# Patient Record
Sex: MALE | Race: WHITE | Employment: OTHER | URBAN - METROPOLITAN AREA
[De-identification: names, ages, dates, MRNs, and addresses within clinical notes are randomized per-mention and may not be internally consistent; named-entity substitution may affect disease eponyms.]

---

## 2017-01-03 ENCOUNTER — OFFICE VISIT (OUTPATIENT)
Dept: NEUROLOGY | Age: 37
End: 2017-01-03

## 2017-01-03 VITALS
RESPIRATION RATE: 20 BRPM | HEART RATE: 83 BPM | BODY MASS INDEX: 18.4 KG/M2 | SYSTOLIC BLOOD PRESSURE: 110 MMHG | HEIGHT: 69 IN | OXYGEN SATURATION: 98 % | WEIGHT: 124.2 LBS | DIASTOLIC BLOOD PRESSURE: 68 MMHG

## 2017-01-03 DIAGNOSIS — G93.49 STATIC ENCEPHALOPATHY: ICD-10-CM

## 2017-01-03 DIAGNOSIS — F41.9 ANXIETY: ICD-10-CM

## 2017-01-03 DIAGNOSIS — R25.1 TREMOR: ICD-10-CM

## 2017-01-03 DIAGNOSIS — M81.0 OSTEOPOROSIS: ICD-10-CM

## 2017-01-03 DIAGNOSIS — G40.109 LOCALIZATION-RELATED FOCAL EPILEPSY WITH SIMPLE PARTIAL SEIZURES (HCC): Primary | ICD-10-CM

## 2017-01-03 RX ORDER — MIRTAZAPINE 7.5 MG/1
7.5 TABLET, FILM COATED ORAL
COMMUNITY
End: 2018-03-27 | Stop reason: ALTCHOICE

## 2017-01-03 NOTE — PATIENT INSTRUCTIONS

## 2017-01-03 NOTE — MR AVS SNAPSHOT
Visit Information Date & Time Provider Department Dept. Phone Encounter #  
 1/3/2017  2:00 PM Matt Moran NP Neurology [facility] [place] [address] [phone] [id number] Follow-up Instructions Return in about 3 months (around 4/3/2017). Your Appointments 2/6/2017  9:30 AM  
ROUTINE CARE with Rafael Rowley DO [facility] Internal Medicine ([address]) Appt Note: 6 month follow up; 6 month follow up [address] [place] [address] [address] [place] [address]  
Reggie Deputado [address]  
  
    
 3/9/2017  1:40 PM  
New Patient with Liam PittsburgCandice sales Neurology [facility] [address] [facility]) Appt Note: new patient cognitive dysfunction/ Fabián [place] [address] [facility] [address] [facility] 99 [address] [phone]  
  
   
 [place] [address] [facility] [address] [id number] [address] [place] Upcoming Health Maintenance Date Due DTaP/Tdap/Td series (1 - Tdap) 11/4/1999 INFLUENZA AGE 9 TO ADULT 8/1/2016 Allergies as of 1/3/2017  Review Complete On: 1/3/2017 By: Matt Moran NP No Known Allergies Current Immunizations  Reviewed on 3/24/2014 Name Date Influenza Vaccine 11/1/2014, 10/22/2013 Influenza Vaccine Split 10/21/2012 Pneumococcal Vaccine (Unspecified Type) 2/28/2012 Not reviewed this visit You Were Diagnosed With   
  
 Codes Comments Localization-related focal epilepsy with simple partial seizures ([facility] [address]    -  Primary ICD-10-CM: G40.109 ICD-9-CM: 345.50 [facility]     ICD-10-CM: G93.49 
ICD-9-CM: 742.9 Osteoporosis     ICD-10-CM: M81.0 ICD-9-CM: 733.00 Anxiety     ICD-10-CM: F41.9 ICD-9-CM: 300.00 Tremor     ICD-10-CM: R25.1 ICD-9-CM: 903. 0 Vitals BP Pulse Resp Height(growth percentile) Weight(growth percentile) SpO2  
 110/68 83 20 5' 9\" (1.753 m) 124 lb 3.2 oz (56.3 kg) 98% BMI Smoking Status 18.34 kg/m2 Never Smoker Vitals History BMI and BSA Data Body Mass Index Body Surface Area  
 18.34 kg/m 2 1.66 m 2 Preferred Pharmacy Pharmacy Name Phone Reggie Lora, [place] [address] [phone] Your Updated Medication List  
  
   
This list is accurate as of: 1/3/17  2:37 PM.  Always use your most recent med list.  
  
  
  
  
 brief disposable Misc Commonly known as:  adult  
by Does Not Apply route. Depends, size medium, 1 nightly  
  
 busPIRone 10 mg tablet Commonly known as:  BUSPAR Take 10 mg by mouth two (2) times a day. CALCITRATE-VITAMIN D tablet Generic drug:  calcium citrate-vitamin D3 TAKE 1 TABLET BY MOUTH TWICE A DAY  
  
 carBAMazepine  mg capsule Commonly known as:  CARBATROL ER Two po QAM and Three po at bedtime  
  
 fluticasone 50 mcg/actuation nasal spray Commonly known as:  Lennice Boss 2 Sprays by Both Nostrils route daily. IMODIUM A-D 2 mg tablet Generic drug:  loperamide Take 2 mg by mouth four (4) times daily as needed for Diarrhea.  
  
 mirtazapine 7.5 mg tablet Commonly known as:  Shelagh Kick Take 7.5 mg by mouth nightly. MUCINEX 600 mg ER tablet Generic drug:  guaiFENesin ER Take 600 mg by mouth daily as needed for Congestion. ondansetron 4 mg disintegrating tablet Commonly known as:  ZOFRAN ODT Take 1 Tab by mouth every eight (8) hours as needed for Nausea. TAB-A-MARGIE tablet Generic drug:  multivitamin TAKE 1 TABLET BY MOUTH DAILY  
  
 TYLENOL 325 mg tablet Generic drug:  acetaminophen Take  by mouth every four (4) hours as needed for Pain. VITAMIN D2 50,000 unit capsule Generic drug:  ergocalciferol TAKE 1 CAPSULE BY MOUTH EVERY 3 MONTHS Follow-up Instructions Return in about 3 months (around 4/3/2017). Patient Instructions A Healthy Lifestyle: Care Instructions Your Care Instructions A healthy lifestyle can help you feel good, stay at a healthy weight, and have plenty of energy for both work and play. A healthy lifestyle is something you can share with your whole family. A healthy lifestyle also can lower your risk for serious health problems, such as high blood pressure, heart disease, and diabetes. You can follow a few steps listed below to improve your health and the health of your family. Follow-up care is a key part of your treatment and safety. Be sure to make and go to all appointments, and call your doctor if you are having problems. Its also a good idea to know your test results and keep a list of the medicines you take. How can you care for yourself at home? · Do not eat too much sugar, fat, or fast foods. You can still have dessert and treats now and then. The goal is moderation. · Start small to improve your eating habits. Pay attention to portion sizes, drink less juice and soda pop, and eat more fruits and vegetables. ¨ Eat a healthy amount of food. A 3-ounce serving of meat, for example, is about the size of a deck of cards. Fill the rest of your plate with vegetables and whole grains. ¨ Limit the amount of soda and sports drinks you have every day. Drink more water when you are thirsty. ¨ Eat at least 5 servings of fruits and vegetables every day. It may seem like a lot, but it is not hard to reach this goal. A serving or helping is 1 piece of fruit, 1 cup of vegetables, or 2 cups of leafy, raw vegetables. Have an apple or some carrot sticks as an afternoon snack instead of a candy bar. Try to have fruits and/or vegetables at every meal. 
· Make exercise part of your daily routine. You may want to start with simple activities, such as walking, bicycling, or slow swimming. Try to be active 30 to 60 minutes every day. You do not need to do all 30 to 60 minutes all at once.  For example, you can exercise 3 times a day for 10 or 20 minutes. Moderate exercise is safe for most people, but it is always a good idea to talk to your doctor before starting an exercise program. 
· Keep moving. Adonay Pemberton the lawn, work in the garden, or [facility]. Take the stairs instead of the elevator at work. · If you smoke, quit. People who smoke have an increased risk for heart attack, stroke, cancer, and other lung illnesses. Quitting is hard, but there are ways to boost your chance of quitting tobacco for good. ¨ Use nicotine gum, patches, or lozenges. ¨ Ask your doctor about stop-smoking programs and medicines. ¨ Keep trying. In addition to reducing your risk of diseases in the future, you will notice some benefits soon after you stop using tobacco. If you have shortness of breath or asthma symptoms, they will likely get better within a few weeks after you quit. · Limit how much alcohol you drink. Moderate amounts of alcohol (up to 2 drinks a day for men, 1 drink a day for women) are okay. But drinking too much can lead to liver problems, high blood pressure, and other health problems. Family health If you have a family, there are many things you can do together to improve your health. · Eat meals together as a family as often as possible. · Eat healthy foods. This includes fruits, vegetables, lean meats and dairy, and whole grains. · Include your family in your fitness plan. Most people think of activities such as jogging or tennis as the way to fitness, but there are many ways you and your family can be more active. Anything that makes you breathe hard and gets your heart pumping is exercise. Here are some tips: 
¨ Walk to do errands or to take your child to school or the bus. ¨ Go for a family bike ride after dinner instead of watching TV. Where can you learn more? Go to http://rachael-william.info/. Enter J220 in the search box to learn more about \"A Healthy Lifestyle: Care Instructions. \" Current as of: July 26, 2016 Content Version: 11.1 © 2764-3123 [employer], [employer]. Care instructions adapted under license by [employer] (which disclaims liability or warranty for this information). If you have questions about a medical condition or this instruction, always ask your healthcare professional. [address] any warranty or liability for your use of this information. Introducing [facility] [place] [facility] HEALTH SERVICES! Dear Ceci Langston: Thank you for requesting a sigmacare account. Our records indicate that you already have an active sigmacare account. You can access your account anytime at [email or web address] Of The Month Did you know that you can access your hospital and ER discharge instructions at any time in sigmacare? You can also review all of your test results from your hospital stay or ER visit. Additional Information If you have questions, please visit the Frequently Asked Questions section of the sigmacare website at [email or web address] Remember, sigmacare is NOT to be used for urgent needs. For medical emergencies, dial 911. Now available from your iPhone and Android! Please provide this summary of care documentation to your next provider. Your primary care clinician is listed as April Lazaro. If you have any questions after today's visit, please call [phone].

## 2017-01-03 NOTE — PROGRESS NOTES
Date:  January 3, 2017    Name:  Fran Collins  :  1980  MRN:  [id number]     PCP:  Solomon Romeo DO    Chief Complaint   Patient presents with    Results     HISTORY OF PRESENT ILLNESS: Follow up after testing. He is accompanied by his mother and caregivers from his group home. No seizures and seems to be doing well in that regard. MRI of the brain was normal. Ultrasound demonstrated a 1.6 cm cyst in the midpole of the right kidney. All lab work was normal. Mother continues to be concerned about weight loss, increase in hand tremors, fatigue, and cognitive change in the face of bowel issues. She thinks that some of his issues are secondary to his psychiatric medications, in particular the Buspar or the Remeron. She further thinks that some of his weight loss may be due to his GI issues. Current Outpatient Prescriptions   Medication Sig    mirtazapine (REMERON) 7.5 mg tablet Take 7.5 mg by mouth nightly.  VITAMIN D2 50,000 unit capsule TAKE 1 CAPSULE BY MOUTH EVERY 3 MONTHS    fluticasone (FLONASE) 50 mcg/actuation nasal spray 2 Sprays by Both Nostrils route daily.  carBAMazepine ER (CARBATROL ER) 100 mg capsule Two po QAM and Three po at bedtime    acetaminophen (TYLENOL) 325 mg tablet Take  by mouth every four (4) hours as needed for Pain.  busPIRone (BUSPAR) 10 mg tablet Take 10 mg by mouth two (2) times a day.  loperamide (IMODIUM A-D) 2 mg tablet Take 2 mg by mouth four (4) times daily as needed for Diarrhea.  guaiFENesin ER (MUCINEX) 600 mg ER tablet Take 600 mg by mouth daily as needed for Congestion.  CALCITRATE-VITAMIN D tablet TAKE 1 TABLET BY MOUTH TWICE A DAY    TAB-A-MARGIE tablet TAKE 1 TABLET BY MOUTH DAILY    ondansetron (ZOFRAN ODT) 4 mg disintegrating tablet Take 1 Tab by mouth every eight (8) hours as needed for Nausea.  brief disposable (ADULT) misc by Does Not Apply route.  Depends, size medium, 1 nightly     No current facility-administered medications for this visit. No Known Allergies  Past Medical History   Diagnosis Date    Depression     Encounter for long-term (current) use of other medications 5/14/2011    Fecal incontinence     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, without mention of intractable epilepsy 5/14/2011    Mental retardation     Osteoporosis     Other ill-defined conditions(799.89)      intestinal problems    Psychiatric disorder      anxiety    Seizure disorder [facility])      Past Surgical History   Procedure Laterality Date    Hx other surgical       imperforated anus birth defect    Hx appendectomy  1/28/13     APPENDECTOMY    Hx hernia repair      Hx gi       colon resection    Hx gi  1/28/13     LAPAROTOMY EXPLORATORY - OPEN LYSIS OF ADHESIONS      Social History     Social History    Marital status: SINGLE     Spouse name: N/A    Number of children: N/A    Years of education: N/A     Occupational History    Not on file. Social History Main Topics    Smoking status: Never Smoker    Smokeless tobacco: Never Used    Alcohol use No    Drug use: No    Sexual activity: No     Other Topics Concern    Not on file     Social History Narrative     History reviewed. No pertinent family history. PHYSICAL EXAMINATION:    Visit Vitals    /68    Pulse 83    Resp 20    Ht 5' 9\" (1.753 m)    Wt 56.3 kg (124 lb 3.2 oz)    SpO2 98%    BMI 18.34 kg/m2     General: Well defined, nourished, and groomed individual in no acute distress. Neck: Supple, nontender, no bruits, no pain with resistance to active range of motion. Heart: Regular rate and rhythm, no murmurs, rub, or gallop. Normal S1S2. Lungs: Clear to auscultation bilaterally with equal chest expansion, no cough, no wheeze  Musculoskeletal: Extremities revealed no edema and had full range of motion of joints.    Psych: Good mood and bright affect      NEUROLOGICAL EXAMINATION:   Mental Status: Alert and oriented to person and place   Cranial Nerves:   II, III, IV, VI: Visual acuity grossly intact. Visual fields are normal.   Pupils are equal, round, and reactive to light and accommodation. Extra-ocular movements are full and fluid. Fundoscopic exam was benign, no ptosis or nystagmus. V-XII: Hearing is grossly intact. Facial features are symmetric, with normal sensation and strength. The palate rises symmetrically and the tongue protrudes midline. Sternocleidomastoids 5/5. Motor Examination: decreased tone and bulk with generalized weakness. Coordination: Finger to nose was normal. No resting or intention tremor  Gait and Station: Steady while walking. Normal arm swing. No pronator drift. No muscle wasting or fasiculations noted. Reflexes: DTRs 2+ throughout. ASSESSMENT AND PLAN    ICD-10-CM ICD-9-CM    1. Localization-related focal epilepsy with simple partial seizures (HCC) G40.109 345.50    2. Static encephalopathy (HCC) G93.49 742.9    3. Osteoporosis M81.0 733.00    4. Anxiety F41.9 300.00    5. Tremor R25.1 781.0      From a neurological standpoint, he is stable. No seizures on present therapy and normal MRI of the brain. Recommended he follow up with his PCP regarding the fatigue and weight loss as well as with the gastroenterologist for the issues with the stomach problems he has been having. They should discuss the psychiatric medications with the psychiatrist. Follow up in neurology in six months. Cielo Dunbar

## 2017-01-06 ENCOUNTER — TELEPHONE (OUTPATIENT)
Dept: INTERNAL MEDICINE CLINIC | Age: 37
End: 2017-01-06

## 2017-01-06 DIAGNOSIS — J30.9 ALLERGIC RHINITIS, UNSPECIFIED ALLERGIC RHINITIS TRIGGER, UNSPECIFIED RHINITIS SEASONALITY: ICD-10-CM

## 2017-01-06 RX ORDER — FLUTICASONE PROPIONATE 50 MCG
2 SPRAY, SUSPENSION (ML) NASAL
Qty: 1 BOTTLE | Refills: 0 | Status: SHIPPED | OUTPATIENT
Start: 2017-01-06 | End: 2017-12-19 | Stop reason: SDUPTHER

## 2017-01-06 NOTE — TELEPHONE ENCOUNTER
They need the flonase written as an as needed not daily. For the group home to stop they need a new script.  Please write and fax [phone] attention yumiko

## 2017-02-06 ENCOUNTER — OFFICE VISIT (OUTPATIENT)
Dept: INTERNAL MEDICINE CLINIC | Age: 37
End: 2017-02-06

## 2017-02-06 VITALS
HEART RATE: 87 BPM | HEIGHT: 69 IN | OXYGEN SATURATION: 97 % | WEIGHT: 118 LBS | RESPIRATION RATE: 18 BRPM | DIASTOLIC BLOOD PRESSURE: 44 MMHG | BODY MASS INDEX: 17.48 KG/M2 | TEMPERATURE: 97 F | SYSTOLIC BLOOD PRESSURE: 79 MMHG

## 2017-02-06 DIAGNOSIS — E55.9 VITAMIN D DEFICIENCY: ICD-10-CM

## 2017-02-06 DIAGNOSIS — R63.4 WEIGHT LOSS: Primary | ICD-10-CM

## 2017-02-06 DIAGNOSIS — R15.9 STOOL INCONTINENCE: ICD-10-CM

## 2017-02-06 DIAGNOSIS — F41.9 ANXIETY: ICD-10-CM

## 2017-02-06 DIAGNOSIS — F32.A DEPRESSION, UNSPECIFIED DEPRESSION TYPE: ICD-10-CM

## 2017-02-06 DIAGNOSIS — Z87.19 S/P SMALL BOWEL OBSTRUCTION: ICD-10-CM

## 2017-02-06 DIAGNOSIS — M81.0 OSTEOPOROSIS: ICD-10-CM

## 2017-02-06 DIAGNOSIS — G40.109 LOCALIZATION-RELATED FOCAL EPILEPSY WITH SIMPLE PARTIAL SEIZURES (HCC): ICD-10-CM

## 2017-02-06 NOTE — PROGRESS NOTES
Reviewed record in preparation for visit and have obtained necessary documentation. Identified pt with two pt identifiers(name and ). Health Maintenance Due   Topic    DTaP/Tdap/Td series (1 - Tdap)    INFLUENZA AGE 9 TO ADULT          No chief complaint on file. Learning Assessment:  :     Learning Assessment 1/3/2017 2016 2016 3/24/2014   PRIMARY LEARNER Patient Patient Patient Other   [facility] CAREGIVER - - - Yes   PRIMARY LANGUAGE ENGLISH ENGLISH ENGLISH ENGLISH   LEARNER PREFERENCE PRIMARY READING READING READING OTHER (COMMENT)   ANSWERED BY [facility]   RELATIONSHIP SELF SELF SELF OTHER       Depression Screening:  :     No flowsheet data found. Fall Risk Assessment:  :     No flowsheet data found. Abuse Screening:  :     No flowsheet data found. Coordination of Care Questionnaire:  :     1) Have you been to an emergency room, urgent care clinic since your last visit? no   Hospitalized since your last visit? no             2) Have you seen or consulted any other health care providers outside of [address] [place] [address] since your last visit? no  (Include any pap smears or colon screenings in this section.)    3) Do you have an Advance Directive on file? no    4) Are you interested in receiving information on Advance Directives? NO      Patient is accompanied by adult caretaker I have received verbal consent from Susannah Castillo to discuss any/all medical information while they are present in the room.

## 2017-02-06 NOTE — MR AVS SNAPSHOT
Visit Information Date & Time Provider Department Dept. Phone Encounter #  
 2/6/2017  9:30 AM Deandra Gao, [facility] Internal Medicine [phone] [id number] Follow-up Instructions Return in about 3 months (around 5/6/2017). Your Appointments 3/9/2017  1:40 PM  
New Patient with Ykm Abbe, PsyD Neurology [place] [address] [address] [place] [address]) Appt Note: new patient cognitive dysfunction/ Fabián [place] [address] [place] [place] [address] [phone]  
  
   
 [place] [address]  
  
    
 4/3/2017  1:30 PM  
Follow Up with Caryn Muñoz NP Neurology [address]) Appt Note: 3 mo epilepsy f/u. ..[facility] [address] [place] [address] [phone]  
  
   
 [place] [address] [address] [address] [place] Upcoming Health Maintenance Date Due DTaP/Tdap/Td series (1 - Tdap) 11/4/1999 INFLUENZA AGE 9 TO ADULT 8/1/2016 Allergies as of 2/6/2017  Review Complete On: 2/6/2017 By: Deandra Gao, DO No Known Allergies Current Immunizations  Reviewed on 2/6/2017 Name Date Influenza Vaccine 10/15/2016, 11/1/2014, 10/22/2013 Influenza Vaccine Split 10/21/2012 Pneumococcal Vaccine (Unspecified Type) 2/28/2012 Reviewed by Narciso Wang on 2/6/2017 at  9:33 AM  
You Were Diagnosed With   
  
 Codes Comments Weight loss    -  Primary ICD-10-CM: R63.4 ICD-9-CM: 783.21 Localization-related focal epilepsy with simple partial seizures ([facility] [address]     ICD-10-CM: G40.109 ICD-9-CM: 345.50 S/p small bowel obstruction     ICD-10-CM: Z87.19 ICD-9-CM: V12.79 Stool incontinence     ICD-10-CM: R15.9 ICD-9-CM: 787.60 Anxiety     ICD-10-CM: F41.9 ICD-9-CM: 300.00 Osteoporosis     ICD-10-CM: M81.0 ICD-9-CM: 733.00 Depression, unspecified depression type     ICD-10-CM: F32.9 ICD-9-CM: 767 Vitamin D deficiency     ICD-10-CM: E55.9 ICD-9-CM: 268.9 Vitals BP Pulse Temp Resp Height(growth percentile) Weight(growth percentile) (!) 79/44 (BP 1 Location: Left arm, BP Patient Position: Sitting) 87 97 °F (36.1 °C) (Oral) 18 5' 9\" (1.753 m) 118 lb (53.5 kg) SpO2 BMI Smoking Status 97% 17.43 kg/m2 Never Smoker Vitals History BMI and BSA Data Body Mass Index Body Surface Area  
 17.43 kg/m 2 1.61 m 2 Preferred Pharmacy Pharmacy Name Phone Reggie [address]ters [address] [phone] Your Updated Medication List  
  
   
This list is accurate as of: 2/6/17  9:57 AM.  Always use your most recent med list.  
  
  
  
  
 BENEFIBER CLEAR SF (DEXTRIN) PO Take  by mouth. brief disposable Misc Commonly known as:  adult  
by Does Not Apply route. Depends, size medium, 1 nightly  
  
 busPIRone 10 mg tablet Commonly known as:  BUSPAR Take 10 mg by mouth two (2) times a day. CALCITRATE-VITAMIN D tablet Generic drug:  calcium citrate-vitamin D3 TAKE 1 TABLET BY MOUTH TWICE A DAY  
  
 carBAMazepine  mg capsule Commonly known as:  CARBATROL ER Two po QAM and Three po at bedtime  
  
 fluticasone 50 mcg/actuation nasal spray Commonly known as:  Ronald Gut 2 Sprays by Both Nostrils route daily as needed for Rhinitis. IMODIUM A-D 2 mg tablet Generic drug:  loperamide Take 2 mg by mouth four (4) times daily as needed for Diarrhea.  
  
 mirtazapine 7.5 mg tablet Commonly known as:  Carson Doles Take 7.5 mg by mouth nightly. MUCINEX 600 mg ER tablet Generic drug:  guaiFENesin ER Take 600 mg by mouth daily as needed for Congestion. ondansetron 4 mg disintegrating tablet Commonly known as:  ZOFRAN ODT Take 1 Tab by mouth every eight (8) hours as needed for Nausea. TAB-A-MARGIE tablet Generic drug:  multivitamin TAKE 1 TABLET BY MOUTH DAILY TYLENOL 325 mg tablet Generic drug:  acetaminophen Take  by mouth every four (4) hours as needed for Pain. VITAMIN D2 50,000 unit capsule Generic drug:  ergocalciferol TAKE 1 CAPSULE BY MOUTH EVERY 3 MONTHS Follow-up Instructions Return in about 3 months (around 5/6/2017). Introducing [facility] & [facility] SERVICES! Dear NATHANIEL: Thank you for requesting a LettuceThinner account. Our records indicate that you already have an active LettuceThinner account. You can access your account anytime at [email or web address] [email or web address]/The [email or web address] Did you know that you can access your hospital and ER discharge instructions at any time in LettuceThinner? You can also review all of your test results from your hospital stay or ER visit. Additional Information If you have questions, please visit the Frequently Asked Questions section of the LettuceThinner website at [email or web address] Remember, LettuceThinner is NOT to be used for urgent needs. For medical emergencies, dial 911. Now available from your iPhone and Android! Please provide this summary of care documentation to your next provider. Your primary care clinician is listed as Radha Marcelino. If you have any questions after today's visit, please call [phone].

## 2017-02-06 NOTE — PROGRESS NOTES
HISTORY OF PRESENT ILLNESS  Bk Fenton is a 43 y.o. male. Pt. comes in with his mother and  from his AL for f/u. Has multiple medical problems. Reports continued issues with stool incontinence and frequent loose stools. Has lost a lot of wt. His BP is low. Denies any pain, N,V,blood, dizziness or falls. Has had multiple intestine surgeries as a child and most recently had a SBO. Followed by colorectal MD. Has an appointment with GI MD. Had recent extensive labs that I reviewed. All look stable including Albumin. Sodium was 147. Brain MRI and abd US were normal. Has been taking fiber supplement but not drinking much fluids. No recent seizures. Followed by psych, and neurology. Has chronic psychosomatic c/o's. Also started on Remeron and taking less Buspar thru psychiatrist. On Vit D for osteoporosis. Reports compliance with medications and diet. Med list and most recent labs/studies reviewed with them. Trying to be active physically. No other new c/o. Follow Up Chronic Condition   Pertinent negatives include no chest pain, no abdominal pain, no headaches and no shortness of breath. Weight Loss   Pertinent negatives include no chest pain, no abdominal pain, no headaches and no shortness of breath. Seizure    Associated symptoms include diarrhea. Pertinent negatives include no headaches, no chest pain, no nausea and no vomiting. He reports diarrhea. He reports no chest pain, no vomiting, no headaches. Bowel Incontinence   Pertinent negatives include no chest pain, no abdominal pain, no headaches and no shortness of breath. Anxiety   Pertinent negatives include no chest pain, no abdominal pain, no headaches and no shortness of breath. Review of Systems   Constitutional: Positive for weight loss. Negative for fever and malaise/fatigue. Eyes: Negative for blurred vision. Respiratory: Negative for shortness of breath. Cardiovascular: Negative for chest pain and leg swelling. Gastrointestinal: Positive for diarrhea. Negative for abdominal pain, blood in stool, heartburn, melena, nausea and vomiting. Stool incontinence   Genitourinary: Negative for dysuria and urgency. Musculoskeletal: Negative for back pain, falls and joint pain. Skin: Negative for rash. Neurological: Positive for tremors and seizures. Negative for dizziness, sensory change and headaches. Endo/Heme/Allergies: Negative. Psychiatric/Behavioral: Positive for depression. The patient is nervous/anxious. The patient does not have insomnia. All other systems reviewed and are negative. Physical Exam   Constitutional: He is oriented to person, place, and time. He appears well-developed and well-nourished. No distress. Pleasant,  cognitivelty a bit slow   HENT:   Head: Normocephalic and atraumatic. Mouth/Throat: Oropharynx is clear and moist.   Eyes: Conjunctivae are normal. No scleral icterus. Neck: Normal range of motion. Neck supple. No JVD present. No thyromegaly present. Cardiovascular: Normal rate, regular rhythm, normal heart sounds and intact distal pulses. No murmur heard. Pulmonary/Chest: Effort normal and breath sounds normal. No respiratory distress. He has no wheezes. He has no rales. He exhibits no tenderness. Abdominal: Soft. Bowel sounds are normal. He exhibits no distension. There is no tenderness. Chronic surgical scars   Genitourinary: Rectal exam shows guaiac negative stool. Genitourinary Comments: Stool in underwear with incontinence  Surgical scars in anal area   Musculoskeletal: He exhibits no edema or tenderness. Neurological: He is alert and oriented to person, place, and time. No cranial nerve deficit. Coordination abnormal.   Skin: Skin is warm and dry. No rash noted. Psychiatric: His behavior is normal.   Repeats words  Chronic cognitive deficit may be a bit worse   Nursing note and vitals reviewed.       ASSESSMENT and Tiffanie Chad was seen today for follow up chronic condition, weight loss, seizure, bowel incontinence and anxiety. Diagnoses and all orders for this visit:    Weight loss    Localization-related focal epilepsy with simple partial seizures ([facility] [address]    S/p small bowel obstruction    Stool incontinence    Anxiety    Osteoporosis    Depression, unspecified depression type    Vitamin D deficiency      Follow-up Disposition:  Return in about 3 months (around 5/6/2017).    lab results and schedule of future lab studies reviewed with patient  reviewed diet, exercise and weight control  reviewed medications and side effects in detail  Reviewed all recent studies with them in detail  Today's exam is stable  F/u with other MD's as scheduled  I wonder if he has malabsorption  Also his underlying psychiatric issues may be playing a role

## 2017-03-09 ENCOUNTER — OFFICE VISIT (OUTPATIENT)
Dept: NEUROLOGY | Age: 37
End: 2017-03-09

## 2017-03-09 DIAGNOSIS — F41.9 ANXIETY: ICD-10-CM

## 2017-03-09 DIAGNOSIS — G93.49 STATIC ENCEPHALOPATHY: ICD-10-CM

## 2017-03-09 DIAGNOSIS — R41.3 MEMORY LOSS: Primary | ICD-10-CM

## 2017-03-09 NOTE — PROGRESS NOTES
[address] [facility],[address]  [address] [facility]   [address] [facility] [address] [facility]   Jose Alvarez   [phone] Office   [phone] Fax      Neuropsychology    Initial Diagnostic Interview Note      Referral:  Shyannike Aquino DO, Nell Sprout, KRISTINE    Janel Micheal is a 43 y.o. right handed  male who was accompanied by his caregiver to the initial clinical interview on 3/9/17. Please refer to his medical records for details pertaining to his history. Briefly, the patient reported that he completed high school. He has epilepsy and tremors and cognitive changes. He finished high school and then went to a transition program from age 22. He has abnormal CT of abdomen and does not have much bowel control. They are seeing a major decline in his memory when he was dealing with his health decline. He knows something is wrong but cannot articulate. See also records. He has lost 25 pounds recently. Bilateral tremors getting worse. Ashen in appearance. Lethargy and fatigue. HE is on Buspar. Had been doing well for years and years but now there is a decline since there is a colon/rectal issue. Memory declining. Forgets completely. Feels like he is dementia. Has to be cues for everything, medications, day-to-day ADLs, etc.      Spoke with mother as well. Could be an absorption issue? Endoscopy March 17th. Will await those results. Then needs testing done. Better than he was before he got sick in terms of need for cues and such but better but not back to baseline. Psychological was done about 9 years ago. IQ found in the 62s.       MMSE: 24/30    Clock: Impaired    TOPF:      Historian: Fair  Praxis: No UE apraxia  R/L Orientation: Intact to self and to other  Dress: within normal limits   Weight: within normal limits   Appearance/Hygiene: within normal limits   Gait: Short steps, unsteady, needs direction  Assistive Devices: Glasses  Mood: within normal limits   Affect: within normal limits   Comprehension: mildly to moderately impaired  Thought Process: tangential needs redirection  Expressive Language: speaks little  Receptive Language: within normal limits   Motor:  No cognitive or motor perseveration  ETOH: Denied  Tobacco: Denied  Illicit: Demoed  SI/HI: Denied  Psychosis: Denied  Insight: Fair  Judgment: TBD, impaired appearing  Other Psych:      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, without mention of intractable epilepsy 5/14/2011    Mental retardation     Osteoporosis     Other ill-defined conditions(799.89)     intestinal problems    Psychiatric disorder     anxiety    Seizure disorder [facility])        Past Surgical History:   Procedure Laterality Date    HX APPENDECTOMY  1/28/13    APPENDECTOMY    HX GI      colon resection    HX GI  1/28/13    LAPAROTOMY EXPLORATORY - OPEN LYSIS OF ADHESIONS     HX HERNIA REPAIR      HX OTHER SURGICAL      imperforated anus birth defect       No Known Allergies    No family history on file. Social History   Substance Use Topics    Smoking status: Never Smoker    Smokeless tobacco: Never Used    Alcohol use No       Current Outpatient Prescriptions   Medication Sig Dispense Refill    WHEAT DEXTRIN (BENEFIBER CLEAR SF, DEXTRIN, PO) Take  by mouth.  fluticasone (FLONASE) 50 mcg/actuation nasal spray 2 Sprays by Both Nostrils route daily as needed for Rhinitis. 1 Bottle 0    mirtazapine (REMERON) 7.5 mg tablet Take 7.5 mg by mouth nightly.       VITAMIN D2 50,000 unit capsule TAKE 1 CAPSULE BY MOUTH EVERY 3 MONTHS 1 Cap 0    carBAMazepine ER (CARBATROL ER) 100 mg capsule Two po QAM and Three po at bedtime 150 Cap 11    acetaminophen (TYLENOL) 325 mg tablet Take  by mouth every four (4) hours as needed for Pain.  busPIRone (BUSPAR) 10 mg tablet Take 10 mg by mouth two (2) times a day.  loperamide (IMODIUM A-D) 2 mg tablet Take 2 mg by mouth four (4) times daily as needed for Diarrhea.  guaiFENesin ER (MUCINEX) 600 mg ER tablet Take 600 mg by mouth daily as needed for Congestion.  CALCITRATE-VITAMIN D tablet TAKE 1 TABLET BY MOUTH TWICE A DAY 60 Tab PRN    TAB-A-MARGIE tablet TAKE 1 TABLET BY MOUTH DAILY 30 Tab PRN    ondansetron (ZOFRAN ODT) 4 mg disintegrating tablet Take 1 Tab by mouth every eight (8) hours as needed for Nausea. 12 Tab 0    brief disposable (ADULT) misc by Does Not Apply route. Depends, size medium, 1 nightly 1 Package 11         Plan:  Obtain authorization for testing from insurance company. Report to follow once testing, scoring, and interpretation completed. ? Organic based neurocognitive issues versus mood disorder or combination of same. ? Problems organic, functional, or both? This note will not be viewable in [address]

## 2017-03-17 ENCOUNTER — HOSPITAL ENCOUNTER (OUTPATIENT)
Age: 37
Setting detail: OUTPATIENT SURGERY
Discharge: HOME | End: 2017-03-17
Attending: INTERNAL MEDICINE | Admitting: INTERNAL MEDICINE
Payer: MEDICARE

## 2017-03-17 ENCOUNTER — SURGERY (OUTPATIENT)
Age: 37
End: 2017-03-17

## 2017-03-17 ENCOUNTER — ANESTHESIA (OUTPATIENT)
Dept: ENDOSCOPY | Age: 37
End: 2017-03-17
Payer: MEDICARE

## 2017-03-17 ENCOUNTER — ANESTHESIA EVENT (OUTPATIENT)
Dept: ENDOSCOPY | Age: 37
End: 2017-03-17
Payer: MEDICARE

## 2017-03-17 VITALS
SYSTOLIC BLOOD PRESSURE: 112 MMHG | HEIGHT: 69 IN | BODY MASS INDEX: 17.48 KG/M2 | OXYGEN SATURATION: 100 % | TEMPERATURE: 98.4 F | DIASTOLIC BLOOD PRESSURE: 70 MMHG | HEART RATE: 84 BPM | RESPIRATION RATE: 14 BRPM | WEIGHT: 118 LBS

## 2017-03-17 LAB
H PYLORI FROM TISSUE: NEGATIVE
KIT LOT NO., HCLOLOT: NORMAL
NEGATIVE CONTROL: NORMAL
POSITIVE CONTROL: NORMAL

## 2017-03-17 PROCEDURE — 87077 CULTURE AEROBIC IDENTIFY: CPT | Performed by: INTERNAL MEDICINE

## 2017-03-17 PROCEDURE — 74011250636 HC RX REV CODE- 250/636: Performed by: INTERNAL MEDICINE

## 2017-03-17 PROCEDURE — 74011250636 HC RX REV CODE- 250/636

## 2017-03-17 PROCEDURE — 77030019988 HC FCPS ENDOSC DISP BSC -B: Performed by: INTERNAL MEDICINE

## 2017-03-17 PROCEDURE — 74011000250 HC RX REV CODE- 250

## 2017-03-17 PROCEDURE — 76060000031 HC ANESTHESIA FIRST 0.5 HR: Performed by: INTERNAL MEDICINE

## 2017-03-17 PROCEDURE — 76040000019: Performed by: INTERNAL MEDICINE

## 2017-03-17 PROCEDURE — 88305 TISSUE EXAM BY PATHOLOGIST: CPT | Performed by: INTERNAL MEDICINE

## 2017-03-17 RX ORDER — SODIUM CHLORIDE 9 MG/ML
50 INJECTION, SOLUTION INTRAVENOUS CONTINUOUS
Status: DISCONTINUED | OUTPATIENT
Start: 2017-03-17 | End: 2017-03-17 | Stop reason: HOSPADM

## 2017-03-17 RX ORDER — MIDAZOLAM HYDROCHLORIDE 1 MG/ML
1-2 INJECTION, SOLUTION INTRAMUSCULAR; INTRAVENOUS
Status: DISCONTINUED | OUTPATIENT
Start: 2017-03-17 | End: 2017-03-17 | Stop reason: HOSPADM

## 2017-03-17 RX ORDER — LIDOCAINE HYDROCHLORIDE 20 MG/ML
INJECTION, SOLUTION EPIDURAL; INFILTRATION; INTRACAUDAL; PERINEURAL AS NEEDED
Status: DISCONTINUED | OUTPATIENT
Start: 2017-03-17 | End: 2017-03-17 | Stop reason: HOSPADM

## 2017-03-17 RX ORDER — SODIUM CHLORIDE 0.9 % (FLUSH) 0.9 %
5-10 SYRINGE (ML) INJECTION AS NEEDED
Status: DISCONTINUED | OUTPATIENT
Start: 2017-03-17 | End: 2017-03-17 | Stop reason: HOSPADM

## 2017-03-17 RX ORDER — PROPOFOL 10 MG/ML
INJECTION, EMULSION INTRAVENOUS AS NEEDED
Status: DISCONTINUED | OUTPATIENT
Start: 2017-03-17 | End: 2017-03-17 | Stop reason: HOSPADM

## 2017-03-17 RX ORDER — SODIUM CHLORIDE 0.9 % (FLUSH) 0.9 %
5-10 SYRINGE (ML) INJECTION EVERY 8 HOURS
Status: DISCONTINUED | OUTPATIENT
Start: 2017-03-17 | End: 2017-03-17 | Stop reason: HOSPADM

## 2017-03-17 RX ADMIN — LIDOCAINE HYDROCHLORIDE 60 MG: 20 INJECTION, SOLUTION EPIDURAL; INFILTRATION; INTRACAUDAL; PERINEURAL at 09:17

## 2017-03-17 RX ADMIN — SODIUM CHLORIDE 50 ML/HR: 900 INJECTION, SOLUTION INTRAVENOUS at 09:13

## 2017-03-17 RX ADMIN — PROPOFOL 140 MG: 10 INJECTION, EMULSION INTRAVENOUS at 09:30

## 2017-03-17 NOTE — PROGRESS NOTES
[facility]  1980  [id number]    Situation:  Verbal report received from: Damon López  Procedure: Procedure(s):  ESOPHAGOGASTRODUODENOSCOPY (EGD)  ESOPHAGOGASTRODUODENAL (EGD) BIOPSY    Background:    Preoperative diagnosis: ABNORMAL WEIGHT LOSS, MALNUTRITION  Postoperative diagnosis: Esophagitis    :  Dr. Rebecca Noonan  Assistant(s): Endoscopy Technician-1: Amy Marrero  Endoscopy RN-1: Zelalem Hodgson RN    Specimens:   ID Type Source Tests Collected by Time Destination   1 : Meenakshi Scott MD 3/17/2017 1915 Pathology   2 : [address] Myrna Pereira MD 3/17/2017 6762 Pathology     H. Pylori  yes    Assessment:  Intra-procedure medications     Anesthesia gave intra-procedure sedation and medications, see anesthesia flow sheet yes    Intravenous fluids: NS@ KVO     Vital signs stable     Abdominal assessment: round and soft     Recommendation:  Discharge patient per MD order.     Family or Friend   Permission to share finding with family or friend yes

## 2017-03-17 NOTE — IP AVS SNAPSHOT
[address] [facility] 
[phone] Patient: Rangel Woods MRN: [id number] YII:[birth year] You are allergic to the following No active allergies Recent Documentation Height Weight BMI Smoking Status 1.753 m 53.5 kg 17.43 kg/m2 Never Smoker Emergency Contacts Name Discharge Info Relation Home Work [facility] DISCHARGE CAREGIVER [3] Mother [14] [phone] Suha Ramos   [phone] About your hospitalization You were admitted on:  March 17, 2017 You last received care in the:  [facility] ENDOSCOPY You were discharged on:  March 17, 2017 Unit phone number:  [phone] Why you were hospitalized Your primary diagnosis was:  Not on File Providers Seen During Your Hospitalizations Provider Role Specialty Primary office phone Noel Jacobson MD Attending Provider Gastroenterology [phone] Your Primary Care Physician (PCP) Primary Care Physician Office Phone Office Fax Caity Lora [phone] Follow-up Information Follow up With Details Comments Contact Info Annamarie Moore DO   [address] [facility] [address] [place] [address] 
[phone] Your Appointments Wednesday March 22, 2017  8:00 AM EDT Any with Yuli Petty PsyD Neurology [address] [facility]) [place] [address] [id number] [phone] Monday April 03, 2017  1:30 PM EDT Follow Up with Veronica Doran NP Neurology [address] [facility]) [place] [address] [id number] [phone] Current Discharge Medication List  
  
CONTINUE these medications which have NOT CHANGED Dose & Instructions Dispensing Information Comments Morning Noon Evening Bedtime BENEFIBER CLEAR SF (DEXTRIN) PO Your last dose was: Your next dose is: Take  by mouth. Refills:  0  
     
   
   
   
  
 brief disposable Misc Commonly known as:  adult Your last dose was: Your next dose is:    
   
   
 by Does Not Apply route. Depends, size medium, 1 nightly Quantity:  1 Package Refills:  11  
     
   
   
   
  
 busPIRone 10 mg tablet Commonly known as:  BUSPAR Your last dose was: Your next dose is:    
   
   
 Dose:  10 mg Take 10 mg by mouth two (2) times a day. Refills:  0  
     
   
   
   
  
 CALCITRATE-VITAMIN D tablet Generic drug:  calcium citrate-vitamin D3 Your last dose was: Your next dose is: TAKE 1 TABLET BY MOUTH TWICE A DAY Quantity:  60 Tab Refills:  PRN  
     
   
   
   
  
 carBAMazepine  mg capsule Commonly known as:  CARBATROL ER Your last dose was: Your next dose is:    
   
   
 Two po QAM and Three po at bedtime Quantity:  150 Cap Refills:  11  
     
   
   
   
  
 fluticasone 50 mcg/actuation nasal spray Commonly known as:  Tillman Blizzard Your last dose was: Your next dose is:    
   
   
 Dose:  2 Spray 2 Sprays by Both Nostrils route daily as needed for Rhinitis. Quantity:  1 Bottle Refills:  0 IMODIUM A-D 2 mg tablet Generic drug:  loperamide Your last dose was: Your next dose is:    
   
   
 Dose:  2 mg Take 2 mg by mouth four (4) times daily as needed for Diarrhea. Refills:  0  
     
   
   
   
  
 mirtazapine 7.5 mg tablet Commonly known as:  Mitzy Garcia Your last dose was: Your next dose is:    
   
   
 Dose:  7.5 mg Take 7.5 mg by mouth nightly. Refills:  0 MUCINEX 600 mg ER tablet Generic drug:  guaiFENesin ER Your last dose was:     
   
Your next dose is:    
   
   
 Dose:  600 mg  
 Take 600 mg by mouth daily as needed for Congestion. Refills:  0  
     
   
   
   
  
 ondansetron 4 mg disintegrating tablet Commonly known as:  ZOFRAN ODT Your last dose was: Your next dose is:    
   
   
 Dose:  4 mg Take 1 Tab by mouth every eight (8) hours as needed for Nausea. Quantity:  12 Tab Refills:  0  
     
   
   
   
  
 TAB-A-MARGIE tablet Generic drug:  multivitamin Your last dose was: Your next dose is: TAKE 1 TABLET BY MOUTH DAILY Quantity:  30 Tab Refills:  PRN  
     
   
   
   
  
 TYLENOL 325 mg tablet Generic drug:  acetaminophen Your last dose was: Your next dose is: Take  by mouth every four (4) hours as needed for Pain. Refills:  0  
     
   
   
   
  
 VITAMIN D2 50,000 unit capsule Generic drug:  ergocalciferol Your last dose was: Your next dose is: TAKE 1 CAPSULE BY MOUTH EVERY 3 MONTHS Quantity:  1 Cap Refills:  0 Discharge Instructions Mohinder Myers MD 
[facility], [address] [place] [address] 
[phone] 
[email or web address] Bk Fenton 
[id number] 
1980 EGD DISCHARGE INSTRUCTIONS Discomfort: 
Sore throat- throat lozenges or warm salt water gargle 
redness at IV site- apply warm compress to area; if redness or soreness persist- contact your physician Gaseous discomfort- walking, belching will help relieve any discomfort You may not operate a vehicle for 12 hours You may not engage in an occupation involving machinery or appliances for rest of today You may not drink alcoholic beverages for at least 12 hours Avoid making any critical decisions for at least 24 hour DIET You may have anything by mouth. You may eat and drink immediately.  
You may resume your regular diet  however -  remember your colon is empty and a heavy meal will produce gas. Avoid these foods:  vegetables, fried / greasy foods, carbonated drinks ACTIVITY You may resume your normal daily activities Spend the remainder of the day resting -  avoid any strenuous activity. CALL M.D. ANY SIGN OF Increasing pain, nausea, vomiting Abdominal distension (swelling) New increased bleeding (oral or rectal) Fever (chills) Pain in chest area Bloody discharge from nose or mouth Shortness of breath Follow-up Instructions: 
 Call Dr. Sha Car for any questions or problems. Telephone # [phone] Dr. Asha Castellanos office will notify you of the biopsy results available  Within 7 to 10 days. We will call you or send a letter Continue same medications. ENDOSCOPY FINDINGS: 
 Your endoscopy showed just some mild esophagitis which we biopsied and also biopsied to check for celiac sprue. DISCHARGE SUMMARY from Nurse The following personal items collected during your admission are returned to you:  
Dental Appliance: Dental Appliances: None Vision: Visual Aid: Glasses Hearing Aid:   
Jewelry:   
Clothing:   
Other Valuables:   
Valuables sent to safe: [employer] Activation Thank you for requesting access to meQuilibrium. Please follow the instructions below to securely access and download your online medical record. meQuilibrium allows you to send messages to your doctor, view your test results, renew your prescriptions, schedule appointments, and more. How Do I Sign Up? 1. In your internet browser, go to [email or web address] 
2. Click on the First Time User? Click Here link in the Sign In box. You will be redirect to the New Member Sign Up page. 3. Enter your meQuilibrium Access Code exactly as it appears below. You will not need to use this code after youve completed the sign-up process. If you do not sign up before the expiration date, you must request a new code. meQuilibrium Access Code: Activation code not generated Current Intentiva Status: Active (This is the date your Intentiva access code will ) 4. Enter the last four digits of your Social Security Number (xxxx) and Date of Birth (mm/dd/yyyy) as indicated and click Submit. You will be taken to the next sign-up page. 5. Create a Caster Venturest ID. This will be your Intentiva login ID and cannot be changed, so think of one that is secure and easy to remember. 6. Create a Intentiva password. You can change your password at any time. 7. Enter your Password Reset Question and Answer. This can be used at a later time if you forget your password. 8. Enter your e-mail address. You will receive e-mail notification when new information is available in [address] 9. Click Sign Up. You can now view and download portions of your medical record. 10. Click the Download Summary menu link to download a portable copy of your medical information. Additional Information If you have questions, please visit the Frequently Asked Questions section of the Intentiva website at [email or web address] Remember, Intentiva is NOT to be used for urgent needs. For medical emergencies, dial 911. Discharge Orders None ACO Transitions of Care Introducing [employer] [address] Edilma Urbano offers a voluntary care coordination program to provide high quality service and care to [facility] fee-for-service beneficiaries. Viry Ferris was designed to help you enhance your health and well-being through the following services: ? Transitions of Care  support for individuals who are transitioning from one care setting to another (example: Hospital to home). ? Chronic and Complex Care Coordination  support for individuals and caregivers of those with serious or chronic illnesses or with more than one chronic (ongoing) condition and those who take a number of different medications. If you meet specific medical criteria, a [address] [place] [address] Rd may call you directly to coordinate your care with your primary care physician and your other care providers. For questions about the [facility] programs, please, contact your physicians office. For general questions or additional information about Accountable Care Organizations: 
Please visit www.medicare.gov/acos. html or call 1-800-MEDICARE (4-759.794.2880) TTY users should call 1-541.212.3590. Introducing [facility] & HEALTH SERVICES! Dear Radha Ryan: Thank you for requesting a UsTrendy account. Our records indicate that you already have an active UsTrendy account. You can access your account anytime at [email or web address] Did you know that you can access your hospital and ER discharge instructions at any time in UsTrendy? You can also review all of your test results from your hospital stay or ER visit. Additional Information If you have questions, please visit the Frequently Asked Questions section of the UsTrendy website at [email or web address] Remember, UsTrendy is NOT to be used for urgent needs. For medical emergencies, dial 911. Now available from your iPhone and Android! General Information Please provide this summary of care documentation to your next provider. Patient Signature:  ____________________________________________________________ Date:  ____________________________________________________________  
  
Don Millan Provider Signature:  ____________________________________________________________ Date:  ____________________________________________________________

## 2017-03-17 NOTE — PERIOP NOTES
Anesthesia reports 140mg Propofol, 60mg Lidocaine and 300mL NS given during procedure. Received report from anesthesia staff on vital signs and status of patient.

## 2017-03-17 NOTE — DISCHARGE INSTRUCTIONS
Roverto Olvera MD  [facility], [address]  [address]  [phone]  [email or web address]    Petra Asp  [id number]  1980    EGD DISCHARGE INSTRUCTIONS    Discomfort:  Sore throat- throat lozenges or warm salt water gargle  redness at IV site- apply warm compress to area; if redness or soreness persist- contact your physician  Gaseous discomfort- walking, belching will help relieve any discomfort  You may not operate a vehicle for 12 hours  You may not engage in an occupation involving machinery or appliances for rest of today  You may not drink alcoholic beverages for at least 12 hours  Avoid making any critical decisions for at least 24 hour  DIET  You may have anything by mouth. You may eat and drink immediately. You may resume your regular diet - however -  remember your colon is empty and a heavy meal will produce gas. Avoid these foods:  vegetables, fried / greasy foods, carbonated drinks      ACTIVITY  You may resume your normal daily activities   Spend the remainder of the day resting -  avoid any strenuous activity. CALL M.D. ANY SIGN OF   Increasing pain, nausea, vomiting  Abdominal distension (swelling)  New increased bleeding (oral or rectal)  Fever (chills)  Pain in chest area  Bloody discharge from nose or mouth  Shortness of breath    Follow-up Instructions:   Call Dr. Roverto Olvera for any questions or problems. Telephone # [phone]  Dr. Sallie Alvarez office will notify you of the biopsy results available  Within 7 to 10 days. We will call you or send a letter   Continue same medications. ENDOSCOPY FINDINGS:   Your endoscopy showed just some mild esophagitis which we biopsied and also biopsied to check for celiac sprue.       DISCHARGE SUMMARY from Nurse    The following personal items collected during your admission are returned to you:   Dental Appliance: Dental Appliances: None  Vision: Visual Aid: Glasses  Hearing Aid:    Jewelry:    Clothing:    Other Valuables:    Valuables sent to safe: MyChart Activation    Thank you for requesting access to [facility]. Please follow the instructions below to securely access and download your online medical record. [facility]ae allows you to send messages to your doctor, view your test results, renew your prescriptions, schedule appointments, and more. How Do I Sign Up? 1. In your internet browser, go to [email or web address]  2. Click on the First Time User? Click Here link in the Sign In box. You will be redirect to the New Member Sign Up page. 3. Enter your [facility]ae Access Code exactly as it appears below. You will not need to use this code after youve completed the sign-up process. If you do not sign up before the expiration date, you must request a new code. Cannae Access Code: Activation code not generated  Current [facility]ae Status: Active (This is the date your Cannae access code will )    4. Enter the last four digits of your Social Security Number (xxxx) and Date of Birth (mm/dd/yyyy) as indicated and click Submit. You will be taken to the next sign-up page. 5. Create a Cannae ID. This will be your [facility]ae login ID and cannot be changed, so think of one that is secure and easy to remember. 6. Create a [facility]ae password. You can change your password at any time. 7. Enter your Password Reset Question and Answer. This can be used at a later time if you forget your password. 8. Enter your e-mail address. You will receive e-mail notification when new information is available in [address] 9. Click Sign Up. You can now view and download portions of your medical record. 10. Click the Download Summary menu link to download a portable copy of your medical information. Additional Information    If you have questions, please visit the Frequently Asked Questions section of the [facility]ae website at [email or web address] Remember, [facility] is NOT to be used for urgent needs. For medical emergencies, dial 911.

## 2017-03-17 NOTE — ANESTHESIA POSTPROCEDURE EVALUATION
Post-Anesthesia Evaluation and Assessment    Patient: Forrest Mckeon MRN: [id number]  SSN: [id number]    YOB: 1980  Age: 43 y.o. Sex: male       Cardiovascular Function/Vital Signs  Visit Vitals    /70    Pulse 84    Temp 36.9 °C (98.4 °F)    Resp 14    Ht 5' 9\" (1.753 m)    Wt 53.5 kg (118 lb)    SpO2 100%    BMI 17.43 kg/m2       Patient is status post total IV anesthesia anesthesia for Procedure(s):  ESOPHAGOGASTRODUODENOSCOPY (EGD)  ESOPHAGOGASTRODUODENAL (EGD) BIOPSY. Nausea/Vomiting: None    Postoperative hydration reviewed and adequate. Pain:  Pain Scale 1: Numeric (0 - 10) (03/17/17 1000)  Pain Intensity 1: 0 (03/17/17 1000)   Managed    Neurological Status: At baseline    Mental Status and Level of Consciousness: Arousable    Pulmonary Status:   O2 Device: Room air (03/17/17 1000)   Adequate oxygenation and airway patent    Complications related to anesthesia: None    Post-anesthesia assessment completed.  No concerns    Signed By: Tiera Sarah DO     March 17, 2017

## 2017-03-17 NOTE — ANESTHESIA PREPROCEDURE EVALUATION
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  Hx of colon resection  Hx of Explor lap for partial small bowel obstruction/appendectomy, SANDY  2013 Endo/Other             Other Findings   Comments: Osteoporosis  VIt D defic         Physical Exam    Airway  Mallampati: II  TM Distance: 4 - 6 cm  Neck ROM: normal range of motion   Mouth opening: Normal     Cardiovascular    Rhythm: regular  Rate: normal         Dental      Comments: Missing a few teeth, none loose per pt   Pulmonary  Breath sounds clear to auscultation               Abdominal  GI exam deferred       Other Findings            Anesthetic Plan    ASA: 2  Anesthesia type: total IV anesthesia          Induction: Intravenous  Anesthetic plan and risks discussed with:  Mother and Patient

## 2017-03-17 NOTE — PROCEDURES
Tin Kanwal                   Endoscopic Gastroduodenoscopy Procedure Note      3/17/2017  [facility]  1980  [id number]    Procedure: Endoscopic Gastroduodenoscopy with biopsy    Indication:  Unexplained weight loss     Pre-operative Diagnosis: see indication above    Post-operative Diagnosis: see findings below    : RICCO Dobson MD    Referring Provider:  Lenny Cedeno DO      Anesthesia/Sedation:  MAC anesthesia Propofol    Airway assessment: No airway problems anticipated    Pre-Procedural Exam:      Airway: clear, no airway problems anticipated  Heart: RRR, without gallops or rubs  Lungs: clear bilaterally without wheezes, crackles, or rhonchi  Abdomen: soft, nontender, nondistended, bowel sounds present  Mental Status: awake, alert and oriented to person, place and time       Procedure Details     After infomed consent was obtained for the procedure, with all risks and benefits of procedure explained the patient was taken to the endoscopy suite and placed in the left lateral decubitus position. Following sequential administration of sedation as per above, the endoscope was inserted into the mouth and advanced under direct vision to second portion of the duodenum. A careful inspection was made as the gastroscope was withdrawn, including a retroflexed view of the proximal stomach; findings and interventions are described below. Findings:   Esophagus:  Focal esophagitis at the GE junction, biopsied  Stomach: normal , biopsied for pyloritek  Duodenum: normal, biopsied    Therapies:  biopsy of esophagus  biopsy of duodenal second portion    Specimens: 1. Duodenal biopsies  2. Biopsies of GE junction           Complications:   None; patient tolerated the procedure well. EBL:  None. Impression:      -Mild esophagitis, otherwise normal egd    Recommendations:   -Await pathology. , -Follow symptoms.     Lorne Barba., MD3/17/2017

## 2017-03-22 ENCOUNTER — OFFICE VISIT (OUTPATIENT)
Dept: NEUROLOGY | Age: 37
End: 2017-03-22

## 2017-03-22 DIAGNOSIS — R41.3 MEMORY LOSS: Primary | ICD-10-CM

## 2017-03-22 DIAGNOSIS — G93.49 STATIC ENCEPHALOPATHY: ICD-10-CM

## 2017-03-22 DIAGNOSIS — F03.A0 MILD DEMENTIA: ICD-10-CM

## 2017-03-22 DIAGNOSIS — F41.9 ANXIETY: ICD-10-CM

## 2017-03-25 NOTE — PROGRESS NOTES
[address] [facility],[address]  [address] [facility]   [facility] [address] [facility] [address] [facility]   [place]   [phone] Office   [phone] Fax      Neuropsychological Evaluation Report    Referral:  Conor Garcia DO, Dor[facility] Edelson, [facility]    Sergio Holly is a 43 y.o. right handed  male who was accompanied by his caregiver to the initial clinical interview on 3/9/17. Please refer to his medical records for details pertaining to his history. Briefly, the patient reported that he completed high school. He has epilepsy and tremors and cognitive changes. He finished high school and then went to a transition program from age 22. He has abnormal CT of abdomen and does not have much bowel control. They are seeing a major decline in his memory when he was dealing with his health decline. He knows something is wrong but cannot articulate. See also records. He has lost 25 pounds recently. Bilateral tremors getting worse. Ashen in appearance. Lethargy and fatigue. HE is on Buspar. Had been doing well for years and years but now there is a decline since there is a colon/rectal issue. Memory declining. Forgets completely. Feels like he is dementia. Has to be cues for everything, medications, day-to-day ADLs, etc.      Spoke with mother as well. Could be an absorption issue? Endoscopy March 17th. Will await those results. Then needs testing done. Better than he was before he got sick in terms of need for cues and such but better but not back to baseline. Psychological was done about 9 years ago. IQ found in the 62s.       MMSE: 24/30    Clock: Impaired    TOPF:      Historian: Fair  Praxis: No UE apraxia  R/L Orientation: Intact to self and to other  Dress: within normal limits   Weight: within normal limits   Appearance/Hygiene: within normal limits   Gait: Short steps, unsteady, needs direction  Assistive Devices: Glasses  Mood: within normal limits   Affect: within normal limits   Comprehension: mildly to moderately impaired  Thought Process: tangential needs redirection  Expressive Language: speaks little  Receptive Language: within normal limits   Motor:  No cognitive or motor perseveration  ETOH: Denied  Tobacco: Denied  Illicit: Demoed  SI/HI: Denied  Psychosis: Denied  Insight: Fair  Judgment: TBD, impaired appearing  Other Psych:      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, without mention of intractable epilepsy 5/14/2011    Mental retardation     Osteoporosis     Other ill-defined conditions(799.89)     intestinal problems    Psychiatric disorder     anxiety    Seizure disorder [facility])        Past Surgical History:   Procedure Laterality Date    HX APPENDECTOMY  1/28/13    APPENDECTOMY    HX GI      colon resection    HX GI  1/28/13    LAPAROTOMY EXPLORATORY - OPEN LYSIS OF ADHESIONS     HX HERNIA REPAIR      HX OTHER SURGICAL      imperforated anus birth defect    UPPER GI ENDOSCOPY,BIOPSY  3/17/2017            No Known Allergies    History reviewed. No pertinent family history. Social History   Substance Use Topics    Smoking status: Never Smoker    Smokeless tobacco: Never Used    Alcohol use No       Current Outpatient Prescriptions   Medication Sig Dispense Refill    WHEAT DEXTRIN (BENEFIBER CLEAR SF, DEXTRIN, PO) Take  by mouth.  fluticasone (FLONASE) 50 mcg/actuation nasal spray 2 Sprays by Both Nostrils route daily as needed for Rhinitis. 1 Bottle 0    mirtazapine (REMERON) 7.5 mg tablet Take 7.5 mg by mouth nightly.       VITAMIN D2 50,000 unit capsule TAKE 1 CAPSULE BY MOUTH EVERY 3 MONTHS 1 Cap 0    carBAMazepine ER (CARBATROL ER) 100 mg capsule Two po QAM and Three po at bedtime 150 Cap 11    acetaminophen (TYLENOL) 325 mg tablet Take  by mouth every four (4) hours as needed for Pain.  busPIRone (BUSPAR) 10 mg tablet Take 10 mg by mouth two (2) times a day.  loperamide (IMODIUM A-D) 2 mg tablet Take 2 mg by mouth four (4) times daily as needed for Diarrhea.  guaiFENesin ER (MUCINEX) 600 mg ER tablet Take 600 mg by mouth daily as needed for Congestion.  CALCITRATE-VITAMIN D tablet TAKE 1 TABLET BY MOUTH TWICE A DAY 60 Tab PRN    TAB-A-MARGIE tablet TAKE 1 TABLET BY MOUTH DAILY 30 Tab PRN    ondansetron (ZOFRAN ODT) 4 mg disintegrating tablet Take 1 Tab by mouth every eight (8) hours as needed for Nausea. 12 Tab 0    brief disposable (ADULT) misc by Does Not Apply route. Depends, size medium, 1 nightly 1 Package 11         Plan:  Obtain authorization for testing from insurance company. Report to follow once testing, scoring, and interpretation completed. ? Organic based neurocognitive issues versus mood disorder or combination of same. ? Problems organic, functional, or both? This note will not be viewable in [address] Neuropsychological Evaluation Results Follow  Patient Testing 3/22/17 Report Completed 3/25/17  A Psychometrist assisted with portions of this examination    The following tests were administered: Neuropsychological Mental Status Exam*, Mini-Mental Status Examination*, Clock Drawing Test*, Wechsler Test Of Adult Reading*, Wechsler Abbreviated Scale Of Intelligence - II, Verbal Fluency Tests, Palmer & Palmer - Revised, CPT-III, Repeatable Battery For The Assessment Of Neuropsychological Status, Surinder Dementia Rating Scale - 2, Trailmaking Test Parts A & B, Revised Memory & Behavior Checklist*, Geriatric Depression Inventory, Beck Anxiety Inventory, Farrell Symptom Checklist, History Taking  & Clinical Interview With The Patient*. Additional History Taking With The Patients Caregiver, ABAS-3,  Review Of Available Records*.       Test Findings:  Note:  The patients raw data have been compared with currently available norms which include demographic corrections for age, gender, and/or education. Sometimes, the patients scores are compared to demographically similar individuals as close to the patients age, education level, etc., as possible. \"Average\" is viewed as being +/- 1 standard deviation (SD) from the stated mean for a particular test score. \"Low average\" is viewed as being between 1 and 2 SD below the mean, and above average is viewed as being 1 and 2 SD above the mean. Scores falling in the borderline range (between 1-1/2 and 2 SD below the mean) are viewed with particular attention as to whether they are normal or abnormal neurocognitive test scores. Other methods of inference in analyzing the test data are also utilized, including the pattern and range of scores in the profile, bilateral motor functions, and the presence, if any, of pathognomonic signs. Behaviorally, the patient was friendly and cooperative and appeared motivated to perform well during this examination. Scores on the Dementia Rating Scale -2 were converted using norms from demographically similar individuals as close to the patient's age as possible. Within this context, the results of this evaluation are viewed as a valid reflection of the patients actual neurocognitive and emotional status. The patient's score of 13/30 on the Mini-Mental Status Exam was impaired. In this regard, he was not oriented to year, month, date, state, county, or floor. Registration of three words was 2/3 correct on Tria 1. Serial 7s 0/5. Recall for three words after a brief delay was 1/3 correct. Reading was impaired. Writing was impaired. Visual construction was impaired. Clock drawing was impaired. IQ was predicted to be around a score of 50 based on his performance on a task estimating premorbid functioning levels.        The patient was administered the Wechsler Abbreviated Scale of Intelligence - II and he generated an Estimated VCI score of 45 (<0.1st %ile), MATHIEU - 49 (<0.1st  %ile), and his Estimated Full-4 IQ score was 43 (<0.1 %Ile). These scores are lower than when he was previous testing (IQ was reported as being in the 60s, and are somewhat below what was expected based on his performance on a task estimated premorbid functioning levels. These estimations become less accurate (more in the confidence band) as IQ scores deviate from average. But, I do see some evidence of neurocognitive decline over time. All of his generated IQ scores are within the extremely low range. His structured word list fluency, as assessed by the FAS Test, was within the severely impaired range with a T score of 15. Category fluency was within the moderately to severely impaired range with a T score of 20. Confrontation naming ability, as assessed by the Palmer & Palmer - Revised, was within the severely impaired range at 24/60 correct (T = 15). This pattern of performance is indicative of a patient who is at increased risk for day-to-day problems with verbal fluency. Confrontation naming ability was also impaired. \ The patient was administered the CPT-III and review of the subscales revealed numerous problems with inattentiveness without concerns for problems with impulsivity. This pattern of performance is indicative of a patient who is at increased risk for day-to-day problems with sustained visual attention/concentration. The patient was administered the Surinder Dementia Rating Scale -2 and his total score of 77/144 correct corresponds to an age- and education- corrected MOANS Scaled Score of 1 (<1st  %ile) and indicates a severely impaired level of performance. In this regard, his simple attention, initiation/perseveration, construction, conceptualization, and memory capacity were all impaired.   This pattern of performance is indicative of a patient who is at increased risk for day-to-day problems across a variety of neurocognitive domains. The patient was administered the Repeatable Battery for the Assessment Of Neuropsychological Status (RBANS) and his age-corrected scores are as follows:    Domain:    Index Score %ile Classification    Immediate Memory  40  <0.1 Severe Impairment    Delayed Memory  40  <0.1 Severe Impairment    Visuospatial/Constructional  53  0.1 Severe Impairment    Language   74  4 Borderline    Attention   49  <0.1 Severe Impairment    Overall Functioning  47  <0.1 Severe Impairment   ___________________________________________________________    As can be seen, he is showing marked deficits across a variety of neurocognitive domains. Some language  abilities are an area of relative strength, but these may serve to mask underlying cognitive deficits at times. This pattern of performance is indicative of a patient who is at increased risk for day-to-day problems across a variety of neurocognitive domains. Simple timed visual motor sequencing (Trailmaking Test Part A) was within the moderately to severely impaired range with a T score of 8. His performance on a similar, but more complex task of timed visual motor sequencing (Trailmaking Test Part B) was within the severely impaired impaired range with a T score of 6. This pattern of performance is indicative of a patient who is at increased risk for day-to-day problems with executive functioning. His Geriatric Depression Inventory score of 17 was within the mildly depressed range, but he did not understand some of the items to which he was answering. His Smith Anxiety Inventory score of 54 reflected severe anxiety, but he did not understand those items either. The patient's responses on the [facility] Symptom Checklist did not reveal concern for additional psychopathology.        The ABAS-3 was also completed and marked concerns across all adaptive domains assessed, including General Adaptive Skills (0/1st %ile), conceptual skills, practical skills, and adaptive skills. Impressions and Recommendations: This patient generated an abnormal range Neuropsychological Evaluation with respect to neurocognitive functioning. In this regard, impairments were noted across the vast majority of neurocognitive domains assessed. Some language aabilities remain important areas of relative neurocognitive strength, but these likely mask underlying cognitive deficits at times. There is evidence of a decline from previous evaluation, by history. There is evidence of a decline from what limited estimates of premorbid functioning that could be done. He has been on Buspar for quite some time now. Concern is for a reversible cause of this type of dementia process. He is lethargic, ashen, and fatigued and has lost significant weight of late. A medical issue causing those problems may also have caused this memory loss. As such, reversible causes of dementia need to be ruled out. If none is found, then this is a dementia type process that is at least mild in terms of severity. There is evidence of evolution here and this is not solely a static encephalopathy. Neurologic correlation is indicated. Medication for memory and continue treatment for psychiatric issues as needed. Patient is not competent. Rodo Sutherland He should not live independently without appropriate supervision across virtually all ADLs, but especially those pertaining to memory. This includes nutritional/meal preparation supervision, medication management supervision, and supervision of financial dealings. No Driving. I agree with his current living arrangements. Follow up prn. Clinical correlation is, of course, indicated     I will discuss these findings with the patient and family when they follow up with me in the near future. A follow up Neuropsychological Evaluation is indicated on a prn basis.       DIAGNOSES Mild Dementia    Chronic Intellectual Deficits    Chronic Learning Disabilities     The above information is based upon information currently available to me. If there is any additional information of which I am currently unaware, I would be more than happy to review it upon having it made available to me. Thank you for the opportunity to see this interesting individual.     Sincerely,       Jameson Bennett. Emani Torres, EdS      Cc: Chrissy Carr DO, Xavier Milian, ACNP    Time Documentation    2 units -53018- Record review. Review of history provided by patient. Testing by Neuropsychologist, Review of raw data. Scoring. Interpretation of all tests together in context/ Data interpretation/integration. Case Conceptualization, Report writing. Coordination Of Care. 4 units  -[id number] - Psychometrist test prep, administration, and scoring. Supervision Of Psychometrist. Neuropsych Interpretation of individual tests completed by psychometrist    \"Unit\" is defined by CPT/National Guidelines (31 - 60 minutes). Integral services including scoring of raw data, data interpretation, case conceptualization, report writing etcetera were initiated after the patient finished testing/raw data collected and was completed on the date the report was signed.

## 2017-03-29 RX ORDER — ERGOCALCIFEROL 1.25 MG/1
CAPSULE ORAL
Qty: 1 CAP | Refills: 0 | Status: SHIPPED | OUTPATIENT
Start: 2017-03-29 | End: 2017-06-27 | Stop reason: SDUPTHER

## 2017-04-03 ENCOUNTER — OFFICE VISIT (OUTPATIENT)
Dept: NEUROLOGY | Age: 37
End: 2017-04-03

## 2017-04-03 VITALS
RESPIRATION RATE: 20 BRPM | SYSTOLIC BLOOD PRESSURE: 106 MMHG | OXYGEN SATURATION: 98 % | DIASTOLIC BLOOD PRESSURE: 66 MMHG | WEIGHT: 117.6 LBS | BODY MASS INDEX: 17.42 KG/M2 | HEIGHT: 69 IN | HEART RATE: 94 BPM

## 2017-04-03 DIAGNOSIS — G40.109 LOCALIZATION-RELATED FOCAL EPILEPSY WITH SIMPLE PARTIAL SEIZURES (HCC): Primary | ICD-10-CM

## 2017-04-03 DIAGNOSIS — G93.49 STATIC ENCEPHALOPATHY: ICD-10-CM

## 2017-04-03 RX ORDER — CARBAMAZEPINE 200 MG/1
200 TABLET, EXTENDED RELEASE ORAL 2 TIMES DAILY
Qty: 60 TAB | Refills: 3 | Status: SHIPPED | OUTPATIENT
Start: 2017-04-03 | End: 2017-06-16 | Stop reason: SDUPTHER

## 2017-04-03 NOTE — MR AVS SNAPSHOT
Visit Information Date & Time Provider Department Dept. Phone Encounter #  
 4/3/2017  1:30 PM Jo Do NP Neurology [address] [facility] [phone] [id number] Follow-up Instructions Return in about 3 months (around 7/3/2017). Your Appointments 5/8/2017  9:45 AM  
ROUTINE CARE with Ritchie Townsend DO [facility] ([address]) Appt Note: fu [address] [place] [address] Street [facility] [address] [facility] [address]  
[phone]  
  
   
 [address] [place] [address]  
  
    
 5/30/2017  2:40 PM  
Follow Up with Luevenia Krabbe, PsyD Neurology [address] [place] [address]) Appt Note: office feedback/ Fabián Carmen [place] [address] [facility] 99 [phone]  
  
   
 [place] [address] [id number] [address] Upcoming Health Maintenance Date Due DTaP/Tdap/Td series (1 - Tdap) 11/4/1999 Allergies as of 4/3/2017  Review Complete On: 4/3/2017 By: Jo Do NP No Known Allergies Current Immunizations  Reviewed on 2/6/2017 Name Date Influenza Vaccine 10/15/2016, 11/1/2014, 10/22/2013 Influenza Vaccine Split 10/21/2012 Pneumococcal Vaccine (Unspecified Type) 2/28/2012 Not reviewed this visit You Were Diagnosed With   
  
 Codes Comments Localization-related focal epilepsy with simple partial seizures ([facility] [address]    -  Primary ICD-10-CM: G40.109 ICD-9-CM: 345.50 Static encephalopathy ([facility] [address]     ICD-10-CM: G93.49 
ICD-9-CM: 994. 9 Vitals BP Pulse Resp Height(growth percentile) Weight(growth percentile) SpO2  
 106/66 94 20 5' 9\" (1.753 m) 117 lb 9.6 oz (53.3 kg) 98% BMI Smoking Status 17.37 kg/m2 Never Smoker Vitals History BMI and BSA Data Body Mass Index Body Surface Area  
 17.37 kg/m 2 1.61 m 2 Preferred Pharmacy Pharmacy Name Phone Reggie Winters [address], [facility] 161 [phone] Your Updated Medication List  
  
   
This list is accurate as of: 4/3/17  2:51 PM.  Always use your most recent med list.  
  
  
  
  
 BENEFIBER CLEAR SF (DEXTRIN) PO Take  by mouth. brief disposable Misc Commonly known as:  adult  
by Does Not Apply route. Depends, size medium, 1 nightly CALCITRATE-VITAMIN D tablet Generic drug:  calcium citrate-vitamin D3 TAKE 1 TABLET BY MOUTH TWICE A DAY  
  
 carBAMazepine  mg SR tablet Commonly known as:  TEGretol XR Take 1 Tab by mouth two (2) times a day. fluticasone 50 mcg/actuation nasal spray Commonly known as:  Katherne Fix 2 Sprays by Both Nostrils route daily as needed for Rhinitis. IMODIUM A-D 2 mg tablet Generic drug:  loperamide Take 2 mg by mouth four (4) times daily as needed for Diarrhea.  
  
 mirtazapine 7.5 mg tablet Commonly known as:  Marlee Varma Take 7.5 mg by mouth nightly. MUCINEX 600 mg ER tablet Generic drug:  guaiFENesin ER Take 600 mg by mouth daily as needed for Congestion. ondansetron 4 mg disintegrating tablet Commonly known as:  ZOFRAN ODT Take 1 Tab by mouth every eight (8) hours as needed for Nausea. TAB-A-MARGIE tablet Generic drug:  multivitamin TAKE 1 TABLET BY MOUTH DAILY  
  
 TYLENOL 325 mg tablet Generic drug:  acetaminophen Take  by mouth every four (4) hours as needed for Pain. VITAMIN D2 50,000 unit capsule Generic drug:  ergocalciferol TAKE 1 CAPSULE BY MOUTH EVERY 3 MONTHS Prescriptions Sent to Pharmacy Refills  
 carBAMazepine XR (TEGRETOL XR) 200 mg SR tablet 3 Sig: Take 1 Tab by mouth two (2) times a day. Class: Normal  
 Pharmacy: [address] [place] [address] #: [phone] Route: Oral  
  
Follow-up Instructions Return in about 3 months (around 7/3/2017). Patient Instructions A Healthy Lifestyle: Care Instructions Your Care Instructions A healthy lifestyle can help you feel good, stay at a healthy weight, and have plenty of energy for both work and play. A healthy lifestyle is something you can share with your whole family. A healthy lifestyle also can lower your risk for serious health problems, such as high blood pressure, heart disease, and diabetes. You can follow a few steps listed below to improve your health and the health of your family. Follow-up care is a key part of your treatment and safety. Be sure to make and go to all appointments, and call your doctor if you are having problems. Its also a good idea to know your test results and keep a list of the medicines you take. How can you care for yourself at home? · Do not eat too much sugar, fat, or fast foods. You can still have dessert and treats now and then. The goal is moderation. · Start small to improve your eating habits. Pay attention to portion sizes, drink less juice and soda pop, and eat more fruits and vegetables. ¨ Eat a healthy amount of food. A 3-ounce serving of meat, for example, is about the size of a deck of cards. Fill the rest of your plate with vegetables and whole grains. ¨ Limit the amount of soda and sports drinks you have every day. Drink more water when you are thirsty. ¨ Eat at least 5 servings of fruits and vegetables every day. It may seem like a lot, but it is not hard to reach this goal. A serving or helping is 1 piece of fruit, 1 cup of vegetables, or 2 cups of leafy, raw vegetables. Have an apple or some carrot sticks as an afternoon snack instead of a candy bar. Try to have fruits and/or vegetables at every meal. 
· Make exercise part of your daily routine. You may want to start with simple activities, such as walking, bicycling, or slow swimming. Try to be active 30 to 60 minutes every day.  You do not need to do all 30 to 60 minutes all at once. For example, you can exercise 3 times a day for 10 or 20 minutes. Moderate exercise is safe for most people, but it is always a good idea to talk to your doctor before starting an exercise program. 
· Keep moving. [place] Mil[place] the lawn, work in the garden, or Bizdom. Take the stairs instead of the elevator at work. · If you smoke, quit. People who smoke have an increased risk for heart attack, stroke, cancer, and other lung illnesses. Quitting is hard, but there are ways to boost your chance of quitting tobacco for good. ¨ Use nicotine gum, patches, or lozenges. ¨ Ask your doctor about stop-smoking programs and medicines. ¨ Keep trying. In addition to reducing your risk of diseases in the future, you will notice some benefits soon after you stop using tobacco. If you have shortness of breath or asthma symptoms, they will likely get better within a few weeks after you quit. · Limit how much alcohol you drink. Moderate amounts of alcohol (up to 2 drinks a day for men, 1 drink a day for women) are okay. But drinking too much can lead to liver problems, high blood pressure, and other health problems. Family health If you have a family, there are many things you can do together to improve your health. · Eat meals together as a family as often as possible. · Eat healthy foods. This includes fruits, vegetables, lean meats and dairy, and whole grains. · Include your family in your fitness plan. Most people think of activities such as jogging or tennis as the way to fitness, but there are many ways you and your family can be more active. Anything that makes you breathe hard and gets your heart pumping is exercise. Here are some tips: 
¨ Walk to do errands or to take your child to school or the bus. ¨ Go for a family bike ride after dinner instead of watching TV. Where can you learn more? Go to http://rachael-william.info/. Enter D451 in the search box to learn more about \"A Healthy Lifestyle: Care Instructions. \" Current as of: July 26, 2016 Content Version: 11.2 © 8060-6995 [employer]. Care instructions adapted under license by [employer] (which disclaims liability or warranty for this information). If you have questions about a medical condition or this instruction, always ask your healthcare professional. [address] any warranty or liability for your use of this information. Introducing \[facility] [place] [facility]\"" & HEALTH SERVICES! Dear Nj Cerda: Thank you for requesting a YourTeamOnline account. Our records indicate that you already have an active YourTeamOnline account. You can access your account anytime at [email or web address]. [email or web address] [employer]G Navigation Did you know that you can access your hospital and ER discharge instructions at any time in YourTeamOnline? You can also review all of your test results from your hospital stay or ER visit. Additional Information If you have questions, please visit the Frequently Asked Questions section of the YourTeamOnline website at [email or web address] Remember, YourTeamOnline is NOT to be used for urgent needs. For medical emergencies, dial 911. Now available from your iPhone and Android! Please provide this summary of care documentation to your next provider. Your primary care clinician is listed as Bruno Lee. If you have any questions after today's visit, please call [phone].

## 2017-04-03 NOTE — PROGRESS NOTES
Date:  17    Name:  Lidia Lara  :  1980  MRN:  [id number]     PCP:  Burnadette Lennox, DO    Chief Complaint   Patient presents with    Seizure     HISTORY OF PRESENT ILLNESS: Follow up for epilepsy. Since last visit, he has had neuropsychological testing. Pt presents with his mom and caregiver. No seizures since last visit, no seizures as an adult. Pt states he has been doing very well. His tremor is unchanged. Neuropsychological testing was ordered when he had cognitive decline last February. His cognition has improved since then but caretaker does note that some days he is very distractible and it takes him longer than it used to complete tasks. His Buspar was discontinued by psychiatry and they think this may have helped his cognition. He has started Remeron for sleep and to help him gain weight. Mom is concerned that pt has not gained any wait and is asking if some of his issues (weight loss, GI issues) could be related to his long term carbamazepine and if there is a potential to wean this. He is taking Benefiber to help with his bowel control. He is sensitive to different foods. Upper GI endoscopy was negative and they would like him to do a gastric emptying study which they need to schedule. Current Outpatient Prescriptions   Medication Sig    VITAMIN D2 50,000 unit capsule TAKE 1 CAPSULE BY MOUTH EVERY 3 MONTHS    WHEAT DEXTRIN (BENEFIBER CLEAR SF, DEXTRIN, PO) Take  by mouth.  fluticasone (FLONASE) 50 mcg/actuation nasal spray 2 Sprays by Both Nostrils route daily as needed for Rhinitis.  mirtazapine (REMERON) 7.5 mg tablet Take 7.5 mg by mouth nightly.  carBAMazepine ER (CARBATROL ER) 100 mg capsule Two po QAM and Three po at bedtime    acetaminophen (TYLENOL) 325 mg tablet Take  by mouth every four (4) hours as needed for Pain.  loperamide (IMODIUM A-D) 2 mg tablet Take 2 mg by mouth four (4) times daily as needed for Diarrhea.     guaiFENesin ER (MUCINEX) 600 mg ER tablet Take 600 mg by mouth daily as needed for Congestion.  CALCITRATE-VITAMIN D tablet TAKE 1 TABLET BY MOUTH TWICE A DAY    TAB-A-MARGIE tablet TAKE 1 TABLET BY MOUTH DAILY    ondansetron (ZOFRAN ODT) 4 mg disintegrating tablet Take 1 Tab by mouth every eight (8) hours as needed for Nausea.  brief disposable (ADULT) misc by Does Not Apply route. Depends, size medium, 1 nightly     No current facility-administered medications for this visit. 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, without mention of intractable epilepsy 5/14/2011    Mental retardation     Osteoporosis     Other ill-defined conditions     intestinal problems    Psychiatric disorder     anxiety    Seizure disorder [facility])      Past Surgical History:   Procedure Laterality Date    HX APPENDECTOMY  1/28/13    APPENDECTOMY    HX GI      colon resection    HX GI  1/28/13    LAPAROTOMY EXPLORATORY - OPEN LYSIS OF ADHESIONS     HX HERNIA REPAIR      HX OTHER SURGICAL      imperforated anus birth defect    UPPER GI ENDOSCOPY,BIOPSY  3/17/2017          Social History     Social History    Marital status: SINGLE     Spouse name: N/A    Number of children: N/A    Years of education: N/A     Occupational History    Not on file. Social History Main Topics    Smoking status: Never Smoker    Smokeless tobacco: Never Used    Alcohol use No    Drug use: No    Sexual activity: No     Other Topics Concern    Not on file     Social History Narrative     History reviewed. No pertinent family history.     PHYSICAL EXAMINATION:    Visit Vitals    /66    Pulse 94    Resp 20    Ht 5' 9\" (1.753 m)    Wt 53.3 kg (117 lb 9.6 oz)    SpO2 98%    BMI 17.37 kg/m2     General: Well defined, nourished, and groomed individual in no acute distress. Neck: Supple, nontender, no bruits, no pain with resistance to active range of motion. Heart: Regular rate and rhythm, no murmurs, rub, or gallop. Normal S1S2. Lungs: Clear to auscultation bilaterally with equal chest expansion, no cough, no wheeze  Musculoskeletal: Extremities revealed no edema and had full range of motion of joints. Psych: Good mood and bright affect      NEUROLOGICAL EXAMINATION:   Mental Status: Alert and oriented to person and place   Cranial Nerves:   II, III, IV, VI: Visual acuity grossly intact. Visual fields are normal.   Pupils are equal, round, and reactive to light and accommodation. Extra-ocular movements are full and fluid. Fundoscopic exam was benign, no ptosis or nystagmus. V-XII: Hearing is grossly intact. Facial features are symmetric, with normal sensation and strength. The palate rises symmetrically and the tongue protrudes midline. Sternocleidomastoids 5/5. Motor Examination: decreased tone and bulk with generalized weakness. Coordination: Finger to nose was normal. No resting or intention tremor  Gait and Station: Steady while walking. Normal arm swing. No pronator drift. No muscle wasting or fasiculations noted. Reflexes: DTRs 2+ throughout. ASSESSMENT AND PLAN    ICD-10-CM ICD-9-CM    1. Localization-related focal epilepsy with simple partial seizures (HCC) G40.109 345.50 carBAMazepine XR (TEGRETOL XR) 200 mg SR tablet   2. Static encephalopathy (HCC) G93.49 742.9      Discussed neuropsychological test results which showed mild dementia, chronic intellectual deficits, and chronic learning disabilities. Discussed that these results are not surprising as early onset dementia often corresponds with chronic intellectual issues. Discussed that the progression of dementia is variable. Pt has a follow up to further discuss these results with Dr. José Miguel Fletcher in May.   We have already addressed other potential causes with negative findings in the recent past (thyroid function, etc.). Discussed potential for adding memory management medications in the future for his mild dementia but as pt continues to have GI issues, which are being worked up, and as family would like to discuss neuropsychiatric testing results with Dr. Rene Doyle, we will hold off on this for now. Recommended being patient with pt, giving pt more time to complete tasks, repeating information when necessary, etc.but also that some days he will need more help than others. Today we will wean his carbamazepine to 200 mg bid and see how he tolerates this. Discussed that it is possible to discontinue the carbamazepine entirely in the future but there is a risk that he will have breakthrough seizures with adjustment of his AED. Pt will follow up in 3 months. Pt, caregiver and mother informed that they can call with issues or concerns at any time. More than 50% of today's office visit was spent in discussion regarding the neuropsychological testing, diagnosis, prognosis, progression, and treatment. Cielo Tate

## 2017-04-03 NOTE — PATIENT INSTRUCTIONS

## 2017-04-07 ENCOUNTER — HOSPITAL ENCOUNTER (OUTPATIENT)
Dept: NUCLEAR MEDICINE | Age: 37
Discharge: HOME | End: 2017-04-07
Attending: INTERNAL MEDICINE
Payer: MEDICARE

## 2017-04-07 DIAGNOSIS — R63.4 ABNORMAL WEIGHT LOSS: ICD-10-CM

## 2017-04-07 PROCEDURE — 78264 GASTRIC EMPTYING IMG STUDY: CPT

## 2017-05-08 ENCOUNTER — OFFICE VISIT (OUTPATIENT)
Dept: INTERNAL MEDICINE CLINIC | Age: 37
End: 2017-05-08

## 2017-05-08 VITALS
DIASTOLIC BLOOD PRESSURE: 72 MMHG | RESPIRATION RATE: 22 BRPM | HEART RATE: 93 BPM | BODY MASS INDEX: 17.33 KG/M2 | SYSTOLIC BLOOD PRESSURE: 129 MMHG | TEMPERATURE: 98.5 F | WEIGHT: 117 LBS | OXYGEN SATURATION: 95 % | HEIGHT: 69 IN

## 2017-05-08 DIAGNOSIS — M81.0 OSTEOPOROSIS, UNSPECIFIED OSTEOPOROSIS TYPE, UNSPECIFIED PATHOLOGICAL FRACTURE PRESENCE: ICD-10-CM

## 2017-05-08 DIAGNOSIS — G40.109 LOCALIZATION-RELATED FOCAL EPILEPSY WITH SIMPLE PARTIAL SEIZURES (HCC): ICD-10-CM

## 2017-05-08 DIAGNOSIS — R63.6 UNDERWEIGHT: Primary | ICD-10-CM

## 2017-05-08 DIAGNOSIS — R15.9 INCONTINENCE OF FECES, UNSPECIFIED FECAL INCONTINENCE TYPE: ICD-10-CM

## 2017-05-08 DIAGNOSIS — F41.9 ANXIETY: ICD-10-CM

## 2017-05-08 DIAGNOSIS — Z87.19 S/P SMALL BOWEL OBSTRUCTION: ICD-10-CM

## 2017-05-08 NOTE — MR AVS SNAPSHOT
Visit Information Date & Time Provider Department Dept. Phone Encounter #  
 5/8/2017  9:45 AM Chantell Rajput, [address] [facility] [phone] [id number] Follow-up Instructions Return in about 4 months (around 9/8/2017). Your Appointments 5/30/2017  2:40 PM  
Follow Up with Jose Cruz Mitchell PsyD Neurology [address]) Appt Note: office feedback/ Fabián Carmen [place] [address] [id number] [phone]  
  
   
 [place] [address] [address] [address] [place] 7/12/2017  2:30 PM  
Follow Up with Myranda Wen NP Neurology [address]) Appt Note: 3mo f/up epilepsy cr $0Cp  
 [address] [address] [phone]  
  
   
 [place] [address] [address] [address] [place] Upcoming Health Maintenance Date Due DTaP/Tdap/Td series (1 - Tdap) 11/4/1999 INFLUENZA AGE 9 TO ADULT 8/1/2017 Allergies as of 5/8/2017  Review Complete On: 5/8/2017 By: Chantell Rajput, DO No Known Allergies Current Immunizations  Reviewed on 2/6/2017 Name Date Influenza Vaccine 10/15/2016, 11/1/2014, 10/22/2013 Influenza Vaccine Split 10/21/2012 Pneumococcal Vaccine (Unspecified Type) 2/28/2012 Not reviewed this visit You Were Diagnosed With   
  
 Codes Comments Underweight    -  Primary ICD-10-CM: R63.6 ICD-9-CM: 783.22 Incontinence of feces, unspecified fecal incontinence type     ICD-10-CM: R15.9 ICD-9-CM: 787.60 S/p small bowel obstruction     ICD-10-CM: Z87.19 ICD-9-CM: V12.79 Localization-related focal epilepsy with simple partial seizures ([facility] [address]     ICD-10-CM: G40.109 ICD-9-CM: 345.50 Anxiety     ICD-10-CM: F41.9 ICD-9-CM: 300.00 Osteoporosis, unspecified osteoporosis type, unspecified pathological fracture presence     ICD-10-CM: M81.0 ICD-9-CM: 733.00   
  
 Vitals BP Pulse Temp Resp Height(growth percentile) Weight(growth percentile) 129/72 93 98.5 °F (36.9 °C) (Oral) 22 5' 9\" (1.753 m) 117 lb (53.1 kg) SpO2 BMI Smoking Status 95% 17.28 kg/m2 Never Smoker BMI and BSA Data Body Mass Index Body Surface Area  
 17.28 kg/m 2 1.61 m 2 Preferred Pharmacy Pharmacy Name Phone Reggie [address], [place] [address] [phone] Your Updated Medication List  
  
   
This list is accurate as of: 5/8/17 10:36 AM.  Always use your most recent med list.  
  
  
  
  
 BENEFIBER CLEAR SF (DEXTRIN) PO Take  by mouth. brief disposable Misc Commonly known as:  adult  
by Does Not Apply route. Depends, size medium, 1 nightly CALCITRATE-VITAMIN D tablet Generic drug:  calcium citrate-vitamin D3 TAKE 1 TABLET BY MOUTH TWICE A DAY  
  
 carBAMazepine  mg SR tablet Commonly known as:  TEGretol XR Take 1 Tab by mouth two (2) times a day. fluticasone 50 mcg/actuation nasal spray Commonly known as:  Clementina Barrie 2 Sprays by Both Nostrils route daily as needed for Rhinitis. IMODIUM A-D 2 mg tablet Generic drug:  loperamide Take 2 mg by mouth four (4) times daily as needed for Diarrhea.  
  
 mirtazapine 7.5 mg tablet Commonly known as:  Jenene Dorothy Take 7.5 mg by mouth nightly. MUCINEX 600 mg ER tablet Generic drug:  guaiFENesin ER Take 600 mg by mouth daily as needed for Congestion. ondansetron 4 mg disintegrating tablet Commonly known as:  ZOFRAN ODT Take 1 Tab by mouth every eight (8) hours as needed for Nausea. PROBIOTIC PO Take  by mouth. TAB-A-MARGIE tablet Generic drug:  multivitamin TAKE 1 TABLET BY MOUTH DAILY  
  
 TYLENOL 325 mg tablet Generic drug:  acetaminophen Take  by mouth every four (4) hours as needed for Pain. VITAMIN D2 50,000 unit capsule Generic drug:  ergocalciferol TAKE 1 CAPSULE BY MOUTH EVERY 3 MONTHS Follow-up Instructions Return in about 4 months (around 9/8/2017). Introducing [facility] SERVICES! Dear Lulu Santos: Thank you for requesting a Kior account. Our records indicate that you already have an active Kior account. You can access your account anytime at [email or web address] Did you know that you can access your hospital and ER discharge instructions at any time in Kior? You can also review all of your test results from your hospital stay or ER visit. Additional Information If you have questions, please visit the Frequently Asked Questions section of the Kior website at [email or web address] Remember, Kior is NOT to be used for urgent needs. For medical emergencies, dial 911. Now available from your iPhone and Android! Please provide this summary of care documentation to your next provider. Your primary care clinician is listed as Rod Vazquez. If you have any questions after today's visit, please call [phone].

## 2017-05-08 NOTE — PROGRESS NOTES
Chief Complaint   Patient presents with    Weight Loss    Form Completion     participating in [employer]     Reviewed record  In preparation for visit and have obtained necessary documentation. 1. Have you been to the ER, urgent care clinic since your last visit? Hospitalized since your last visit? No    2. Have you seen or consulted any other health care providers outside of the [address] [place] [address] since your last visit? Include any pap smears or colon screening.  No      Used 2 patient I. D. 's

## 2017-05-16 ENCOUNTER — OFFICE VISIT (OUTPATIENT)
Dept: INTERNAL MEDICINE CLINIC | Age: 37
End: 2017-05-16

## 2017-05-16 VITALS
HEART RATE: 91 BPM | HEIGHT: 69 IN | BODY MASS INDEX: 17.33 KG/M2 | SYSTOLIC BLOOD PRESSURE: 113 MMHG | DIASTOLIC BLOOD PRESSURE: 63 MMHG | WEIGHT: 117 LBS | RESPIRATION RATE: 19 BRPM | TEMPERATURE: 96.3 F | OXYGEN SATURATION: 97 %

## 2017-05-16 DIAGNOSIS — R63.6 UNDERWEIGHT: ICD-10-CM
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
## 2017-05-16 DIAGNOSIS — R23.8 SKIN IRRITATION: Primary | ICD-10-CM

## 2017-05-16 NOTE — PROGRESS NOTES
Reviewed record in preparation for visit and have obtained necessary documentation. Identified pt with two pt identifiers(name and ). Health Maintenance Due   Topic    DTaP/Tdap/Td series (1 - Tdap)         Chief Complaint   Patient presents with   [address] 81     on his bottom area          Learning Assessment:  :     Learning Assessment 4/3/2017 1/3/2017 2016 2016 3/24/2014   PRIMARY LEARNER Patient Patient Patient Patient Other   [facility]   LEARNER PREFERENCE PRIMARY READING READING READING READING OTHER (COMMENT)   ANSWERED BY [facility]   RELATIONSHIP SELF SELF SELF SELF OTHER       Depression Screening:  :     No flowsheet data found. Fall Risk Assessment:  :     No flowsheet data found. Abuse Screening:  :     No flowsheet data found. Coordination of Care Questionnaire:  :     1) Have you been to an emergency room, urgent care clinic since your last visit? no  Hospitalized since your last visit? no             2) Have you seen or consulted any other health care providers outside of [address] [place] [address] since your last visit? no  (Include any pap smears or colon screenings in this section.)    3) Do you have an Advance Directive on file? no    4) Are you interested in receiving information on Advance Directives? NO      Patient is accompanied by self/adault care taker I have received verbal consent from Tho Joseph to discuss any/all medical information while they are present in the room.

## 2017-05-16 NOTE — MR AVS SNAPSHOT
Visit Information Date & Time Provider Department Dept. Phone Encounter #  
 5/16/2017  3:00 PM Caesar Sanchez, 227 [facility] Internal Medicine [phone] [id number] Follow-up Instructions Return if symptoms worsen or fail to improve. Your Appointments 5/30/2017  2:40 PM  
Follow Up with Jonathan Yanez PsyD Neurology [address] [facility]) Appt Note: office feedback/ Fabián Carmen [place] [address] [place] [address] [phone]  
  
   
 [place] [address] [facility] [address] [id number] [address] 7/12/2017  2:30 PM  
Follow Up with Franc Friend NP Neurology [address] [place] [address]) Appt Note: 3mo f/up epilepsy cr $0Cp  
 [address] [place] [address] [phone]  
  
   
 [facility]  
  
    
 9/8/2017 10:00 AM  
ROUTINE CARE with Caesar Sanchez DO [facility] Internal Medicine ([facility]) Appt Note: 4 month follow up [address] [facility] [address] [facility] access hospital [id number]  
[phone]  
  
   
 [facility] [address] Upcoming Health Maintenance Date Due DTaP/Tdap/Td series (1 - Tdap) 11/4/1999 INFLUENZA AGE 9 TO ADULT 8/1/2017 Allergies as of 5/16/2017  Review Complete On: 5/16/2017 By: Caesar Sanchez DO No Known Allergies Current Immunizations  Reviewed on 2/6/2017 Name Date Influenza Vaccine 10/15/2016, 11/1/2014, 10/22/2013 Influenza Vaccine Split 10/21/2012 Pneumococcal Vaccine (Unspecified Type) 2/28/2012 Not reviewed this visit You Were Diagnosed With   
  
 Codes Comments Skin irritation    -  Primary ICD-10-CM: R23.8 ICD-9-CM: 709.9 Insect bite, initial encounter     ICD-10-CM: W57. Namrata Plummer ICD-9-CM: 919.4, E906.4 Underweight     ICD-10-CM: R63.6 ICD-9-CM: 783.22 Vitals BP Pulse Temp Resp Height(growth percentile) Weight(growth percentile) 113/63 (BP 1 Location: Right arm, BP Patient Position: Sitting) 91 96.3 °F (35.7 °C) (Oral) 19 5' 9\" (1.753 m) 117 lb (53.1 kg) SpO2 BMI Smoking Status 97% 17.28 kg/m2 Never Smoker Vitals History BMI and BSA Data Body Mass Index Body Surface Area  
 17.28 kg/m 2 1.61 m 2 Preferred Pharmacy Pharmacy Name Phone [facility], [address] [phone] Your Updated Medication List  
  
   
This list is accurate as of: 5/16/17  3:29 PM.  Always use your most recent med list.  
  
  
  
  
 BENEFIBER CLEAR SF (DEXTRIN) PO Take  by mouth. brief disposable Misc Commonly known as:  adult  
by Does Not Apply route. Depends, size medium, 1 nightly CALCITRATE-VITAMIN D tablet Generic drug:  calcium citrate-vitamin D3 TAKE 1 TABLET BY MOUTH TWICE A DAY  
  
 carBAMazepine  mg SR tablet Commonly known as:  TEGretol XR Take 1 Tab by mouth two (2) times a day. fluticasone 50 mcg/actuation nasal spray Commonly known as:  Emmette Settles 2 Sprays by Both Nostrils route daily as needed for Rhinitis. IMODIUM A-D 2 mg tablet Generic drug:  loperamide Take 2 mg by mouth four (4) times daily as needed for Diarrhea.  
  
 mirtazapine 7.5 mg tablet Commonly known as:  Zain Crea Take 7.5 mg by mouth nightly. MUCINEX 600 mg ER tablet Generic drug:  guaiFENesin ER Take 600 mg by mouth daily as needed for Congestion. neomycin-bacitracin-polymyxin 3.5mg-400 unit- 5,000 unit/gram ointment Commonly known as:  NEOSPORIN (KJO-ERS-HTOGR) Apply to back red area daily x 7 days  
  
 ondansetron 4 mg disintegrating tablet Commonly known as:  ZOFRAN ODT Take 1 Tab by mouth every eight (8) hours as needed for Nausea. PROBIOTIC PO Take  by mouth. TAB-A-MARGIE tablet Generic drug:  multivitamin TAKE 1 TABLET BY MOUTH DAILY  
  
 TYLENOL 325 mg tablet Generic drug:  acetaminophen Take  by mouth every four (4) hours as needed for Pain. VITAMIN D2 50,000 unit capsule Generic drug:  ergocalciferol TAKE 1 CAPSULE BY MOUTH EVERY 3 MONTHS Prescriptions Sent to Pharmacy Refills  
 neomycin-bacitracin-polymyxin (NEOSPORIN, [id number],) 3.5mg-400 unit- 5,000 unit/gram ointment 0 Sig: Apply to back red area daily x 7 days Class: Normal  
 Pharmacy: [address] [place], [place] [email or web address] #: [phone] Follow-up Instructions Return if symptoms worsen or fail to improve. Introducing [facility]! Dear Jimbo Black: Thank you for requesting a Multi[employer] account. Our records indicate that you already have an active Multiplicom account. You can access your account anytime at [email or web address] Did you know that you can access your hospital and ER discharge instructions at any time in Multi[employer]om? You can also review all of your test results from your hospital stay or ER visit. Additional Information If you have questions, please visit the Frequently Asked Questions section of the Multi[employer] website at [email or web address] Remember, Multi[employer]om is NOT to be used for urgent needs. For medical emergencies, dial 911. Now available from your iPhone and Android! Please provide this summary of care documentation to your next provider. Your primary care clinician is listed as Jace Mendez. If you have any questions after today's visit, please call [phone].

## 2017-05-30 ENCOUNTER — OFFICE VISIT (OUTPATIENT)
Dept: NEUROLOGY | Age: 37
End: 2017-05-30

## 2017-05-30 DIAGNOSIS — R41.3 MEMORY LOSS: ICD-10-CM

## 2017-05-30 DIAGNOSIS — F03.A0 MILD DEMENTIA: Primary | ICD-10-CM

## 2017-05-30 DIAGNOSIS — G93.49 STATIC ENCEPHALOPATHY: ICD-10-CM

## 2017-05-30 DIAGNOSIS — F41.9 ANXIETY: ICD-10-CM

## 2017-05-30 DIAGNOSIS — F09 COGNITIVE DYSFUNCTION: ICD-10-CM

## 2017-05-30 NOTE — PROGRESS NOTES
Office feedback session with the patient today. I saw the patient first and then the mother separately alone  I reviewed the results of the recent Neuropsychological Evaluation, including discussing individual tests as well as patient's areas of neurocognitive strength versus weakness. Education was provided regarding my diagnostic impressions, and we discussed treatment plan/options. I also answered numerous questions related to the clinical findings, including discussing various methods to improve cognition and mood. Counseling provided regarding mood and cognition. The patient will follow up with the referring provider, and reported being very pleased with the services provided. [facility] minutes with patient and 27 with mother, record review, coordination of care. Records provided. We discussed: This patient generated an abnormal range Neuropsychological Evaluation with respect to neurocognitive functioning. In this regard, impairments were noted across the vast majority of neurocognitive domains assessed. Some language abilities remain important areas of relative neurocognitive strength, but these likely mask underlying cognitive deficits at times. There is evidence of a decline from previous evaluation, by history. There is evidence of a decline from what limited estimates of premorbid functioning that could be done. He has been on Buspar for quite some time now.     Concern is for a reversible cause of this type of dementia process. He is lethargic, ashen, and fatigued and has lost significant weight of late. A medical issue causing those problems may also have caused this memory loss. As such, reversible causes of dementia need to be ruled out. If none is found, then this is a dementia type process that is at least mild in terms of severity. There is evidence of evolution here and this is not solely a static encephalopathy. Neurologic correlation is indicated. Medication for memory and continue treatment for psychiatric issues as needed. Patient is not competent. Elizabeth Eddie He should not live independently without appropriate supervision across virtually all ADLs, but especially those pertaining to memory. This includes nutritional/meal preparation supervision, medication management supervision, and supervision of financial dealings. No Driving. I agree with his current living arrangements. Follow up prn. Clinical correlation is, of course, indicated     I will discuss these findings with the patient and family when they follow up with me in the near future.  A follow up Neuropsychological Evaluation is indicated on a prn basis.      DIAGNOSES Mild Dementia  Chronic Intellectual Deficits  Chronic Learning Disabilities

## 2017-05-30 NOTE — PROGRESS NOTES
HISTORY OF PRESENT ILLNESS  Joel Mcknight is a 43 y.o. male. Pt. comes in with his caregiver and mother for f/u. Has multiple medical problems. His mother is concerned about irritation on his sacral area with possible insect bite. Patient has chronic malnutrition with previous colon operations. Has lost some weight and struggling to gain back. Extensive GI workup has been negative. He denies any new issues himself. Followed by neurologist and psychiatrist.  Lives in a group home. Reports compliance with medications and diet. Med list and most recent labs/studies reviewed with pt. Trying to be active physically as tolerated. Reports no other new c/o. Skin Problem   Pertinent negatives include no chest pain, no abdominal pain, no headaches and no shortness of breath. Review of Systems   Constitutional: Positive for weight loss. Negative for fever and malaise/fatigue. Eyes: Negative for blurred vision. Respiratory: Negative for shortness of breath. Cardiovascular: Negative for chest pain and leg swelling. Gastrointestinal: Positive for diarrhea. Negative for abdominal pain, blood in stool and melena. Stool incontinence   Genitourinary: Negative for dysuria and urgency. Musculoskeletal: Negative for back pain, falls and joint pain. Skin: Negative for itching and rash. Neurological: Positive for tremors and seizures. Negative for dizziness, sensory change and headaches. Endo/Heme/Allergies: Negative. Psychiatric/Behavioral: Positive for depression. The patient is nervous/anxious. The patient does not have insomnia. All other systems reviewed and are negative. Physical Exam   Constitutional: He is oriented to person, place, and time. He appears well-developed and well-nourished. No distress. Pleasant,  cognitivelty a bit slow   HENT:   Head: Normocephalic and atraumatic. Mouth/Throat: Oropharynx is clear and moist.   Eyes: Conjunctivae are normal. No scleral icterus. Neck: Normal range of motion. Neck supple. No JVD present. No thyromegaly present. Cardiovascular: Normal rate, regular rhythm, normal heart sounds and intact distal pulses. No murmur heard. Pulmonary/Chest: Effort normal and breath sounds normal. No respiratory distress. He has no wheezes. He has no rales. Abdominal: Soft. Bowel sounds are normal. He exhibits no distension. There is no tenderness. Chronic surgical scars   Genitourinary: Rectal exam shows guaiac negative stool. Genitourinary Comments: Stool in underwear with incontinence  Surgical scars in anal area   Musculoskeletal: He exhibits no edema or tenderness. Neurological: He is alert and oriented to person, place, and time. No cranial nerve deficit. Coordination abnormal.   Skin: Skin is warm and dry. No rash noted. There is erythema (Over sacral area, no skin breakdown or ulcers). Psychiatric: His behavior is normal.   Repeats words  Chronic cognitive deficit may be a bit worse   Nursing note and vitals reviewed. ASSESSMENT and Kristi Kayleigh was seen today for skin problem. Diagnoses and all orders for this visit:    Skin irritation    Insect bite, initial encounter    Underweight    Other orders  -     neomycin-bacitracin-polymyxin (NEOSPORIN, [id number],) 3.5mg-400 unit- 5,000 unit/gram ointment;  Apply to back red area daily x 7 days      Follow-up Disposition:  Return if symptoms worsen or fail to improve.   lab results and schedule of future lab studies reviewed with patient  reviewed diet, exercise and weight control  reviewed medications and side effects in detail  Reassurance  F/u with other MD's as scheduled  Avoid prolonged sitting in the same position to prevent possible pressure ulcers

## 2017-05-30 NOTE — PROGRESS NOTES
HISTORY OF PRESENT ILLNESS  Jacob Monique is a 43 y.o. male. Pt. comes in with his mother and  from his AL for f/u. Needs form filled for special [employer]. Has multiple medical problems. Has chronic stool incontinence and frequent loose stools. Has lost a lot of wt. Has had multiple intestine surgeries as a child and most recently had a SBO. Followed by colorectal MD. Has seen GI MD. Had extensive labs. All look stable including Albumin. Brain MRI and abd US were normal. No recent seizures. Followed by psych, and neurology. Has chronic psychosomatic c/o's. On Vit D for osteoporosis. Reports compliance with medications and diet. Med list and most recent labs/studies reviewed with them. Trying to be active physically. No other new c/o. Weight Loss   Pertinent negatives include no chest pain, no abdominal pain, no headaches and no shortness of breath. Follow Up Chronic Condition   Pertinent negatives include no chest pain, no abdominal pain, no headaches and no shortness of breath. Seizure    Associated symptoms include diarrhea. Pertinent negatives include no headaches, no chest pain, no nausea and no vomiting. He reports diarrhea. He reports no chest pain, no vomiting, no headaches. Bowel Incontinence   Pertinent negatives include no chest pain, no abdominal pain, no headaches and no shortness of breath. Anxiety   Pertinent negatives include no chest pain, no abdominal pain, no headaches and no shortness of breath. Review of Systems   Constitutional: Positive for weight loss. Negative for fever and malaise/fatigue. Eyes: Negative for blurred vision. Respiratory: Negative for shortness of breath. Cardiovascular: Negative for chest pain and leg swelling. Gastrointestinal: Positive for diarrhea. Negative for abdominal pain, blood in stool, heartburn, melena, nausea and vomiting. Stool incontinence   Genitourinary: Negative for dysuria and urgency.    Musculoskeletal: Negative for back pain, falls and joint pain. Skin: Negative for rash. Neurological: Positive for tremors and seizures. Negative for dizziness, sensory change and headaches. Endo/Heme/Allergies: Negative. Psychiatric/Behavioral: Positive for depression. The patient is nervous/anxious. The patient does not have insomnia. All other systems reviewed and are negative. Physical Exam   Constitutional: He is oriented to person, place, and time. He appears well-developed and well-nourished. No distress. Pleasant,  cognitivelty a bit slow   HENT:   Head: Normocephalic and atraumatic. Mouth/Throat: Oropharynx is clear and moist.   Eyes: Conjunctivae are normal. No scleral icterus. Neck: Normal range of motion. Neck supple. No JVD present. No thyromegaly present. Cardiovascular: Normal rate, regular rhythm, normal heart sounds and intact distal pulses. No murmur heard. Pulmonary/Chest: Effort normal and breath sounds normal. No respiratory distress. He has no wheezes. He has no rales. He exhibits no tenderness. Abdominal: Soft. Bowel sounds are normal. He exhibits no distension. There is no tenderness. Chronic surgical scars   Genitourinary: Rectal exam shows guaiac negative stool. Genitourinary Comments: Stool in underwear with incontinence  Surgical scars in anal area   Musculoskeletal: He exhibits no edema or tenderness. Neurological: He is alert and oriented to person, place, and time. No cranial nerve deficit. Coordination abnormal.   Skin: Skin is warm and dry. No rash noted. Psychiatric: His behavior is normal.   Repeats words  Chronic cognitive deficit may be a bit worse   Nursing note and vitals reviewed. GLEN and Nathaly Chaney was seen today for weight loss and form completion.     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    lab results and schedule of future lab studies reviewed with patient  reviewed diet, exercise and weight control  reviewed medications and side effects in detail  Reviewed all recent studies with them in detail  Today's exam is stable  F/u with other MD's as scheduled  Reassurance  Overall stable  Form filled

## 2017-06-16 ENCOUNTER — OFFICE VISIT (OUTPATIENT)
Dept: NEUROLOGY | Age: 37
End: 2017-06-16

## 2017-06-16 VITALS
SYSTOLIC BLOOD PRESSURE: 98 MMHG | RESPIRATION RATE: 20 BRPM | HEIGHT: 69 IN | HEART RATE: 77 BPM | OXYGEN SATURATION: 99 % | DIASTOLIC BLOOD PRESSURE: 68 MMHG | BODY MASS INDEX: 17.33 KG/M2 | WEIGHT: 117 LBS

## 2017-06-16 DIAGNOSIS — G40.109 LOCALIZATION-RELATED FOCAL EPILEPSY WITH SIMPLE PARTIAL SEIZURES (HCC): Primary | ICD-10-CM

## 2017-06-16 DIAGNOSIS — G93.49 STATIC ENCEPHALOPATHY: ICD-10-CM

## 2017-06-16 DIAGNOSIS — F41.9 ANXIETY: ICD-10-CM

## 2017-06-16 DIAGNOSIS — F32.A DEPRESSION, UNSPECIFIED DEPRESSION TYPE: ICD-10-CM

## 2017-06-16 RX ORDER — CARBAMAZEPINE 100 MG/1
100 TABLET, EXTENDED RELEASE ORAL 2 TIMES DAILY
Qty: 60 TAB | Refills: 3 | Status: SHIPPED | OUTPATIENT
Start: 2017-06-16 | End: 2017-11-15

## 2017-06-16 NOTE — PROGRESS NOTES
Date:  17     Name:  Chanell Krause  :  1980  MRN:  [id number]     PCP:  Dariana Castanon DO    Chief Complaint   Patient presents with    Seizure     HISTORY OF PRESENT ILLNESS: Follow up epilepsy and mild dementia by neuropsychological testing. Last visit, we did discuss the neuropsychological testing but due to his ongoing GI issues and desire to talk with Dr. Solitario Fernández about the results, no memory management was started at that time. Mother was actually more concerned about potentially stopping his Tegretol XR as he has not had any seizures since childhood and the GI issues. After discussing the results, it was decided that perhaps his health issues may be playing a role in the cognitive issues. As such, he then recommended that the neuropsychological testing be repeated in six months to assess for any changes. He has been doing better from a GI standpoint and his weight has stabilized. No seizures since the decrease in Tegretol XR to 200mg twice a day. Except as noted above, denies  fever, chills, cough. No CP or SOB. No dysuria, loss of bowel or bladder control. No Weight loss. Appetite good. Sleeping well. No sweats. No edema. No bruising or bleeding. No nausea or vomit. No diarrhea. No frequency, urgency, No depressive sxs. No anxiety. Denies sore throat, nasal congestion, nasal discharge, epistaxis, tinnitus, hearing loss, back pain, muscle pain, or joint pain. Current Outpatient Prescriptions   Medication Sig    neomycin-bacitracin-polymyxin (NEOSPORIN, ILE-RNI-MVEZZ,) 3.5mg-400 unit- 5,000 unit/gram ointment Apply to back red area daily x 7 days    LACTOBACILLUS ACIDOPHILUS (PROBIOTIC PO) Take  by mouth.  TAB-A-MARGIE tablet TAKE 1 TABLET BY MOUTH DAILY    carBAMazepine XR (TEGRETOL XR) 200 mg SR tablet Take 1 Tab by mouth two (2) times a day.     VITAMIN D2 50,000 unit capsule TAKE 1 CAPSULE BY MOUTH EVERY 3 MONTHS    WHEAT DEXTRIN (BENEFIBER CLEAR SF, DEXTRIN, PO) Take  by mouth.  fluticasone (FLONASE) 50 mcg/actuation nasal spray 2 Sprays by Both Nostrils route daily as needed for Rhinitis.  mirtazapine (REMERON) 7.5 mg tablet Take 7.5 mg by mouth nightly.  acetaminophen (TYLENOL) 325 mg tablet Take  by mouth every four (4) hours as needed for Pain.  loperamide (IMODIUM A-D) 2 mg tablet Take 2 mg by mouth four (4) times daily as needed for Diarrhea.  guaiFENesin ER (MUCINEX) 600 mg ER tablet Take 600 mg by mouth daily as needed for Congestion.  CALCITRATE-VITAMIN D tablet TAKE 1 TABLET BY MOUTH TWICE A DAY    ondansetron (ZOFRAN ODT) 4 mg disintegrating tablet Take 1 Tab by mouth every eight (8) hours as needed for Nausea.  brief disposable (ADULT) misc by Does Not Apply route. Depends, size medium, 1 nightly     No current facility-administered medications for this visit. 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, without mention of intractable epilepsy 5/14/2011    Mental retardation     Osteoporosis     Other ill-defined conditions     intestinal problems    Psychiatric disorder     anxiety    Seizure disorder [facility])      Past Surgical History:   Procedure Laterality Date    HX APPENDECTOMY  1/28/13    APPENDECTOMY    HX GI      colon resection    HX GI  1/28/13    LAPAROTOMY EXPLORATORY - OPEN LYSIS OF ADHESIONS     HX HERNIA REPAIR      HX OTHER SURGICAL      imperforated anus birth defect    UPPER GI ENDOSCOPY,BIOPSY  3/17/2017          Social History     Social History    Marital status: SINGLE     Spouse name: N/A    Number of children: N/A    Years of education: N/A     Occupational History    Not on file.      Social History Main Topics    Smoking status: Never Smoker    Smokeless tobacco: Never Used    Alcohol use No    Drug use: No    Sexual activity: No     Other Topics Concern    Not on file     Social History Narrative     History reviewed. No pertinent family history. PHYSICAL EXAMINATION:    Visit Vitals    BP 98/68    Pulse 77    Resp 20    Ht 5' 9\" (1.753 m)    Wt 53.1 kg (117 lb)    SpO2 99%    BMI 17.28 kg/m2     General: Well defined, nourished, and groomed individual in no acute distress. Neck: Supple, nontender, no bruits, no pain with resistance to active range of motion. Heart: Regular rate and rhythm, no murmurs, rub, or gallop. Normal S1S2. Lungs: Clear to auscultation bilaterally with equal chest expansion, no cough, no wheeze  Musculoskeletal: Extremities revealed no edema and had full range of motion of joints. Psych: Good mood and bright affect      NEUROLOGICAL EXAMINATION:   Mental Status: Alert and oriented to person and place   Cranial Nerves:   II, III, IV, VI: Visual acuity grossly intact. Visual fields are normal.   Pupils are equal, round, and reactive to light and accommodation. Extra-ocular movements are full and fluid. Fundoscopic exam was benign, no ptosis or nystagmus. V-XII: Hearing is grossly intact. Facial features are symmetric, with normal sensation and strength. The palate rises symmetrically and the tongue protrudes midline. Sternocleidomastoids 5/5. Motor Examination: decreased tone and bulk with generalized weakness. Coordination: Finger to nose was normal. No resting or intention tremor  Gait and Station: Steady while walking. Normal arm swing. No pronator drift. No muscle wasting or fasiculations noted. Reflexes: DTRs 2+ throughout. ASSESSMENT AND PLAN    ICD-10-CM ICD-9-CM    1. 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 Static encephalopathy (HCC) G93.49 742.9    3. Anxiety F41.9 300.00    4.  Depression, unspecified depression type F32.9 311      At this point, his GI issues seem to have improved somewhat, his weight has stabilized, and there have not been any significant worsening in his cognitive functioning. Family seems to think that this may have something to do with the Tegretol since the stabilization seemed to occur after the Tegretol was decreased. They would like to continue to decrease the Tegretol further with potential for discontinuation. Tegretol was decreased to 100 mg twice daily. We will plan to reassess the neuropsychological evaluation as recommended. Follow-up in 3 months. Cielo Calvin

## 2017-06-16 NOTE — PATIENT INSTRUCTIONS

## 2017-06-16 NOTE — MR AVS SNAPSHOT
Visit Information Date & Time Provider Department Dept. Phone Encounter #  
 6/16/2017 10:00 AM RICHY Leyva Neurology [facility] [phone] [id number] Follow-up Instructions Return in about 3 months (around 9/16/2017). Your Appointments 9/8/2017 10:00 AM  
ROUTINE CARE with Reddy Huerta DO [facility] ([address]) Appt Note: 4 month follow up [facility] [address] [facility] [address]  
[phone]  
  
   
 [address] Upcoming Health Maintenance Date Due DTaP/Tdap/Td series (1 - Tdap) 11/4/1999 INFLUENZA AGE 9 TO ADULT 8/1/2017 Allergies as of 6/16/2017  Review Complete On: 6/16/2017 By: Briseyda Stacy NP No Known Allergies Current Immunizations  Reviewed on 2/6/2017 Name Date Influenza Vaccine 10/15/2016, 11/1/2014, 10/22/2013 Influenza Vaccine Split 10/21/2012 Pneumococcal Vaccine (Unspecified Type) 2/28/2012 Not reviewed this visit You Were Diagnosed With   
  
 Codes Comments Localization-related focal epilepsy with simple partial seizures ([facility] [address]    -  Primary ICD-10-CM: G40.109 ICD-9-CM: 345.50 Static encephalopathy ([facility] [address]     ICD-10-CM: G93.49 
ICD-9-CM: 742.9 Anxiety     ICD-10-CM: F41.9 ICD-9-CM: 300.00 Depression, unspecified depression type     ICD-10-CM: F32.9 ICD-9-CM: 852 Vitals BP Pulse Resp Height(growth percentile) Weight(growth percentile) SpO2  
 98/68 77 20 5' 9\" (1.753 m) 117 lb (53.1 kg) 99% BMI Smoking Status 17.28 kg/m2 Never Smoker Vitals History BMI and BSA Data Body Mass Index Body Surface Area  
 17.28 kg/m 2 1.61 m 2 Preferred Pharmacy Pharmacy Name Phone [facility], [place] [address] [phone] Your Updated Medication List  
  
   
 This list is accurate as of: 6/16/17 10:37 AM.  Always use your most recent med list.  
  
  
  
  
 BENEFIBER CLEAR SF (DEXTRIN) PO Take  by mouth. brief disposable Misc Commonly known as:  adult  
by Does Not Apply route. Depends, size medium, 1 nightly CALCITRATE-VITAMIN D tablet Generic drug:  calcium citrate-vitamin D3 TAKE 1 TABLET BY MOUTH TWICE A DAY  
  
 carBAMazepine  mg SR tablet Commonly known as:  TEGretol XR Take 1 Tab by mouth two (2) times a day. fluticasone 50 mcg/actuation nasal spray Commonly known as:  Katherne Fix 2 Sprays by Both Nostrils route daily as needed for Rhinitis. IMODIUM A-D 2 mg tablet Generic drug:  loperamide Take 2 mg by mouth four (4) times daily as needed for Diarrhea.  
  
 mirtazapine 7.5 mg tablet Commonly known as:  Marlee Sequim Take 7.5 mg by mouth nightly. MUCINEX 600 mg ER tablet Generic drug:  guaiFENesin ER Take 600 mg by mouth daily as needed for Congestion. neomycin-bacitracin-polymyxin 3.5mg-400 unit- 5,000 unit/gram ointment Commonly known as:  NEOSPORIN (VDR-XPF-LRAXB) Apply to back red area daily x 7 days  
  
 ondansetron 4 mg disintegrating tablet Commonly known as:  ZOFRAN ODT Take 1 Tab by mouth every eight (8) hours as needed for Nausea. PROBIOTIC PO Take  by mouth. TAB-A-MARGIE tablet Generic drug:  multivitamin TAKE 1 TABLET BY MOUTH DAILY  
  
 TYLENOL 325 mg tablet Generic drug:  acetaminophen Take  by mouth every four (4) hours as needed for Pain. VITAMIN D2 50,000 unit capsule Generic drug:  ergocalciferol TAKE 1 CAPSULE BY MOUTH EVERY 3 MONTHS Prescriptions Sent to Pharmacy Refills  
 carBAMazepine XR (TEGRETOL XR) 100 mg SR tablet 3 Sig: Take 1 Tab by mouth two (2) times a day. Class: Normal  
 Pharmacy: [address] [place] [address] #: [phone]  Route: Oral  
  
 Follow-up Instructions Return in about 3 months (around 9/16/2017). Patient Instructions A Healthy Lifestyle: Care Instructions Your Care Instructions A healthy lifestyle can help you feel good, stay at a healthy weight, and have plenty of energy for both work and play. A healthy lifestyle is something you can share with your whole family. A healthy lifestyle also can lower your risk for serious health problems, such as high blood pressure, heart disease, and diabetes. You can follow a few steps listed below to improve your health and the health of your family. Follow-up care is a key part of your treatment and safety. Be sure to make and go to all appointments, and call your doctor if you are having problems. Its also a good idea to know your test results and keep a list of the medicines you take. How can you care for yourself at home? · Do not eat too much sugar, fat, or fast foods. You can still have dessert and treats now and then. The goal is moderation. · Start small to improve your eating habits. Pay attention to portion sizes, drink less juice and soda pop, and eat more fruits and vegetables. ¨ Eat a healthy amount of food. A 3-ounce serving of meat, for example, is about the size of a deck of cards. Fill the rest of your plate with vegetables and whole grains. ¨ Limit the amount of soda and sports drinks you have every day. Drink more water when you are thirsty. ¨ Eat at least 5 servings of fruits and vegetables every day. It may seem like a lot, but it is not hard to reach this goal. A serving or helping is 1 piece of fruit, 1 cup of vegetables, or 2 cups of leafy, raw vegetables. Have an apple or some carrot sticks as an afternoon snack instead of a candy bar. Try to have fruits and/or vegetables at every meal. 
· Make exercise part of your daily routine. You may want to start with simple activities, such as walking, bicycling, or slow swimming.  Try to be active 30 to 60 minutes every day. You do not need to do all 30 to 60 minutes all at once. For example, you can exercise 3 times a day for 10 or 20 minutes. Moderate exercise is safe for most people, but it is always a good idea to talk to your doctor before starting an exercise program. 
· Keep moving. Akil Flores the lawn, work in the garden, or Path. Take the stairs instead of the elevator at work. · If you smoke, quit. People who smoke have an increased risk for heart attack, stroke, cancer, and other lung illnesses. Quitting is hard, but there are ways to boost your chance of quitting tobacco for good. ¨ Use nicotine gum, patches, or lozenges. ¨ Ask your doctor about stop-smoking programs and medicines. ¨ Keep trying. In addition to reducing your risk of diseases in the future, you will notice some benefits soon after you stop using tobacco. If you have shortness of breath or asthma symptoms, they will likely get better within a few weeks after you quit. · Limit how much alcohol you drink. Moderate amounts of alcohol (up to 2 drinks a day for men, 1 drink a day for women) are okay. But drinking too much can lead to liver problems, high blood pressure, and other health problems. Family health If you have a family, there are many things you can do together to improve your health. · Eat meals together as a family as often as possible. · Eat healthy foods. This includes fruits, vegetables, lean meats and dairy, and whole grains. · Include your family in your fitness plan. Most people think of activities such as jogging or tennis as the way to fitness, but there are many ways you and your family can be more active. Anything that makes you breathe hard and gets your heart pumping is exercise. Here are some tips: 
¨ Walk to do errands or to take your child to school or the bus. ¨ Go for a family bike ride after dinner instead of watching TV. Where can you learn more? Go to http://rachael-william.info/. Enter M610 in the search box to learn more about \"A Healthy Lifestyle: Care Instructions. \" Current as of: July 26, 2016 Content Version: 11.2 © 1671-7552 [employer]. Care instructions adapted under license by [employer] (which disclaims liability or warranty for this information). If you have questions about a medical condition or this instruction, always ask your healthcare professional. [address] any warranty or liability for your use of this information. Introducing [facility] & HEALTH SERVICES! Dear Roshan Henning: Thank you for requesting a Zayo account. Our records indicate that you already have an active Zayo account. You can access your account anytime at [email or web address] Did you know that you can access your hospital and ER discharge instructions at any time in Zayo? You can also review all of your test results from your hospital stay or ER visit. Additional Information If you have questions, please visit the Frequently Asked Questions section of the Zayo website at [email or web address] Remember, Zayo is NOT to be used for urgent needs. For medical emergencies, dial 911. Now available from your iPhone and Android! Please provide this summary of care documentation to your next provider. Your primary care clinician is listed as Alcides Ortiz. If you have any questions after today's visit, please call [phone].

## 2017-06-27 RX ORDER — ERGOCALCIFEROL 1.25 MG/1
CAPSULE ORAL
Qty: 1 CAP | Refills: 0 | Status: SHIPPED | OUTPATIENT
Start: 2017-06-27 | End: 2017-12-19 | Stop reason: SDUPTHER

## 2017-07-25 ENCOUNTER — TELEPHONE (OUTPATIENT)
Dept: INTERNAL MEDICINE CLINIC | Age: 37
End: 2017-07-25

## 2017-07-25 DIAGNOSIS — G47.00 INSOMNIA, UNSPECIFIED TYPE: Primary | ICD-10-CM

## 2017-07-25 RX ORDER — LANOLIN ALCOHOL/MO/W.PET/CERES
3 CREAM (GRAM) TOPICAL
Qty: 30 TAB | Refills: 11 | Status: SHIPPED | OUTPATIENT
Start: 2017-07-25 | End: 2018-07-30 | Stop reason: SDUPTHER

## 2017-07-25 NOTE — TELEPHONE ENCOUNTER
Patients mother called and would like to know if he could be prescribed \"Melatonin\" for him to use at night and if so should he be dosed with 3 mg or 5 mg. She also states that they would like to start using natural options instead of Pharmaceutical drugs. Please give her a call back in regards to this at [phone].

## 2017-09-15 ENCOUNTER — OFFICE VISIT (OUTPATIENT)
Dept: INTERNAL MEDICINE CLINIC | Age: 37
End: 2017-09-15

## 2017-09-15 VITALS
HEART RATE: 85 BPM | DIASTOLIC BLOOD PRESSURE: 65 MMHG | BODY MASS INDEX: 16.88 KG/M2 | TEMPERATURE: 98.5 F | RESPIRATION RATE: 18 BRPM | SYSTOLIC BLOOD PRESSURE: 102 MMHG | HEIGHT: 69 IN | WEIGHT: 114 LBS | OXYGEN SATURATION: 95 %

## 2017-09-15 DIAGNOSIS — M81.0 OSTEOPOROSIS, UNSPECIFIED OSTEOPOROSIS TYPE, UNSPECIFIED PATHOLOGICAL FRACTURE PRESENCE: ICD-10-CM

## 2017-09-15 DIAGNOSIS — R63.6 UNDERWEIGHT: Primary | ICD-10-CM

## 2017-09-15 DIAGNOSIS — F41.9 ANXIETY: ICD-10-CM

## 2017-09-15 DIAGNOSIS — G40.109 LOCALIZATION-RELATED FOCAL EPILEPSY WITH SIMPLE PARTIAL SEIZURES (HCC): ICD-10-CM

## 2017-09-15 DIAGNOSIS — Z87.19 S/P SMALL BOWEL OBSTRUCTION: ICD-10-CM

## 2017-09-15 DIAGNOSIS — F32.A DEPRESSION, UNSPECIFIED DEPRESSION TYPE: ICD-10-CM

## 2017-09-15 NOTE — LETTER
9/18/2017 3:58 PM 
 
Mr. Vince Win 
[address] 
[facility] [address] Dear Vince Win: 
 
Please find your most recent results below. Resulted Orders METABOLIC PANEL, COMPREHENSIVE Result Value Ref Range Glucose 101 (H) 65 - 99 mg/dL BUN 12 6 - 20 mg/dL Creatinine 0.92 0.76 - 1.27 mg/dL GFR est non- >59 mL/min/1.73 GFR est  >59 mL/min/1.73  
 BUN/Creatinine ratio 13 9 - 20 Sodium 146 (H) 134 - 144 mmol/L Potassium 3.9 3.5 - 5.2 mmol/L Chloride 100 96 - 106 mmol/L  
 CO2 31 (H) 18 - 29 mmol/L Calcium 9.5 8.7 - 10.2 mg/dL Protein, total 6.7 6.0 - 8.5 g/dL Albumin 4.5 3.5 - 5.5 g/dL GLOBULIN, TOTAL 2.2 1.5 - 4.5 g/dL A-G Ratio 2.0 1.2 - 2.2 Bilirubin, total 0.3 0.0 - 1.2 mg/dL Alk. phosphatase 47 39 - 117 IU/L  
 AST (SGOT) 18 0 - 40 IU/L  
 ALT (SGPT) 11 0 - 44 IU/L Narrative Performed at:  [address]  [id number] : Jordyn Mccarthy MD, Phone:  [phone] CBC W/O DIFF Result Value Ref Range WBC 4.8 3.4 - 10.8 x10E3/uL  
 RBC 4.61 4.14 - 5.80 x10E6/uL HGB 14.6 12.6 - 17.7 g/dL HCT 43.2 37.5 - 51.0 % MCV 94 79 - 97 fL  
 MCH 31.7 26.6 - 33.0 pg  
 MCHC 33.8 31.5 - 35.7 g/dL  
 RDW 13.3 12.3 - 15.4 % PLATELET 500 590 - 528 x10E3/uL Narrative Performed at:  [address]  [id number] : Jordyn Mccarthy MD, Phone:  [phone] RECOMMENDATIONS: 
None. Keep up the good work! Please call me if you have any questions: [phone] Sincerely, 
 
 
Inis Cushing, DO

## 2017-09-15 NOTE — MR AVS SNAPSHOT
Visit Information Date & Time Provider Department Dept. Phone Encounter #  
 9/15/2017  1:00 PM Ivan Johnson, Jameel Carson [facility] Internal Medicine [phone] [id number] Follow-up Instructions Return in about 4 months (around 1/15/2018). Your Appointments 9/29/2017  2:00 PM  
Follow Up with Leola Owens NP [address] [facility] Appt Note: 3mo f/up epilepsy cr $0CP; 3mo f/up epilepsy cr $0CP  
 [address] [address] [phone]  
  
   
 [place] [address]  
  
    
 10/12/2017  1:00 PM  
New Patient with Corinne Blanc PsyD [address] [facility] Appt Note: re evaluation 6 month follow up/ Fabián Morales [place] [address] [facility] [address] [facility] [address] [phone]  
  
   
 [place] [address] [place] [address] [id number] [address] [place] Upcoming Health Maintenance Date Due DTaP/Tdap/Td series (1 - Tdap) 11/4/1999 INFLUENZA AGE 9 TO ADULT 8/1/2017 Allergies as of 9/15/2017  Review Complete On: 9/15/2017 By: Ivan Johnson, DO No Known Allergies Current Immunizations  Reviewed on 2/6/2017 Name Date Influenza Vaccine 10/15/2016, 11/1/2014, 10/22/2013 Influenza Vaccine Split 10/21/2012 Pneumococcal Vaccine (Unspecified Type) 2/28/2012 Not reviewed this visit You Were Diagnosed With   
  
 Codes Comments Underweight    -  Primary ICD-10-CM: R63.6 ICD-9-CM: 783.22 Localization-related focal epilepsy with simple partial [facility]     ICD-10-CM: G40.109 ICD-9-CM: 345.50 Osteoporosis, unspecified osteoporosis type, unspecified pathological fracture presence     ICD-10-CM: M81.0 ICD-9-CM: 733.00 Anxiety     ICD-10-CM: F41.9 ICD-9-CM: 300.00 Depression, unspecified depression type     ICD-10-CM: F32.9 ICD-9-CM: 862 S/p small bowel obstruction     ICD-10-CM: Z87.19 ICD-9-CM: V12.79 Vitals BP Pulse Temp Resp Height(growth percentile) Weight(growth percentile) 102/65 (BP 1 Location: Right arm, BP Patient Position: Sitting) 85 98.5 °F (36.9 °C) (Oral) 18 5' 9\" (1.753 m) 114 lb (51.7 kg) SpO2 BMI Smoking Status 95% 16.83 kg/m2 Never Smoker Vitals History BMI and BSA Data Body Mass Index Body Surface Area  
 16.83 kg/m 2 1.59 m 2 Preferred Pharmacy Pharmacy Name Phone [facility] [address], [place] [address] [phone] Your Updated Medication List  
  
   
This list is accurate as of: 9/15/17  1:43 PM.  Always use your most recent med list.  
  
  
  
  
 BENEFIBER CLEAR SF (DEXTRIN) PO Take  by mouth. brief disposable Misc Commonly known as:  adult  
by Does Not Apply route. Depends, size medium, 1 nightly CALCITRATE-VITAMIN D tablet Generic drug:  calcium citrate-vitamin D3 TAKE 1 TABLET BY MOUTH TWICE A DAY  
  
 carBAMazepine  mg SR tablet Commonly known as:  TEGretol XR Take 1 Tab by mouth two (2) times a day. fluticasone 50 mcg/actuation nasal spray Commonly known as:  Herb Slocomb 2 Sprays by Both Nostrils route daily as needed for Rhinitis. IMODIUM A-D 2 mg tablet Generic drug:  loperamide Take 2 mg by mouth four (4) times daily as needed for Diarrhea.  
  
 melatonin 3 mg tablet Take 1 Tab by mouth nightly. mirtazapine 7.5 mg tablet Commonly known as:  Unk Lust Take 7.5 mg by mouth nightly. MUCINEX 600 mg ER tablet Generic drug:  guaiFENesin ER Take 600 mg by mouth daily as needed for Congestion. neomycin-bacitracin-polymyxin 3.5mg-400 unit- 5,000 unit/gram ointment Commonly known as:  NEOSPORIN (GND-GMM-TWPUT) Apply to back red area daily x 7 days PROBIOTIC PO Take  by mouth. TAB-A-MARGIE tablet Generic drug:  multivitamin TAKE 1 TABLET BY MOUTH DAILY  
  
 TYLENOL 325 mg tablet Generic drug:  acetaminophen Take  by mouth every four (4) hours as needed for Pain. VITAMIN D2 50,000 unit capsule Generic drug:  ergocalciferol TAKE 1 CAPSULE BY MOUTH EVERY 3 MONTHS We Performed the Following CBC W/O DIFF [14322 CPT(R)] METABOLIC PANEL, COMPREHENSIVE [27638 CPT(R)] Follow-up Instructions Return in about 4 months (around 1/15/2018). Introducing [facility] & HEALTH SERVICES! Dear Jasmine Scales: Thank you for requesting a [employer] account. Our records indicate that you already have an active [employer] account. You can access your account anytime at [email or web address] Did you know that you can access your hospital and ER discharge instructions at any time in [employer]? You can also review all of your test results from your hospital stay or ER visit. Additional Information If you have questions, please visit the Frequently Asked Questions section of the [employer] website at [email or web address] Remember, [employer] is NOT to be used for urgent needs. For medical emergencies, dial 911. Now available from your iPhone and Android! Please provide this summary of care documentation to your next provider. Your primary care clinician is listed as Theador Hence. If you have any questions after today's visit, please call [phone].

## 2017-09-15 NOTE — PROGRESS NOTES
Health Maintenance Due   Topic Date Due    DTaP/Tdap/Td series (1 - Tdap) 11/04/1999    INFLUENZA AGE 9 TO ADULT  08/01/2017       Chief Complaint   Patient presents with    Weight Management     hasnt gained any weight    Insomnia     CC    Osteoporosis    Epilepsy       1. Have you been to the ER, urgent care clinic since your last visit? Hospitalized since your last visit? No    2. Have you seen or consulted any other health care providers outside of the [address] [place] [address] since your last visit? Include any pap smears or colon screening. No    3) Do you have an Advance Directive on file? no    4) Are you interested in receiving information on Advance Directives? NO      Patient is accompanied by self I have received verbal consent from Landen Pettit to discuss any/all medical information while they are present in the room.

## 2017-09-15 NOTE — PROGRESS NOTES
HISTORY OF PRESENT ILLNESS  Oneyda Thurston is a 43 y.o. male. Pt. comes in with his mother and caregiver from group home for f/u. Has multiple medical problems. Followed by neurologist for seizures. Has not had a seizure in many years. To try to get him off medications. Followed by psychiatrist for depression and anxiety. Medication seems to help. Remeron has been increased recently. Also uses melatonin for insomnia. Has had extensive GI surgery in the past.  Has had a small bowel obstruction over the last few years. He iss underweight and unable to gain weight. Has been getting acupuncture which is helping his GI issues. Reports compliance with medications and diet. Med list and most recent labs/studies reviewed with pt. Trying to be active physically as tolerated. Due for repeat labs. Reports no other new c/o. Weight Management   Pertinent negatives include no chest pain, no abdominal pain, no headaches and no shortness of breath. Insomnia   Pertinent negatives include no chest pain, no abdominal pain, no headaches and no shortness of breath. Osteoporosis   Pertinent negatives include no chest pain, no abdominal pain, no headaches and no shortness of breath. Review of Systems   Constitutional: Positive for weight gain and weight loss. Negative for fever and malaise/fatigue. Eyes: Negative for blurred vision. Respiratory: Negative for shortness of breath. Cardiovascular: Negative for chest pain and leg swelling. Gastrointestinal: Positive for diarrhea (Occasional). Negative for abdominal pain, blood in stool, heartburn, melena, nausea and vomiting. Stool incontinence   Genitourinary: Negative for dysuria and urgency. Musculoskeletal: Negative for back pain, falls and joint pain. Skin: Negative for rash. Neurological: Positive for tremors and seizures. Negative for dizziness, sensory change and headaches. Endo/Heme/Allergies: Negative.     Psychiatric/Behavioral: Positive for depression. The patient is nervous/anxious and has insomnia. All other systems reviewed and are negative. Physical Exam   Constitutional: He is oriented to person, place, and time. He appears well-developed and well-nourished. No distress. Pleasant,  cognitivelty a bit slow   HENT:   Head: Normocephalic and atraumatic. Mouth/Throat: Oropharynx is clear and moist.   Eyes: Conjunctivae are normal. No scleral icterus. Neck: Normal range of motion. Neck supple. No JVD present. No thyromegaly present. Cardiovascular: Normal rate, regular rhythm, normal heart sounds and intact distal pulses. No murmur heard. Pulmonary/Chest: Effort normal and breath sounds normal. No respiratory distress. He has no wheezes. He has no rales. He exhibits no tenderness. Abdominal: Soft. Bowel sounds are normal. He exhibits no distension. There is no tenderness. Chronic surgical scars   Genitourinary: Rectal exam shows guaiac negative stool. Genitourinary Comments: Stool in underwear with incontinence  Surgical scars in anal area   Musculoskeletal: He exhibits no edema or tenderness. Neurological: He is alert and oriented to person, place, and time. No cranial nerve deficit. Coordination abnormal.   Skin: Skin is warm and dry. No rash noted. Psychiatric: His behavior is normal.   Repeats words  Chronic cognitive deficit , about same   Nursing note and vitals reviewed. ASSESSMENT and PLAN  Diagnoses and all orders for this visit:    1. Underweight  -     METABOLIC PANEL, COMPREHENSIVE  -     CBC W/O DIFF    2. Localization-related focal epilepsy with simple partial seizures ([facility] [address]    3. Osteoporosis, unspecified osteoporosis type, unspecified pathological fracture presence    4. Anxiety    5. Depression, unspecified depression type    6. S/p small bowel obstruction  -     METABOLIC PANEL, COMPREHENSIVE  -     CBC W/O DIFF      Follow-up Disposition:  Return in about 4 months (around 1/15/2018).    lab results and schedule of future lab studies reviewed with patient  reviewed diet, exercise and weight control  reviewed medications and side effects in detail  F/u with other MD's as scheduled  Overall stable

## 2017-09-16 LAB
ALBUMIN SERPL-MCNC: 4.5 G/DL (ref 3.5–5.5)
ALBUMIN/GLOB SERPL: 2 {RATIO} (ref 1.2–2.2)
ALP SERPL-CCNC: 47 IU/L (ref 39–117)
ALT SERPL-CCNC: 11 IU/L (ref 0–44)
AST SERPL-CCNC: 18 IU/L (ref 0–40)
BILIRUB SERPL-MCNC: 0.3 MG/DL (ref 0–1.2)
BUN SERPL-MCNC: 12 MG/DL (ref 6–20)
BUN/CREAT SERPL: 13 (ref 9–20)
CALCIUM SERPL-MCNC: 9.5 MG/DL (ref 8.7–10.2)
CHLORIDE SERPL-SCNC: 100 MMOL/L (ref 96–106)
CO2 SERPL-SCNC: 31 MMOL/L (ref 18–29)
CREAT SERPL-MCNC: 0.92 MG/DL (ref 0.76–1.27)
ERYTHROCYTE [DISTWIDTH] IN BLOOD BY AUTOMATED COUNT: 13.3 % (ref 12.3–15.4)
GLOBULIN SER CALC-MCNC: 2.2 G/DL (ref 1.5–4.5)
GLUCOSE SERPL-MCNC: 101 MG/DL (ref 65–99)
HCT VFR BLD AUTO: 43.2 % (ref 37.5–51)
HGB BLD-MCNC: 14.6 G/DL (ref 12.6–17.7)
MCH RBC QN AUTO: 31.7 PG (ref 26.6–33)
MCHC RBC AUTO-ENTMCNC: 33.8 G/DL (ref 31.5–35.7)
MCV RBC AUTO: 94 FL (ref 79–97)
PLATELET # BLD AUTO: 151 X10E3/UL (ref 150–379)
POTASSIUM SERPL-SCNC: 3.9 MMOL/L (ref 3.5–5.2)
PROT SERPL-MCNC: 6.7 G/DL (ref 6–8.5)
RBC # BLD AUTO: 4.61 X10E6/UL (ref 4.14–5.8)
SODIUM SERPL-SCNC: 146 MMOL/L (ref 134–144)
WBC # BLD AUTO: 4.8 X10E3/UL (ref 3.4–10.8)

## 2017-09-29 ENCOUNTER — OFFICE VISIT (OUTPATIENT)
Dept: NEUROLOGY | Age: 37
End: 2017-09-29

## 2017-09-29 VITALS
BODY MASS INDEX: 17.25 KG/M2 | OXYGEN SATURATION: 94 % | WEIGHT: 116.5 LBS | TEMPERATURE: 98 F | HEART RATE: 79 BPM | HEIGHT: 69 IN | SYSTOLIC BLOOD PRESSURE: 112 MMHG | RESPIRATION RATE: 18 BRPM | DIASTOLIC BLOOD PRESSURE: 68 MMHG

## 2017-09-29 DIAGNOSIS — G40.109 LOCALIZATION-RELATED FOCAL EPILEPSY WITH SIMPLE PARTIAL SEIZURES (HCC): Primary | ICD-10-CM

## 2017-09-29 DIAGNOSIS — G93.49 STATIC ENCEPHALOPATHY: ICD-10-CM

## 2017-09-29 DIAGNOSIS — F32.A DEPRESSION, UNSPECIFIED DEPRESSION TYPE: ICD-10-CM

## 2017-09-29 RX ORDER — CARBAMAZEPINE 100 MG/1
TABLET, EXTENDED RELEASE ORAL
Qty: 7 TAB | Refills: 0 | Status: SHIPPED | OUTPATIENT
Start: 2017-09-29 | End: 2017-11-15

## 2017-09-29 NOTE — MR AVS SNAPSHOT
Visit Information Date & Time Provider Department Dept. Phone Encounter #  
 9/29/2017  2:00 PM RICHY [facility] [phone] [id number] Your Appointments 10/12/2017  1:00 PM  
New Patient with Elena Rosales PsyD 1993 [facility] ([facility]) Appt Note: re evaluation 6 month follow up/ Fabián Carmen [place] [address] [address] [phone]  
  
   
 [place] [address]  
  
    
 10/19/2017 10:00 AM  
CONSULT with Dee Dee Olivas NP 1993 [facility] ([facility]) Appt Note: f/u  
 [address] [address] [phone]  
  
   
 [address] [address]  
  
    
 1/30/2018  9:00 AM  
PHYSICAL PRE OP with Diann Alva DO [facility] ([facility]) Appt Note: annual ck up for forms for group home [address] [place] [address] [address] [place] [address]  
[phone]  
  
   
 [address] Upcoming Health Maintenance Date Due DTaP/Tdap/Td series (1 - Tdap) 11/4/1999 INFLUENZA AGE 9 TO ADULT 8/1/2017 Allergies as of 9/29/2017  Review Complete On: 9/29/2017 By: Dee Dee Olivas NP No Known Allergies Current Immunizations  Reviewed on 2/6/2017 Name Date Influenza Vaccine 10/15/2016, 11/1/2014, 10/22/2013 Influenza Vaccine Split 10/21/2012 Pneumococcal Vaccine (Unspecified Type) 2/28/2012 Not reviewed this visit You Were Diagnosed With   
  
 Codes Comments Localization-related focal epilepsy with simple partial seizures ([facility] [address]    -  Primary ICD-10-CM: G40.109 ICD-9-CM: 345.50 Static encephalopathy     ICD-10-CM: G93.49 
ICD-9-CM: 742.9 Depression, unspecified depression type     ICD-10-CM: F32.9 ICD-9-CM: 144 Vitals BP Pulse Temp Resp Height(growth percentile) Weight(growth percentile) 112/68 79 98 °F (36.7 °C) (Oral) 18 5' 9\" (1.753 m) 116 lb 8 oz (52.8 kg) SpO2 BMI Smoking Status 94% 17.2 kg/m2 Never Smoker BMI and BSA Data Body Mass Index Body Surface Area  
 17.2 kg/m 2 1.6 m 2 Preferred Pharmacy Pharmacy Name Phone [facility], [address] [phone] Your Updated Medication List  
  
   
This list is accurate as of: 9/29/17  3:17 PM.  Always use your most recent med list.  
  
  
  
  
 BENEFIBER CLEAR SF (DEXTRIN) PO Take  by mouth. brief disposable Misc Commonly known as:  adult  
by Does Not Apply route. Depends, size medium, 1 nightly CALCITRATE-VITAMIN D tablet Generic drug:  calcium citrate-vitamin D3 TAKE 1 TABLET BY MOUTH TWICE A DAY * carBAMazepine  mg SR tablet Commonly known as:  TEGretol XR Take 1 Tab by mouth two (2) times a day. * carBAMazepine  mg SR tablet Commonly known as:  TEGretol XR Decrease dose after the completion of the neuropsychological testing to 100mg nightly for one week then discontinue. fluticasone 50 mcg/actuation nasal spray Commonly known as:  Rosario [place] 2 Sprays by Both Nostrils route daily as needed for Rhinitis. IMODIUM A-D 2 mg tablet Generic drug:  loperamide Take 2 mg by mouth four (4) times daily as needed for Diarrhea.  
  
 melatonin 3 mg tablet Take 1 Tab by mouth nightly. mirtazapine 7.5 mg tablet Commonly known as:  Eulis Yves Take 7.5 mg by mouth nightly. MUCINEX 600 mg ER tablet Generic drug:  guaiFENesin ER Take 600 mg by mouth daily as needed for Congestion. neomycin-bacitracin-polymyxin 3.5mg-400 unit- 5,000 unit/gram ointment Commonly known as:  NEOSPORIN ([id number]-[id number]) Apply to back red area daily x 7 days PROBIOTIC COMPLEX 25 billion cell -100 mg Cap Generic drug:  L.acid-B.bifidum-B.animal-FOS Take  by mouth. TAB-A-MARGIE tablet Generic drug:  multivitamin TAKE 1 TABLET BY MOUTH DAILY  
  
 TYLENOL 325 mg tablet Generic drug:  acetaminophen Take  by mouth every four (4) hours as needed for Pain. VITAMIN D2 50,000 unit capsule Generic drug:  ergocalciferol TAKE 1 CAPSULE BY MOUTH EVERY 3 MONTHS * Notice: This list has 2 medication(s) that are the same as other medications prescribed for you. Read the directions carefully, and ask your doctor or other care provider to review them with you. Prescriptions Printed Refills  
 carBAMazepine XR (TEGRETOL XR) 100 mg SR tablet 0 Sig: Decrease dose after the completion of the neuropsychological testing to 100mg nightly for one week then discontinue. Class: Print Introducing hospitals & HEALTH SERVICES! Dear Zelda Sheldon: Thank you for requesting a [employer] account. Our records indicate that you already have an active [employer] account. You can access your account anytime at [email or web address]. [employer] Did you know that you can access your hospital and ER discharge instructions at any time in [employer]? You can also review all of your test results from your hospital stay or ER visit. Additional Information If you have questions, please visit the Frequently Asked Questions section of the [employer] website at [email or web address]/. Remember, [employer] is NOT to be used for urgent needs. For medical emergencies, dial 911. Now available from your iPhone and Android! Please provide this summary of care documentation to your next provider. Your primary care clinician is listed as Ananda Carlson. If you have any questions after today's visit, please call [phone].

## 2017-09-29 NOTE — PROGRESS NOTES
Date:  17     Name:  Shanti Edgar  :  1980  MRN:  [id number]     PCP:  Valarie Chisholm DO    Chief Complaint   Patient presents with    Epilepsy     3 mo f/u     HISTORY OF PRESENT ILLNESS: Follow up evaluation of epilepsy. At his last office visit, the Tegretol XR was decreased to 100mg bid. No seizures on the lower dose. He was not sleeping well so they saw the psychiatrist who increased the Remeron to 15mg. Since then, they have noticed that he is more engaged, interactive, more energy, and is sleeping more. Mother had several questions regarding the Remeron and the increased risk for possible dementia on this medication long term as well as potential herbal treatments. Except as noted above, denies  fever, chills, cough. No CP or SOB. No dysuria, loss of bowel or bladder control. No Weight loss. Appetite good. Sleeping well. No sweats. No edema. No bruising or bleeding. No nausea or vomit. No diarrhea. No frequency, urgency, No depressive sxs. No anxiety. Denies sore throat, nasal congestion, nasal discharge, epistaxis, tinnitus, hearing loss, back pain, muscle pain, or joint pain. Current Outpatient Prescriptions   Medication Sig    L.acid-B.bifidum-B.animal-FOS (PROBIOTIC COMPLEX) 25 billion cell -100 mg cap Take  by mouth.  melatonin 3 mg tablet Take 1 Tab by mouth nightly.  VITAMIN D2 50,000 unit capsule TAKE 1 CAPSULE BY MOUTH EVERY 3 MONTHS    carBAMazepine XR (TEGRETOL XR) 100 mg SR tablet Take 1 Tab by mouth two (2) times a day.  neomycin-bacitracin-polymyxin (NEOSPORIN, [id number],) 3.5mg-400 unit- 5,000 unit/gram ointment Apply to back red area daily x 7 days    TAB-A-MARGIE tablet TAKE 1 TABLET BY MOUTH DAILY    WHEAT DEXTRIN (BENEFIBER CLEAR SF, DEXTRIN, PO) Take  by mouth.  fluticasone (FLONASE) 50 mcg/actuation nasal spray 2 Sprays by Both Nostrils route daily as needed for Rhinitis.     mirtazapine (REMERON) 7.5 mg tablet Take 7.5 mg by mouth nightly.  acetaminophen (TYLENOL) 325 mg tablet Take  by mouth every four (4) hours as needed for Pain.  loperamide (IMODIUM A-D) 2 mg tablet Take 2 mg by mouth four (4) times daily as needed for Diarrhea.  guaiFENesin ER (MUCINEX) 600 mg ER tablet Take 600 mg by mouth daily as needed for Congestion.  CALCITRATE-VITAMIN D tablet TAKE 1 TABLET BY MOUTH TWICE A DAY    brief disposable (ADULT) misc by Does Not Apply route. Depends, size medium, 1 nightly     No current facility-administered medications for this visit. 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, without mention of intractable epilepsy 5/14/2011    Mental retardation     Osteoporosis     Other ill-defined conditions     intestinal problems    Psychiatric disorder     anxiety    Seizure disorder [facility])      Past Surgical History:   Procedure Laterality Date    HX APPENDECTOMY  1/28/13    APPENDECTOMY    HX GI      colon resection    HX GI  1/28/13    LAPAROTOMY EXPLORATORY - OPEN LYSIS OF ADHESIONS     HX HERNIA REPAIR      HX OTHER SURGICAL      imperforated anus birth defect    UPPER GI ENDOSCOPY,BIOPSY  3/17/2017          Social History     Social History    Marital status: SINGLE     Spouse name: N/A    Number of children: N/A    Years of education: N/A     Occupational History    Not on file. Social History Main Topics    Smoking status: Never Smoker    Smokeless tobacco: Never Used    Alcohol use No    Drug use: No    Sexual activity: No     Other Topics Concern    Not on file     Social History Narrative     History reviewed. No pertinent family history.     PHYSICAL EXAMINATION:    Visit Vitals    /68    Pulse 79    Temp 98 °F (36.7 °C) (Oral)    Resp 18    Ht 5' 9\" (1.753 m)    Wt 52.8 kg (116 lb 8 oz)    SpO2 94%    BMI 17.2 kg/m2     General: Well defined, nourished, and groomed individual in no acute distress. Neck: Supple, nontender, no bruits, no pain with resistance to active range of motion. Heart: Regular rate and rhythm, no murmurs, rub, or gallop. Normal S1S2. Lungs: Clear to auscultation bilaterally with equal chest expansion, no cough, no wheeze  Musculoskeletal: Extremities revealed no edema and had full range of motion of joints. Psych: Good mood and bright affect      NEUROLOGICAL EXAMINATION:   Mental Status: Alert and oriented to person and place   Cranial Nerves:   II, III, IV, VI: Visual acuity grossly intact. Visual fields are normal.   Pupils are equal, round, and reactive to light and accommodation. Extra-ocular movements are full and fluid. Fundoscopic exam was benign, no ptosis or nystagmus. V-XII: Hearing is grossly intact. Facial features are symmetric, with normal sensation and strength. The palate rises symmetrically and the tongue protrudes midline. Sternocleidomastoids 5/5. Motor Examination: decreased tone and bulk with generalized weakness. Coordination: Finger to nose was normal. No resting or intention tremor  Gait and Station: Steady while walking. Normal arm swing. No pronator drift. No muscle wasting or fasiculations noted. Reflexes: DTRs 2+ throughout. ASSESSMENT AND PLAN    ICD-10-CM ICD-9-CM    1. 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 Static encephalopathy G93.49 742.9    3. Depression, unspecified depression type F32.9 311      For now, he will continue with the lower dose of Tegretol XR 100mg. I would like to have this discontinued after the completion of neuropsychological re-evaluation. Will follow up after this testing to discuss results.  Questions regarding the Remeron and herbal preparations were discussed and answered. Cielo Enriquez

## 2017-09-30 RX ORDER — CARBAMAZEPINE 100 MG/1
TABLET, EXTENDED RELEASE ORAL
Qty: 60 TAB | Refills: 0 | Status: SHIPPED | OUTPATIENT
Start: 2017-09-30 | End: 2017-11-15

## 2017-10-09 ENCOUNTER — TELEPHONE (OUTPATIENT)
Dept: NEUROLOGY | Age: 37
End: 2017-10-09

## 2017-10-09 NOTE — TELEPHONE ENCOUNTER
----- Message from Eduardo Maldonado sent at 10/9/2017  1:54 PM EDT -----  Regarding: RICHY Irby / Alisa Gallegos from [address] would like a callback to get clarification for RX Carbamazepine.      (P)[phone]-Ask for pharmacist

## 2017-10-11 NOTE — TELEPHONE ENCOUNTER
Returned pharmacist's call. Spoke to pharmacy and they were inquiring on the date of pt's neuropsych test b/c of carbamazepine being decreased due to test. Informed pharmacy that pt's test is on 10-12-17.

## 2017-10-12 ENCOUNTER — OFFICE VISIT (OUTPATIENT)
Dept: NEUROLOGY | Age: 37
End: 2017-10-12

## 2017-10-12 DIAGNOSIS — G93.49 STATIC ENCEPHALOPATHY: ICD-10-CM

## 2017-10-12 DIAGNOSIS — F41.9 ANXIETY: ICD-10-CM

## 2017-10-12 DIAGNOSIS — G31.84 MILD COGNITIVE IMPAIRMENT: Primary | ICD-10-CM

## 2017-10-12 NOTE — PROGRESS NOTES
[address] [facility]  [address]   [place] [address] [facility] [address] [facility], [facility] IRWIN [address] Alejandro.   [phone] Office   [phone] Fax      Neuropsychology    Exam # 2    Initial Diagnostic Interview Note      Referral:  Lucas Gu DO    Trinity Feliciano is a 43 y.o. right handed  male who was accompanied by his mother and caregiver to the initial clinical interview on 10/12/17 . Please refer to his medical records for details pertaining to his history. When I saw him last: Trinity Feliciano is a 43 y.o. right handed  male who was accompanied by his caregiver to the initial clinical interview on 3/9/17. Please refer to his medical records for details pertaining to his history. Briefly, the patient reported that he completed high school. He has epilepsy and tremors and cognitive changes. He finished high school and then went to a transition program from age 22. He has abnormal CT of abdomen and does not have much bowel control. They are seeing a major decline in his memory when he was dealing with his health decline. He knows something is wrong but cannot articulate. See also records. He has lost 25 pounds recently. Bilateral tremors getting worse. Ashen in appearance. Lethargy and fatigue. HE is on Buspar. Had been doing well for years and years but now there is a decline since there is a colon/rectal issue. Memory declining. Forgets completely. Feels like he is dementia. Has to be cues for everything, medications, day-to-day ADLs, etc.       Spoke with mother as well. Could be an absorption issue? Endoscopy March 17th. Will await those results. Then needs testing done. Better than he was before he got sick in terms of need for cues and such but better but not back to baseline.       Psychological was done about 9 years ago. IQ found in the 62s.    My diagnostic impressions at that time included:   This patient generated an abnormal range Neuropsychological Evaluation with respect to neurocognitive functioning. In this regard, impairments were noted across the vast majority of neurocognitive domains assessed. Some language aabilities remain important areas of relative neurocognitive strength, but these likely mask underlying cognitive deficits at times. There is evidence of a decline from previous evaluation, by history. There is evidence of a decline from what limited estimates of premorbid functioning that could be done. He has been on Buspar for quite some time now.                           Concern is for a reversible cause of this type of dementia process. He is lethargic, ashen, and fatigued and has lost significant weight of late. A medical issue causing those problems may also have caused this memory loss. As such, reversible causes of dementia need to be ruled out. If none is found, then this is a dementia type process that is at least mild in terms of severity. There is evidence of evolution here and this is not solely a static encephalopathy. Neurologic correlation is indicated. Medication for memory and continue treatment for psychiatric issues as needed. Patient is not competent. Tamera Fernández He should not live independently without appropriate supervision across virtually all ADLs, but especially those pertaining to memory. This includes nutritional/meal preparation supervision, medication management supervision, and supervision of financial dealings. No Driving. I agree with his current living arrangements. Follow up prn. Clinical correlation is, of course, indicated                           I will discuss these findings with the patient and family when they follow up with me in the near future.   A follow up Neuropsychological Evaluation is indicated on a prn basis.       DIAGNOSES Mild Dementia                                              Chronic Intellectual Deficits Chronic Learning Disabilities    Since I saw him last, the caregiver reported that the patient has been needing increased support for every day living skills. Much more difficult to get him to do things he used to do. He struggles with ADLs. He needs assistance with all ADLs. Placing extra staff to work with him one on one. He repeats questions and fixates on things and is much more anxious. No new neurologic concerns. PAtient says he is much more anxious. He doesn't know why he is more anxious. Sleeping a lot. Needs redirection to get back to bed. He is sometimes using more advanced words. Appetite unchanged. Still needs assistance across all ADLs. Definite worsening of cognition and definite increase in anxiety. Tremors worse also. Balance problems. Neuropsychological Mental Status Exam (NMSE):  Historian: Fair  Praxis: No UE apraxia  R/L Orientation: Intact to self and to other  Dress: within normal limits   Weight: within normal limits   Appearance/Hygiene: within normal limits   Gait: within normal limits   Assistive Devices: Glasses  Mood: Anxious  Affect: Anxious   Comprehension: moderately impaired    Thought Process: Needs redirection   Expressive Language: mildly impaired   Receptive Language: within normal limits   Motor:  No cognitive perseveration.   Tremors noted  ETOH: Denied  Tobacco: Denied  Illicit: Denied  SI/HI: Denied  Psychosis: Denied  Insight: Fair  Judgment:  Poor  Other Psych:      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, without mention of intractable epilepsy 5/14/2011    Mental retardation     Osteoporosis     Other ill-defined conditions(459.89)     intestinal problems    Psychiatric disorder     anxiety    Seizure disorder ([facility] [address]        Past Surgical History:   Procedure Laterality Date    HX APPENDECTOMY  1/28/13    APPENDECTOMY    HX GI      colon resection    HX GI  1/28/13    LAPAROTOMY EXPLORATORY - OPEN LYSIS OF ADHESIONS     HX HERNIA REPAIR      HX OTHER SURGICAL      imperforated anus birth defect    UPPER GI ENDOSCOPY,BIOPSY  3/17/2017            No Known Allergies    History reviewed. No pertinent family history. Social History   Substance Use Topics    Smoking status: Never Smoker    Smokeless tobacco: Never Used    Alcohol use No       Current Outpatient Prescriptions   Medication Sig Dispense Refill    carBAMazepine XR (TEGRETOL XR) 100 mg SR tablet TAKE 1 TABLET BY MOUTH TWICE A DAY 60 Tab 0    L.acid-B.bifidum-B.animal-FOS (PROBIOTIC COMPLEX) 25 billion cell -100 mg cap Take  by mouth.  carBAMazepine XR (TEGRETOL XR) 100 mg SR tablet Decrease dose after the completion of the neuropsychological testing to 100mg nightly for one week then discontinue. 7 Tab 0    melatonin 3 mg tablet Take 1 Tab by mouth nightly. 30 Tab 11    VITAMIN D2 50,000 unit capsule TAKE 1 CAPSULE BY MOUTH EVERY 3 MONTHS 1 Cap 0    carBAMazepine XR (TEGRETOL XR) 100 mg SR tablet Take 1 Tab by mouth two (2) times a day. 60 Tab 3    neomycin-bacitracin-polymyxin (NEOSPORIN, [id number],) 3.5mg-400 unit- 5,000 unit/gram ointment Apply to back red area daily x 7 days 1 Tube 0    TAB-A-MARGIE tablet TAKE 1 TABLET BY MOUTH DAILY 30 Tab PRN    WHEAT DEXTRIN (BENEFIBER CLEAR SF, DEXTRIN, PO) Take  by mouth.  fluticasone (FLONASE) 50 mcg/actuation nasal spray 2 Sprays by Both Nostrils route daily as needed for Rhinitis. 1 Bottle 0    mirtazapine (REMERON) 7.5 mg tablet Take 7.5 mg by mouth nightly.  acetaminophen (TYLENOL) 325 mg tablet Take  by mouth every four (4) hours as needed for Pain.  loperamide (IMODIUM A-D) 2 mg tablet Take 2 mg by mouth four (4) times daily as needed for Diarrhea.       guaiFENesin ER (MUCINEX) 600 mg ER tablet Take 600 mg by mouth daily as needed for Congestion.  CALCITRATE-VITAMIN D tablet TAKE 1 TABLET BY MOUTH TWICE A DAY 60 Tab PRN    brief disposable (ADULT) misc by Does Not Apply route. Depends, size medium, 1 nightly 1 Package 11         Plan:  Obtain authorization for testing from insurance company. Report to follow once testing, scoring, and interpretation completed. ? Organic based neurocognitive issues versus mood disorder or combination of same. ? Problems organic, functional, or both? This note will not be viewable in [address] 43year old male last seen six months ago  Marked global encephalopathy along with chronic intellectual and learning difficulties. Wanted updated testing to see if this was a static issue or if this is dementia. Per family and caregiver, there is decline but also increased anxiety . Now, the question is whether or not the worsening issue is anxiety or dementia related. My hunch is that this is a combination of dementia and anxiety.

## 2017-10-30 ENCOUNTER — OFFICE VISIT (OUTPATIENT)
Dept: NEUROLOGY | Age: 37
End: 2017-10-30

## 2017-10-30 DIAGNOSIS — F71 IQ 35-49 (MODERATE MENTAL RETARDATION): ICD-10-CM

## 2017-10-30 DIAGNOSIS — F41.9 SEVERE ANXIETY: ICD-10-CM

## 2017-10-30 DIAGNOSIS — G93.49 STATIC ENCEPHALOPATHY: Primary | ICD-10-CM

## 2017-10-30 DIAGNOSIS — F81.9 LEARNING DISABILITIES: ICD-10-CM

## 2017-11-02 NOTE — PROGRESS NOTES
[address] [facility],[address]  [address]   [place] [address] [facility]   Priyanka Alvarez   [phone] Office   [phone] Fax      Neuropsychological Evaluation Report    Exam # 2  Referral:  Jelly Oliva DO    Corey Selby is a 43 y.o. right handed  male who was accompanied by his mother and caregiver to the initial clinical interview on 10/12/17 . Please refer to his medical records for details pertaining to his history. When I saw him last: Corey Selby is a 43 y.o. right handed  male who was accompanied by his caregiver to the initial clinical interview on 3/9/17. Please refer to his medical records for details pertaining to his history. Briefly, the patient reported that he completed high school. He has epilepsy and tremors and cognitive changes. He finished high school and then went to a transition program from age 22. He has abnormal CT of abdomen and does not have much bowel control. They are seeing a major decline in his memory when he was dealing with his health decline. He knows something is wrong but cannot articulate. See also records. He has lost 25 pounds recently. Bilateral tremors getting worse. Ashen in appearance. Lethargy and fatigue. HE is on Buspar. Had been doing well for years and years but now there is a decline since there is a colon/rectal issue. Memory declining. Forgets completely. Feels like he is dementia. Has to be cues for everything, medications, day-to-day ADLs, etc.       Spoke with mother as well. Could be an absorption issue? Endoscopy March 17th. Will await those results. Then needs testing done. Better than he was before he got sick in terms of need for cues and such but better but not back to baseline.       Psychological was done about 9 years ago. IQ found in the 62s.    My diagnostic impressions at that time included:   This patient generated an abnormal range Neuropsychological Evaluation with respect to neurocognitive functioning. In this regard, impairments were noted across the vast majority of neurocognitive domains assessed. Some language aabilities remain important areas of relative neurocognitive strength, but these likely mask underlying cognitive deficits at times. There is evidence of a decline from previous evaluation, by history. There is evidence of a decline from what limited estimates of premorbid functioning that could be done. He has been on Buspar for quite some time now.                           Concern is for a reversible cause of this type of dementia process. He is lethargic, ashen, and fatigued and has lost significant weight of late. A medical issue causing those problems may also have caused this memory loss. As such, reversible causes of dementia need to be ruled out. If none is found, then this is a dementia type process that is at least mild in terms of severity. There is evidence of evolution here and this is not solely a static encephalopathy. Neurologic correlation is indicated. Medication for memory and continue treatment for psychiatric issues as needed. Patient is not competent. [place] Traciow He should not live independently without appropriate supervision across virtually all ADLs, but especially those pertaining to memory. This includes nutritional/meal preparation supervision, medication management supervision, and supervision of financial dealings. No Driving. I agree with his current living arrangements. Follow up prn. Clinical correlation is, of course, indicated                           I will discuss these findings with the patient and family when they follow up with me in the near future.   A follow up Neuropsychological Evaluation is indicated on a prn basis.       DIAGNOSES Mild Dementia                                              Chronic Intellectual Deficits                                              Chronic Learning Disabilities    Since I saw him last, the caregiver reported that the patient has been needing increased support for every day living skills. Much more difficult to get him to do things he used to do. He struggles with ADLs. He needs assistance with all ADLs. Placing extra staff to work with him one on one. He repeats questions and fixates on things and is much more anxious. No new neurologic concerns. PAtient says he is much more anxious. He doesn't know why he is more anxious. Sleeping a lot. Needs redirection to get back to bed. He is sometimes using more advanced words. Appetite unchanged. Still needs assistance across all ADLs. Definite worsening of cognition and definite increase in anxiety. Tremors worse also. Balance problems. Neuropsychological Mental Status Exam (NMSE):  Historian: Fair  Praxis: No UE apraxia  R/L Orientation: Intact to self and to other  Dress: within normal limits   Weight: within normal limits   Appearance/Hygiene: within normal limits   Gait: within normal limits   Assistive Devices: Glasses  Mood: Anxious  Affect: Anxious   Comprehension: moderately impaired    Thought Process: Needs redirection   Expressive Language: mildly impaired   Receptive Language: within normal limits   Motor:  No cognitive perseveration.   Tremors noted  ETOH: Denied  Tobacco: Denied  Illicit: Denied  SI/HI: Denied  Psychosis: Denied  Insight: Fair  Judgment:  Poor  Other Psych:      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, without mention of intractable epilepsy 5/14/2011    Mental retardation     Osteoporosis     Other ill-defined conditions(079.24)     intestinal problems    Psychiatric disorder     anxiety    Seizure disorder [facility])        Past Surgical History: Procedure Laterality Date    HX APPENDECTOMY  1/28/13    APPENDECTOMY    HX GI      colon resection    HX GI  1/28/13    LAPAROTOMY EXPLORATORY - OPEN LYSIS OF ADHESIONS     HX HERNIA REPAIR      HX OTHER SURGICAL      imperforated anus birth defect    UPPER GI ENDOSCOPY,BIOPSY  3/17/2017            No Known Allergies    History reviewed. No pertinent family history. Social History   Substance Use Topics    Smoking status: Never Smoker    Smokeless tobacco: Never Used    Alcohol use No       Current Outpatient Prescriptions   Medication Sig Dispense Refill    carBAMazepine XR (TEGRETOL XR) 100 mg SR tablet TAKE 1 TABLET BY MOUTH TWICE A DAY 60 Tab 0    L.acid-B.bifidum-B.animal-FOS (PROBIOTIC COMPLEX) 25 billion cell -100 mg cap Take  by mouth.  carBAMazepine XR (TEGRETOL XR) 100 mg SR tablet Decrease dose after the completion of the neuropsychological testing to 100mg nightly for one week then discontinue. 7 Tab 0    melatonin 3 mg tablet Take 1 Tab by mouth nightly. 30 Tab 11    VITAMIN D2 50,000 unit capsule TAKE 1 CAPSULE BY MOUTH EVERY 3 MONTHS 1 Cap 0    carBAMazepine XR (TEGRETOL XR) 100 mg SR tablet Take 1 Tab by mouth two (2) times a day. 60 Tab 3    neomycin-bacitracin-polymyxin (NEOSPORIN, ZRR-PNL-NNDOB,) 3.5mg-400 unit- 5,000 unit/gram ointment Apply to back red area daily x 7 days 1 Tube 0    TAB-A-MARGIE tablet TAKE 1 TABLET BY MOUTH DAILY 30 Tab PRN    WHEAT DEXTRIN (BENEFIBER CLEAR SF, DEXTRIN, PO) Take  by mouth.  fluticasone (FLONASE) 50 mcg/actuation nasal spray 2 Sprays by Both Nostrils route daily as needed for Rhinitis. 1 Bottle 0    mirtazapine (REMERON) 7.5 mg tablet Take 7.5 mg by mouth nightly.  acetaminophen (TYLENOL) 325 mg tablet Take  by mouth every four (4) hours as needed for Pain.  loperamide (IMODIUM A-D) 2 mg tablet Take 2 mg by mouth four (4) times daily as needed for Diarrhea.       guaiFENesin ER (MUCINEX) 600 mg ER tablet Take 600 mg by mouth daily as needed for Congestion.  CALCITRATE-VITAMIN D tablet TAKE 1 TABLET BY MOUTH TWICE A DAY 60 Tab PRN    brief disposable (ADULT) misc by Does Not Apply route. Depends, size medium, 1 nightly 1 Package 11         Plan:  Obtain authorization for testing from insurance company. Report to follow once testing, scoring, and interpretation completed. ? Organic based neurocognitive issues versus mood disorder or combination of same. ? Problems organic, functional, or both? This note will not be viewable in [address] 43year old male last seen six months ago  Marked global encephalopathy along with chronic intellectual and learning difficulties. Wanted updated testing to see if this was a static issue or if this is dementia. Per family and caregiver, there is decline but also increased anxiety . Now, the question is whether or not the worsening issue is anxiety or dementia related. My hunch is that this is a combination of dementia and anxiety. Neuropsychological Evaluation Results Follow  Patient Testing 10/30/17 Report Completed 11/2/17  A Psychometrist assisted with portions of this examination    The following tests were administered: Neuropsychological Mental Status Exam*, Mini-Mental Status Examination*, Clock Drawing Test*, TOPF, Wechsler Abbreviated Scale Of Intelligence - II, Verbal Fluency Tests, Palmer & Palmer - Revised, CPT-III, Repeatable Battery For The Assessment Of Neuropsychological Status, Surinder Dementia Rating Scale - 2, Trailmaking Test Parts A & B, Revised Memory & Behavior Checklist*, Geriatric Depression Inventory, Beck Anxiety Inventory, Farrell Symptom Checklist, History Taking  & Clinical Interview With The Patient*. Additional History Taking With The Patients Mother and Caregiver, ABAS-3, CASE, Review Of Available Records*.       Test Findings:  Note:  The patients raw data have been compared with currently available norms which include demographic corrections for age, gender, and/or education. Sometimes, the patients scores are compared to demographically similar individuals as close to the patients age, education level, etc., as possible. \"Average\" is viewed as being +/- 1 standard deviation (SD) from the stated mean for a particular test score. \"Low average\" is viewed as being between 1 and 2 SD below the mean, and above average is viewed as being 1 and 2 SD above the mean. Scores falling in the borderline range (between 1-1/2 and 2 SD below the mean) are viewed with particular attention as to whether they are normal or abnormal neurocognitive test scores. Other methods of inference in analyzing the test data are also utilized, including the pattern and range of scores in the profile, bilateral motor functions, and the presence, if any, of pathognomonic signs. Behaviorally, the patient was friendly and cooperative and appeared motivated to perform well during this examination. He has marked cognitive difficulties and was increasingly anxious at times. He was tangential a times. He needed reassurance and frequently checked to ensure his shield was not wet by reaching down his pants. Some scores were converted using norms from demographically similar individuals as close to the patient's age as possible. Within this context, the results of this evaluation are viewed as a valid reflection of the patients actual neurocognitive and emotional status. The patient's score of 13/30 on the Mini-Mental Status Exam was impaired. In this regard, he was not oriented to year, season, month, day, date, state, county, or floor. Backwards spelling was 2/5 correct. Repetition was impaired. Comprehension and carry out of a three step command was 1/3 correct. Reading was impaired. Writing was impaired. Visual construction was impaired. Clock drawing was impaired. year, date, county, or building.   Recall for three words after a brief delay was 0/3 correct. Clock drawing was impaired. MMSE was 13/30 on previous exam.       The patient was administered the Wechsler Abbreviated Scale of Intelligence - II and he generated an Estimated VCI score of 49 (<0.1st %ile), MATHIEU - 57 (0.2nd %ile), and his Estimated Full-4 IQ score was 50 (<0.1st %ile). These scores are statistically commensurate than what was predicted based on the TOPF. All of his generated IQ scores are within the extremely low range. His structured word list fluency, as assessed by the FAS Test, was within the severely impaired range with a T score of 15. Category fluency was within the severely impaired range with a T score of 12. Confrontation naming ability, as assessed by the Palmer & Palmer - Revised, was within the severely impaired range at 24/60 correct (T = 15). This pattern of performance is indicative of a patient who is at increased risk for day-to-day problems with verbal fluency. Confrontation naming ability was also impaired. No major decline in verbal fluency or confrontation naming over time. The patient was administered the CPT-III and review of the subscales revealed numerous problems with inattentiveness without concerns for problems with impulsivity. This pattern of performance is indicative of a patient who is at increased risk for day-to-day problems with sustained visual attention/concentration. A mild decline in attention is noted over time. The patient was administered the Surinder Dementia Rating Scale -2 and his total score of 80/144 correct corresponds to an age- and education- corrected MOANS Scaled Score of 1 (<1st %ile) and indicates a severely impaired level of performance. In this regard, his construction was normal.   At the same time, his performance across all other neurocognitive domains assessed, including initiation/perseveration, simple attention, conceptualization, and memory capacity were all impaired.   This pattern of performance is indicative of a patient who is at increased risk for day-to-day problems across a variety of neurocognitive domains. No major declines noted over time. The patient was administered the Repeatable Battery for the Assessment Of Neuropsychological Status (RBANS) and his age-corrected scores are as follows:    Domain:    Index Score %ile Classification    Immediate Memory  40  <0.1 Severe Impairment    Delayed Memory  40  <0.1 Severe Impairment    Visuospatial/Constructional  50  <0.1 Severe Impairment    Language   74  4 Borderline    Attention   53  0.1  Severely Impaired    Overall Functioning  47   <0.1 Severe Impairment   ___________________________________________________________    As can be seen, he is showing marked deficits across a variety of neurocognitive domains. Some languageabilities are an area of relative strength, but these may serve to mask underlying cognitive deficits at times. This pattern of performance is indicative of a patient who is at increased risk for day-to-day problems across a variety of neurocognitive domains. No major declines in functioning noted over time. Simple timed visual motor sequencing (Trailmaking Test Part A) was within the severely impaired range with a T score of 8. His performance on a similar, but more complex task of timed visual motor sequencing (Trailmaking Test Part B) was within the severely impaired range with a T score of 6. This latter test was discontinued. This pattern of performance is indicative of a patient who is at increased risk for day-to-day problems with executive functioning. No major declines in executive functioning over time. His Geriatric Depression Inventory score of 15 was within the mildly depressed range. His Smith Anxiety Inventory Score of 30 reflected severe anxiety. The patient's responses on the [facility] Symptom Checklist revealed concern for considerable anxiety as well as depression.        The caregiver completed the ABAS-3 and reported marked concerns regarding the patient's general adaptive skills (0.1st %ile), conceptual skills (0.1st %ile), social skills (1st %ile), and practical skills (0.1st %ile). On the CASE, the caregiver reported marked concerns regarding the patient's cognitive stats as well as anxiety and OCD type concerns. Impressions and Recommendations: This is the patient's second Neuropsychological Evaluation. Test by test comparisons continue to show marked and global neurocognitive deficits. However, I do not see evidence of a major decline across scores, but it is noted that most of his scores are already at the very low end of what is testable. What I do see clearly is an increase in his anxiety, which is a factor in terms of his reported decline in functioning. There is no clear evidence that strongly indicates dementia here. I suggest a review of his psychiatric medication management for anxiety. Should cognitive problems continue to appear to decline pending successful treatment of anxiety, then the default diagnosis is dementia. The good news, however, is that most scores are very stable and I do not have even subtle indicators of a decline in neurocognitive functioning since I saw him last.  He continues to need supervision across all ADLs and this need for supervision and reassurance increases as a function of his anxiety. The patient's generated cognitive difficulties are significant to the degree whereby it is my opinion that he should not live independently without appropriate supervision across virtually all ADLs, but especially those pertaining to memory. This includes nutritional/meal preparation supervision, medication management supervision, and supervision of financial dealings. I again find him not competent. We now have baseline and updated data on him. Follow up prn, especially if there is further decline. Clinical correlation is strongly advised. This exam is limited by his intellectual deficits. I will discuss these findings with the patient and family when they follow up with me in the near future. A follow up Neuropsychological Evaluation is indicated on a prn basis. DIAGNOSES: Static Encephalopathy    Severe Anxiety With Compulsive Tendencies    Chronic Intellectual Deficits    Chronic Learning Disabilities     The above information is based upon information currently available to me. If there is any additional information of which I am currently unaware, I would be more than happy to review it upon having it made available to me. Thank you for the opportunity to see this interesting individual.     Sincerely,       Kemar Cunningham. Francena Schirmer, PsyD, EdS      Cc: Jean Pierre Dunlap, DO    Time Documentation    2 units -[id number]-  1.75 hours Record review. Review of history provided by patient. Testing by Neuropsychologist, Review of raw data. Scoring. Interpretation of all tests together in context/ Data interpretation/integration. Case Conceptualization, Report writing. Coordination Of Care. 4 units  -[id number] -  4.25 hours Psychometrist test prep, administration, and scoring. Supervision Of Psychometrist. Neuropsych Interpretation of individual tests completed by psychometrist    \"Unit\" is defined by CPT/National Guidelines (31 - 60 minutes). Integral services including scoring of raw data, data interpretation, case conceptualization, report writing etcetera were initiated after the patient finished testing/raw data collected and was completed on the date the report was signed.

## 2017-11-15 ENCOUNTER — OFFICE VISIT (OUTPATIENT)
Dept: NEUROLOGY | Age: 37
End: 2017-11-15

## 2017-11-15 VITALS
DIASTOLIC BLOOD PRESSURE: 76 MMHG | SYSTOLIC BLOOD PRESSURE: 120 MMHG | BODY MASS INDEX: 17.06 KG/M2 | TEMPERATURE: 98.9 F | WEIGHT: 115.2 LBS | HEART RATE: 93 BPM | RESPIRATION RATE: 16 BRPM | HEIGHT: 69 IN | OXYGEN SATURATION: 98 %

## 2017-11-15 DIAGNOSIS — G93.49 STATIC ENCEPHALOPATHY: ICD-10-CM

## 2017-11-15 DIAGNOSIS — F41.9 ANXIETY: ICD-10-CM

## 2017-11-15 DIAGNOSIS — G40.109 LOCALIZATION-RELATED FOCAL EPILEPSY WITH SIMPLE PARTIAL SEIZURES (HCC): Primary | ICD-10-CM

## 2017-11-15 NOTE — PROGRESS NOTES
Chief Complaint   Patient presents with    Epilepsy     1. Have you been to the ER, urgent care clinic since your last visit? Hospitalized since your last visit? NO    2. Have you seen or consulted any other health care providers outside of the [address] [place] [address] since your last visit? Include any pap smears or colon screening.  NO

## 2017-11-15 NOTE — PROGRESS NOTES
Date:  11/15/17     Name:  Kristi Bonilla  :  1980  MRN:  [id number]     PCP:  Deandra Gao DO    Chief Complaint   Patient presents with    Epilepsy     HISTORY OF PRESENT ILLNESS: Follow-up for possible epilepsy. Stopped the Tegretol XR about week ago. They have noticed a little more jitteriness since he stopped it. He may be a little more nervous. They are trying some alternative therapies to help deal with some of the anxiety. A weekly Yoga class, acupuncture, etc. He has had more tremors since he stopped the Tegretol XR. His balance has been a little worse since he stopped it as well. Except as noted above, denies  fever, chills, cough. No CP or SOB. No dysuria, loss of bowel or bladder control. No Weight loss. Appetite good. Sleeping well. No sweats. No edema. No bruising or bleeding. No nausea or vomit. No diarrhea. No frequency, urgency, No depressive sxs. No anxiety. Denies sore throat, nasal congestion, nasal discharge, epistaxis, tinnitus, hearing loss, back pain, muscle pain, or joint pain. Current Outpatient Prescriptions   Medication Sig    LEVOMEFOLATE CALCIUM (DEPLIN PO) Take 15 mg by mouth daily.  L.acid-B.bifidum-B.animal-FOS (PROBIOTIC COMPLEX) 25 billion cell -100 mg cap Take  by mouth.  melatonin 3 mg tablet Take 1 Tab by mouth nightly.  VITAMIN D2 50,000 unit capsule TAKE 1 CAPSULE BY MOUTH EVERY 3 MONTHS    neomycin-bacitracin-polymyxin (NEOSPORIN, OKV-UVA-ACPUS,) 3.5mg-400 unit- 5,000 unit/gram ointment Apply to back red area daily x 7 days    TAB-A-MARGIE tablet TAKE 1 TABLET BY MOUTH DAILY    WHEAT DEXTRIN (BENEFIBER CLEAR SF, DEXTRIN, PO) Take  by mouth.  fluticasone (FLONASE) 50 mcg/actuation nasal spray 2 Sprays by Both Nostrils route daily as needed for Rhinitis.  mirtazapine (REMERON) 7.5 mg tablet Take 7.5 mg by mouth nightly.  acetaminophen (TYLENOL) 325 mg tablet Take  by mouth every four (4) hours as needed for Pain.     loperamide (IMODIUM A-D) 2 mg tablet Take 2 mg by mouth four (4) times daily as needed for Diarrhea.  guaiFENesin ER (MUCINEX) 600 mg ER tablet Take 600 mg by mouth daily as needed for Congestion.  CALCITRATE-VITAMIN D tablet TAKE 1 TABLET BY MOUTH TWICE A DAY    brief disposable (ADULT) misc by Does Not Apply route. Depends, size medium, 1 nightly     No current facility-administered medications for this visit. 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, without mention of intractable epilepsy 5/14/2011    Mental retardation     Osteoporosis     Other ill-defined conditions(039.41)     intestinal problems    Psychiatric disorder     anxiety    Seizure disorder [facility])      Past Surgical History:   Procedure Laterality Date    HX APPENDECTOMY  1/28/13    APPENDECTOMY    HX GI      colon resection    HX GI  1/28/13    LAPAROTOMY EXPLORATORY - OPEN LYSIS OF ADHESIONS     HX HERNIA REPAIR      HX OTHER SURGICAL      imperforated anus birth defect    UPPER GI ENDOSCOPY,BIOPSY  3/17/2017          Social History     Social History    Marital status: SINGLE     Spouse name: N/A    Number of children: N/A    Years of education: N/A     Occupational History    Not on file. Social History Main Topics    Smoking status: Never Smoker    Smokeless tobacco: Never Used    Alcohol use No    Drug use: No    Sexual activity: No     Other Topics Concern    Not on file     Social History Narrative     History reviewed. No pertinent family history.     PHYSICAL EXAMINATION:    Visit Vitals    /76 (BP 1 Location: Left arm, BP Patient Position: Sitting)    Pulse 93    Temp 98.9 °F (37.2 °C) (Oral)    Resp 16    Ht 5' 9\" (1.753 m)    Wt 52.3 kg (115 lb 3.2 oz)    SpO2 98%    BMI 17.01 kg/m2     General: Well defined, nourished, and groomed individual in no acute distress. Neck: Supple, nontender, no bruits, no pain with resistance to active range of motion. Heart: Regular rate and rhythm, no murmurs, rub, or gallop. Normal S1S2. Lungs: Clear to auscultation bilaterally with equal chest expansion, no cough, no wheeze  Musculoskeletal: Extremities revealed no edema and had full range of motion of joints. Psych: Good mood and bright affect      NEUROLOGICAL EXAMINATION:   Mental Status: Alert and oriented to person and place   Cranial Nerves:   II, III, IV, VI: Visual acuity grossly intact. Visual fields are normal.   Pupils are equal, round, and reactive to light and accommodation. Extra-ocular movements are full and fluid. Fundoscopic exam was benign, no ptosis or nystagmus. V-XII: Hearing is grossly intact. Facial features are symmetric, with normal sensation and strength. The palate rises symmetrically and the tongue protrudes midline. Sternocleidomastoids 5/5. Motor Examination: decreased tone and bulk with generalized weakness. Coordination: Finger to nose was . No resting tremor. There is a noticeable tremor bilaterally, right greater than left. Gait and Station: Unsteady and slow with rising and walking. He pins his hands to his sides with walking. No pronator drift. No muscle wasting or fasiculations noted. Reflexes: DTRs 2+ throughout. ASSESSMENT AND PLAN    ICD-10-CM ICD-9-CM    1. Localization-related focal epilepsy with simple partial seizures (HCC) G40.109 345.50    2. Static encephalopathy G93.49 742.9    3. Anxiety F41.9 300.00        We had a long discussion regarding him stopping the Tegretol-XR and the fact that this is sometimes uses a mood stabilizer. It is entirely possible that some of the tremor may be secondary to anxiety of which she has a significant amount. When walking, he is unsteady and slow with rising and walking.   He seems very unsure of himself when he does walk. Recommended that he potentially participate in physical therapy. Also made the suggestion that perhaps he would be better if he was on still a low dose of the Tegretol-XR. His mother indicates that they do have a follow-up with the psychiatrist would like to follow-up with him prior to resuming even a low dose of the Tegretol-XR. She is more interested in trying a more homeopathic or holistic approach to treating that issue. Of note, he is also only been off the Tegretol now for about a week so there is some question as to whether or not some of these issues will just balance out as he continues to not having bad on board. His mother indicated that she would prefer to take a watch and wait approach for now. He has not had any seizures despite discontinuation of the Tegretol. We will reevaluate in approximately 6 weeks. Cieol Dodd

## 2017-11-27 ENCOUNTER — OFFICE VISIT (OUTPATIENT)
Dept: INTERNAL MEDICINE CLINIC | Age: 37
End: 2017-11-27

## 2017-11-27 VITALS
BODY MASS INDEX: 15.55 KG/M2 | HEIGHT: 69 IN | DIASTOLIC BLOOD PRESSURE: 65 MMHG | SYSTOLIC BLOOD PRESSURE: 112 MMHG | OXYGEN SATURATION: 95 % | RESPIRATION RATE: 18 BRPM | WEIGHT: 105 LBS | HEART RATE: 103 BPM

## 2017-11-27 DIAGNOSIS — F41.9 ANXIETY: ICD-10-CM

## 2017-11-27 DIAGNOSIS — R63.4 WEIGHT LOSS: Primary | ICD-10-CM

## 2017-11-27 DIAGNOSIS — R56.9 SEIZURES (HCC): ICD-10-CM

## 2017-11-27 DIAGNOSIS — F32.A DEPRESSION, UNSPECIFIED DEPRESSION TYPE: ICD-10-CM

## 2017-11-27 DIAGNOSIS — Z87.19 S/P SMALL BOWEL OBSTRUCTION: ICD-10-CM

## 2017-11-27 RX ORDER — LEVOMEFOLATE/ALGAL OIL 7.5-90.314
CAPSULE ORAL
COMMUNITY

## 2017-11-27 NOTE — MR AVS SNAPSHOT
Visit Information Date & Time Provider Department Dept. Phone Encounter #  
 11/27/2017  1:00 PM Titi Esteban, [address] [facility] Internal Medicine [phone] [id number] Follow-up Instructions Return in about 3 months (around 2/27/2018). Your Appointments 12/13/2017  3:30 PM  
Follow Up with Jai Luis NP [address] ([facility]) Appt Note: seizure alise  
 [address] [address] [phone]  
  
   
 [place] [address] [id number] [address] 1/12/2018  9:40 AM  
Follow Up with Kenn Gage PsyD [address] ([address]) Appt Note: office feedback/ Fabián Carmen [place] [address] [phone]  
  
   
 [address] [address]  
  
    
 1/30/2018  9:00 AM  
PHYSICAL PRE OP with Titi Esteban DO [facility] Medicine ([facility] [facility]) Appt Note: annual ck up for forms for group home [address] [facility] [address] [address]  
[phone]  
  
   
 [address] [place] [address] [address] Upcoming Health Maintenance Date Due DTaP/Tdap/Td series (1 - Tdap) 11/4/1999 Influenza Age 5 to Adult 8/1/2017 Allergies as of 11/27/2017  Review Complete On: 11/27/2017 By: Titi Esteban DO No Known Allergies Current Immunizations  Reviewed on 2/6/2017 Name Date Influenza Vaccine 10/15/2016, 11/1/2014, 10/22/2013 Influenza Vaccine Split 10/21/2012 Pneumococcal Vaccine (Unspecified Type) 2/28/2012 Not reviewed this visit You Were Diagnosed With   
  
 Codes Comments Weight loss    -  Primary ICD-10-CM: R63.4 ICD-9-CM: 783.21 Seizures ([address]     ICD-10-CM: R56.9 ICD-9-CM: 780.39 Depression, unspecified depression type     ICD-10-CM: F32.9 ICD-9-CM: 872 S/p small bowel obstruction     ICD-10-CM: Z87.19 ICD-9-CM: V12.79 Anxiety     ICD-10-CM: F41.9 ICD-9-CM: 300.00 Vitals BP Pulse Resp Height(growth percentile) Weight(growth percentile) SpO2  
 112/65 (BP 1 Location: Right arm, BP Patient Position: Sitting) (!) 103 18 5' 9\" (1.753 m) 105 lb (47.6 kg) 95% BMI Smoking Status 15.51 kg/m2 Never Smoker Vitals History BMI and BSA Data Body Mass Index Body Surface Area 15.51 kg/m 2 1.52 m 2 Preferred Pharmacy Pharmacy Name Phone [facility] [phone] Your Updated Medication List  
  
   
This list is accurate as of: 11/27/17  1:46 PM.  Always use your most recent med list.  
  
  
  
  
 BENEFIBER CLEAR SF (DEXTRIN) PO Take  by mouth. brief disposable Misc Commonly known as:  adult  
by Does Not Apply route. Depends, size medium, 1 nightly CALCITRATE-VITAMIN D tablet Generic drug:  calcium citrate-vitamin D3 TAKE 1 TABLET BY MOUTH TWICE A DAY  
  
 DEPLIN (ALGAL OIL) 7.5-90.314 mg Cap Generic drug:  levomefolate-algal oil Take  by mouth. DEPLIN PO Take 15 mg by mouth daily. fluticasone 50 mcg/actuation nasal spray Commonly known as:  Cameron Wilkerson 2 Sprays by Both Nostrils route daily as needed for Rhinitis. IMODIUM A-D 2 mg tablet Generic drug:  loperamide Take 2 mg by mouth four (4) times daily as needed for Diarrhea.  
  
 melatonin 3 mg tablet Take 1 Tab by mouth nightly. mirtazapine 7.5 mg tablet Commonly known as:  Jessica Obrien Take 7.5 mg by mouth nightly. MUCINEX 600 mg ER tablet Generic drug:  guaiFENesin ER Take 600 mg by mouth daily as needed for Congestion. neomycin-bacitracin-polymyxin 3.5mg-400 unit- 5,000 unit/gram ointment Commonly known as:  NEOSPORIN (BUA-WSB-TFATI) Apply to back red area daily x 7 days PROBIOTIC COMPLEX 25 billion cell -100 mg Cap Generic drug:  L.acid-B.bifidum-B.animal-FOS Take  by mouth. TAB-A-MARGIE tablet Generic drug:  multivitamin TAKE 1 TABLET BY MOUTH DAILY  
  
 TYLENOL 325 mg tablet Generic drug:  acetaminophen Take  by mouth every four (4) hours as needed for Pain. VITAMIN D2 50,000 unit capsule Generic drug:  ergocalciferol TAKE 1 CAPSULE BY MOUTH EVERY 3 MONTHS Follow-up Instructions Return in about 3 months (around 2/27/2018). Introducing [facility]! Dear Carlos Joseph: Thank you for requesting a ITDatabase account. Our records indicate that you already have an active ITDatabase account. You can access your account anytime at [email or web address] Did you know that you can access your hospital and ER discharge instructions at any time in ITDatabase? You can also review all of your test results from your hospital stay or ER visit. Additional Information If you have questions, please visit the Frequently Asked Questions section of the ITDatabase website at [email or web address] Remember, ITDatabase is NOT to be used for urgent needs. For medical emergencies, dial 911. Now available from your iPhone and Android! Please provide this summary of care documentation to your next provider. Your primary care clinician is listed as Margoth Barrow. If you have any questions after today's visit, please call [phone].

## 2017-11-27 NOTE — PROGRESS NOTES
Health Maintenance Due   Topic Date Due    DTaP/Tdap/Td series (1 - Tdap) 11/04/1999    Influenza Age 5 to Adult  08/01/2017       Chief Complaint   Patient presents with    Weight Loss    Nausea     on 11/22    Anxiety    Follow Up Chronic Condition       1. Have you been to the ER, urgent care clinic since your last visit? Hospitalized since your last visit? No    2. Have you seen or consulted any other health care providers outside of the [address] [place] [address] since your last visit? Include any pap smears or colon screening. No    3) Do you have an Advance Directive on file? yes    4) Are you interested in receiving information on Advance Directives? NO      Patient is accompanied by self I have received verbal consent from Miguel Butler to discuss any/all medical information while they are present in the room.

## 2017-11-28 ENCOUNTER — OFFICE VISIT (OUTPATIENT)
Dept: NEUROLOGY | Age: 37
End: 2017-11-28

## 2017-11-28 DIAGNOSIS — G93.49 STATIC ENCEPHALOPATHY: Primary | ICD-10-CM

## 2017-11-28 DIAGNOSIS — F41.9 SEVERE ANXIETY: ICD-10-CM

## 2017-11-28 DIAGNOSIS — F81.9 LEARNING DISABILITIES: ICD-10-CM

## 2017-11-28 DIAGNOSIS — F71 IQ 35-49 (MODERATE MENTAL RETARDATION): ICD-10-CM

## 2017-11-28 NOTE — PROGRESS NOTES
Office feedback session with the patient's mothert today. I reviewed the results of the recent Neuropsychological Evaluation, including discussing individual tests as well as patient's areas of neurocognitive strength versus weakness. Education was provided regarding my diagnostic impressions, and we discussed treatment plan/options. I also answered numerous questions related to the clinical findings, including discussing various methods to improve cognition and mood. Counseling provided regarding mood and cognition. Thankfully I do not think this is dementia but instead increased anxiety. Consider daily medication for anxiety. Discussed consulation with [facility], alternative living arrangements/day care type settings. hoepfully [facility] can assist with placement. Exam reassuring in terms of cognition but concerning in terms of increased anxiety and physical decline of late. CBT and supportive psychotherapy techniques were utilized. The patient will follow up with the referring provider, and reported being very pleased with the services provided. Follow up with [facility] prn. 30 minutes with patient, record review, coordination of care. Records provided. We discussed: This is the patient's second Neuropsychological Evaluation. Test by test comparisons continue to show marked and global neurocognitive deficits. However, I do not see evidence of a major decline across scores, but it is noted that most of his scores are already at the very low end of what is testable. What I do see clearly is an increase in his anxiety, which is a factor in terms of his reported decline in functioning. There is no clear evidence that strongly indicates dementia here. I suggest a review of his psychiatric medication management for anxiety. Should cognitive problems continue to appear to decline pending successful treatment of anxiety, then the default diagnosis is dementia.   The good news, however, is that most scores are very stable and I do not have even subtle indicators of a decline in neurocognitive functioning since I saw him last.  He continues to need supervision across all ADLs and this need for supervision and reassurance increases as a function of his anxiety. The patient's generated cognitive difficulties are significant to the degree whereby it is my opinion that he should not live independently without appropriate supervision across virtually all ADLs, but especially those pertaining to memory. This includes nutritional/meal preparation supervision, medication management supervision, and supervision of financial dealings. I again find him not competent. We now have baseline and updated data on him. Follow up prn, especially if there is further decline. Clinical correlation is strongly advised. This exam is limited by his intellectual deficits.                             I will discuss these findings with the patient and family when they follow up with me in the near future.   A follow up Neuropsychological Evaluation is indicated on a prn basis.       DIAGNOSES:           Static Encephalopathy                                              Severe Anxiety With Compulsive Tendencies                                              Chronic Intellectual Deficits                                              Chronic Learning Disabilities

## 2017-11-29 ENCOUNTER — HOSPITAL ENCOUNTER (OUTPATIENT)
Dept: NUTRITION | Age: 37
Discharge: HOME | End: 2017-11-29
Attending: INTERNAL MEDICINE
Payer: MEDICARE

## 2017-11-29 DIAGNOSIS — K90.9 ABNORMAL INTESTINAL ABSORPTION: ICD-10-CM

## 2017-11-29 DIAGNOSIS — R63.4 WEIGHT LOSS: ICD-10-CM

## 2017-11-29 PROCEDURE — 97802 MEDICAL NUTRITION INDIV IN: CPT | Performed by: DIETITIAN, REGISTERED

## 2017-11-29 NOTE — PROGRESS NOTES
[facility] Assessment - Initial Nutrition Evaluation   DATE: 2017      REFERRING PHYSICIAN: Dr. Gladis Kaur  NAME: Janel Michael : 1980 AGE: 42 y.o. GENDER: male  REASON FOR VISIT: Abnormal wt loss    Past Medical Hx: fecal incontinence, epilepsy, mental retardation, colon resection, lysis of adhesions, imperforate anus repair (birth), colostomy and reversal, bowel rotation, anxiety    LABS:   Lab Results   Component Value Date/Time    Hemoglobin A1c 5.6 2016 08:57 AM       MEDICATIONS/SUPPLEMENTS:   [unfilled]  Prior to Admission medications    Medication Sig Start Date End Date Taking? Authorizing Provider   CALCITRATE-VITAMIN D tablet TAKE 1 TABLET BY MOUTH TWICE A DAY 17   Castro Tipton NP   levomefolate-algal oil (DEPLIN, ALGAL OIL,) 7.5-90.314 mg cap Take  by mouth. Historical Provider   LEVOMEFOLATE CALCIUM (DEPLIN PO) Take 15 mg by mouth daily. Historical Provider   L.acid-B.bifidum-B.animal-FOS (PROBIOTIC COMPLEX) 25 billion cell -100 mg cap Take  by mouth. Historical Provider   melatonin 3 mg tablet Take 1 Tab by mouth nightly. 17   Moshe Cagle DO   VITAMIN D2 50,000 unit capsule TAKE 1 CAPSULE BY MOUTH EVERY 3 MONTHS 17   Castro Tipton NP   neomycin-bacitracin-polymyxin (NEOSPORIN, IQT-MKY-AYUPV,) 3.5mg-400 unit- 5,000 unit/gram ointment Apply to back red area daily x 7 days 17   Moshe Cagle DO   TAB-A-MARGIE tablet TAKE 1 TABLET BY MOUTH DAILY 17   Moshe Cagle DO   WHEAT DEXTRIN (BENEFIBER CLEAR SF, DEXTRIN, PO) Take  by mouth. Historical Provider   fluticasone (FLONASE) 50 mcg/actuation nasal spray 2 Sprays by Both Nostrils route daily as needed for Rhinitis. 17   Castro Tipton NP   mirtazapine (REMERON) 7.5 mg tablet Take 7.5 mg by mouth nightly. Historical Provider   acetaminophen (TYLENOL) 325 mg tablet Take  by mouth every four (4) hours as needed for Pain.     Historical Provider   loperamide (IMODIUM A-D) 2 mg tablet Take 2 mg by mouth four (4) times daily as needed for Diarrhea. Historical Provider   guaiFENesin ER (MUCINEX) 600 mg ER tablet Take 600 mg by mouth daily as needed for Congestion. Historical Provider   brief disposable (ADULT) misc by Does Not Apply route.  Depends, size medium, 1 nightly 6/10/15   Roise Oats, NP       FOOD ALLERGIES/INTOLERANCES: milk products (mother states not lactose)    ANTHROPOMETRICS:    Ht Readings from Last 1 Encounters:   11/27/17 5' 9\" (1.753 m)      Wt Readings from Last 1 Encounters:   11/27/17 47.6 kg (105 lb)      IBW: 160 # +/- 10%  %IBW: 67% +/- 10%    BMI: 15.5 Category: Underweight    Reported Wt Hx:  Mother reports pt  lbs (although per EHR pt 130 lbs 1 year ago); significant wt loss in past few weeks    Wt Readings from Last 15 Encounters:   11/27/17 47.6 kg (105 lb)   11/15/17 52.3 kg (115 lb 3.2 oz)   09/29/17 52.8 kg (116 lb 8 oz)   09/15/17 51.7 kg (114 lb)   06/16/17 53.1 kg (117 lb)   05/16/17 53.1 kg (117 lb)   05/08/17 53.1 kg (117 lb)   04/03/17 53.3 kg (117 lb 9.6 oz)   03/17/17 53.5 kg (118 lb)   02/06/17 53.5 kg (118 lb)   01/03/17 56.3 kg (124 lb 3.2 oz)   12/20/16 55.7 kg (122 lb 12.8 oz)   11/28/16 56.4 kg (124 lb 6.4 oz)   10/03/16 59 kg (130 lb)   09/21/16 59 kg (130 lb)     Estimated Nutritional Needs:   8732-6447 kcal/day (MSJ x 1.4-1.5), 62-71g protein/day (1.3-1.5g/kg)    Reported Diet Hx:  Pt eats 3 meals a day, no snacks; group home recently increased bfast by adding some protein     24 Hour Diet Recall  Breakfast  Cereal w/almond milk or pancakes, monzon or toast, sausage   Lunch  pb honey sandwich   Dinner  Hamburger, pasta, peas   Snacks  none   Beverages  Hawaiian punch, water, almond milk     Exercise/Physical Actvity:  No structured exercise    Environmental/Social:  Pt lives at group home; goes to day program during the day; mother very involved in pt care      NUTRITION DIAGNOSIS:  Underweight r/t insufficient caloric intake as evidenced by pt with BMI of 15. Inadequate energy intake (calories/protein) r/t insufficient po intake as evidenced by pt consuming less that estimated nutritional needs per 24 hr recall    NUTRITION ASSESSMENT / INTERVENTION:  Pt seen for abnormal wt loss. Pt accompanied by mother and group home employee. Mother states pt resides in group home and goes to day support during the week; changed group home ~1 year ago. Mother also notes decrease in cognitive function over past few months, but states physician thinks r/t anxiety; pt currently not on anxiety meds. Pt with some digestive issues per mother- has had numerous GI surgeries and intolerance to certain foods (raw fruit, raw vegetables, nuts, milk- not lactose) which tend to cause diarrhea. 24 hr recall/usual meals indicate pt consuming insufficient calories/protein, although group home states appetite very good; no snacks at group home or day support. 17 lb wt loss (14%) noted over past year which is severe in nature; temporal and clavicular wasting observed. Recommended increasing calories/protein by having protein at each meal and adding snacks between meals to include 2 protein shakes per day; discussed using plant protein powder (since pt cannot tolerate any dairy) w/almond or rice milk and adding peanut butter for increased calories. Likely pt would need to slowly add to diet to determine tolerance. Also recommended adding high calorie items to foods such as butter, syrup, gravies, jellies, etc..to increase caloric intake. Recommended mother put together list of foods pt cannot tolerate and give to group home and day support. It appears there is some miscommunication and pt receiving foods not tolerated which is causing diarrhea. Likely anxiety is playing role in GI issues also- group home employee stated pt having diarrhea after an event the night before possibly due to overstimulation.  Pt may need to be encouraged to consume more food at meals and in between meals. PATIENT GOALS:  1. Aim for ~65g protein daily; goal of 2 high amber/high protein shakes per day  2. Add butter, syrup, gravy, jelly to food to increase caloric intake  3. Encourage increased po intake at meals and at snacks    Specific tips and techniques to facilitate compliance with above recommendations were provided and discussed. Pt was strongly encourage to begin making necessary changes now, and re-visit the dietitian prn. If further details are desired please feel free to contact me at [phone]. This phone number was also provided to the patient for any further questions or concerns.             Jazmyne Eli RD

## 2017-12-01 NOTE — PROGRESS NOTES
HISTORY OF PRESENT ILLNESS  Rangel Woods is a 42 y.o. male. Pt. comes in with his mother from his group home for f/u. Has multiple medical problems. He has lost more weight and they are concerned. His appetite is fair. He had multiple GI surgeries as a child and has had issues with obstruction needing surgery over the last few years as well. Denies any significant abdominal pain. He has cognitive deficits and is a poor historian. Has been on seizure medicines for a long time but that has been stopped by neurologist recently. Also has depression and anxiety. Followed by psychiatry. Mother has brought the report of genetic testing for different medications. Patient is on Remeron but looks like she may need a higher dose of that to be effective. Reports compliance with medications and diet. Med list and most recent labs/studies reviewed with pt. all labs have been stable. Trying to be active physically as tolerated. No tobacco or alcohol use. Reports no other new c/o. Weight Loss   Pertinent negatives include no chest pain, no abdominal pain, no headaches and no shortness of breath. Nausea    Associated symptoms include diarrhea (Occasional). Pertinent negatives include no fever, no abdominal pain, no headaches and no headaches. Anxiety   Pertinent negatives include no chest pain, no abdominal pain, no headaches and no shortness of breath. Follow Up Chronic Condition   Pertinent negatives include no chest pain, no abdominal pain, no headaches and no shortness of breath. Review of Systems   Constitutional: Positive for weight loss. Negative for fever and malaise/fatigue. HENT: Negative. Eyes: Negative for blurred vision. Respiratory: Negative for shortness of breath. Cardiovascular: Negative for chest pain and leg swelling. Gastrointestinal: Positive for diarrhea (Occasional). Negative for abdominal pain, blood in stool and melena.         Stool incontinence   Genitourinary: Positive for urgency. Negative for dysuria and frequency. Musculoskeletal: Negative for back pain, falls and joint pain. Skin: Negative for rash. Neurological: Positive for tremors and seizures. Negative for dizziness, sensory change and headaches. Endo/Heme/Allergies: Negative. Psychiatric/Behavioral: Positive for depression. The patient is nervous/anxious and has insomnia. All other systems reviewed and are negative. Physical Exam   Constitutional: He is oriented to person, place, and time. He appears well-developed and well-nourished. No distress. Pleasant,  cognitivelty a bit slow   HENT:   Head: Normocephalic and atraumatic. Mouth/Throat: Oropharynx is clear and moist.   Eyes: Conjunctivae are normal. No scleral icterus. Neck: Normal range of motion. Neck supple. No JVD present. No thyromegaly present. Cardiovascular: Normal rate, regular rhythm, normal heart sounds and intact distal pulses. No murmur heard. Pulmonary/Chest: Effort normal and breath sounds normal. No respiratory distress. He has no wheezes. He has no rales. He exhibits no tenderness. Abdominal: Soft. Bowel sounds are normal. He exhibits no distension. There is no tenderness. Chronic surgical scars   Genitourinary: Rectal exam shows guaiac negative stool. Genitourinary Comments: Stool in underwear with incontinence  Surgical scars in anal area   Musculoskeletal: He exhibits no edema or tenderness. Neurological: He is alert and oriented to person, place, and time. No cranial nerve deficit. Coordination abnormal.   Skin: Skin is warm and dry. No rash noted. Psychiatric: His behavior is normal.   Repeats words  Chronic cognitive deficit , about same   Nursing note and vitals reviewed. ASSESSMENT and PLAN  Diagnoses and all orders for this visit:    1. Weight loss    2. Seizures ([address]    3. Depression, unspecified depression type    4. S/p small bowel obstruction    5.  Anxiety      Follow-up Disposition:  Return in about 3 months (around 2/27/2018).    lab results and schedule of future lab studies reviewed with patient  reviewed diet, exercise and weight control  reviewed medications and side effects in detail  F/u with other MD's as scheduled  I wonder if he has some malabsorption from all the GI surgeries he has had which is contributing to his inability to gain weight/weight loss  Advised patient's mother to share her genetic testing with his psychiatrist to see if his Remeron needs to be increased

## 2017-12-19 DIAGNOSIS — J30.9 ALLERGIC RHINITIS: ICD-10-CM

## 2017-12-19 RX ORDER — ERGOCALCIFEROL 1.25 MG/1
CAPSULE ORAL
Qty: 1 CAP | Refills: 0 | Status: CANCELLED | OUTPATIENT
Start: 2017-12-19

## 2017-12-19 RX ORDER — FLUTICASONE PROPIONATE 50 MCG
SPRAY, SUSPENSION (ML) NASAL
Qty: 16 G | Refills: 0 | Status: SHIPPED | OUTPATIENT
Start: 2017-12-19 | End: 2019-02-04 | Stop reason: SDUPTHER

## 2017-12-19 RX ORDER — ERGOCALCIFEROL 1.25 MG/1
CAPSULE ORAL
Qty: 1 CAP | Refills: 1 | Status: SHIPPED | OUTPATIENT
Start: 2017-12-19 | End: 2018-03-23 | Stop reason: SDUPTHER

## 2018-01-22 ENCOUNTER — OFFICE VISIT (OUTPATIENT)
Dept: INTERNAL MEDICINE CLINIC | Age: 38
End: 2018-01-22

## 2018-01-22 VITALS
HEIGHT: 69 IN | DIASTOLIC BLOOD PRESSURE: 58 MMHG | TEMPERATURE: 98.6 F | WEIGHT: 110 LBS | RESPIRATION RATE: 19 BRPM | OXYGEN SATURATION: 94 % | BODY MASS INDEX: 16.29 KG/M2 | SYSTOLIC BLOOD PRESSURE: 97 MMHG | HEART RATE: 91 BPM

## 2018-01-22 DIAGNOSIS — R56.9 SEIZURES (HCC): ICD-10-CM

## 2018-01-22 DIAGNOSIS — M81.0 OSTEOPOROSIS, UNSPECIFIED OSTEOPOROSIS TYPE, UNSPECIFIED PATHOLOGICAL FRACTURE PRESENCE: ICD-10-CM

## 2018-01-22 DIAGNOSIS — F32.A DEPRESSION, UNSPECIFIED DEPRESSION TYPE: ICD-10-CM

## 2018-01-22 DIAGNOSIS — F41.9 ANXIETY: ICD-10-CM

## 2018-01-22 DIAGNOSIS — L21.9 SEBORRHEIC DERMATITIS: Primary | ICD-10-CM

## 2018-01-22 DIAGNOSIS — R63.6 UNDERWEIGHT: ICD-10-CM

## 2018-01-22 DIAGNOSIS — Z87.19 S/P SMALL BOWEL OBSTRUCTION: ICD-10-CM

## 2018-01-22 RX ORDER — BUSPIRONE HYDROCHLORIDE 5 MG/1
5 TABLET ORAL DAILY
COMMUNITY
End: 2018-10-12 | Stop reason: ALTCHOICE

## 2018-01-22 NOTE — MR AVS SNAPSHOT
[address] [facility] [address] [place] [address] 
[phone] Patient: Janie Cochran MRN:  [id number] Visit Information Date & Time Provider Department Dept. Phone Encounter #  
 1/22/2018  9:00 AM Eliel Vivas J[facility] Internal Medicine [phone] [id number] Follow-up Instructions Return in about 4 months (around 5/22/2018). Your Appointments 2/16/2018  3:30 PM  
Follow Up with Nolvia Yu NP [address] [facility] ([facility]) Appt Note: seizure alise; seizure alise r/s yd 12/11/17; $0CP yd 12/11/17; seizure [place] [address] [phone] [phone]  
  
   
 [facility]  
  
    
 2/27/2018  1:45 PM  
ROUTINE CARE with Eliel Vivas DO [facility] Internal Medicine ([address] [place] [address]) Appt Note: 3 month follow up [address] [place] [address] [address] [facility] [address]  
[phone]  
  
   
 [address] [facility] [address] Upcoming Health Maintenance Date Due DTaP/Tdap/Td series (1 - Tdap) 11/4/1999 Influenza Age 5 to Adult 8/1/2017 Allergies as of 1/22/2018  Review Complete On: 1/22/2018 By: Eliel Vivas DO No Known Allergies Current Immunizations  Reviewed on 2/6/2017 Name Date Influenza Vaccine 10/15/2016, 11/1/2014, 10/22/2013 Influenza Vaccine Split 10/21/2012 Pneumococcal Vaccine (Unspecified Type) 2/28/2012 Not reviewed this visit You Were Diagnosed With   
  
 Codes Comments Seborrheic dermatitis    -  Primary ICD-10-CM: L21.9 ICD-9-CM: 690.10 Depression, unspecified depression type     ICD-10-CM: F32.9 ICD-9-CM: 524 Anxiety     ICD-10-CM: F41.9 ICD-9-CM: 300.00 Underweight     ICD-10-CM: R63.6 ICD-9-CM: 783.22 S/p small bowel obstruction     ICD-10-CM: Z87.19 ICD-9-CM: V12.79   
 Seizures ([facility]     ICD-10-CM: R56.9 ICD-9-CM: 780.39 Vitals BP Pulse Temp Resp Height(growth percentile) Weight(growth percentile) 97/58 (BP 1 Location: Left arm, BP Patient Position: Sitting) 91 98.6 °F (37 °C) 19 5' 9\" (1.753 m) 110 lb (49.9 kg) SpO2 BMI Smoking Status 94% 16.24 kg/m2 Never Smoker Vitals History BMI and BSA Data Body Mass Index Body Surface Area  
 16.24 kg/m 2 1.56 m 2 Preferred Pharmacy Pharmacy Name Phone [facility] [place] [address] [phone] Your Updated Medication List  
  
   
This list is accurate as of: 1/22/18  9:31 AM.  Always use your most recent med list.  
  
  
  
  
 BENEFIBER CLEAR SF (DEXTRIN) PO Take  by mouth. brief disposable Misc Commonly known as:  adult  
by Does Not Apply route. Depends, size medium, 1 nightly  
  
 busPIRone 5 mg tablet Commonly known as:  BUSPAR Take 5 mg by mouth two (2) times a day. CALCITRATE-VITAMIN D tablet Generic drug:  calcium citrate-vitamin D3 TAKE 1 TABLET BY MOUTH TWICE A DAY  
  
 DEPLIN (ALGAL OIL) 7.5-90.314 mg Cap Generic drug:  levomefolate-algal oil Take  by mouth. DEPLIN PO Take 15 mg by mouth daily. ergocalciferol 50,000 unit capsule Commonly known as:  VITAMIN D2  
TAKE 1 CAPSULE BY MOUTH EVERY 3 MONTHS  
  
 fluticasone 50 mcg/actuation nasal spray Commonly known as:  FLONASE  
BLOW NOSE: 2 SPRAYS IN EACH NOSTRIL DAILY FOR ALLERGY. IMODIUM A-D 2 mg tablet Generic drug:  loperamide Take 2 mg by mouth four (4) times daily as needed for Diarrhea.  
  
 melatonin 3 mg tablet Take 1 Tab by mouth nightly. mirtazapine 7.5 mg tablet Commonly known as:  Jyotsna Reil Take 7.5 mg by mouth nightly. MUCINEX 600 mg ER tablet Generic drug:  guaiFENesin ER Take 600 mg by mouth daily as needed for Congestion. neomycin-bacitracin-polymyxin 3.5mg-400 unit- 5,000 unit/gram ointment Commonly known as:  NEOSPORIN (YAB-YAF-KPARZ) Apply to back red area daily x 7 days OTHER  
L-Theanine 100 mg 1 daily PROBIOTIC COMPLEX 25 billion cell -100 mg Cap Generic drug:  L.acid-B.bifidum-B.animal-FOS Take  by mouth. TAB-A-MARGIE tablet Generic drug:  multivitamin TAKE 1 TABLET BY MOUTH DAILY  
  
 TYLENOL 325 mg tablet Generic drug:  acetaminophen Take  by mouth every four (4) hours as needed for Pain. Prescriptions Printed Refills OTHER 11 Sig: L-Theanine 100 mg 1 daily Class: Print Follow-up Instructions Return in about 4 months (around 5/22/2018). Introducing [facility]! Dear Manisha Moya: Thank you for requesting a [facility] account. Our records indicate that you already have an active [facility] account. You can access your account anytime at [email or web address] Did you know that you can access your hospital and ER discharge instructions at any time in [facility]? You can also review all of your test results from your hospital stay or ER visit. Additional Information If you have questions, please visit the Frequently Asked Questions section of the [facility] website at [email or web address] Remember, [facility] is NOT to be used for urgent needs. For medical emergencies, dial 911. Now available from your iPhone and Android! Please provide this summary of care documentation to your next provider. Your primary care clinician is listed as Jonathan Terry. If you have any questions after today's visit, please call [phone].

## 2018-01-22 NOTE — PROGRESS NOTES
Health Maintenance Due   Topic Date Due    DTaP/Tdap/Td series (1 - Tdap) 11/04/1999    Influenza Age 5 to Adult  08/01/2017       Chief Complaint   Patient presents with    Ear Injury     right ear-fungus    Weight Management    Nocturia       1. Have you been to the ER, urgent care clinic since your last visit? Hospitalized since your last visit? Yes When: 1/3/17 [facility] Reason for visit: Ear fungus    2. Have you seen or consulted any other health care providers outside of the [address] [place] [address] since your last visit? Include any pap smears or colon screening. No    3) Do you have an Advance Directive on file? yes    4) Are you interested in receiving information on Advance Directives? NO      Patient is accompanied by self I have received verbal consent from Nancy Peter to discuss any/all medical information while they are present in the room.

## 2018-01-22 NOTE — PROGRESS NOTES
HISTORY OF PRESENT ILLNESS  Ubaldo Ren is a 42 y.o. male. Pt. comes in his mother and caregiver for f/u in his annual checkup. Needs an H&P form filled. Has multiple medical problems. He lives in a group home. Recent urgent care visit for itching behind ear. Nizoral shampoo has helped. Has dandruff from time to time. Has chronic issues with being underweight. Has been on high calorie high protein diet. Has gained 5 pounds since last visit. Has been taken off seizure medications by a neurologist.  No recent seizures. Sees psychiatrist and current regimen seems to be helping his depression and anxiety. Reports compliance with medications and diet. Med list and most recent labs/studies reviewed with pt. Trying to be active physically as tolerated. His mother wants him to try l-Theanine as well. Reports no other new c/o. Ear Injury    Associated symptoms include diarrhea (Occasional). Pertinent negatives include no headaches, no abdominal pain and no vomiting. Weight Management   Pertinent negatives include no chest pain, no abdominal pain, no headaches and no shortness of breath. Nocturia   Pertinent negatives include no chest pain, no abdominal pain, no headaches and no shortness of breath. Anxiety   Pertinent negatives include no chest pain, no abdominal pain, no headaches and no shortness of breath. Review of Systems   Constitutional: Positive for weight gain. HENT: Negative. Eyes: Negative for blurred vision. Respiratory: Negative for shortness of breath. Cardiovascular: Negative for chest pain and leg swelling. Gastrointestinal: Positive for diarrhea (Occasional). Negative for abdominal pain, blood in stool, melena, nausea and vomiting. Stool incontinence   Genitourinary: Positive for nocturia and urgency. Negative for dysuria and frequency. Musculoskeletal: Negative for back pain, falls and joint pain. Skin: Negative.     Neurological: Positive for tremors and seizures. Negative for dizziness, sensory change and headaches. Endo/Heme/Allergies: Negative. Psychiatric/Behavioral: Positive for depression. The patient is nervous/anxious and has insomnia. All other systems reviewed and are negative. Physical Exam   Constitutional: He is oriented to person, place, and time. He appears well-developed and well-nourished. No distress. Pleasant,  cognitivelty a bit slow   HENT:   Head: Normocephalic and atraumatic. Mouth/Throat: Oropharynx is clear and moist.   Eyes: Conjunctivae are normal. No scleral icterus. Neck: Normal range of motion. Neck supple. No JVD present. No thyromegaly present. Cardiovascular: Normal rate, regular rhythm, normal heart sounds and intact distal pulses. No murmur heard. Pulmonary/Chest: Effort normal and breath sounds normal. No respiratory distress. He has no wheezes. He has no rales. He exhibits no tenderness. Abdominal: Soft. Bowel sounds are normal. He exhibits no distension. There is no tenderness. Chronic surgical scars   Genitourinary: Rectal exam shows guaiac negative stool. Genitourinary Comments: Stool in underwear with incontinence  Surgical scars in anal area   Musculoskeletal: He exhibits no edema or tenderness. Neurological: He is alert and oriented to person, place, and time. No cranial nerve deficit. Coordination abnormal.   Skin: Skin is warm and dry. No rash noted. No erythema. Minimal dandruff   Psychiatric: His behavior is normal.   Repeats words  Chronic cognitive deficit , about same   Nursing note and vitals reviewed. ASSESSMENT and PLAN  Diagnoses and all orders for this visit:    1. Seborrheic dermatitis    2. Depression, unspecified depression type    3. Anxiety    4. Underweight    5. S/p small bowel obstruction    6. Seizures ([address]    7.  Osteoporosis, unspecified osteoporosis type, unspecified pathological fracture presence    Other orders  -     OTHER; L-Theanine 100 mg 1 daily      Follow-up Disposition:  Return in about 4 months (around 5/22/2018).    lab results and schedule of future lab studies reviewed with patient  reviewed diet, exercise and weight control  reviewed medications and side effects in detail  Use Nizoral shampoo weekly as needed  F/u with other MD's as scheduled  Overall stable  H&P form for group home filled  They will contact me if patient needs TB screening  Encourage patient to do some light weight training and continue with high-calorie high-protein diet to gain more weight

## 2018-02-16 ENCOUNTER — OFFICE VISIT (OUTPATIENT)
Dept: NEUROLOGY | Age: 38
End: 2018-02-16

## 2018-02-16 VITALS
HEART RATE: 68 BPM | WEIGHT: 112 LBS | OXYGEN SATURATION: 97 % | BODY MASS INDEX: 16.59 KG/M2 | DIASTOLIC BLOOD PRESSURE: 68 MMHG | HEIGHT: 69 IN | RESPIRATION RATE: 20 BRPM | SYSTOLIC BLOOD PRESSURE: 104 MMHG

## 2018-02-16 DIAGNOSIS — R26.9 GAIT DISTURBANCE: ICD-10-CM

## 2018-02-16 DIAGNOSIS — R53.1 WEAKNESS GENERALIZED: Primary | ICD-10-CM

## 2018-02-16 DIAGNOSIS — G93.49 STATIC ENCEPHALOPATHY: ICD-10-CM

## 2018-02-16 NOTE — MR AVS SNAPSHOT
[address] [place] [address] 
 
 
 [place] [address] [address] [phone] Patient: Vignesh Cade MRN:  [id number] Visit Information Date & Time Provider Department Dept. Phone Encounter #  
 2/16/2018  3:30 PM RICHY Merino Neurology [facility] [phone] [id number] Your Appointments 2/27/2018  1:45 PM  
ROUTINE CARE with Margherita Brunner, [facility] ([address] [place] [address]) Appt Note: 3 month follow up [address] [facility] [address] [place] [address]  
[phone]  
  
   
 [address] [facility] [address] 5/15/2018  2:30 PM  
ROUTINE CARE with Margherita Brunner, [facility] ([address] [place] [address]) Appt Note: 4 mos f/u  
 [address] [facility] [address] [facility] [address] [facility]  
[phone] 5/18/2018  3:00 PM  
Follow Up with Donavan Stacy NP 1991 [facility] ([address] [place] [address]) Appt Note: 3mo f/up seizures cr  
 [place] [address] [address] [phone]  
  
   
 [place] [address] [address] [facility] Upcoming Health Maintenance Date Due DTaP/Tdap/Td series (1 - Tdap) 11/4/1999 Influenza Age 5 to Adult 8/1/2017 Allergies as of 2/16/2018  Review Complete On: 2/16/2018 By: Donavan Stacy NP No Known Allergies Current Immunizations  Reviewed on 2/6/2017 Name Date Influenza Vaccine 10/15/2016, 11/1/2014, 10/22/2013 Influenza Vaccine Split 10/21/2012 Pneumococcal Vaccine (Unspecified Type) 2/28/2012 Not reviewed this visit You Were Diagnosed With   
  
 Codes Comments Weakness generalized    -  Primary ICD-10-CM: R53.1 ICD-9-CM: 780.79 Gait disturbance     ICD-10-CM: R26.9 ICD-9-CM: 781.2 Static encephalopathy     ICD-10-CM: G93.49 
ICD-9-CM: 619. 9 Vitals BP Pulse Resp Height(growth percentile) Weight(growth percentile) SpO2  
 104/68 68 20 5' 9\" (1.753 m) 112 lb (50.8 kg) 97% BMI Smoking Status 16.54 kg/m2 Never Smoker Vitals History BMI and BSA Data Body Mass Index Body Surface Area  
 16.54 kg/m 2 1.57 m 2 Preferred Pharmacy Pharmacy Name Phone [facility], [place] [address] [phone] Your Updated Medication List  
  
   
This list is accurate as of: 2/16/18  4:22 PM.  Always use your most recent med list.  
  
  
  
  
 BENEFIBER CLEAR SF (DEXTRIN) PO Take  by mouth. brief disposable Misc Commonly known as:  adult  
by Does Not Apply route. Depends, size medium, 1 nightly  
  
 busPIRone 5 mg tablet Commonly known as:  BUSPAR Take 5 mg by mouth two (2) times a day. CALCITRATE-VITAMIN D tablet Generic drug:  calcium citrate-vitamin D3 TAKE 1 TABLET BY MOUTH TWICE A DAY  
  
 DEPLIN (ALGAL OIL) 7.5-90.314 mg Cap Generic drug:  levomefolate-algal oil Take  by mouth. DEPLIN PO Take 7.5 mg by mouth daily. ergocalciferol 50,000 unit capsule Commonly known as:  VITAMIN D2  
TAKE 1 CAPSULE BY MOUTH EVERY 3 MONTHS  
  
 fluticasone 50 mcg/actuation nasal spray Commonly known as:  FLONASE  
BLOW NOSE: 2 SPRAYS IN EACH NOSTRIL DAILY FOR ALLERGY. IMODIUM A-D 2 mg tablet Generic drug:  loperamide Take 2 mg by mouth four (4) times daily as needed for Diarrhea.  
  
 melatonin 3 mg tablet Take 1 Tab by mouth nightly. mirtazapine 7.5 mg tablet Commonly known as:  Brigido Ronnie Take 7.5 mg by mouth nightly. MUCINEX 600 mg ER tablet Generic drug:  guaiFENesin ER Take 600 mg by mouth daily as needed for Congestion. neomycin-bacitracin-polymyxin 3.5mg-400 unit- 5,000 unit/gram ointment Commonly known as:  NEOSPORIN ([id number]) Apply to back red area daily x 7 days  OTHER  
 L-Theanine 100 mg 1 daily PROBIOTIC COMPLEX 25 billion cell -100 mg Cap Generic drug:  L.acid-B.bifidum-B.animal-FOS Take  by mouth. TAB-A-MARGIE tablet Generic drug:  multivitamin TAKE 1 TABLET BY MOUTH DAILY  
  
 TYLENOL 325 mg tablet Generic drug:  acetaminophen Take  by mouth every four (4) hours as needed for Pain. We Performed the Following REFERRAL TO PHYSICAL THERAPY [KIM94 Custom] Referral Information Referral ID Referred By Referred To  
  
 [id number] Johanna Mcgraw Not Available Visits Status Start Date End Date 1 New Request 2/16/18 2/16/19 If your referral has a status of pending review or denied, additional information will be sent to support the outcome of this decision. Patient Instructions A Healthy Lifestyle: Care Instructions Your Care Instructions A healthy lifestyle can help you feel good, stay at a healthy weight, and have plenty of energy for both work and play. A healthy lifestyle is something you can share with your whole family. A healthy lifestyle also can lower your risk for serious health problems, such as high blood pressure, heart disease, and diabetes. You can follow a few steps listed below to improve your health and the health of your family. Follow-up care is a key part of your treatment and safety. Be sure to make and go to all appointments, and call your doctor if you are having problems. It's also a good idea to know your test results and keep a list of the medicines you take. How can you care for yourself at home? · Do not eat too much sugar, fat, or fast foods. You can still have dessert and treats now and then. The goal is moderation. · Start small to improve your eating habits. Pay attention to portion sizes, drink less juice and soda pop, and eat more fruits and vegetables. ¨ Eat a healthy amount of food.  A 3-ounce serving of meat, for example, is about the size of a deck of cards. Fill the rest of your plate with vegetables and whole grains. ¨ Limit the amount of soda and sports drinks you have every day. Drink more water when you are thirsty. ¨ Eat at least 5 servings of fruits and vegetables every day. It may seem like a lot, but it is not hard to reach this goal. A serving or helping is 1 piece of fruit, 1 cup of vegetables, or 2 cups of leafy, raw vegetables. Have an apple or some carrot sticks as an afternoon snack instead of a candy bar. Try to have fruits and/or vegetables at every meal. 
· Make exercise part of your daily routine. You may want to start with simple activities, such as walking, bicycling, or slow swimming. Try to be active 30 to 60 minutes every day. You do not need to do all 30 to 60 minutes all at once. For example, you can exercise 3 times a day for 10 or 20 minutes. Moderate exercise is safe for most people, but it is always a good idea to talk to your doctor before starting an exercise program. 
· Keep moving. Darlyn Siddiqui the lawn, work in the garden, or [employer]. Take the stairs instead of the elevator at work. · If you smoke, quit. People who smoke have an increased risk for heart attack, stroke, cancer, and other lung illnesses. Quitting is hard, but there are ways to boost your chance of quitting tobacco for good. ¨ Use nicotine gum, patches, or lozenges. ¨ Ask your doctor about stop-smoking programs and medicines. ¨ Keep trying. In addition to reducing your risk of diseases in the future, you will notice some benefits soon after you stop using tobacco. If you have shortness of breath or asthma symptoms, they will likely get better within a few weeks after you quit. · Limit how much alcohol you drink. Moderate amounts of alcohol (up to 2 drinks a day for men, 1 drink a day for women) are okay. But drinking too much can lead to liver problems, high blood pressure, and other health problems. Family health If you have a family, there are many things you can do together to improve your health. · Eat meals together as a family as often as possible. · Eat healthy foods. This includes fruits, vegetables, lean meats and dairy, and whole grains. · Include your family in your fitness plan. Most people think of activities such as jogging or tennis as the way to fitness, but there are many ways you and your family can be more active. Anything that makes you breathe hard and gets your heart pumping is exercise. Here are some tips: 
¨ Walk to do errands or to take your child to school or the bus. ¨ Go for a family bike ride after dinner instead of watching TV. Where can you learn more? Go to http://rachael-william.info/. Enter G618 in the search box to learn more about \"A Healthy Lifestyle: Care Instructions. \" Current as of: May 12, 2017 Content Version: 11.4 © 4875-3129 [employer]. Care instructions adapted under license by [employer] (which disclaims liability or warranty for this information). If you have questions about a medical condition or this instruction, always ask your healthcare professional. [place] [address] any warranty or liability for your use of this information. Introducing [facility]! Dear Coral Franks: Thank you for requesting a AMS-Qi account. Our records indicate that you already have an active AMS-Qi account. You can access your account anytime at [email or web address] Did you know that you can access your hospital and ER discharge instructions at any time in AMS-Qi? You can also review all of your test results from your hospital stay or ER visit. Additional Information If you have questions, please visit the Frequently Asked Questions section of the AMS-Qi website at [email or web address] Remember, AMS-Qi is NOT to be used for urgent needs. For medical emergencies, dial 911. Now available from your iPhone and Android! Please provide this summary of care documentation to your next provider. Your primary care clinician is listed as Rupal Hart. If you have any questions after today's visit, please call [phone].

## 2018-02-16 NOTE — PATIENT INSTRUCTIONS

## 2018-02-16 NOTE — PROGRESS NOTES
Date:  18     Name:  Maggie Ordoñze  :  1980  MRN:  [id number]     PCP:  Eloise Valderrama DO    Chief Complaint   Patient presents with    Epilepsy     HISTORY OF PRESENT ILLNESS: Follow up evaluation of seizures, cognitive function, tremor, and anxiety. Still not having any seizures despite discontinuation of the Tegretol XR. The longer he has been off of it, the better the tremor has been. The tremor is more noticeable when he is upset or anxious. They did add the L-Theanine which seemed to help with the anxiety. They saw Dr. Parisa Ragsdale who indicated that his cognitive changes were not dementia related but more likely anxiety related. Except as noted above, denies  fever, chills, cough. No CP or SOB. No dysuria, loss of bowel or bladder control. No Weight loss. Appetite good. Sleeping well. No sweats. No edema. No bruising or bleeding. No nausea or vomit. No diarrhea. No frequency, urgency, No depressive sxs. No anxiety. Denies sore throat, nasal congestion, nasal discharge, epistaxis, tinnitus, hearing loss, back pain, muscle pain, or joint pain. Current Outpatient Prescriptions   Medication Sig    busPIRone (BUSPAR) 5 mg tablet Take 5 mg by mouth two (2) times a day.  OTHER L-Theanine 100 mg 1 daily    fluticasone (FLONASE) 50 mcg/actuation nasal spray BLOW NOSE: 2 SPRAYS IN EACH NOSTRIL DAILY FOR ALLERGY.  ergocalciferol (VITAMIN D2) 50,000 unit capsule TAKE 1 CAPSULE BY MOUTH EVERY 3 MONTHS    CALCITRATE-VITAMIN D tablet TAKE 1 TABLET BY MOUTH TWICE A DAY    levomefolate-algal oil (DEPLIN, ALGAL OIL,) 7.5-90.314 mg cap Take  by mouth.  LEVOMEFOLATE CALCIUM (DEPLIN PO) Take 7.5 mg by mouth daily.  L.acid-B.bifidum-B.animal-FOS (PROBIOTIC COMPLEX) 25 billion cell -100 mg cap Take  by mouth.  melatonin 3 mg tablet Take 1 Tab by mouth nightly.     neomycin-bacitracin-polymyxin (NEOSPORIN, [id number]-[id number],) 3.5mg-400 unit- 5,000 unit/gram ointment Apply to back red area daily x 7 days    TAB-A-MARGIE tablet TAKE 1 TABLET BY MOUTH DAILY    WHEAT DEXTRIN (BENEFIBER CLEAR SF, DEXTRIN, PO) Take  by mouth.  mirtazapine (REMERON) 7.5 mg tablet Take 7.5 mg by mouth nightly.  acetaminophen (TYLENOL) 325 mg tablet Take  by mouth every four (4) hours as needed for Pain.  loperamide (IMODIUM A-D) 2 mg tablet Take 2 mg by mouth four (4) times daily as needed for Diarrhea.  guaiFENesin ER (MUCINEX) 600 mg ER tablet Take 600 mg by mouth daily as needed for Congestion.  brief disposable (ADULT) misc by Does Not Apply route. Depends, size medium, 1 nightly     No current facility-administered medications for this visit. 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)      Past Surgical History:   Procedure Laterality Date    HX APPENDECTOMY  1/28/13    APPENDECTOMY    HX GI      colon resection    HX GI  1/28/13    LAPAROTOMY EXPLORATORY - OPEN LYSIS OF ADHESIONS     HX HERNIA REPAIR      HX OTHER SURGICAL      imperforated anus birth defect    UPPER GI ENDOSCOPY,BIOPSY  3/17/2017          Social History     Social History    Marital status: SINGLE     Spouse name: N/A    Number of children: N/A    Years of education: N/A     Occupational History    Not on file. Social History Main Topics    Smoking status: Never Smoker    Smokeless tobacco: Never Used    Alcohol use No    Drug use: No    Sexual activity: No     Other Topics Concern    Not on file     Social History Narrative     History reviewed.  No pertinent family history. PHYSICAL EXAMINATION:    Visit Vitals    /68    Pulse 68    Resp 20    Ht 5' 9\" (1.753 m)    Wt 50.8 kg (112 lb)    SpO2 97%    BMI 16.54 kg/m2     General: Well defined, nourished, and groomed individual in no acute distress. Neck: Supple, nontender, no bruits, no pain with resistance to active range of motion. Heart: Regular rate and rhythm, no murmurs, rub, or gallop. Normal S1S2. Lungs: Clear to auscultation bilaterally with equal chest expansion, no cough, no wheeze  Musculoskeletal: Extremities revealed no edema and had full range of motion of joints. Psych: Good mood and bright affect      NEUROLOGICAL EXAMINATION:   Mental Status: Alert and oriented to person and place   Cranial Nerves:   II, III, IV, VI: Visual acuity grossly intact. Visual fields are normal.   Pupils are equal, round, and reactive to light and accommodation. Extra-ocular movements are full and fluid. Fundoscopic exam was benign, no ptosis or nystagmus. V-XII: Hearing is grossly intact. Facial features are symmetric, with normal sensation and strength. The palate rises symmetrically and the tongue protrudes midline. Sternocleidomastoids 5/5. Motor Examination: decreased tone and bulk with generalized weakness. Coordination: Finger to nose was normal. No resting tremor. There is a noticeable tremor bilaterally, right greater than left. Gait and Station: Unsteady and slow with rising and walking. He pins his hands to his sides with walking. No pronator drift. No muscle wasting or fasiculations noted. Reflexes: DTRs 2+ throughout. ASSESSMENT AND PLAN    ICD-10-CM ICD-9-CM    1. Weakness generalized R53.1 780.79 REFERRAL TO PHYSICAL THERAPY   2. Gait disturbance R26.9 781.2 REFERRAL TO PHYSICAL THERAPY   3. Static encephalopathy G93.49 742.9      He has generalized weakness and balance disturbance which may be secondary to general debilitation from his past illness.  He is still quite thin and has generalized weakness. Will refer to PT for gait, balance, and strengthening. They will discuss ongoing anxiety care with his psychiatrist. Letha Valdovinos to monitor for seizures off AED. Follow up in six months    10[address] [facility].  Deisy El

## 2018-02-19 ENCOUNTER — TELEPHONE (OUTPATIENT)
Dept: NEUROLOGY | Age: 38
End: 2018-02-19

## 2018-02-19 NOTE — TELEPHONE ENCOUNTER
----- Message from Marianne Mayfield sent at 2/19/2018  1:18 PM EST -----  Regarding: RICHY Valdovinos/ Jose Eduardo  Sidney Gonzalez, mother, is requesting an order for physical therapy be sent to [facility] for the pt. [facility] [phone]; [phone]. Kanwal's best contact number is [phone].

## 2018-03-26 RX ORDER — ERGOCALCIFEROL 1.25 MG/1
CAPSULE ORAL
Qty: 1 CAP | Refills: 0 | Status: SHIPPED | OUTPATIENT
Start: 2018-03-26 | End: 2018-06-18 | Stop reason: SDUPTHER

## 2018-03-27 ENCOUNTER — OFFICE VISIT (OUTPATIENT)
Dept: INTERNAL MEDICINE CLINIC | Age: 38
End: 2018-03-27

## 2018-03-27 VITALS
OXYGEN SATURATION: 98 % | RESPIRATION RATE: 18 BRPM | DIASTOLIC BLOOD PRESSURE: 63 MMHG | BODY MASS INDEX: 16.44 KG/M2 | SYSTOLIC BLOOD PRESSURE: 112 MMHG | HEART RATE: 96 BPM | WEIGHT: 111 LBS | HEIGHT: 69 IN

## 2018-03-27 DIAGNOSIS — E55.9 VITAMIN D DEFICIENCY: ICD-10-CM

## 2018-03-27 DIAGNOSIS — R63.6 UNDERWEIGHT: Primary | ICD-10-CM

## 2018-03-27 DIAGNOSIS — F41.9 ANXIETY: ICD-10-CM

## 2018-03-27 DIAGNOSIS — Z98.890 HISTORY OF ABDOMINAL SURGERY: ICD-10-CM

## 2018-03-27 DIAGNOSIS — Z87.19 S/P SMALL BOWEL OBSTRUCTION: ICD-10-CM

## 2018-03-27 PROBLEM — F33.9 RECURRENT DEPRESSION (HCC): Status: ACTIVE | Noted: 2018-03-27

## 2018-03-27 RX ORDER — AMITRIPTYLINE HYDROCHLORIDE 25 MG/1
50 TABLET, FILM COATED ORAL
COMMUNITY
End: 2018-07-13 | Stop reason: SDUPTHER

## 2018-03-27 NOTE — MR AVS SNAPSHOT
[address] [facility] [address] 
[phone] Patient: Fredrick Arellano MRN:  [id number] Visit Information Date & Time Provider Department Dept. Phone Encounter #  
 3/27/2018  2:30 PM [place] [facility] [phone] [id number] Follow-up Instructions Return in about 4 months (around 7/27/2018). Your Appointments 5/15/2018  2:30 PM  
ROUTINE CARE with Jennifer Xie DO [facility] ([facility]) Appt Note: 4 mos f/u  
 [address] [facility] [address] [place] [address]  
[phone]  
  
   
 [address] 5/18/2018  3:00 PM  
Follow Up with Colton Edgar NP [address] [place] [address] ([facility]) Appt Note: 3mo f/up seizures cr  
 [place] [address] [facility] [address] [facility] [address] [address] [phone]  
  
   
 [place] [address] [facility] [address] [address] [address] Upcoming Health Maintenance Date Due DTaP/Tdap/Td series (1 - Tdap) 11/4/1999 MEDICARE YEARLY EXAM 3/14/2018 Influenza Age 5 to Adult 8/31/2018* *Topic was postponed. The date shown is not the original due date. Allergies as of 3/27/2018  Review Complete On: 3/27/2018 By: Jennifer Xie DO No Known Allergies Current Immunizations  Reviewed on 2/6/2017 Name Date Influenza Vaccine 10/15/2016, 11/1/2014, 10/22/2013 Influenza Vaccine Split 10/21/2012 Pneumococcal Vaccine (Unspecified Type) 2/28/2012 Not reviewed this visit You Were Diagnosed With   
  
 Codes Comments Underweight    -  Primary ICD-10-CM: R63.6 ICD-9-CM: 783.22 Vitamin D deficiency     ICD-10-CM: E55.9 ICD-9-CM: 268.9 S/p small bowel obstruction     ICD-10-CM: Z87.19 ICD-9-CM: V12.79 Anxiety     ICD-10-CM: F41.9 ICD-9-CM: 300.00 History of abdominal surgery     ICD-10-CM: Z98.890 ICD-9-CM: V45.89 Vitals BP Pulse Resp Height(growth percentile) Weight(growth percentile) SpO2  
 112/63 (BP 1 Location: Right arm, BP Patient Position: Sitting) 96 18 5' 9\" (1.753 m) 111 lb (50.3 kg) 98% BMI Smoking Status 16.39 kg/m2 Never Smoker Vitals History BMI and BSA Data Body Mass Index Body Surface Area  
 16.39 kg/m 2 1.56 m 2 Preferred Pharmacy Pharmacy Name Phone [facility] Lora, [place] [address] [phone] Your Updated Medication List  
  
   
This list is accurate as of 3/27/18  3:06 PM.  Always use your most recent med list.  
  
  
  
  
 amitriptyline 25 mg tablet Commonly known as:  ELAVIL Take  by mouth nightly. BENEFIBER CLEAR SF (DEXTRIN) PO Take  by mouth. brief disposable Misc Commonly known as:  adult  
by Does Not Apply route. Depends, size medium, 1 nightly  
  
 busPIRone 5 mg tablet Commonly known as:  BUSPAR Take 5 mg by mouth two (2) times a day. CALCITRATE-VITAMIN D tablet Generic drug:  calcium citrate-vitamin D3 TAKE 1 TABLET BY MOUTH TWICE A DAY  
  
 DEPLIN (ALGAL OIL) 7.5-90.314 mg Cap Generic drug:  levomefolate-algal oil Take  by mouth. DEPLIN PO Take 7.5 mg by mouth daily. fluticasone 50 mcg/actuation nasal spray Commonly known as:  FLONASE  
BLOW NOSE: 2 SPRAYS IN EACH NOSTRIL DAILY FOR ALLERGY. IMODIUM A-D 2 mg tablet Generic drug:  loperamide Take 2 mg by mouth four (4) times daily as needed for Diarrhea.  
  
 melatonin 3 mg tablet Take 1 Tab by mouth nightly. MUCINEX 600 mg ER tablet Generic drug:  guaiFENesin ER Take 600 mg by mouth daily as needed for Congestion. neomycin-bacitracin-polymyxin 3.5mg-400 unit- 5,000 unit/gram ointment Commonly known as:  NEOSPORIN ([id number]) Apply to back red area daily x 7 days OTHER  
L-Theanine 100 mg 1 daily PROBIOTIC COMPLEX 25 billion cell -100 mg Cap Generic drug:  L.acid-B.bifidum-B.animal-FOS Take  by mouth. TAB-A-MARGIE tablet Generic drug:  multivitamin TAKE 1 TABLET BY MOUTH DAILY theanine 50 mg Tbdi Take  by mouth. TYLENOL 325 mg tablet Generic drug:  acetaminophen Take  by mouth every four (4) hours as needed for Pain. VITAMIN D2 50,000 unit capsule Generic drug:  ergocalciferol TAKE 1 CAPSULE BY MOUTH EVERY 3 MONTHS ON THE FIRST (Darlyn Barrier) Follow-up Instructions Return in about 4 months (around 7/27/2018). Introducing [facility]! Dear Oswaldo Beasley: Thank you for requesting a MyToons account. Our records indicate that you already have an active MyToons account. You can access your account anytime at [email or web address] Did you know that you can access your hospital and ER discharge instructions at any time in MyToons? You can also review all of your test results from your hospital stay or ER visit. Additional Information If you have questions, please visit the Frequently Asked Questions section of the MyToons website at [email or web address] Remember, MyToons is NOT to be used for urgent needs. For medical emergencies, dial 911. Now available from your iPhone and Android! Please provide this summary of care documentation to your next provider. Your primary care clinician is listed as Abraham Mackay. If you have any questions after today's visit, please call [phone].

## 2018-03-30 DIAGNOSIS — K05.10 GINGIVITIS: Primary | ICD-10-CM

## 2018-03-30 NOTE — PROGRESS NOTES
HISTORY OF PRESENT ILLNESS  Fredrick Arellano is a 42 y.o. male. Pt. comes in with his mother and caregiver from group home for f/u. Has multiple medical problems. Followed by neurologist for seizures. Seizure medications have been stopped. Has not had a seizure in many years. Followed by psychiatrist for depression and anxiety. Medication seems to help. Remeron has been stopped and now on amitriptyline nightly. Has had extensive GI surgery in the past.  Has had a small bowel obstruction over the last few years. He is chronically underweight and unable to gain weight. I have advised high-calorie diet and supplements. He is denying any pain or other GI issues today. Reports compliance with medications and diet. Med list and most recent labs/studies reviewed with pt. Trying to be active physically as tolerated. Reports no other new c/o. Weight Management   Pertinent negatives include no chest pain, no abdominal pain, no headaches and no shortness of breath. Anxiety   Pertinent negatives include no chest pain, no abdominal pain, no headaches and no shortness of breath. Depression   Pertinent negatives include no chest pain, no abdominal pain, no headaches and no shortness of breath. Follow Up Chronic Condition   Pertinent negatives include no chest pain, no abdominal pain, no headaches and no shortness of breath. Review of Systems   Constitutional: Positive for weight gain. HENT: Negative. Eyes: Negative. Respiratory: Negative for shortness of breath. Cardiovascular: Negative for chest pain and leg swelling. Gastrointestinal: Positive for diarrhea (Occasional). Negative for abdominal pain, blood in stool, melena and nausea. Stool incontinence   Genitourinary: Positive for urgency. Negative for dysuria and frequency. Musculoskeletal: Negative for back pain, falls and joint pain. Skin: Negative. Neurological: Positive for tremors and seizures.  Negative for dizziness, sensory change and headaches. Endo/Heme/Allergies: Negative. Psychiatric/Behavioral: Positive for depression. The patient is nervous/anxious and has insomnia. All other systems reviewed and are negative. Physical Exam   Constitutional: He is oriented to person, place, and time. He appears well-developed and well-nourished. No distress. Pleasant,  cognitivelty a bit slow   HENT:   Head: Normocephalic and atraumatic. Mouth/Throat: Oropharynx is clear and moist.   Eyes: Conjunctivae are normal.   Neck: Normal range of motion. Neck supple. No JVD present. No thyromegaly present. Cardiovascular: Normal rate, regular rhythm, normal heart sounds and intact distal pulses. No murmur heard. Pulmonary/Chest: Effort normal and breath sounds normal. No respiratory distress. He has no wheezes. He has no rales. Abdominal: Soft. Bowel sounds are normal. He exhibits no distension. There is no tenderness. Chronic surgical scars   Musculoskeletal: He exhibits no edema or tenderness. Neurological: He is alert and oriented to person, place, and time. Coordination abnormal.   Skin: Skin is warm and dry. No rash noted. Psychiatric: His behavior is normal.   Repeats words  Chronic cognitive deficit , about same   Nursing note and vitals reviewed. ASSESSMENT and PLAN  Diagnoses and all orders for this visit:    1. Underweight    2. Vitamin D deficiency    3. S/p small bowel obstruction    4. Anxiety    5. History of abdominal surgery      Follow-up Disposition:  Return in about 4 months (around 7/27/2018).    lab results and schedule of future lab studies reviewed with patient  reviewed diet, exercise and weight control  reviewed medications and side effects in detail  F/u with other MD's as scheduled  Increase calorie intake as tolerated

## 2018-04-27 NOTE — TELEPHONE ENCOUNTER
----- Message from Efraín Baig sent at 4/23/2018  2:50 PM EDT -----  Regarding: FW: Dr. Araceli Camp Telephone      ----- Message -----     From: Dianna Cortez     Sent: 4/23/2018   1:48 PM       To: Lexy Karen [employer]  Subject:  Naty Estes mother Jasper Encinas) is calling to request an order to be sent to the pt's group home and day program for his protein supplement. The group home fax: [phone], where they need to add 2 scoops of protein power to 8-10 ox of soy milk 2 times a day. And the day program fax: [phone], where the give 2 scoops in 8-10 oz of soy milk 1 time a day at lunch. Best contact: [phone].

## 2018-05-22 ENCOUNTER — OFFICE VISIT (OUTPATIENT)
Dept: INTERNAL MEDICINE CLINIC | Age: 38
End: 2018-05-22

## 2018-05-22 VITALS
OXYGEN SATURATION: 98 % | BODY MASS INDEX: 16.35 KG/M2 | RESPIRATION RATE: 19 BRPM | SYSTOLIC BLOOD PRESSURE: 92 MMHG | DIASTOLIC BLOOD PRESSURE: 58 MMHG | WEIGHT: 110.4 LBS | HEART RATE: 83 BPM | HEIGHT: 69 IN

## 2018-05-22 DIAGNOSIS — Z98.890 HISTORY OF ABDOMINAL SURGERY: ICD-10-CM

## 2018-05-22 DIAGNOSIS — F41.9 ANXIETY: ICD-10-CM

## 2018-05-22 DIAGNOSIS — R63.6 UNDERWEIGHT: ICD-10-CM

## 2018-05-22 DIAGNOSIS — H00.015 HORDEOLUM EXTERNUM OF LEFT LOWER EYELID: Primary | ICD-10-CM

## 2018-05-22 DIAGNOSIS — I95.2 HYPOTENSION DUE TO DRUGS: ICD-10-CM

## 2018-05-22 RX ORDER — GENTAMICIN SULFATE 3 MG/ML
1 SOLUTION/ DROPS OPHTHALMIC 3 TIMES DAILY
Qty: 5 ML | Refills: 0 | Status: SHIPPED | OUTPATIENT
Start: 2018-05-22 | End: 2018-05-29

## 2018-05-22 NOTE — PATIENT INSTRUCTIONS
Styes and Chalazia: Care Instructions  Your Care Instructions    Styes and chalazia (say \"hkv-CUT-oyi-uh\") are both conditions that can cause swelling of the eyelid. A stye is an infection in the root of an eyelash. The infection causes a tender red lump on the edge of the eyelid. The infection can spread until the whole eyelid becomes red and inflamed. Styes usually break open, and a tiny amount of pus drains. They usually clear up on their own in about a week, but they sometimes need treatment with antibiotics. A chalazion is a lump or cyst in the eyelid (chalazion is singular; chalazia is plural). It is caused by swelling and inflammation of deep oil glands inside the eyelid. Chalazia are usually not infected. They can take a few months to heal.  If a chalazion becomes more swollen and painful or does not go away, you may need to have it drained by your doctor. Follow-up care is a key part of your treatment and safety. Be sure to make and go to all appointments, and call your doctor if you are having problems. It's also a good idea to know your test results and keep a list of the medicines you take. How can you care for yourself at home? · Do not rub your eyes. Do not squeeze or try to open a stye or chalazion. · To help a stye or chalazion heal faster:  ¨ Put a warm, moist compress on your eye for 5 to 10 minutes, 3 to 6 times a day. Heat often brings a stye to a point where it drains on its own. Keep in mind that warm compresses will often increase swelling a little at first.  ¨ Do not use hot water or heat a wet cloth in a microwave oven. The compress may get too hot and can burn the eyelid. · Always wash your hands before and after you use a compress or touch your eyes. · If the doctor gave you antibiotic drops or ointment, use the medicine exactly as directed. Use the medicine for as long as instructed, even if your eye starts to feel better.   · To put in eyedrops or ointment:  ¨ Tilt your head back, and pull your lower eyelid down with one finger. ¨ Drop or squirt the medicine inside the lower lid. ¨ Close your eye for 30 to 60 seconds to let the drops or ointment move around. ¨ Do not touch the ointment or dropper tip to your eyelashes or any other surface. · Do not wear eye makeup or contact lenses until the stye or chalazion heals. · Do not share towels, pillows, or washcloths while you have a stye. When should you call for help? Call your doctor now or seek immediate medical care if:  ? · You have pain in your eye.   ? · You have a change in vision or loss of vision. ? · Redness and swelling get much worse. ? Watch closely for changes in your health, and be sure to contact your doctor if:  ? · Your stye does not get better in 1 week. ? · Your chalazion does not start to get better after several weeks. Where can you learn more? Go to http://rachael-william.info/. Enter L688 in the search box to learn more about \"Styes and Chalazia: Care Instructions. \"  Current as of: March 3, 2017  Content Version: 11.4  © 5594-7702 [employer]. Care instructions adapted under license by [employer] (which disclaims liability or warranty for this information). If you have questions about a medical condition or this instruction, always ask your healthcare professional. [place] [address] any warranty or liability for your use of this information.

## 2018-05-22 NOTE — PROGRESS NOTES
Health Maintenance Due   Topic Date Due    DTaP/Tdap/Td series (1 - Tdap) 11/04/1999    MEDICARE YEARLY EXAM  03/14/2018       Chief Complaint   Patient presents with   Enid Farrar     left eye    Abdominal Pain    Osteoporosis       1. Have you been to the ER, urgent care clinic since your last visit? Hospitalized since your last visit? No    2. Have you seen or consulted any other health care providers outside of the [address] [place] [address] since your last visit? Include any pap smears or colon screening. No    3) Do you have an Advance Directive on file? yes    4) Are you interested in receiving information on Advance Directives? NO      Patient is accompanied by self I have received verbal consent from [employer] to discuss any/all medical information while they are present in the room.

## 2018-05-22 NOTE — MR AVS SNAPSHOT
[address] [facility] [address] [place] [address] 
[phone] Patient: Jose Richardson MRN:  [id number] Visit Information Date & Time Provider Department Dept. Phone Encounter #  
 5/22/2018  1:00 PM Trish Maldonado [facility] Internal Medicine [phone] [id number] Follow-up Instructions Return if symptoms worsen or fail to improve. Your Appointments 5/23/2018 10:00 AM  
Follow Up with Alka Lopez NP [facility] ([facility]) Appt Note: 3mo f/up seizures cr; r/s sth 4/12/18; r/s 05/21/18 tmdc 05/22/18 [place] [address] [facility] [address] [phone]  
  
   
 [address] [facility] [employer] [address]  
  
    
 7/9/2018 10:15 AM  
ROUTINE CARE with Trish Maldonado DO [facility] Internal Medicine ([facility]) Appt Note: 4 month f/u  
 [address] [facility] [address] [facility] [address] [place] [address]  
[phone]  
  
   
 [address] Upcoming Health Maintenance Date Due DTaP/Tdap/Td series (1 - Tdap) 11/4/1999 MEDICARE YEARLY EXAM 3/14/2018 Influenza Age 5 to Adult 8/31/2018* *Topic was postponed. The date shown is not the original due date. Allergies as of 5/22/2018  Review Complete On: 5/22/2018 By: Trish Maldonado DO No Known Allergies Current Immunizations  Reviewed on 2/6/2017 Name Date Influenza Vaccine 10/15/2016, 11/1/2014, 10/22/2013 Influenza Vaccine Split 10/21/2012 Pneumococcal Vaccine (Unspecified Type) 2/28/2012 Not reviewed this visit You Were Diagnosed With   
  
 Codes Comments Hordeolum externum of left lower eyelid    -  Primary ICD-10-CM: H00.015 
ICD-9-CM: 373.11 Hypotension due to drugs     ICD-10-CM: I95.2 ICD-9-CM: 458.8, E947.9 History of abdominal surgery     ICD-10-CM: Z98.890 ICD-9-CM: V45.89   
 Underweight     ICD-10-CM: R63.6 ICD-9-CM: 783.22 Anxiety     ICD-10-CM: F41.9 ICD-9-CM: 300.00 Vitals BP Pulse Resp Height(growth percentile) Weight(growth percentile) SpO2  
 92/58 (BP 1 Location: Left arm, BP Patient Position: Sitting) 83 19 5' 9\" (1.753 m) 110 lb 6.4 oz (50.1 kg) 98% BMI Smoking Status 16.3 kg/m2 Never Smoker Vitals History BMI and BSA Data Body Mass Index Body Surface Area  
 16.3 kg/m 2 1.56 m 2 Preferred Pharmacy Pharmacy Name Phone [facility] [phone] Your Updated Medication List  
  
   
This list is accurate as of 5/22/18  1:21 PM.  Always use your most recent med list.  
  
  
  
  
 amitriptyline 25 mg tablet Commonly known as:  ELAVIL Take 50 mg by mouth nightly. BENEFIBER CLEAR SF (DEXTRIN) PO Take  by mouth. brief disposable Misc Commonly known as:  adult  
by Does Not Apply route. Depends, size medium, 1 nightly  
  
 busPIRone 5 mg tablet Commonly known as:  BUSPAR Take 5 mg by mouth two (2) times a day. CALCITRATE-VITAMIN D tablet Generic drug:  calcium citrate-vitamin D3 TAKE 1 TABLET BY MOUTH TWICE A DAY  
  
 DEPLIN (ALGAL OIL) 7.5-90.314 mg Cap Generic drug:  levomefolate-algal oil Take  by mouth. DEPLIN PO Take 7.5 mg by mouth daily. fluticasone 50 mcg/actuation nasal spray Commonly known as:  FLONASE  
BLOW NOSE: 2 SPRAYS IN EACH NOSTRIL DAILY FOR ALLERGY. gentamicin 0.3 % ophthalmic solution Commonly known as:  GARAMYCIN Administer 1 Drop to left eye three (3) times daily for 7 days. IMODIUM A-D 2 mg tablet Generic drug:  loperamide Take 2 mg by mouth four (4) times daily as needed for Diarrhea.  
  
 melatonin 3 mg tablet Take 1 Tab by mouth nightly. MUCINEX 600 mg ER tablet Generic drug:  guaiFENesin ER  
 Take 600 mg by mouth daily as needed for Congestion. neomycin-bacitracin-polymyxin 3.5mg-400 unit- 5,000 unit/gram ointment Commonly known as:  NEOSPORIN (IWY-LMZ-WYKTV) Apply to back red area daily x 7 days OTHER  
L-Theanine 100 mg 1 daily OTHER Use to brush teeth as needed to prevent cavities,gingivitis, and plaque build up, OTHER Protein powder: please add 2 scoops of protein powder to 8-10 oz of soy milk twice a day ( with I of those times being lunch) and serve to patient. Fax  [phone] when finished PROBIOTIC COMPLEX 25 billion cell -100 mg Cap Generic drug:  L.acid-B.bifidum-B.animal-FOS Take  by mouth. TAB-A-MARGIE tablet Generic drug:  multivitamin TAKE 1 TABLET BY MOUTH DAILY theanine 50 mg Tbdi Take  by mouth. TYLENOL 325 mg tablet Generic drug:  acetaminophen Take  by mouth every four (4) hours as needed for Pain. VITAMIN D2 50,000 unit capsule Generic drug:  ergocalciferol TAKE 1 CAPSULE BY MOUTH EVERY 3 MONTHS ON THE FIRST ([facility]) Prescriptions Sent to Pharmacy Refills  
 gentamicin (GARAMYCIN) 0.3 % ophthalmic solution 0 Sig: Administer 1 Drop to left eye three (3) times daily for 7 days. Class: Normal  
 Pharmacy: [address] [place] [address] #: [phone] Route: Left Eye Follow-up Instructions Return if symptoms worsen or fail to improve. Patient Instructions Styes and Chalazia: Care Instructions Your Care Instructions Styes and chalazia (say \"xao-RZS-sov-\") are both conditions that can cause swelling of the eyelid. A stye is an infection in the root of an eyelash. The infection causes a tender red lump on the edge of the eyelid. The infection can spread until the whole eyelid becomes red and inflamed.  Styes usually break open, and a tiny amount of pus drains. They usually clear up on their own in about a week, but they sometimes need treatment with antibiotics. A chalazion is a lump or cyst in the eyelid (chalazion is singular; chalazia is plural). It is caused by swelling and inflammation of deep oil glands inside the eyelid. Chalazia are usually not infected. They can take a few months to heal. 
If a chalazion becomes more swollen and painful or does not go away, you may need to have it drained by your doctor. Follow-up care is a key part of your treatment and safety. Be sure to make and go to all appointments, and call your doctor if you are having problems. It's also a good idea to know your test results and keep a list of the medicines you take. How can you care for yourself at home? · Do not rub your eyes. Do not squeeze or try to open a stye or chalazion. · To help a stye or chalazion heal faster: ¨ Put a warm, moist compress on your eye for 5 to 10 minutes, 3 to 6 times a day. Heat often brings a stye to a point where it drains on its own. Keep in mind that warm compresses will often increase swelling a little at first. 
¨ Do not use hot water or heat a wet cloth in a microwave oven. The compress may get too hot and can burn the eyelid. · Always wash your hands before and after you use a compress or touch your eyes. · If the doctor gave you antibiotic drops or ointment, use the medicine exactly as directed. Use the medicine for as long as instructed, even if your eye starts to feel better. · To put in eyedrops or ointment: ¨ Tilt your head back, and pull your lower eyelid down with one finger. ¨ Drop or squirt the medicine inside the lower lid. ¨ Close your eye for 30 to 60 seconds to let the drops or ointment move around. ¨ Do not touch the ointment or dropper tip to your eyelashes or any other surface. · Do not wear eye makeup or contact lenses until the stye or chalazion heals. · Do not share towels, pillows, or washcloths while you have a stye. When should you call for help? Call your doctor now or seek immediate medical care if: 
? · You have pain in your eye.  
? · You have a change in vision or loss of vision. ? · Redness and swelling get much worse. ? Watch closely for changes in your health, and be sure to contact your doctor if: 
? · Your stye does not get better in 1 week. ? · Your chalazion does not start to get better after several weeks. Where can you learn more? Go to http://rachael-william.info/. Enter S045 in the search box to learn more about \"Styes and Chalazia: Care Instructions. \" Current as of: March 3, 2017 Content Version: 11.4 © 3718-2518 [employer]. Care instructions adapted under license by [employer] (which disclaims liability or warranty for this information). If you have questions about a medical condition or this instruction, always ask your healthcare professional. [address] any warranty or liability for your use of this information. Introducing [facility]! Dear Trace Walker: Thank you for requesting a [employer] account. Our records indicate that you already have an active [employer] account. You can access your account anytime at [email or web address] Did you know that you can access your hospital and ER discharge instructions at any time in [employer]? You can also review all of your test results from your hospital stay or ER visit. Additional Information If you have questions, please visit the Frequently Asked Questions section of the [employer] website at [email or web address] Remember, [employer] is NOT to be used for urgent needs. For medical emergencies, dial 911. Now available from your iPhone and Android! Please provide this summary of care documentation to your next provider. Your primary care clinician is listed as Gaby Doyle. If you have any questions after today's visit, please call [phone].

## 2018-05-22 NOTE — PROGRESS NOTES
HISTORY OF PRESENT ILLNESS  Naina Jade is a 42 y.o. male. Patient comes in with a caregiver from his group home for follow-up. Reports is stye in the left lower eyelid for the past few days. Some irritation but no vision changes. Has a few chronic medical issues. No recent seizures. History of multiple abdominal surgeries as a child. Is chronically underweight. Also has osteoporosis. Med list and most recent studies reviewed. Reports compliance with medications. Appetite is fair and has gradually gained some weight. He has some cognitive deficits. Denies any other new problems. Stye   Pertinent negatives include no chest pain, no abdominal pain, no headaches and no shortness of breath. Abdominal Pain   Pertinent negatives include no chest pain, no abdominal pain, no headaches and no shortness of breath. Osteoporosis   Pertinent negatives include no chest pain, no abdominal pain, no headaches and no shortness of breath. Review of Systems   Constitutional: Negative. HENT: Negative. Eyes: Positive for redness. Negative for blurred vision, double vision, photophobia, pain and discharge. Respiratory: Negative for shortness of breath. Cardiovascular: Negative for chest pain and leg swelling. Gastrointestinal: Positive for diarrhea (Occasional). Negative for abdominal pain. Stool incontinence   Genitourinary: Positive for urgency. Negative for dysuria. Musculoskeletal: Negative for back pain, falls and joint pain. Skin: Negative. Neurological: Positive for tremors and seizures. Negative for dizziness, sensory change and headaches. Endo/Heme/Allergies: Negative. Psychiatric/Behavioral: Positive for depression. The patient is nervous/anxious and has insomnia. All other systems reviewed and are negative. Physical Exam   Constitutional: He is oriented to person, place, and time. He appears well-developed and well-nourished. No distress.    Pleasant,  cognitivelty a bit slow   HENT:   Head: Normocephalic and atraumatic. Mouth/Throat: Oropharynx is clear and moist.   Eyes: Conjunctivae and EOM are normal. Pupils are equal, round, and reactive to light. Right eye exhibits no discharge. Left eye exhibits no discharge. Eyelid: stye present. Left lower eyelid erythema/stye   Neck: Normal range of motion. Neck supple. No JVD present. No thyromegaly present. Cardiovascular: Normal rate, regular rhythm, normal heart sounds and intact distal pulses. No murmur heard. Pulmonary/Chest: Effort normal and breath sounds normal. No respiratory distress. He has no wheezes. He has no rales. Abdominal: Soft. Bowel sounds are normal. He exhibits no distension. Chronic surgical scars   Musculoskeletal: He exhibits no edema or tenderness. Neurological: He is alert and oriented to person, place, and time. Coordination abnormal.   Skin: Skin is warm and dry. No rash noted. Psychiatric: His behavior is normal.   Repeats words  Chronic cognitive deficit , about same   Nursing note and vitals reviewed. ASSESSMENT and PLAN  Diagnoses and all orders for this visit:    1. Hordeolum externum of left lower eyelid    2. Hypotension due to drugs    3. History of abdominal surgery    4. Underweight    5. Anxiety    Other orders  -     gentamicin (GARAMYCIN) 0.3 % ophthalmic solution; Administer 1 Drop to left eye three (3) times daily for 7 days.       Follow-up Disposition:  Return if symptoms worsen or fail to improve.   lab results and schedule of future lab studies reviewed with patient  reviewed diet, exercise and weight control  reviewed medications and side effects in detail  F/u with other MD's as scheduled  Printed information about eyelid stye given to patient  Advised him to avoid touching or rubbing the area

## 2018-05-23 ENCOUNTER — OFFICE VISIT (OUTPATIENT)
Dept: NEUROLOGY | Age: 38
End: 2018-05-23

## 2018-05-23 VITALS
SYSTOLIC BLOOD PRESSURE: 98 MMHG | OXYGEN SATURATION: 98 % | DIASTOLIC BLOOD PRESSURE: 62 MMHG | BODY MASS INDEX: 16.29 KG/M2 | HEART RATE: 80 BPM | HEIGHT: 69 IN | WEIGHT: 110 LBS | RESPIRATION RATE: 20 BRPM

## 2018-05-23 DIAGNOSIS — R25.1 TREMOR: Primary | ICD-10-CM

## 2018-05-23 DIAGNOSIS — G93.49 STATIC ENCEPHALOPATHY: ICD-10-CM

## 2018-05-23 DIAGNOSIS — Z74.09 IMPAIRED FUNCTIONAL MOBILITY, BALANCE, GAIT, AND ENDURANCE: ICD-10-CM

## 2018-05-23 DIAGNOSIS — F41.9 ANXIETY: ICD-10-CM

## 2018-05-23 RX ORDER — GABAPENTIN 100 MG/1
100 CAPSULE ORAL 3 TIMES DAILY
Qty: 90 CAP | Refills: 3 | Status: SHIPPED | OUTPATIENT
Start: 2018-05-23 | End: 2018-08-29 | Stop reason: SDUPTHER

## 2018-05-23 NOTE — PATIENT INSTRUCTIONS

## 2018-05-23 NOTE — PROGRESS NOTES
Date:  18     Name:  Isa Marroquin  :  1980  MRN:  [id number]     PCP:  Lesli Marc DO    Chief Complaint   Patient presents with    Epilepsy     HISTORY OF PRESENT ILLNESS: Follow up for gait and balance disturbance, generalized weakness. Off of the Tegretol for some time without seizures. They have noticed the tremor a bit more since he has been off of the Tegretol. He has also been a little less stable from an anxiety standpoint. The tremor is worse when he is anxious. He has been participating in physical therapy which has helped but per his mother the strength and the balance are still not very good. Current Outpatient Prescriptions   Medication Sig    gentamicin (GARAMYCIN) 0.3 % ophthalmic solution Administer 1 Drop to left eye three (3) times daily for 7 days.  OTHER Protein powder: please add 2 scoops of protein powder to 8-10 oz of soy milk twice a day ( with I of those times being lunch) and serve to patient. Fax tp [phone] when finished    OTHER Use to brush teeth as needed to prevent cavities,gingivitis, and plaque build up,    TAB-A-MARGIE tablet TAKE 1 TABLET BY MOUTH DAILY    amitriptyline (ELAVIL) 25 mg tablet Take 50 mg by mouth nightly.  theanine 50 mg TbDi Take  by mouth.  VITAMIN D2 50,000 unit capsule TAKE 1 CAPSULE BY MOUTH EVERY 3 MONTHS ON THE FIRST (, APRIL,JULY,OCTOBER)    busPIRone (BUSPAR) 5 mg tablet Take 5 mg by mouth two (2) times a day.  OTHER L-Theanine 100 mg 1 daily    fluticasone (FLONASE) 50 mcg/actuation nasal spray BLOW NOSE: 2 SPRAYS IN EACH NOSTRIL DAILY FOR ALLERGY.  CALCITRATE-VITAMIN D tablet TAKE 1 TABLET BY MOUTH TWICE A DAY    levomefolate-algal oil (DEPLIN, ALGAL OIL,) 7.5-90.314 mg cap Take  by mouth.  LEVOMEFOLATE CALCIUM (DEPLIN PO) Take 7.5 mg by mouth daily.  L.acid-B.bifidum-B.animal-FOS (PROBIOTIC COMPLEX) 25 billion cell -100 mg cap Take  by mouth.     melatonin 3 mg tablet Take 1 Tab by mouth nightly.  neomycin-bacitracin-polymyxin (NEOSPORIN, RCN-NWZ-RRQXZ,) 3.5mg-400 unit- 5,000 unit/gram ointment Apply to back red area daily x 7 days    WHEAT DEXTRIN (BENEFIBER CLEAR SF, DEXTRIN, PO) Take  by mouth.  acetaminophen (TYLENOL) 325 mg tablet Take  by mouth every four (4) hours as needed for Pain.  loperamide (IMODIUM A-D) 2 mg tablet Take 2 mg by mouth four (4) times daily as needed for Diarrhea.  guaiFENesin ER (MUCINEX) 600 mg ER tablet Take 600 mg by mouth daily as needed for Congestion.  brief disposable (ADULT) misc by Does Not Apply route. Depends, size medium, 1 nightly     No current facility-administered medications for this visit. 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, without mention of intractable epilepsy 5/14/2011    Mental retardation     Osteoporosis     Other ill-defined conditions(799.89)     intestinal problems    Psychiatric disorder     anxiety    Seizure disorder [facility])      Past Surgical History:   Procedure Laterality Date    HX APPENDECTOMY  1/28/13    APPENDECTOMY    HX GI      colon resection    HX GI  1/28/13    LAPAROTOMY EXPLORATORY - OPEN LYSIS OF ADHESIONS     HX HERNIA REPAIR      HX OTHER SURGICAL      imperforated anus birth defect    UPPER GI ENDOSCOPY,BIOPSY  3/17/2017          Social History     Social History    Marital status: SINGLE     Spouse name: N/A    Number of children: N/A    Years of education: N/A     Occupational History    Not on file.      Social History Main Topics    Smoking status: Never Smoker    Smokeless tobacco: Never Used    Alcohol use No    Drug use: No    Sexual activity: No     Other Topics Concern    Not on file     Social History Narrative     No family history on file.    PHYSICAL EXAMINATION:    Visit Vitals    BP 98/62    Pulse 80    Resp 20    Ht 5' 9\" (1.753 m)    Wt 49.9 kg (110 lb)    SpO2 98%    BMI 16.24 kg/m2     General: Well defined, nourished, and groomed individual in no acute distress. Neck: Supple, nontender, no bruits, no pain with resistance to active range of motion. Heart: Regular rate and rhythm, no murmurs, rub, or gallop. Normal S1S2. Lungs: Clear to auscultation bilaterally with equal chest expansion, no cough, no wheeze  Musculoskeletal: Extremities revealed no edema and had full range of motion of joints. Psych: Good mood and bright affect      NEUROLOGICAL EXAMINATION:   Mental Status: Alert and oriented to person and place   Cranial Nerves:   II, III, IV, VI: Visual acuity grossly intact. Visual fields are normal.   Pupils are equal, round, and reactive to light and accommodation. Extra-ocular movements are full and fluid. Fundoscopic exam was benign, no ptosis or nystagmus. V-XII: Hearing is grossly intact. Facial features are symmetric, with normal sensation and strength. The palate rises symmetrically and the tongue protrudes midline. Sternocleidomastoids 5/5. Motor Examination: decreased tone and bulk with generalized weakness. Coordination: Finger to nose was normal. No resting tremor. There is a noticeable tremor bilaterally, right greater than left. Gait and Station: Unsteady and slow with rising and walking. He pins his hands to his sides with walking. No pronator drift. No muscle wasting or fasiculations noted. Reflexes: DTRs 2+ throughout. ASSESSMENT AND PLAN    ICD-10-CM ICD-9-CM    1. Tremor R25.1 781.0 gabapentin (NEURONTIN) 100 mg capsule   2. Anxiety F41.9 300.00 gabapentin (NEURONTIN) 100 mg capsule   3. Static encephalopathy G93.49 742.9    4. Impaired functional mobility, balance, gait, and endurance Z74.09 V49.89      We discussed his impaired functional mobility, balance, gait and endurance.   I suspect that this has a lot to do with general debility. There also appears to be at least some mild failure to thrive at play here as well. Some of this may have something to do with some of the anxiety that he experiences since being off the Tegretol. Additionally, there appears to be some tremor as well that has been more noticeable since he has been off the Tegretol. This in turn aggravates anxiety a bit more. Encouraged them to continue to participate in physical therapy. We discussed possible treatment options for the tremor and anxiety and he was started on a low-dose gabapentin. Purpose of potential side effects were discussed and they have verbalized understanding. Given his general debility, explained that we are going to start low with this medication and go slow in terms of increasing it. Follow-up in 3 months or sooner if needed. Cielo Herrmann

## 2018-05-23 NOTE — MR AVS SNAPSHOT
[address] [place] [address] [address] [facility] [address] [id number] [phone] Patient: Mayte Nogueira MRN:  [id number] Visit Information Date & Time Provider Department Dept. Phone Encounter #  
 5/23/2018 10:00 AM RICHY Rogers Neurology [facility] [phone] [id number] Follow-up Instructions Return in about 3 months (around 8/23/2018). Your Appointments 7/9/2018 10:15 AM  
ROUTINE CARE with Taryn Bhatia DO [facility] Internal Medicine ([address]) Appt Note: 4 month f/u  
 [address] [facility] [address]  
[phone]  
  
   
 [address] [facility] [address] Upcoming Health Maintenance Date Due DTaP/Tdap/Td series (1 - Tdap) 11/4/1999 MEDICARE YEARLY EXAM 3/14/2018 Influenza Age 5 to Adult 8/31/2018* *Topic was postponed. The date shown is not the original due date. Allergies as of 5/23/2018  Review Complete On: 5/23/2018 By: Noa Strong NP No Known Allergies Current Immunizations  Reviewed on 2/6/2017 Name Date Influenza Vaccine 10/15/2016, 11/1/2014, 10/22/2013 Influenza Vaccine Split 10/21/2012 Pneumococcal Vaccine (Unspecified Type) 2/28/2012 Not reviewed this visit You Were Diagnosed With   
  
 Codes Comments Tremor    -  Primary ICD-10-CM: R25.1 ICD-9-CM: 781.0 Anxiety     ICD-10-CM: F41.9 ICD-9-CM: 300.00 Static encephalopathy     ICD-10-CM: G93.49 
ICD-9-CM: 742.9 Impaired functional mobility, balance, gait, and endurance     ICD-10-CM: Z74.09 
ICD-9-CM: V49.89 Vitals BP Pulse Resp Height(growth percentile) Weight(growth percentile) SpO2  
 98/62 80 20 5' 9\" (1.753 m) 110 lb (49.9 kg) 98% BMI Smoking Status 16.24 kg/m2 Never Smoker Vitals History BMI and BSA Data  Body Mass Index Body Surface Area  
 16.24 kg/m 2 1.56 m 2  
  
 Preferred Pharmacy Pharmacy Name Phone [facility], [place] [address] [phone] Your Updated Medication List  
  
   
This list is accurate as of 5/23/18 10:44 AM.  Always use your most recent med list.  
  
  
  
  
 amitriptyline 25 mg tablet Commonly known as:  ELAVIL Take 50 mg by mouth nightly. BENEFIBER CLEAR SF (DEXTRIN) PO Take  by mouth. brief disposable Misc Commonly known as:  adult  
by Does Not Apply route. Depends, size medium, 1 nightly  
  
 busPIRone 5 mg tablet Commonly known as:  BUSPAR Take 5 mg by mouth two (2) times a day. CALCITRATE-VITAMIN D tablet Generic drug:  calcium citrate-vitamin D3 TAKE 1 TABLET BY MOUTH TWICE A DAY  
  
 DEPLIN (ALGAL OIL) 7.5-90.314 mg Cap Generic drug:  levomefolate-algal oil Take  by mouth. DEPLIN PO Take 7.5 mg by mouth daily. fluticasone 50 mcg/actuation nasal spray Commonly known as:  FLONASE  
BLOW NOSE: 2 SPRAYS IN EACH NOSTRIL DAILY FOR ALLERGY.  
  
 gabapentin 100 mg capsule Commonly known as:  NEURONTIN Take 1 Cap by mouth three (3) times daily. gentamicin 0.3 % ophthalmic solution Commonly known as:  GARAMYCIN Administer 1 Drop to left eye three (3) times daily for 7 days. IMODIUM A-D 2 mg tablet Generic drug:  loperamide Take 2 mg by mouth four (4) times daily as needed for Diarrhea.  
  
 melatonin 3 mg tablet Take 1 Tab by mouth nightly. MUCINEX 600 mg ER tablet Generic drug:  guaiFENesin ER Take 600 mg by mouth daily as needed for Congestion. OTHER  
L-Theanine 100 mg 1 daily OTHER Use to brush teeth as needed to prevent cavities,gingivitis, and plaque build up, OTHER Protein powder: please add 2 scoops of protein powder to 8-10 oz of soy milk twice a day ( with I of those times being lunch) and serve to patient. Fax tp [phone] when finished PROBIOTIC COMPLEX 25 billion cell -100 mg Cap Generic drug:  L.acid-B.bifidum-B.animal-FOS Take  by mouth. TAB-A-MARGIE tablet Generic drug:  multivitamin TAKE 1 TABLET BY MOUTH DAILY theanine 50 mg Tbdi Take  by mouth. TYLENOL 325 mg tablet Generic drug:  acetaminophen Take  by mouth every four (4) hours as needed for Pain. VITAMIN D2 50,000 unit capsule Generic drug:  ergocalciferol TAKE 1 CAPSULE BY MOUTH EVERY 3 MONTHS ON THE FIRST (Jonathan Marina) Prescriptions Sent to Pharmacy Refills  
 gabapentin (NEURONTIN) 100 mg capsule 3 Sig: Take 1 Cap by mouth three (3) times daily. Class: Normal  
 Pharmacy: [address] [place] [address] #: [phone] Route: Oral  
  
Follow-up Instructions Return in about 3 months (around 8/23/2018). Patient Instructions A Healthy Lifestyle: Care Instructions Your Care Instructions A healthy lifestyle can help you feel good, stay at a healthy weight, and have plenty of energy for both work and play. A healthy lifestyle is something you can share with your whole family. A healthy lifestyle also can lower your risk for serious health problems, such as high blood pressure, heart disease, and diabetes. You can follow a few steps listed below to improve your health and the health of your family. Follow-up care is a key part of your treatment and safety. Be sure to make and go to all appointments, and call your doctor if you are having problems. It's also a good idea to know your test results and keep a list of the medicines you take. How can you care for yourself at home? · Do not eat too much sugar, fat, or fast foods. You can still have dessert and treats now and then. The goal is moderation. · Start small to improve your eating habits. Pay attention to portion sizes, drink less juice and soda pop, and eat more fruits and vegetables. ¨ Eat a healthy amount of food. A 3-ounce serving of meat, for example, is about the size of a deck of cards. Fill the rest of your plate with vegetables and whole grains. ¨ Limit the amount of soda and sports drinks you have every day. Drink more water when you are thirsty. ¨ Eat at least 5 servings of fruits and vegetables every day. It may seem like a lot, but it is not hard to reach this goal. A serving or helping is 1 piece of fruit, 1 cup of vegetables, or 2 cups of leafy, raw vegetables. Have an apple or some carrot sticks as an afternoon snack instead of a candy bar. Try to have fruits and/or vegetables at every meal. 
· Make exercise part of your daily routine. You may want to start with simple activities, such as walking, bicycling, or slow swimming. Try to be active 30 to 60 minutes every day. You do not need to do all 30 to 60 minutes all at once. For example, you can exercise 3 times a day for 10 or 20 minutes. Moderate exercise is safe for most people, but it is always a good idea to talk to your doctor before starting an exercise program. 
· Keep moving. Satish Blotter the lawn, work in the garden, or Bangbite. Take the stairs instead of the elevator at work. · If you smoke, quit. People who smoke have an increased risk for heart attack, stroke, cancer, and other lung illnesses. Quitting is hard, but there are ways to boost your chance of quitting tobacco for good. ¨ Use nicotine gum, patches, or lozenges. ¨ Ask your doctor about stop-smoking programs and medicines. ¨ Keep trying. In addition to reducing your risk of diseases in the future, you will notice some benefits soon after you stop using tobacco. If you have shortness of breath or asthma symptoms, they will likely get better within a few weeks after you quit. · Limit how much alcohol you drink. Moderate amounts of alcohol (up to 2 drinks a day for men, 1 drink a day for women) are okay.  But drinking too much can lead to liver problems, high blood pressure, and other health problems. Family health If you have a family, there are many things you can do together to improve your health. · Eat meals together as a family as often as possible. · Eat healthy foods. This includes fruits, vegetables, lean meats and dairy, and whole grains. · Include your family in your fitness plan. Most people think of activities such as jogging or tennis as the way to fitness, but there are many ways you and your family can be more active. Anything that makes you breathe hard and gets your heart pumping is exercise. Here are some tips: 
¨ Walk to do errands or to take your child to school or the bus. ¨ Go for a family bike ride after dinner instead of watching TV. Where can you learn more? Go to http://rachael-william.info/. Enter Q941 in the search box to learn more about \"A Healthy Lifestyle: Care Instructions. \" Current as of: May 12, 2017 Content Version: 11.4 © 0571-5868 [employer]. Care instructions adapted under license by [employer] (which disclaims liability or warranty for this information). If you have questions about a medical condition or this instruction, always ask your healthcare professional. [address] any warranty or liability for your use of this information. Introducing [facility]! Dear Jasmine Scales: Thank you for requesting a Outski account. Our records indicate that you already have an active Outski account. You can access your account anytime at [email or web address] Did you know that you can access your hospital and ER discharge instructions at any time in Outski? You can also review all of your test results from your hospital stay or ER visit. Additional Information If you have questions, please visit the Frequently Asked Questions section of the Cloudbuild website at [email or web address] Remember, Cloudbuild is NOT to be used for urgent needs. For medical emergencies, dial 911. Now available from your iPhone and Android! Please provide this summary of care documentation to your next provider. Your primary care clinician is listed as Luciano Durant. If you have any questions after today's visit, please call [phone].

## 2018-06-20 ENCOUNTER — TELEPHONE (OUTPATIENT)
Dept: INTERNAL MEDICINE CLINIC | Age: 38
End: 2018-06-20

## 2018-06-20 NOTE — TELEPHONE ENCOUNTER
Mr Sergey Urbina mother called to say that he has had diarrhea/loose stools for several days. I asked that take him to ED for further evaluation as his electrolytes could be significantly off. Gave her that address and phone number for [facility] urgent care on [address]   She expressed understanding and had no further questions

## 2018-06-22 ENCOUNTER — OFFICE VISIT (OUTPATIENT)
Dept: INTERNAL MEDICINE CLINIC | Age: 38
End: 2018-06-22

## 2018-06-22 VITALS
TEMPERATURE: 97.5 F | RESPIRATION RATE: 16 BRPM | WEIGHT: 107 LBS | HEIGHT: 69 IN | HEART RATE: 69 BPM | DIASTOLIC BLOOD PRESSURE: 67 MMHG | SYSTOLIC BLOOD PRESSURE: 105 MMHG | OXYGEN SATURATION: 98 % | BODY MASS INDEX: 15.85 KG/M2

## 2018-06-22 DIAGNOSIS — K52.9 CHRONIC DIARRHEA: Primary | ICD-10-CM

## 2018-06-22 DIAGNOSIS — R15.9 INCONTINENCE OF FECES, UNSPECIFIED FECAL INCONTINENCE TYPE: ICD-10-CM

## 2018-06-22 DIAGNOSIS — E87.6 HYPOKALEMIA: ICD-10-CM

## 2018-06-22 DIAGNOSIS — Z98.890 HISTORY OF ABDOMINAL SURGERY: ICD-10-CM

## 2018-06-22 DIAGNOSIS — R63.6 UNDERWEIGHT: ICD-10-CM

## 2018-06-22 DIAGNOSIS — F32.A DEPRESSION, UNSPECIFIED DEPRESSION TYPE: ICD-10-CM

## 2018-06-22 RX ORDER — ONDANSETRON 4 MG/1
4 TABLET, FILM COATED ORAL
COMMUNITY
End: 2019-03-26

## 2018-06-22 RX ORDER — MONTELUKAST SODIUM 4 MG/1
1 TABLET, CHEWABLE ORAL 2 TIMES DAILY
Qty: 60 TAB | Refills: 2 | Status: SHIPPED | OUTPATIENT
Start: 2018-06-22 | End: 2019-01-21 | Stop reason: SDUPTHER

## 2018-06-22 NOTE — PROGRESS NOTES
Chief Complaint   Patient presents with    Diarrhea     2 and 1/2 weeks diarrhea     1. Have you been to the ER, urgent care clinic since your last visit? Hospitalized since your last visit? No    2. Have you seen or consulted any other health care providers outside of the [facility] since your last visit? Include any pap smears or colon screening. No     Patient accompanied by lead support staff, Emanuel Howard from the group home. Patient's mother is on the way here to appt.

## 2018-06-22 NOTE — MR AVS SNAPSHOT
[address] [facility] [address] [facility] [address] 
[phone] Patient: Christoph Pham MRN:  [id number] Visit Information Date & Time Provider Department Dept. Phone Encounter #  
 6/22/2018  9:00 AM Naveed Valverde [address] [facility] Internal Medicine [phone] [id number] Follow-up Instructions Return in about 3 months (around 9/22/2018). Your Appointments 7/13/2018  9:30 AM  
ROUTINE CARE with Naveed Valverde DO [facility] Internal Medicine ([address]) Appt Note: 4 month f/u; r/s 4 month f/u  
 [address] [facility] [address] [facility] [address] [facility] [id number] [phone]  
  
   
 [address] [facility] [address]  
  
    
 8/22/2018 10:30 AM  
Follow Up with Inell Barthel, NP [address] [facility] ([address] [facility]) Appt Note: 3 month f/u leathw [place] [address] [facility] [address] [phone]  
  
   
 [place] [address] [facility] [address] [address] [address] [place] [address] Upcoming Health Maintenance Date Due DTaP/Tdap/Td series (1 - Tdap) 11/4/1999 Influenza Age 5 to Adult 8/31/2018* *Topic was postponed. The date shown is not the original due date. Allergies as of 6/22/2018  Review Complete On: 6/22/2018 By: Naveed Valverde DO No Known Allergies Current Immunizations  Reviewed on 2/6/2017 Name Date Influenza Vaccine 10/15/2016, 11/1/2014, 10/22/2013 Influenza Vaccine Split 10/21/2012 Pneumococcal Vaccine (Unspecified Type) 2/28/2012 Not reviewed this visit You Were Diagnosed With   
  
 Codes Comments Chronic diarrhea    -  Primary ICD-10-CM: K52.9 ICD-9-CM: 787.91 Incontinence of feces, unspecified fecal incontinence type     ICD-10-CM: R15.9 ICD-9-CM: 787.60 Underweight     ICD-10-CM: R63.6 ICD-9-CM: 783.22 History of abdominal surgery     ICD-10-CM: Z98.890 ICD-9-CM: V45.89   
 Depression, unspecified depression type     ICD-10-CM: F32.9 ICD-9-CM: 840 Hypokalemia     ICD-10-CM: E87.6 ICD-9-CM: 276.8 Vitals BP Pulse Temp Resp Height(growth percentile) Weight(growth percentile) 105/67 69 97.5 °F (36.4 °C) (Oral) 16 5' 9\" (1.753 m) 107 lb (48.5 kg) SpO2 BMI Smoking Status 98% 15.8 kg/m2 Never Smoker BMI and BSA Data Body Mass Index Body Surface Area  
 15.8 kg/m 2 1.54 m 2 Preferred Pharmacy Pharmacy Name Phone [facility], [place] [address] [phone] Your Updated Medication List  
  
   
This list is accurate as of 6/22/18  9:15 AM.  Always use your most recent med list.  
  
  
  
  
 amitriptyline 25 mg tablet Commonly known as:  ELAVIL Take 50 mg by mouth nightly. BENEFIBER CLEAR SF (DEXTRIN) PO Take  by mouth. brief disposable Misc Commonly known as:  adult  
by Does Not Apply route. Depends, size medium, 1 nightly  
  
 busPIRone 5 mg tablet Commonly known as:  BUSPAR Take 5 mg by mouth two (2) times a day. Changed to one tab daily per Dr. Ramiro Seo. CALCITRATE-VITAMIN D tablet Generic drug:  calcium citrate-vitamin D3 TAKE 1 TABLET BY MOUTH TWICE A DAY  
  
 colestipol 1 gram tablet Commonly known as:  COLESTID Take 1 Tab by mouth two (2) times a day. DEPLIN (ALGAL OIL) 7.5-90.314 mg Cap Generic drug:  levomefolate-algal oil Take  by mouth. DEPLIN PO Take 7.5 mg by mouth daily. fluticasone 50 mcg/actuation nasal spray Commonly known as:  FLONASE  
BLOW NOSE: 2 SPRAYS IN EACH NOSTRIL DAILY FOR ALLERGY.  
  
 gabapentin 100 mg capsule Commonly known as:  NEURONTIN Take 1 Cap by mouth three (3) times daily. IMODIUM A-D 2 mg tablet Generic drug:  loperamide Take 2 mg by mouth four (4) times daily as needed for Diarrhea.  
  
 melatonin 3 mg tablet Take 1 Tab by mouth nightly. MUCINEX 600 mg ER tablet Generic drug:  guaiFENesin ER Take 600 mg by mouth daily as needed for Congestion. OTHER  
L-Theanine 100 mg 1 daily OTHER Use to brush teeth as needed to prevent cavities,gingivitis, and plaque build up, OTHER Protein powder: please add 2 scoops of protein powder to 8-10 oz of soy milk twice a day ( with I of those times being lunch) and serve to patient. Fax tp [phone] when finished PROBIOTIC COMPLEX 25 billion cell -100 mg Cap Generic drug:  L.acid-B.bifidum-B.animal-FOS Take  by mouth. TAB-A-MARGIE tablet Generic drug:  multivitamin TAKE 1 TABLET BY MOUTH DAILY theanine 50 mg Tbdi Take  by mouth. TYLENOL 325 mg tablet Generic drug:  acetaminophen Take  by mouth every four (4) hours as needed for Pain. VITAMIN D2 50,000 unit capsule Generic drug:  ergocalciferol TAKE 1 CAPSULE BY MOUTH EVERY 3 MONTHS ON THE FIRST (Oneida Stack) ZOFRAN 4 mg tablet Generic drug:  ondansetron hcl Take 4 mg by mouth every eight (8) hours as needed for Nausea. Prescriptions Sent to Pharmacy Refills  
 colestipol (COLESTID) 1 gram tablet 2 Sig: Take 1 Tab by mouth two (2) times a day. Class: Normal  
 Pharmacy: [address] [place] [address] #: [phone] Route: Oral  
  
We Performed the Following CBC W/O DIFF [92552 CPT(R)] METABOLIC PANEL, COMPREHENSIVE [91130 CPT(R)] Follow-up Instructions Return in about 3 months (around 9/22/2018). Introducing [facility]! Dear Baylee Sanches: Thank you for requesting a Corceuticals account. Our records indicate that you already have an active Corceuticals account. You can access your account anytime at [email or web address] Did you know that you can access your hospital and ER discharge instructions at any time in Corceuticals?   You can also review all of your test results from your hospital stay or ER visit. Additional Information If you have questions, please visit the Frequently Asked Questions section of the Sixteen [employer]een [employer] website at [email or web address] Remember, Sixteen [employer]een [employer] is NOT to be used for urgent needs. For medical emergencies, dial 911. Now available from your iPhone and Android! Please provide this summary of care documentation to your next provider. Your primary care clinician is listed as Theador Hence. If you have any questions after today's visit, please call [phone].

## 2018-06-22 NOTE — LETTER
6/29/2018 8:25 AM 
 
Mr. Rich Duffy 
[address] [facility] [address] Dear Rich Duffy: 
 
Please find your most recent results below. Resulted Orders METABOLIC PANEL, COMPREHENSIVE Result Value Ref Range Glucose 90 65 - 99 mg/dL BUN 14 6 - 20 mg/dL Creatinine 0.90 0.76 - 1.27 mg/dL GFR est non- >59 mL/min/1.73 GFR est  >59 mL/min/1.73  
 BUN/Creatinine ratio 16 9 - 20 Sodium 145 (H) 134 - 144 mmol/L Potassium 3.8 3.5 - 5.2 mmol/L Chloride 102 96 - 106 mmol/L  
 CO2 27 20 - 29 mmol/L Comment: **Please note reference interval change** Calcium 9.3 8.7 - 10.2 mg/dL Protein, total 6.8 6.0 - 8.5 g/dL Albumin 4.4 3.5 - 5.5 g/dL GLOBULIN, TOTAL 2.4 1.5 - 4.5 g/dL A-G Ratio 1.8 1.2 - 2.2 Bilirubin, total 0.4 0.0 - 1.2 mg/dL Alk. phosphatase 50 39 - 117 IU/L  
 AST (SGOT) 21 0 - 40 IU/L  
 ALT (SGPT) 17 0 - 44 IU/L Narrative Performed at:  [address]  [id number] : Cora Pennington MD, Phone:  [phone] CBC W/O DIFF Result Value Ref Range WBC 3.9 3.4 - 10.8 x10E3/uL  
 RBC 4.56 4.14 - 5.80 x10E6/uL HGB 14.4 13.0 - 17.7 g/dL HCT 43.6 37.5 - 51.0 % MCV 96 79 - 97 fL  
 MCH 31.6 26.6 - 33.0 pg  
 MCHC 33.0 31.5 - 35.7 g/dL  
 RDW 13.5 12.3 - 15.4 % PLATELET 249 (L) 751 - 379 x10E3/uL Narrative Performed at:  [address]  [id number] : Cora Pennington MD, Phone:  [phone] RECOMMENDATIONS: 
All labs are stable Please call me if you have any questions: [phone] Sincerely, 
 
 
Lesli Lunch, DO

## 2018-06-23 LAB
ALBUMIN SERPL-MCNC: 4.4 G/DL (ref 3.5–5.5)
ALBUMIN/GLOB SERPL: 1.8 {RATIO} (ref 1.2–2.2)
ALP SERPL-CCNC: 50 IU/L (ref 39–117)
ALT SERPL-CCNC: 17 IU/L (ref 0–44)
AST SERPL-CCNC: 21 IU/L (ref 0–40)
BILIRUB SERPL-MCNC: 0.4 MG/DL (ref 0–1.2)
BUN SERPL-MCNC: 14 MG/DL (ref 6–20)
BUN/CREAT SERPL: 16 (ref 9–20)
CALCIUM SERPL-MCNC: 9.3 MG/DL (ref 8.7–10.2)
CHLORIDE SERPL-SCNC: 102 MMOL/L (ref 96–106)
CO2 SERPL-SCNC: 27 MMOL/L (ref 20–29)
CREAT SERPL-MCNC: 0.9 MG/DL (ref 0.76–1.27)
ERYTHROCYTE [DISTWIDTH] IN BLOOD BY AUTOMATED COUNT: 13.5 % (ref 12.3–15.4)
GFR SERPLBLD CREATININE-BSD FMLA CKD-EPI: 107 ML/MIN/1.73
GFR SERPLBLD CREATININE-BSD FMLA CKD-EPI: 124 ML/MIN/1.73
GLOBULIN SER CALC-MCNC: 2.4 G/DL (ref 1.5–4.5)
GLUCOSE SERPL-MCNC: 90 MG/DL (ref 65–99)
HCT VFR BLD AUTO: 43.6 % (ref 37.5–51)
HGB BLD-MCNC: 14.4 G/DL (ref 13–17.7)
MCH RBC QN AUTO: 31.6 PG (ref 26.6–33)
MCHC RBC AUTO-ENTMCNC: 33 G/DL (ref 31.5–35.7)
MCV RBC AUTO: 96 FL (ref 79–97)
PLATELET # BLD AUTO: 119 X10E3/UL (ref 150–379)
POTASSIUM SERPL-SCNC: 3.8 MMOL/L (ref 3.5–5.2)
PROT SERPL-MCNC: 6.8 G/DL (ref 6–8.5)
RBC # BLD AUTO: 4.56 X10E6/UL (ref 4.14–5.8)
SODIUM SERPL-SCNC: 145 MMOL/L (ref 134–144)
WBC # BLD AUTO: 3.9 X10E3/UL (ref 3.4–10.8)

## 2018-06-24 RX ORDER — ERGOCALCIFEROL 1.25 MG/1
CAPSULE ORAL
Qty: 1 CAP | Refills: 0 | Status: SHIPPED | OUTPATIENT
Start: 2018-06-24 | End: 2019-03-26

## 2018-06-30 ENCOUNTER — HOSPITAL ENCOUNTER (INPATIENT)
Age: 38
LOS: 4 days | Discharge: HOME | DRG: 389 | End: 2018-07-04
Attending: EMERGENCY MEDICINE | Admitting: COLON & RECTAL SURGERY
Payer: MEDICARE

## 2018-06-30 ENCOUNTER — APPOINTMENT (OUTPATIENT)
Dept: GENERAL RADIOLOGY | Age: 38
DRG: 389 | End: 2018-06-30
Attending: EMERGENCY MEDICINE
Payer: MEDICARE

## 2018-06-30 ENCOUNTER — APPOINTMENT (OUTPATIENT)
Dept: CT IMAGING | Age: 38
DRG: 389 | End: 2018-06-30
Attending: EMERGENCY MEDICINE
Payer: MEDICARE

## 2018-06-30 DIAGNOSIS — N17.9 AKI (ACUTE KIDNEY INJURY) (HCC): ICD-10-CM

## 2018-06-30 DIAGNOSIS — F32.A DEPRESSION, UNSPECIFIED DEPRESSION TYPE: ICD-10-CM

## 2018-06-30 DIAGNOSIS — K52.9 CHRONIC DIARRHEA: ICD-10-CM

## 2018-06-30 DIAGNOSIS — K56.609 SMALL BOWEL OBSTRUCTION (HCC): Primary | ICD-10-CM

## 2018-06-30 DIAGNOSIS — R63.4 WEIGHT LOSS: ICD-10-CM

## 2018-06-30 DIAGNOSIS — Z71.89 COUNSELING REGARDING GOALS OF CARE: ICD-10-CM

## 2018-06-30 LAB
ALBUMIN SERPL-MCNC: 4.3 G/DL (ref 3.5–5)
ALBUMIN/GLOB SERPL: 1 {RATIO} (ref 1.1–2.2)
ALP SERPL-CCNC: 57 U/L (ref 45–117)
ALT SERPL-CCNC: 28 U/L (ref 12–78)
ANION GAP SERPL CALC-SCNC: 7 MMOL/L (ref 5–15)
APPEARANCE UR: ABNORMAL
AST SERPL-CCNC: 23 U/L (ref 15–37)
BACTERIA URNS QL MICRO: ABNORMAL /HPF
BASOPHILS # BLD: 0 K/UL (ref 0–0.1)
BASOPHILS NFR BLD: 0 % (ref 0–1)
BILIRUB SERPL-MCNC: 1.1 MG/DL (ref 0.2–1)
BILIRUB UR QL CFM: NEGATIVE
BUN SERPL-MCNC: 28 MG/DL (ref 6–20)
BUN/CREAT SERPL: 17 (ref 12–20)
CALCIUM SERPL-MCNC: 10.4 MG/DL (ref 8.5–10.1)
CHLORIDE SERPL-SCNC: 95 MMOL/L (ref 97–108)
CO2 SERPL-SCNC: 34 MMOL/L (ref 21–32)
COLOR UR: ABNORMAL
CREAT SERPL-MCNC: 1.69 MG/DL (ref 0.7–1.3)
DIFFERENTIAL METHOD BLD: ABNORMAL
EOSINOPHIL # BLD: 0 K/UL (ref 0–0.4)
EOSINOPHIL NFR BLD: 0 % (ref 0–7)
EPITH CASTS URNS QL MICRO: ABNORMAL /LPF
ERYTHROCYTE [DISTWIDTH] IN BLOOD BY AUTOMATED COUNT: 12.4 % (ref 11.5–14.5)
GLOBULIN SER CALC-MCNC: 4.3 G/DL (ref 2–4)
GLUCOSE SERPL-MCNC: 101 MG/DL (ref 65–100)
GLUCOSE UR STRIP.AUTO-MCNC: NEGATIVE MG/DL
HCT VFR BLD AUTO: 52.2 % (ref 36.6–50.3)
HGB BLD-MCNC: 18.1 G/DL (ref 12.1–17)
HGB UR QL STRIP: ABNORMAL
HYALINE CASTS URNS QL MICRO: ABNORMAL /LPF (ref 0–5)
IMM GRANULOCYTES # BLD: 0 K/UL (ref 0–0.04)
IMM GRANULOCYTES NFR BLD AUTO: 0 % (ref 0–0.5)
KETONES UR QL STRIP.AUTO: ABNORMAL MG/DL
LEUKOCYTE ESTERASE UR QL STRIP.AUTO: ABNORMAL
LIPASE SERPL-CCNC: 67 U/L (ref 73–393)
LYMPHOCYTES # BLD: 1.8 K/UL (ref 0.8–3.5)
LYMPHOCYTES NFR BLD: 23 % (ref 12–49)
MCH RBC QN AUTO: 31.7 PG (ref 26–34)
MCHC RBC AUTO-ENTMCNC: 34.7 G/DL (ref 30–36.5)
MCV RBC AUTO: 91.4 FL (ref 80–99)
MONOCYTES # BLD: 0.7 K/UL (ref 0–1)
MONOCYTES NFR BLD: 10 % (ref 5–13)
NEUTS SEG # BLD: 5.1 K/UL (ref 1.8–8)
NEUTS SEG NFR BLD: 67 % (ref 32–75)
NITRITE UR QL STRIP.AUTO: NEGATIVE
NRBC # BLD: 0 K/UL (ref 0–0.01)
NRBC BLD-RTO: 0 PER 100 WBC
PH UR STRIP: 5 [PH] (ref 5–8)
PLATELET # BLD AUTO: 152 K/UL (ref 150–400)
PMV BLD AUTO: 8.8 FL (ref 8.9–12.9)
POTASSIUM SERPL-SCNC: 3.7 MMOL/L (ref 3.5–5.1)
PROT SERPL-MCNC: 8.6 G/DL (ref 6.4–8.2)
PROT UR STRIP-MCNC: 300 MG/DL
RBC # BLD AUTO: 5.71 M/UL (ref 4.1–5.7)
RBC #/AREA URNS HPF: ABNORMAL /HPF (ref 0–5)
SODIUM SERPL-SCNC: 136 MMOL/L (ref 136–145)
SP GR UR REFRACTOMETRY: 1.03 (ref 1–1.03)
UA: UC IF INDICATED,UAUC: ABNORMAL
UROBILINOGEN UR QL STRIP.AUTO: 0.2 EU/DL (ref 0.2–1)
WBC # BLD AUTO: 7.7 K/UL (ref 4.1–11.1)
WBC URNS QL MICRO: ABNORMAL /HPF (ref 0–4)

## 2018-06-30 PROCEDURE — 81001 URINALYSIS AUTO W/SCOPE: CPT | Performed by: EMERGENCY MEDICINE

## 2018-06-30 PROCEDURE — 96361 HYDRATE IV INFUSION ADD-ON: CPT

## 2018-06-30 PROCEDURE — 0D9670Z DRAINAGE OF STOMACH WITH DRAINAGE DEVICE, VIA NATURAL OR ARTIFICIAL OPENING: ICD-10-PCS | Performed by: EMERGENCY MEDICINE

## 2018-06-30 PROCEDURE — 83690 ASSAY OF LIPASE: CPT | Performed by: PHYSICIAN ASSISTANT

## 2018-06-30 PROCEDURE — 74011250636 HC RX REV CODE- 250/636: Performed by: PHYSICIAN ASSISTANT

## 2018-06-30 PROCEDURE — 96374 THER/PROPH/DIAG INJ IV PUSH: CPT

## 2018-06-30 PROCEDURE — 65270000029 HC RM PRIVATE

## 2018-06-30 PROCEDURE — 74011250636 HC RX REV CODE- 250/636: Performed by: EMERGENCY MEDICINE

## 2018-06-30 PROCEDURE — 77030008768 HC TU NG VYGC -A

## 2018-06-30 PROCEDURE — 74011636320 HC RX REV CODE- 636/320: Performed by: EMERGENCY MEDICINE

## 2018-06-30 PROCEDURE — 85025 COMPLETE CBC W/AUTO DIFF WBC: CPT | Performed by: PHYSICIAN ASSISTANT

## 2018-06-30 PROCEDURE — 36415 COLL VENOUS BLD VENIPUNCTURE: CPT | Performed by: PHYSICIAN ASSISTANT

## 2018-06-30 PROCEDURE — 99285 EMERGENCY DEPT VISIT HI MDM: CPT

## 2018-06-30 PROCEDURE — 74018 RADEX ABDOMEN 1 VIEW: CPT

## 2018-06-30 PROCEDURE — 74177 CT ABD & PELVIS W/CONTRAST: CPT

## 2018-06-30 PROCEDURE — 93005 ELECTROCARDIOGRAM TRACING: CPT

## 2018-06-30 PROCEDURE — 87086 URINE CULTURE/COLONY COUNT: CPT | Performed by: EMERGENCY MEDICINE

## 2018-06-30 PROCEDURE — 80053 COMPREHEN METABOLIC PANEL: CPT | Performed by: PHYSICIAN ASSISTANT

## 2018-06-30 RX ORDER — SODIUM CHLORIDE 0.9 % (FLUSH) 0.9 %
5-10 SYRINGE (ML) INJECTION AS NEEDED
Status: DISCONTINUED | OUTPATIENT
Start: 2018-06-30 | End: 2018-07-04 | Stop reason: HOSPADM

## 2018-06-30 RX ORDER — SODIUM CHLORIDE 0.9 % (FLUSH) 0.9 %
10 SYRINGE (ML) INJECTION
Status: COMPLETED | OUTPATIENT
Start: 2018-06-30 | End: 2018-06-30

## 2018-06-30 RX ORDER — ENOXAPARIN SODIUM 100 MG/ML
25 INJECTION SUBCUTANEOUS EVERY 24 HOURS
Status: DISCONTINUED | OUTPATIENT
Start: 2018-06-30 | End: 2018-07-04 | Stop reason: HOSPADM

## 2018-06-30 RX ORDER — ACETAMINOPHEN 10 MG/ML
1000 INJECTION, SOLUTION INTRAVENOUS EVERY 8 HOURS
Status: DISPENSED | OUTPATIENT
Start: 2018-06-30 | End: 2018-07-02

## 2018-06-30 RX ORDER — SODIUM CHLORIDE 0.9 % (FLUSH) 0.9 %
5-10 SYRINGE (ML) INJECTION EVERY 8 HOURS
Status: DISCONTINUED | OUTPATIENT
Start: 2018-06-30 | End: 2018-07-04 | Stop reason: HOSPADM

## 2018-06-30 RX ORDER — SODIUM CHLORIDE 9 MG/ML
50 INJECTION, SOLUTION INTRAVENOUS
Status: COMPLETED | OUTPATIENT
Start: 2018-06-30 | End: 2018-06-30

## 2018-06-30 RX ORDER — ONDANSETRON 2 MG/ML
4 INJECTION INTRAMUSCULAR; INTRAVENOUS
Status: DISPENSED | OUTPATIENT
Start: 2018-06-30 | End: 2018-07-01

## 2018-06-30 RX ORDER — LORAZEPAM 2 MG/ML
0.5 INJECTION INTRAMUSCULAR
Status: ACTIVE | OUTPATIENT
Start: 2018-06-30 | End: 2018-07-01

## 2018-06-30 RX ORDER — DEXTROSE, SODIUM CHLORIDE, AND POTASSIUM CHLORIDE 5; .45; .15 G/100ML; G/100ML; G/100ML
125 INJECTION INTRAVENOUS CONTINUOUS
Status: DISCONTINUED | OUTPATIENT
Start: 2018-06-30 | End: 2018-07-02

## 2018-06-30 RX ADMIN — SODIUM CHLORIDE 50 ML/HR: 900 INJECTION, SOLUTION INTRAVENOUS at 19:51

## 2018-06-30 RX ADMIN — SODIUM CHLORIDE 1000 ML: 900 INJECTION, SOLUTION INTRAVENOUS at 17:02

## 2018-06-30 RX ADMIN — IOPAMIDOL 100 ML: 755 INJECTION, SOLUTION INTRAVENOUS at 19:51

## 2018-06-30 RX ADMIN — Medication 10 ML: at 19:51

## 2018-06-30 RX ADMIN — DIATRIZOATE MEGLUMINE AND DIATRIZOATE SODIUM 30 ML: 660; 100 LIQUID ORAL; RECTAL at 17:45

## 2018-06-30 RX ADMIN — SODIUM CHLORIDE 1000 ML: 900 INJECTION, SOLUTION INTRAVENOUS at 17:46

## 2018-06-30 NOTE — IP AVS SNAPSHOT
[address] [facility] 
[phone] Patient: Teodora Calderon MRN: [id number] About your hospitalization You were admitted on:  June 30, 2018 You last received care in the:  [facility] 2 GENERAL SURGERY You were discharged on:  July 4, 2018 Why you were hospitalized Your primary diagnosis was:  Not on File Your diagnoses also included:  Partial Small Bowel Obstruction (Hcc), Functional Diarrhea, Counseling Regarding Goals Of Care Follow-up Information Follow up With Details Comments Contact Info Izzy Zurita DO On 7/13/2018 @ 9:30am [address] [facility] [address] 
[phone] Randell Quevedo MD On 7/6/2018 @ time already scheduled. [address] [facility] [address] [facility] 
[phone] [facility]  This is your home health care provider. If you dont hear from them within 48hrs of discharge, please give them a call [address] 
1st Floor [facility] [address] 
[phone] Your Scheduled Appointments Friday July 13, 2018  9:30 AM EDT ROUTINE CARE with Izzy Zurita DO [facility] Internal Medicine ([address]) [address] [place] [address] [address]  
[phone] Wednesday August 22, 2018 10:30 AM EDT Follow Up with Sophia Bryan NP [address] [facility] [address] [facility] [address] [id number] [phone] Discharge Orders None A check tim indicates which time of day the medication should be taken. My Medications CONTINUE taking these medications Instructions Each Dose to Equal  
 Morning Noon Evening Bedtime  
 amitriptyline 25 mg tablet Commonly known as:  ELAVIL Your last dose was: Your next dose is: Take 50 mg by mouth nightly.   
 50 mg  
    
 BENEFIBER CLEAR SF (DEXTRIN) PO Your last dose was: Your next dose is: Take  by mouth. brief disposable Misc Commonly known as:  adult Your last dose was: Your next dose is:    
   
   
 by Does Not Apply route. Depends, size medium, 1 nightly  
     
   
   
   
  
 busPIRone 5 mg tablet Commonly known as:  BUSPAR Your last dose was: Your next dose is: Take 5 mg by mouth two (2) times a day. Changed to one tab daily per Dr. Rosa Israel. 5 mg CALCITRATE-VITAMIN D tablet Generic drug:  calcium citrate-vitamin D3 Your last dose was: Your next dose is: TAKE 1 TABLET BY MOUTH TWICE A DAY  
     
   
   
   
  
 colestipol 1 gram tablet Commonly known as:  COLESTID Your last dose was: Your next dose is: Take 1 Tab by mouth two (2) times a day. 1 g  
    
   
   
   
  
 DEPLIN (ALGAL OIL) 7.5-90.314 mg Cap Generic drug:  levomefolate-algal oil Your last dose was: Your next dose is: Take  by mouth. DEPLIN PO Your last dose was: Your next dose is: Take 7.5 mg by mouth daily. 7.5 mg  
    
   
   
   
  
 fluticasone 50 mcg/actuation nasal spray Commonly known as:  Caffie Euler Your last dose was: Your next dose is:    
   
   
 BLOW NOSE: 2 SPRAYS IN EACH NOSTRIL DAILY FOR ALLERGY.  
     
   
   
   
  
 gabapentin 100 mg capsule Commonly known as:  NEURONTIN Your last dose was: Your next dose is: Take 1 Cap by mouth three (3) times daily. 100 mg  
    
   
   
   
  
 melatonin 3 mg tablet Your last dose was: Your next dose is: Take 1 Tab by mouth nightly. 3 mg MUCINEX 600 mg ER tablet Generic drug:  guaiFENesin ER Your last dose was: Your next dose is: Take 600 mg by mouth daily as needed for Congestion. 600 mg  
    
   
   
   
  
 OTHER Your last dose was: Your next dose is: L-Theanine 100 mg 1 daily OTHER Your last dose was: Your next dose is:    
   
   
 Use to brush teeth as needed to prevent cavities,gingivitis, and plaque build up, OTHER Your last dose was: Your next dose is:    
   
   
 Protein powder: please add 2 scoops of protein powder to 8-10 oz of soy milk twice a day ( with I of those times being lunch) and serve to patient. Fax tp [phone] when finished PROBIOTIC COMPLEX 25 billion cell -100 mg Cap Generic drug:  L.acid-B.bifidum-B.animal-FOS Your last dose was: Your next dose is: Take 25 mg by mouth daily. 25 mg  
    
   
   
   
  
 TAB-A-MARGIE tablet Generic drug:  multivitamin Your last dose was: Your next dose is: TAKE 1 TABLET BY MOUTH DAILY theanine 50 mg Tbdi Your last dose was: Your next dose is: Take  by mouth. TYLENOL 325 mg tablet Generic drug:  acetaminophen Your last dose was: Your next dose is: Take  by mouth every four (4) hours as needed for Pain. VITAMIN D2 50,000 unit capsule Generic drug:  ergocalciferol Your last dose was: Your next dose is: TAKE 1 CAPSULE BY MOUTH EVERY 3 MONTHS ON THE FIRST (Jessica Brandt) ZOFRAN 4 mg tablet Generic drug:  ondansetron hcl Your last dose was: Your next dose is: Take 4 mg by mouth every eight (8) hours as needed for Nausea. 4 mg STOP taking these medications IMODIUM A-D 2 mg tablet Generic drug:  loperamide Discharge Instructions None ACO Transitions of Care [facility] offers a voluntary care coordination program to provide high quality service and care to [facility] fee-for-service beneficiaries. [place] was designed to help you enhance your health and well-being through the following services: ? Transitions of Care  support for individuals who are transitioning from one care setting to another (example: Hospital to home). ? Chronic and Complex Care Coordination  support for individuals and caregivers of those with serious or chronic illnesses or with more than one chronic (ongoing) condition and those who take a number of different medications. If you meet specific medical criteria, a [address] [place] [address] Rd may call you directly to coordinate your care with your primary care physician and your other care providers. For questions about the [facility] programs, please, contact your physicians office. For general questions or additional information about Accountable Care Organizations: 
Please visit www.medicare.gov/acos. html or call 1-800-MEDICARE (4-745.124.3089) TTY users should call 9-297.222.2249. [employer] Announcement We are excited to announce that we are making your provider's discharge notes available to you in [employer]. You will see these notes when they are completed and signed by the physician that discharged you from your recent hospital stay. If you have any questions or concerns about any information you see in Velasc[employer], please call the Health Information Department where you were seen or reach out to your Primary Care Provider for more information about your plan of care. Introducing [facility] & HEALTH SERVICES! Dear Zeenat Forbes: Thank you for requesting a [employer] account.   Our records indicate that you already have an active [employer] account. You can access your account anytime at [email or web address] Did you know that you can access your hospital and ER discharge instructions at any time in [employer]? You can also review all of your test results from your hospital stay or ER visit. Additional Information If you have questions, please visit the Frequently Asked Questions section of the [employer] website at [email or web address] Remember, [employer] is NOT to be used for urgent needs. For medical emergencies, dial 911. Now available from your iPhone and Android! Introducing Aamir Gayle As a [facility] patient, I wanted to make you aware of our electronic visit tool called Aamir Gayle. [employer]MediaPhy allows you to connect within minutes with a medical provider 24 hours a day, seven days a week via a mobile device or tablet or logging into a secure website from your computer. You can access Aamir Gayle from anywhere in the [place]. A virtual visit might be right for you when you have a simple condition and feel like you just dont want to get out of bed, or cant get away from work for an appointment, when your regular [facility] provider is not available (evenings, weekends or holidays), or when youre out of town and need minor care. Electronic visits cost only $49 and if the [employer] provider determines a prescription is needed to treat your condition, one can be electronically transmitted to a nearby pharmacy*. Please take a moment to enroll today if you have not already done so. The enrollment process is free and takes just a few minutes. To enroll, please download the [employer]/MediaPhy nan to your tablet or phone, or visit [email or web address] to enroll on your computer.    
And, as an [address] [place] [address] patient with a [employer] account, the results of your visits will be scanned into your electronic medical record and your primary care provider will be able to view the scanned results. We urge you to continue to see your regular [facility] provider for your ongoing medical care. And while your primary care provider may not be the one available when you seek a Aamir Benedictfin virtual visit, the peace of mind you get from getting a real diagnosis real time can be priceless. For more information on [employer], view our Frequently Asked Questions (FAQs) at [email or web address]. Sincerely, 
 
Jeovanny Irby MD 
Chief Medical Officer Damien Urbano *:  certain medications cannot be prescribed via eEye Unresulted Labs-Please follow up with your PCP about these lab tests Order Current Status CALPROTECTIN, FECAL In process OVA & PARASITES, STOOL In process PANCREATIC ELASTASE, FECAL In process VASOACTIVE INTESTINAL PEPTIDE In process Providers Seen During Your Hospitalization Provider Specialty Primary office phone Miriam Baptiste MD Emergency Medicine [phone] Nette Byrd MD Emergency Medicine [phone] Emily Hussein MD Colon and Rectal Surgery [phone] Your Primary Care Physician (PCP) Primary Care Physician Office Phone Office Fax Devan De La Garzamlei [phone] You are allergic to the following No active allergies Recent Documentation Height Weight BMI Smoking Status 1.753 m 47.1 kg 15.33 kg/m2 Never Smoker Emergency Contacts Name Discharge Info Relation Home Work Mobile [facility] DISCHARGE CAREGIVER [3] Mother [14] [phone] [address] CAREGIVER [3] Sister [23] [phone] Makem,[place],Smita UNABLE TO CHOOSE [5] Other Relative [6] [phone] Patient Belongings The following personal items are in your possession at time of discharge: 
  Dental Appliances: None  Visual Aid: Glasses      Home Medications: Kept at bedside (Mother has them at bedisde)   Jewelry: None  Clothing: None    Other Valuables: None  Personal Items Sent to Safe: none Discharge Instructions Attachments/References DIARRHEA (ENGLISH) Patient Handouts Diarrhea: Care Instructions Your Care Instructions Diarrhea is loose, watery stools (bowel movements). The exact cause is often hard to find. Sometimes diarrhea is your body's way of getting rid of what caused an upset stomach. Viruses, food poisoning, and many medicines can cause diarrhea. Some people get diarrhea in response to emotional stress, anxiety, or certain foods. Almost everyone has diarrhea now and then. It usually isn't serious, and your stools will return to normal soon. The important thing to do is replace the fluids you have lost, so you can prevent dehydration. The doctor has checked you carefully, but problems can develop later. If you notice any problems or new symptoms, get medical treatment right away. Follow-up care is a key part of your treatment and safety. Be sure to make and go to all appointments, and call your doctor if you are having problems. It's also a good idea to know your test results and keep a list of the medicines you take. How can you care for yourself at home? · Watch for signs of dehydration, which means your body has lost too much water. Dehydration is a serious condition and should be treated right away. Signs of dehydration are: 
¨ Increasing thirst and dry eyes and mouth. ¨ Feeling faint or lightheaded. ¨ Darker urine, and a smaller amount of urine than normal. 
· To prevent dehydration, drink plenty of fluids, enough so that your urine is light yellow or clear like water. Choose water and other caffeine-free clear liquids until you feel better.  If you have kidney, heart, or liver disease and have to limit fluids, talk with your doctor before you increase the amount of fluids you drink. · Begin eating small amounts of mild foods the next day, if you feel like it. ¨ Try yogurt that has live cultures of Lactobacillus. (Check the label.) ¨ Avoid spicy foods, fruits, alcohol, and caffeine until 48 hours after all symptoms are gone. ¨ Avoid chewing gum that contains sorbitol. ¨ Avoid dairy products (except for yogurt with Lactobacillus) while you have diarrhea and for 3 days after symptoms are gone. · The doctor may recommend that you take over-the-counter medicine, such as loperamide (Imodium), if you still have diarrhea after 6 hours. Read and follow all instructions on the label. Do not use this medicine if you have bloody diarrhea, a high fever, or other signs of serious illness. Call your doctor if you think you are having a problem with your medicine. When should you call for help? Call 911 anytime you think you may need emergency care. For example, call if: 
? · You passed out (lost consciousness). ? · Your stools are maroon or very bloody. ?Call your doctor now or seek immediate medical care if: 
? · You are dizzy or lightheaded, or you feel like you may faint. ? · Your stools are black and look like tar, or they have streaks of blood. ? · You have new or worse belly pain. ? · You have symptoms of dehydration, such as: ¨ Dry eyes and a dry mouth. ¨ Passing only a little dark urine. ¨ Feeling thirstier than usual.  
? · You have a new or higher fever. ? Watch closely for changes in your health, and be sure to contact your doctor if: 
? · Your diarrhea is getting worse. ? · You see pus in the diarrhea. ? · You are not getting better after 2 days (48 hours). Where can you learn more? Go to http://rachael-william.info/. Enter B707 in the search box to learn more about \"Diarrhea: Care Instructions. \" Current as of: March 20, 2017 Content Version: 11.4 © 3584-9876 [employer]wise[employer]. Care instructions adapted under license by [employer] (which disclaims liability or warranty for this information). If you have questions about a medical condition or this instruction, always ask your healthcare professional. [address] any warranty or liability for your use of this information. Please provide this summary of care documentation to your next provider. Signatures-by signing, you are acknowledging that this After Visit Summary has been reviewed with you and you have received a copy. Patient Signature:  ____________________________________________________________ Date:  ____________________________________________________________  
  
Mary Artist Provider Signature:  ____________________________________________________________ Date:  ____________________________________________________________

## 2018-06-30 NOTE — ED NOTES
Bedside and Verbal shift change report given to [facility] (oncoming nurse) by Abby Robb (offgoing nurse). Report included the following information SBAR, ED Summary and Recent Results.

## 2018-06-30 NOTE — ED NOTES
Assumed care of pt from [facility] care. Pt's mother reports pt has had diarrhea for about a month and reports pt was complaining of abdominal pain. Per pt's mother, pt was seen at PCP last week and started on new medication. Pt lives at a group home and pt's mother received notification that pt was found in bed and had vomited dark fowl smelling emisis. Pt resting on stretcher in POC.

## 2018-06-30 NOTE — ED PROVIDER NOTES
3:45 PM  Erin Meier PA-C  has evaluated the patient as the [facility] provider. They have reviewed the vital signs and the triage nurse assessment. They have talked with the patient and any available family and advised that the appropriate studies have been ordered to initiate the work up based on the clinical presentation during the assessment. The pt has been advised that they will be accommodated in the Main ED as soon as possible. Pt is here to be evaluated for vomiting and diarrhea. He states that his stomach is \"uncomfortable. \" He lives at [facility], which is an assisted living facility for people with cognitive disabilities. Pt last threw up this morning. He has only had liquids since then. EMERGENCY DEPARTMENT HISTORY AND PHYSICAL EXAM      Date: 6/30/2018  Patient Name: Belva Ormond    History of Presenting Illness     Chief Complaint   Patient presents with    Vomiting     pt reports to ed complaining of vomiting and abdominal pain onset last night    Abdominal Pain       History Provided By: Patient and Caregiver    HPI: Belva Ormond, 42 y.o. 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 Per caregiver, patient has been having 2 episodes of diarrhea per day for the past month. They have attempted giving him Imodium but his diarrhea has persisted. He saw his PCP last week and was started on an HLD medication which has constipation as a side effect. He has not had any recent ill contact, travel out of the country, or antibiotic use. Patient denies any fever or any other associated complaints. Chief Complaint: Abdominal pain, Vomiting   Duration: 24 Hours   Timing: Progressive   Modifying Factors:  No medications attempted for relief   Associated Symptoms: denies any other associated signs or symptoms    PCP: Clydie Sever,     There are no other complaints, changes, or physical findings at this time. Current Facility-Administered Medications   Medication Dose Route Frequency Provider Last Rate Last Dose    ondansetron (ZOFRAN) injection 4 mg  4 mg IntraVENous GHAZALA Pascal8 [address]         Current Outpatient Prescriptions   Medication Sig Dispense Refill    VITAMIN D2 50,000 unit capsule TAKE 1 CAPSULE BY MOUTH EVERY 3 MONTHS ON THE FIRST (JAN, APRIL,JULY,OCTOBER) 1 Cap 0    ondansetron hcl (ZOFRAN) 4 mg tablet Take 4 mg by mouth every eight (8) hours as needed for Nausea.  colestipol (COLESTID) 1 gram tablet Take 1 Tab by mouth two (2) times a day. 60 Tab 2    CALCITRATE-VITAMIN D tablet TAKE 1 TABLET BY MOUTH TWICE A DAY 62 Tab PRN    gabapentin (NEURONTIN) 100 mg capsule Take 1 Cap by mouth three (3) times daily. 90 Cap 3    OTHER Protein powder: please add 2 scoops of protein powder to 8-10 oz of soy milk twice a day ( with I of those times being lunch) and serve to patient. Fax tp [phone] when finished 1 Each 0    OTHER Use to brush teeth as needed to prevent cavities,gingivitis, and plaque build up, 1 Tube PRN    TAB-A-MARGIE tablet TAKE 1 TABLET BY MOUTH DAILY 31 Tab PRN    amitriptyline (ELAVIL) 25 mg tablet Take 50 mg by mouth nightly.  theanine 50 mg TbDi Take  by mouth.  busPIRone (BUSPAR) 5 mg tablet Take 5 mg by mouth two (2) times a day. Changed to one tab daily per Dr. Emiliano Caro.  OTHER L-Theanine 100 mg 1 daily 30 Tab 11    fluticasone (FLONASE) 50 mcg/actuation nasal spray BLOW NOSE: 2 SPRAYS IN EACH NOSTRIL DAILY FOR ALLERGY. 16 g 0    levomefolate-algal oil (DEPLIN, ALGAL OIL,) 7.5-90.314 mg cap Take  by mouth.  LEVOMEFOLATE CALCIUM (DEPLIN PO) Take 7.5 mg by mouth daily.       L.acid-B.bifidum-B.animal-FOS (PROBIOTIC COMPLEX) 25 billion cell -100 mg cap Take  by mouth.  melatonin 3 mg tablet Take 1 Tab by mouth nightly. 30 Tab 11    WHEAT DEXTRIN (BENEFIBER CLEAR SF, DEXTRIN, PO) Take  by mouth.  acetaminophen (TYLENOL) 325 mg tablet Take  by mouth every four (4) hours as needed for Pain.  loperamide (IMODIUM A-D) 2 mg tablet Take 2 mg by mouth four (4) times daily as needed for Diarrhea.  guaiFENesin ER (MUCINEX) 600 mg ER tablet Take 600 mg by mouth daily as needed for Congestion.  brief disposable (ADULT) misc by Does Not Apply route. Depends, size medium, 1 nightly 1 Package 11     Past History     Past Medical 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, without mention of intractable epilepsy 5/14/2011    Mental retardation     Osteoporosis     Other ill-defined conditions(799.89)     intestinal problems    Psychiatric disorder     anxiety    Seizure disorder [facility])        Past Surgical History:  Past Surgical History:   Procedure Laterality Date    HX APPENDECTOMY  1/28/13    APPENDECTOMY    HX GI      colon resection    HX GI  1/28/13    LAPAROTOMY EXPLORATORY - OPEN LYSIS OF ADHESIONS     HX HERNIA REPAIR      HX OTHER SURGICAL      imperforated anus birth defect    UPPER GI ENDOSCOPY,BIOPSY  3/17/2017            Family History:  Family History   Problem Relation Age of Onset    Heart Disease Father        Social History:  Social History   Substance Use Topics    Smoking status: Never Smoker    Smokeless tobacco: Never Used    Alcohol use No       Allergies:  No Known Allergies    Review of Systems   Review of Systems   Constitutional: Negative for fever. HENT: Negative for congestion. Eyes: Negative. Respiratory: Negative for shortness of breath.     Cardiovascular: Negative for chest pain. Gastrointestinal: Positive for abdominal pain, diarrhea, nausea and vomiting. Endocrine: Negative for heat intolerance. Genitourinary: Negative. Musculoskeletal: Negative for back pain. Skin: Negative for rash. Allergic/Immunologic: Negative for immunocompromised state. Neurological: Negative for dizziness. Hematological: Does not bruise/bleed easily. Psychiatric/Behavioral: Negative. All other systems reviewed and are negative. Physical Exam   Physical Exam   Constitutional: He is oriented to person, place, and time. He appears well-developed. No distress. Cacechtic   HENT:   Head: Normocephalic and atraumatic. Eyes: EOM are normal. Pupils are equal, round, and reactive to light. Neck: Normal range of motion. Neck supple. Cardiovascular: Normal rate, regular rhythm and normal heart sounds. Pulmonary/Chest: Effort normal and breath sounds normal. He has no wheezes. Abdominal: Bowel sounds are normal. He exhibits no distension. There is tenderness. There is no rebound and no guarding. Abdomen firm   Musculoskeletal: Normal range of motion. He exhibits no edema or tenderness. Neurological: He is alert and oriented to person, place, and time. No cranial nerve deficit. Skin: Skin is warm and dry. Psychiatric: He has a normal mood and affect. His behavior is normal.   Nursing note and vitals reviewed.     Diagnostic Study Results     Labs -     Recent Results (from the past 12 hour(s))   URINALYSIS W/ REFLEX CULTURE    Collection Time: 06/30/18  3:37 PM   Result Value Ref Range    Color DARK YELLOW      Appearance TURBID (A) CLEAR      Specific gravity 1.028 1.003 - 1.030      pH (UA) 5.0 5.0 - 8.0      Protein 300 (A) NEG mg/dL    Glucose NEGATIVE  NEG mg/dL    Ketone TRACE (A) NEG mg/dL    Blood SMALL (A) NEG      Urobilinogen 0.2 0.2 - 1.0 EU/dL    Nitrites NEGATIVE  NEG      Leukocyte Esterase TRACE (A) NEG      WBC 5-10 0 - 4 /hpf    RBC 0-5 0 - 5 /hpf Epithelial cells FEW FEW /lpf    Bacteria 1+ (A) NEG /hpf    UA:UC IF INDICATED URINE CULTURE ORDERED (A) CNI      Hyaline cast 2-5 0 - 5 /lpf   BILIRUBIN, CONFIRM    Collection Time: 06/30/18  3:37 PM   Result Value Ref Range    Bilirubin UA, confirm NEGATIVE  NEG     CBC WITH AUTOMATED DIFF    Collection Time: 06/30/18  3:48 PM   Result Value Ref Range    WBC 7.7 4.1 - 11.1 K/uL    RBC 5.71 (H) 4.10 - 5.70 M/uL    HGB 18.1 (H) 12.1 - 17.0 g/dL    HCT 52.2 (H) 36.6 - 50.3 %    MCV 91.4 80.0 - 99.0 FL    MCH 31.7 26.0 - 34.0 PG    MCHC 34.7 30.0 - 36.5 g/dL    RDW 12.4 11.5 - 14.5 %    PLATELET 869 742 - 828 K/uL    MPV 8.8 (L) 8.9 - 12.9 FL    NRBC 0.0 0  WBC    ABSOLUTE NRBC 0.00 0.00 - 0.01 K/uL    NEUTROPHILS 67 32 - 75 %    LYMPHOCYTES 23 12 - 49 %    MONOCYTES 10 5 - 13 %    EOSINOPHILS 0 0 - 7 %    BASOPHILS 0 0 - 1 %    IMMATURE GRANULOCYTES 0 0.0 - 0.5 %    ABS. NEUTROPHILS 5.1 1.8 - 8.0 K/UL    ABS. LYMPHOCYTES 1.8 0.8 - 3.5 K/UL    ABS. MONOCYTES 0.7 0.0 - 1.0 K/UL    ABS. EOSINOPHILS 0.0 0.0 - 0.4 K/UL    ABS. BASOPHILS 0.0 0.0 - 0.1 K/UL    ABS. IMM. GRANS. 0.0 0.00 - 0.04 K/UL    DF AUTOMATED     METABOLIC PANEL, COMPREHENSIVE    Collection Time: 06/30/18  3:48 PM   Result Value Ref Range    Sodium 136 136 - 145 mmol/L    Potassium 3.7 3.5 - 5.1 mmol/L    Chloride 95 (L) 97 - 108 mmol/L    CO2 34 (H) 21 - 32 mmol/L    Anion gap 7 5 - 15 mmol/L    Glucose 101 (H) 65 - 100 mg/dL    BUN 28 (H) 6 - 20 MG/DL    Creatinine 1.69 (H) 0.70 - 1.30 MG/DL    BUN/Creatinine ratio 17 12 - 20      GFR est AA 55 (L) >60 ml/min/1.73m2    GFR est non-AA 45 (L) >60 ml/min/1.73m2    Calcium 10.4 (H) 8.5 - 10.1 MG/DL    Bilirubin, total 1.1 (H) 0.2 - 1.0 MG/DL    ALT (SGPT) 28 12 - 78 U/L    AST (SGOT) 23 15 - 37 U/L    Alk.  phosphatase 57 45 - 117 U/L    Protein, total 8.6 (H) 6.4 - 8.2 g/dL    Albumin 4.3 3.5 - 5.0 g/dL    Globulin 4.3 (H) 2.0 - 4.0 g/dL    A-G Ratio 1.0 (L) 1.1 - 2.2     LIPASE    Collection Time: 06/30/18  3:48 PM   Result Value Ref Range    Lipase 67 (L) 73 - 393 U/L   EKG, 12 LEAD, INITIAL    Collection Time: 06/30/18  3:59 PM   Result Value Ref Range    Ventricular Rate 128 BPM    Atrial Rate 128 BPM    P-R Interval 128 ms    QRS Duration 70 ms    Q-T Interval 286 ms    QTC Calculation (Bezet) 417 ms    Calculated P Axis 81 degrees    Calculated R Axis 78 degrees    Calculated T Axis 64 degrees    Diagnosis       Sinus tachycardia  Right atrial enlargement  When compared with ECG of 21-NOV-2012 15:47,  No significant change was found         Radiologic Studies -   CT ABD PELV W CONT   Final Result        CT Results  (Last 48 hours)               06/30/18 1951  CT ABD PELV W CONT Final result    Impression:  IMPRESSION:    1. Small bowel obstruction. No evidence of perforation. 2. Trace perihepatic free intraperitoneal fluid. 3. Indeterminant subcentimeter renal hypodensities, difficult to further   characterize due to small size. Renal mass CT or MR may be helpful for further   characterization. Narrative:  INDICATION: Abdominal pain, diarrhea. CT of the abdomen and pelvis is performed with 5 mm collimation. Study is   performed with PO and 100 cc of nonionic Isovue 370. Sagittal and coronal   reformatted images were also performed. CT dose reduction was achieved with the use of the standardized protocol   tailored for this examination and automatic exposure control for dose   modulation. Direct comparison is made to prior CT dated October 2016. Findings:       Lung bases: The visualized lung bases are clear. Liver: The liver is normal.       Adrenals: Adrenal glands are normal.       Pancreas: The pancreas is normal.       Gallbladder: The gallbladder is normal.       Kidneys: There are several indeterminate subcentimeter renal hypodensities;   several of these have increased slightly in size. Spleen: The spleen is normal.       Lymph nodes.  There is no yvrose hepatitis, mesenteric, retroperitoneal or pelvic   lymphadenopathy. Bowel: There are multiple markedly dilated loops of small bowel. The colon is   nondilated. No definite pneumatosis is visualized. There is no mesenteric vein   gas. There is no portal vein gas. Urinary bladder: Urinary bladder is partially filled and grossly normal.       Miscellaneous: There is trace perihepatic free intraperitoneal fluid. There is   no free intraperitoneal gas. There is no focal fluid collection to suggest   abscess. Medical Decision Making   I am the first provider for this patient. I reviewed the vital signs, available nursing notes, past medical history, past surgical history, family history and social history. Vital Signs-Reviewed the patient's vital signs. Patient Vitals for the past 12 hrs:   Temp Pulse Resp BP SpO2   06/30/18 1447 98.5 °F (36.9 °C) (!) 130 16 102/68 98 %       Pulse Oximetry Analysis - 98% on room air    Cardiac Monitor:   Rate: 130 bpm  Rhythm: Sinus Tachycardia     EKG interpretation: (Preliminary) 15:59  Rhythm: sinus tachycardia; and regular . Rate (approx.): 128 bpm; Axis: normal; CO interval: normal; QRS interval: normal ; ST/T wave: normal.  Written by Aleksandar Cunha ED Scribsusu, as dictated by Miki Luz MD.    Records Reviewed: Nursing Notes and Old Medical Records    Provider Notes (Medical Decision Making):   Differentials include SBO, ileus, gastroenteritis, gastritis, dehydration, electrolyte abnormality    ED Course:   Initial assessment performed. The patients presenting problems have been discussed, and they are in agreement with the care plan formulated and outlined with them. I have encouraged them to ask questions as they arise throughout their visit. Consult Note:  8:47 PM  Miki Luz MD spoke with Dr Rebecca Beckford,  Specialty: Colorectal Surgery  Discussed pt's hx, disposition, and available diagnostic and imaging results.  Reviewed care plans. Consultant agrees with plans as outlined. CRITICAL CARE NOTE :    8:51 PM  IMPENDING DETERIORATION -Respiratory and Metabolic  ASSOCIATED RISK FACTORS - Hypotension, Metabolic changes and Dehydration  MANAGEMENT- Bedside Assessment and Supervision of Care  INTERPRETATION -  Xrays, CT Scan, ECG and Blood Pressure  INTERVENTIONS - hemodynamic mngmt, gastric tube and Metobolic interventions  CASE REVIEW - Medical Sub-Specialist, Nursing and Family  TREATMENT RESPONSE -Improved  PERFORMED BY - Self    NOTES   :  I have spent 60 minutes of critical care time involved in lab review, consultations with specialist, family decision- making, bedside attention and documentation. During this entire length of time I was immediately available to the patient . Miki Luz MD    Disposition:  Admit Note:  8:51 PM  Pt is being admitted by Dr Rebecca Beckford. The results of their tests and reason(s) for their admission have been discussed with pt and/or available family. They convey agreement and understanding for the need to be admitted and for admission diagnosis. PLAN:  1. Admission    Diagnosis     Clinical Impression:   1. Small bowel obstruction ([address]    2. SHERIF (acute kidney injury) ([address]        Attestations: This note is prepared by Aleksandar Cunha, acting as Scribe for MD Miki Nevarez MD: The scribe's documentation has been prepared under my direction and personally reviewed by me in its entirety. I confirm that the note above accurately reflects all work, treatment, procedures, and medical decision making performed by me.

## 2018-06-30 NOTE — IP AVS SNAPSHOT
[address] [facility] 
[phone] Patient: Teodora Calderon MRN: [id number] A check tim indicates which time of day the medication should be taken. My Medications CONTINUE taking these medications Instructions Each Dose to Equal  
 Morning Noon Evening Bedtime  
 amitriptyline 25 mg tablet Commonly known as:  ELAVIL Your last dose was: Your next dose is: Take 50 mg by mouth nightly. 50 mg BENEFIBER CLEAR SF (DEXTRIN) PO Your last dose was: Your next dose is: Take  by mouth. brief disposable Misc Commonly known as:  adult Your last dose was: Your next dose is:    
   
   
 by Does Not Apply route. Depends, size medium, 1 nightly  
     
   
   
   
  
 busPIRone 5 mg tablet Commonly known as:  BUSPAR Your last dose was: Your next dose is: Take 5 mg by mouth two (2) times a day. Changed to one tab daily per Dr. Sharif Jones. 5 mg CALCITRATE-VITAMIN D tablet Generic drug:  calcium citrate-vitamin D3 Your last dose was: Your next dose is: TAKE 1 TABLET BY MOUTH TWICE A DAY  
     
   
   
   
  
 colestipol 1 gram tablet Commonly known as:  COLESTID Your last dose was: Your next dose is: Take 1 Tab by mouth two (2) times a day. 1 g  
    
   
   
   
  
 DEPLIN (ALGAL OIL) 7.5-90.314 mg Cap Generic drug:  levomefolate-algal oil Your last dose was: Your next dose is: Take  by mouth. DEPLIN PO Your last dose was: Your next dose is: Take 7.5 mg by mouth daily. 7.5 mg  
    
   
   
   
  
 fluticasone 50 mcg/actuation nasal spray Commonly known as:  Lella Solum Your last dose was: Your next dose is:    
   
   
 BLOW NOSE: 2 SPRAYS IN EACH NOSTRIL DAILY FOR ALLERGY.  
     
   
   
   
  
 gabapentin 100 mg capsule Commonly known as:  NEURONTIN Your last dose was: Your next dose is: Take 1 Cap by mouth three (3) times daily. 100 mg  
    
   
   
   
  
 melatonin 3 mg tablet Your last dose was: Your next dose is: Take 1 Tab by mouth nightly. 3 mg MUCINEX 600 mg ER tablet Generic drug:  guaiFENesin ER Your last dose was: Your next dose is: Take 600 mg by mouth daily as needed for Congestion. 600 mg  
    
   
   
   
  
 OTHER Your last dose was: Your next dose is: L-Theanine 100 mg 1 daily OTHER Your last dose was: Your next dose is:    
   
   
 Use to brush teeth as needed to prevent cavities,gingivitis, and plaque build up, OTHER Your last dose was: Your next dose is:    
   
   
 Protein powder: please add 2 scoops of protein powder to 8-10 oz of soy milk twice a day ( with I of those times being lunch) and serve to patient. Fax tp [phone] when finished PROBIOTIC COMPLEX 25 billion cell -100 mg Cap Generic drug:  L.acid-B.bifidum-B.animal-FOS Your last dose was: Your next dose is: Take 25 mg by mouth daily. 25 mg  
    
   
   
   
  
 TAB-A-MARGIE tablet Generic drug:  multivitamin Your last dose was: Your next dose is: TAKE 1 TABLET BY MOUTH DAILY theanine 50 mg Tbdi Your last dose was: Your next dose is: Take  by mouth. TYLENOL 325 mg tablet Generic drug:  acetaminophen Your last dose was: Your next dose is: Take  by mouth every four (4) hours as needed for Pain. VITAMIN D2 50,000 unit capsule Generic drug:  ergocalciferol Your last dose was: Your next dose is: TAKE 1 CAPSULE BY MOUTH EVERY 3 MONTHS ON THE FIRST (Shaq Mccabe) ZOFRAN 4 mg tablet Generic drug:  ondansetron hcl Your last dose was: Your next dose is: Take 4 mg by mouth every eight (8) hours as needed for Nausea. 4 mg STOP taking these medications IMODIUM A-D 2 mg tablet Generic drug:  loperamide

## 2018-06-30 NOTE — PROGRESS NOTES
HISTORY OF PRESENT ILLNESS  Ena Ojeda is a 42 y.o. male. Pt. comes in for f/u. Has multiple medical problems. Continues to have issues with loose stools and fecal incontinence. Multiple intestinal surgeries as a child. Has chronic cognitive deficit. Lives in a group home. He is chronically underweight. Denies any pain. Patella is good. Followed by GI, psychiatry and neurology. No recent seizures. Reports compliance with medications and diet. Med list and most recent labs/studies reviewed with pt. Has had hypokalemia. Trying to be active physically as tolerated. Needs med refills. Due for repeat labs. Reports no other new c/o. Diarrhea    Pertinent negatives include no abdominal pain, no headaches and no back pain. Follow Up Chronic Condition   Pertinent negatives include no chest pain, no abdominal pain, no headaches and no shortness of breath. Review of Systems   Constitutional: Negative. HENT: Negative. Eyes: Negative. Respiratory: Negative for shortness of breath. Cardiovascular: Negative for chest pain and leg swelling. Gastrointestinal: Positive for diarrhea (Fecal incontinence). Negative for abdominal pain, blood in stool, melena and nausea. Stool incontinence   Genitourinary: Positive for urgency. Negative for dysuria. Musculoskeletal: Negative for back pain, falls and joint pain. Skin: Negative. Neurological: Positive for tremors and seizures. Negative for dizziness, sensory change and headaches. Endo/Heme/Allergies: Negative. Psychiatric/Behavioral: Positive for depression. The patient is nervous/anxious and has insomnia. All other systems reviewed and are negative. Physical Exam   Constitutional: He is oriented to person, place, and time. He appears well-developed and well-nourished. No distress. Pleasant,  cognitivelty a bit slow   HENT:   Head: Normocephalic and atraumatic.    Mouth/Throat: Oropharynx is clear and moist.   Eyes: Conjunctivae are normal.   Neck: Normal range of motion. Neck supple. No JVD present. No thyromegaly present. Cardiovascular: Normal rate, regular rhythm, normal heart sounds and intact distal pulses. No murmur heard. Pulmonary/Chest: Effort normal and breath sounds normal. No respiratory distress. He has no wheezes. He has no rales. Abdominal: Soft. Bowel sounds are normal. He exhibits no distension. There is no tenderness. Chronic surgical scars   Musculoskeletal: He exhibits no edema or tenderness. Neurological: He is alert and oriented to person, place, and time. Coordination abnormal.   Skin: Skin is warm and dry. No rash noted. Psychiatric: His behavior is normal.   Repeats words  Chronic cognitive deficit , about same   Nursing note and vitals reviewed. ASSESSMENT and PLAN  Diagnoses and all orders for this visit:    1. Chronic diarrhea  -     METABOLIC PANEL, COMPREHENSIVE  -     CBC W/O DIFF    2. Incontinence of feces, unspecified fecal incontinence type    3. Underweight    4. History of abdominal surgery    5. Depression, unspecified depression type    6. Hypokalemia  -     METABOLIC PANEL, COMPREHENSIVE  -     CBC W/O DIFF    Other orders  -     Start colestipol (COLESTID) 1 gram tablet; Take 1 Tab by mouth two (2) times a day. Follow-up Disposition:  Return in about 3 months (around 9/22/2018).    lab results and schedule of future lab studies reviewed with patient  reviewed diet, exercise and weight control  reviewed medications and side effects in detail  F/u with other MD's as scheduled  Discussed chronic nature of patient's GI issues with him, mom and caregiver

## 2018-06-30 NOTE — ED NOTES
Assumed care of pt from Reggie Winter RN. Pt resting quietly and in no acute distress at this time. Call bell within reach.

## 2018-07-01 LAB
ATRIAL RATE: 128 BPM
BASOPHILS # BLD: 0 K/UL (ref 0–0.1)
BASOPHILS NFR BLD: 0 % (ref 0–1)
CALCULATED P AXIS, ECG09: 81 DEGREES
CALCULATED R AXIS, ECG10: 78 DEGREES
CALCULATED T AXIS, ECG11: 64 DEGREES
DIAGNOSIS, 93000: NORMAL
DIFFERENTIAL METHOD BLD: ABNORMAL
EOSINOPHIL # BLD: 0 K/UL (ref 0–0.4)
EOSINOPHIL NFR BLD: 0 % (ref 0–7)
ERYTHROCYTE [DISTWIDTH] IN BLOOD BY AUTOMATED COUNT: 12.6 % (ref 11.5–14.5)
HCT VFR BLD AUTO: 39.6 % (ref 36.6–50.3)
HGB BLD-MCNC: 13.6 G/DL (ref 12.1–17)
IMM GRANULOCYTES # BLD: 0 K/UL (ref 0–0.04)
IMM GRANULOCYTES NFR BLD AUTO: 0 % (ref 0–0.5)
LYMPHOCYTES # BLD: 1.2 K/UL (ref 0.8–3.5)
LYMPHOCYTES NFR BLD: 25 % (ref 12–49)
MCH RBC QN AUTO: 31.8 PG (ref 26–34)
MCHC RBC AUTO-ENTMCNC: 34.3 G/DL (ref 30–36.5)
MCV RBC AUTO: 92.5 FL (ref 80–99)
MONOCYTES # BLD: 0.2 K/UL (ref 0–1)
MONOCYTES NFR BLD: 4 % (ref 5–13)
NEUTS BAND NFR BLD MANUAL: 15 %
NEUTS SEG # BLD: 3.3 K/UL (ref 1.8–8)
NEUTS SEG NFR BLD: 56 % (ref 32–75)
NRBC # BLD: 0 K/UL (ref 0–0.01)
NRBC BLD-RTO: 0 PER 100 WBC
P-R INTERVAL, ECG05: 128 MS
PLATELET # BLD AUTO: 116 K/UL (ref 150–400)
PMV BLD AUTO: 9.2 FL (ref 8.9–12.9)
Q-T INTERVAL, ECG07: 286 MS
QRS DURATION, ECG06: 70 MS
QTC CALCULATION (BEZET), ECG08: 417 MS
RBC # BLD AUTO: 4.28 M/UL (ref 4.1–5.7)
RBC MORPH BLD: ABNORMAL
VENTRICULAR RATE, ECG03: 128 BPM
WBC # BLD AUTO: 4.7 K/UL (ref 4.1–11.1)
WBC MORPH BLD: ABNORMAL

## 2018-07-01 PROCEDURE — 74011000250 HC RX REV CODE- 250: Performed by: COLON & RECTAL SURGERY

## 2018-07-01 PROCEDURE — 36415 COLL VENOUS BLD VENIPUNCTURE: CPT | Performed by: COLON & RECTAL SURGERY

## 2018-07-01 PROCEDURE — 65270000029 HC RM PRIVATE

## 2018-07-01 PROCEDURE — 74011250637 HC RX REV CODE- 250/637: Performed by: INTERNAL MEDICINE

## 2018-07-01 PROCEDURE — 74011250637 HC RX REV CODE- 250/637: Performed by: GENERAL ACUTE CARE HOSPITAL

## 2018-07-01 PROCEDURE — 85025 COMPLETE CBC W/AUTO DIFF WBC: CPT | Performed by: COLON & RECTAL SURGERY

## 2018-07-01 PROCEDURE — C9113 INJ PANTOPRAZOLE SODIUM, VIA: HCPCS | Performed by: COLON & RECTAL SURGERY

## 2018-07-01 PROCEDURE — 74011250636 HC RX REV CODE- 250/636: Performed by: COLON & RECTAL SURGERY

## 2018-07-01 RX ORDER — AMITRIPTYLINE HYDROCHLORIDE 50 MG/1
50 TABLET, FILM COATED ORAL
Status: DISCONTINUED | OUTPATIENT
Start: 2018-07-01 | End: 2018-07-04 | Stop reason: HOSPADM

## 2018-07-01 RX ORDER — GABAPENTIN 100 MG/1
100 CAPSULE ORAL 3 TIMES DAILY
Status: DISCONTINUED | OUTPATIENT
Start: 2018-07-01 | End: 2018-07-04 | Stop reason: HOSPADM

## 2018-07-01 RX ADMIN — DEXTROSE MONOHYDRATE, SODIUM CHLORIDE, AND POTASSIUM CHLORIDE 125 ML/HR: 50; 4.5; 1.49 INJECTION, SOLUTION INTRAVENOUS at 09:37

## 2018-07-01 RX ADMIN — AMITRIPTYLINE HYDROCHLORIDE 50 MG: 50 TABLET, FILM COATED ORAL at 21:57

## 2018-07-01 RX ADMIN — ACETAMINOPHEN 1000 MG: 10 INJECTION, SOLUTION INTRAVENOUS at 01:08

## 2018-07-01 RX ADMIN — SODIUM CHLORIDE 40 MG: 9 INJECTION INTRAMUSCULAR; INTRAVENOUS; SUBCUTANEOUS at 09:14

## 2018-07-01 RX ADMIN — DEXTROSE MONOHYDRATE, SODIUM CHLORIDE, AND POTASSIUM CHLORIDE 125 ML/HR: 50; 4.5; 1.49 INJECTION, SOLUTION INTRAVENOUS at 18:24

## 2018-07-01 RX ADMIN — DEXTROSE MONOHYDRATE, SODIUM CHLORIDE, AND POTASSIUM CHLORIDE 125 ML/HR: 50; 4.5; 1.49 INJECTION, SOLUTION INTRAVENOUS at 01:08

## 2018-07-01 RX ADMIN — SODIUM CHLORIDE 40 MG: 9 INJECTION INTRAMUSCULAR; INTRAVENOUS; SUBCUTANEOUS at 09:13

## 2018-07-01 RX ADMIN — Medication 10 ML: at 01:09

## 2018-07-01 RX ADMIN — GABAPENTIN 100 MG: 100 CAPSULE ORAL at 21:57

## 2018-07-01 RX ADMIN — ACETAMINOPHEN 1000 MG: 10 INJECTION, SOLUTION INTRAVENOUS at 16:16

## 2018-07-01 RX ADMIN — Medication 10 ML: at 06:00

## 2018-07-01 RX ADMIN — Medication 10 ML: at 21:57

## 2018-07-01 RX ADMIN — Medication 10 ML: at 09:14

## 2018-07-01 RX ADMIN — GABAPENTIN 100 MG: 100 CAPSULE ORAL at 16:21

## 2018-07-01 NOTE — PROGRESS NOTES
TRANSFER - IN REPORT:    Verbal report received from Mirela(name) on Trinity Feliciano  being received from ED(unit) for routine progression of care      Report consisted of patients Situation, Background, Assessment and   Recommendations(SBAR). Information from the following report(s) SBAR, Kardex, [facility] and Recent Results was reviewed with the receiving nurse. Opportunity for questions and clarification was provided. Assessment completed upon patients arrival to unit and care assumed.

## 2018-07-01 NOTE — PROGRESS NOTES
MEWS Score 3 due to slightly elevated heart rate at 103 due to patient just finished walking back to chair from bathroom.

## 2018-07-01 NOTE — ED NOTES
TRANSFER - OUT REPORT:    Verbal report given to Guerrero Leigh RN (name) on Yessenia Cook  being transferred to gen surg (unit) for routine progression of care       Report consisted of patients Situation, Background, Assessment and   Recommendations(SBAR). Information from the following report(s) SBAR, Kardex, ED Summary, Intake/Output, MAR, Recent Results and Cardiac Rhythm NSR, sinus tach was reviewed with the receiving nurse. Lines:   Peripheral IV 06/30/18 Right Antecubital (Active)   Site Assessment Clean, dry, & intact 6/30/2018  3:52 PM   Phlebitis Assessment 0 6/30/2018  3:52 PM   Infiltration Assessment 0 6/30/2018  3:52 PM   Dressing Status Clean, dry, & intact 6/30/2018  3:52 PM   Dressing Type Transparent 6/30/2018  3:52 PM   Hub Color/Line Status Flushed 6/30/2018  3:52 PM   Action Taken Blood drawn 6/30/2018  3:52 PM        Opportunity for questions and clarification was provided.       Patient transported with:   [employer]

## 2018-07-01 NOTE — ED NOTES
NGT placed. Pt tolerated. NGT is 14g and 60 cm at right nare. Placement verified by auscultation and xray. NGT to low suction. Clear/green liquid draining.

## 2018-07-01 NOTE — PROGRESS NOTES
Pt seen and examined  See H and P  Basically, an ileus due to too much bile sequestering agents and anti-diarrhea meds  Will clamp NG, give liquids, medicine consult to restart all anti epileptic drugs and monitor if needed

## 2018-07-01 NOTE — PROGRESS NOTES
Hospitalist Consultation Note    NAME:  Brea Mancilla   :   1980   MRN:   [id number]     ATTENDING: Donato Villeda MD  PCP:  Blaine Dias DO    Date/Time:  2018 4:03 PM      Recommendations/Plan:       Active Problems:    Partial small bowel obstruction ([address] (2013)    Mental Retardation  Depression Hx  Anxiety Disorder  Seizure Disorder  -Upon evaluation of previous documentation, pt is not on any AED's. Most recent note from 5/3/18 indicates that Tegretol was what he was last on, but even that was discontinued some time ago. -Therefore pt is only on low dose Gabapentin for the above. Confirmed this with pt's step-father who is at bedside. Will resume this med PO as he is tolerating PO now.  -Can resume the rest of his meds tomorrow as he continues to improve / upon DC    SHERIF  -Likely secondary to decreased PO intake, and diarrhea  -Pt receiving IVF, likely to improve  -Repeat chemistry for tomorrow already ordered - monitor    SBO, improving  -As per Surgery  -Pt appears to be tolerating PO and having BM. NGT was taken out earlier today. Code Status: Full  DVT Prophylaxis: As per Primary Team          Subjective:   REQUESTING PHYSICIAN:  REASON FOR CONSULT:      Samaria James is a 42 y.o.  male who I was asked to see for \"OTHER. \"  Pt is pleasant when spoken to at bedside. He states that he is doing well and denies any complaints. Pt's step father is at bedside who helps to provide the history. He states that his step son is \"doing better\" and that he is tolerating PO now. He states that he has been taken off all anti epileptic medications and does not recall the last he had any seizure history.       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, without mention of intractable epilepsy 5/14/2011    Mental retardation     Osteoporosis     Other ill-defined conditions(519.89)     intestinal problems    Psychiatric disorder     anxiety    [facility]izure disorder [facility])       Past Surgical History:   Procedure Laterality Date    HX APPENDECTOMY  1/28/13    APPENDECTOMY    HX GI      colon resection    HX GI  1/28/13    LAPAROTOMY EXPLORATORY - OPEN LYSIS OF ADHESIONS     HX HERNIA REPAIR      HX OTHER SURGICAL      imperforated anus birth defect    UPPER GI ENDOSCOPY,BIOPSY  3/17/2017          Social History   Substance Use Topics    Smoking status: Never Smoker    Smokeless tobacco: Never Used    Alcohol use No      Family History   Problem Relation Age of Onset    Heart Disease Father        No Known Allergies   Prior to Admission medications    Medication Sig Start Date End Date Taking? Authorizing Provider   VITAMIN D2 50,000 unit capsule TAKE 1 CAPSULE BY MOUTH EVERY 3 MONTHS ON THE FIRST (JAN, APRIL,JULY,OCTOBER) 6/24/18  Yes Yvette Gómez NP   ondansetron hcl (ZOFRAN) 4 mg tablet Take 4 mg by mouth every eight (8) hours as needed for Nausea. Yes Historical Provider   colestipol (COLESTID) 1 gram tablet Take 1 Tab by mouth two (2) times a day. 6/22/18  Yes Moshe Cagle DO   CALCITRATE-VITAMIN D tablet TAKE 1 TABLET BY MOUTH TWICE A DAY 5/30/18  Yes Yvette Gómez NP   gabapentin (NEURONTIN) 100 mg capsule Take 1 Cap by mouth three (3) times daily. 5/23/18  Yes Oneida Ferrera NP   OTHER Protein powder: please add 2 scoops of protein powder to 8-10 oz of soy milk twice a day ( with I of those times being lunch) and serve to patient.   Fax tp [phone] when finished 4/30/18  Yes Yvette Gómez NP   OTHER Use to brush teeth as needed to prevent cavities,gingivitis, and plaque build up, 4/2/18  Yes Lucas Gu,    TAB-A-MARGIE tablet TAKE 1 TABLET BY MOUTH DAILY 3/30/18  Yes Yvette Gómez NP   amitriptyline (ELAVIL) 25 mg tablet Take 50 mg by mouth nightly. Yes Historical Provider   theanine 50 mg TbDi Take  by mouth. Yes Historical Provider   busPIRone (BUSPAR) 5 mg tablet Take 5 mg by mouth two (2) times a day. Changed to one tab daily per Dr. Nico Herrera. Yes Historical Provider   OTHER L-Theanine 100 mg 1 daily 1/22/18  Yes Moshe Cagle DO   fluticasone (FLONASE) 50 mcg/actuation nasal spray BLOW NOSE: 2 SPRAYS IN EACH NOSTRIL DAILY FOR ALLERGY. 12/19/17  Yes Skipper Hammans, NP   levomefolate-algal oil (DEPLIN, ALGAL OIL,) 7.5-90.314 mg cap Take  by mouth. Yes Historical Provider   LEVOMEFOLATE CALCIUM (DEPLIN PO) Take 7.5 mg by mouth daily. Yes Historical Provider   L.acid-B.bifidum-B.animal-FOS (PROBIOTIC COMPLEX) 25 billion cell -100 mg cap Take 25 mg by mouth daily. Yes Historical Provider   melatonin 3 mg tablet Take 1 Tab by mouth nightly. 7/25/17  Yes Moshe Cagle DO   WHEAT DEXTRIN (BENEFIBER CLEAR SF, DEXTRIN, PO) Take  by mouth. Yes Historical Provider   acetaminophen (TYLENOL) 325 mg tablet Take  by mouth every four (4) hours as needed for Pain. Yes Historical Provider   loperamide (IMODIUM A-D) 2 mg tablet Take 2 mg by mouth four (4) times daily as needed for Diarrhea. Yes Historical Provider   brief disposable (ADULT) misc by Does Not Apply route. Depends, size medium, 1 nightly 6/10/15  Yes Skipper Hammans, NP   guaiFENesin ER (MUCINEX) 600 mg ER tablet Take 600 mg by mouth daily as needed for Congestion. Historical Provider       REVIEW OF SYSTEMS:     Total of 12 systems reviewed as follows:   I am not able to complete the review of systems because:    The patient is intubated and sedated    The patient has altered mental status due to his acute medical problems    The patient has baseline aphasia from prior stroke(s)    The patient has baseline dementia and is not reliable historian                 POSITIVE= underlined text  Negative = text not underlined  General:  fever, chills, sweats, generalized weakness, weight loss/gain,      loss of appetite   Eyes:    blurred vision, eye pain, loss of vision, double vision  ENT:    rhinorrhea, pharyngitis   Respiratory:   cough, sputum production, SOB, wheezing, MONROE, pleuritic pain   Cardiology:   chest pain, palpitations, orthopnea, PND, edema, syncope   Gastrointestinal:  abdominal pain , N/V, dysphagia, diarrhea, constipation, bleeding   Genitourinary:  frequency, urgency, dysuria, hematuria, incontinence   Muskuloskeletal :  arthralgia, myalgia   Hematology:  easy bruising, nose or gum bleeding, lymphadenopathy   Dermatological: rash, ulceration, pruritis   Endocrine:   hot flashes or polydipsia   Neurological:  headache, dizziness, confusion, focal weakness, paresthesia,     Speech difficulties, memory loss, gait disturbance  Psychological: Feelings of anxiety, depression, agitation    Objective:   VITALS:    Visit Vitals    BP 90/62 (BP 1 Location: Left arm, BP Patient Position: Post activity; Sitting)    Pulse (!) 101  Comment: just walked back from bathroom    Temp 98.1 °F (36.7 °C)    Resp 18    Ht 5' 9\" (1.753 m)    Wt 47.1 kg (103 lb 13.4 oz)    SpO2 97%    BMI 15.33 kg/m2     Temp (24hrs), Av.2 °F (36.8 °C), Min:97.5 °F (36.4 °C), Max:98.7 °F (37.1 °C)      PHYSICAL EXAM:   General:    Alert, cooperative, no distress, frail     HEENT: Atraumatic, anicteric sclerae, pink conjunctivae     No oral ulcers, mucosa moist, throat clear  Neck:  Supple, symmetrical,  thyroid: non tender  Lungs:   Clear to auscultation bilaterally. No Wheezing or Rhonchi. No rales. Chest wall:  No tenderness  No Accessory muscle use. Heart:   Regular  rhythm,  No  murmur   No edema  Abdomen:   Soft, non-tender. Not distended. Extremities: No cyanosis. No clubbing  Skin:     Not pale. Not Jaundiced  No rashes   Psych:  Good insight. Not depressed. Not anxious or agitated. Neurologic: EOMs intact. No facial asymmetry. No aphasia or slurred speech.  Symmetrical strength, Alert and oriented X 2.    _______________________________________________________________________  Care Plan discussed with:    Comments   Patient x    Family  x    RN     Care Manager                    Consultant:      ____________________________________________________________________  TOTAL TIME:     45 mins    Comments    x Reviewed previous records   >50% of visit spent in counseling and coordination of care x Discussion with patient and/or family and questions answered       Critical Care Provided     Minutes non procedure based  ________________________________________________________________________  Signed: Shahzad Mendez MD      Procedures: see electronic medical records for all procedures/Xrays and details which were not copied into this note but were reviewed prior to creation of Plan. LAB DATA REVIEWED:    Recent Results (from the past 24 hour(s))   CBC WITH AUTOMATED DIFF    Collection Time: 07/01/18  6:42 AM   Result Value Ref Range    WBC 4.7 4.1 - 11.1 K/uL    RBC 4.28 4.10 - 5.70 M/uL    HGB 13.6 12.1 - 17.0 g/dL    HCT 39.6 36.6 - 50.3 %    MCV 92.5 80.0 - 99.0 FL    MCH 31.8 26.0 - 34.0 PG    MCHC 34.3 30.0 - 36.5 g/dL    RDW 12.6 11.5 - 14.5 %    PLATELET 447 (L) 450 - 400 K/uL    MPV 9.2 8.9 - 12.9 FL    NRBC 0.0 0  WBC    ABSOLUTE NRBC 0.00 0.00 - 0.01 K/uL    NEUTROPHILS 56 32 - 75 %    BAND NEUTROPHILS 15 %    LYMPHOCYTES 25 12 - 49 %    MONOCYTES 4 (L) 5 - 13 %    EOSINOPHILS 0 0 - 7 %    BASOPHILS 0 0 - 1 %    IMMATURE GRANULOCYTES 0 0.0 - 0.5 %    ABS. NEUTROPHILS 3.3 1.8 - 8.0 K/UL    ABS. LYMPHOCYTES 1.2 0.8 - 3.5 K/UL    ABS. MONOCYTES 0.2 0.0 - 1.0 K/UL    ABS. EOSINOPHILS 0.0 0.0 - 0.4 K/UL    ABS. BASOPHILS 0.0 0.0 - 0.1 K/UL    ABS. IMM.  GRANS. 0.0 0.00 - 0.04 K/UL    DF MANUAL      RBC COMMENTS NORMOCYTIC, NORMOCHROMIC      WBC COMMENTS REACTIVE LYMPHS         _____________________________  Hospitalist: Shahzad Mendez MD

## 2018-07-01 NOTE — PROGRESS NOTES
Pharmacy - Enoxaparin (Lovenox®) Monitoring/Dosing      Indication: DVT prophylaxs     Current Dose: Enoxaparin 40 mg subcutaneously every 24H hours    Creatinine Clearance (mL/min): 39      Labs:  Recent Labs      06/30/18   1548   CREA  1.69*   HGB  18.1*   PLT  152     Wt Readings from Last 1 Encounters:   06/30/18 47.1 kg (103 lb 13.4 oz)     Ht Readings from Last 1 Encounters:   06/30/18 175.3 cm (69\")       Impression/Plan:   · Enoxaparin dose adjusted to 25 mg daily per anticoagulation protocol for low body weight patient. Thanks,  Otto Thorne RPH      [email or web address]/[facility]/Pharmacy/Clinical%20Companion/DVT%20Prophylaxis%20Adjustment%20Protcol. pdf

## 2018-07-02 LAB
AMYLASE SERPL-CCNC: 52 U/L (ref 25–115)
ANION GAP SERPL CALC-SCNC: 6 MMOL/L (ref 5–15)
BACTERIA SPEC CULT: NORMAL
BUN SERPL-MCNC: 8 MG/DL (ref 6–20)
BUN/CREAT SERPL: 8 (ref 12–20)
CALCIUM SERPL-MCNC: 8.5 MG/DL (ref 8.5–10.1)
CC UR VC: NORMAL
CHLORIDE SERPL-SCNC: 107 MMOL/L (ref 97–108)
CO2 SERPL-SCNC: 30 MMOL/L (ref 21–32)
CREAT SERPL-MCNC: 0.98 MG/DL (ref 0.7–1.3)
CRYPTOSP AG STL QL: NEGATIVE
G LAMBLIA AG STL QL: NEGATIVE
GLUCOSE SERPL-MCNC: 109 MG/DL (ref 65–100)
POTASSIUM SERPL-SCNC: 3.9 MMOL/L (ref 3.5–5.1)
SERVICE CMNT-IMP: NORMAL
SODIUM SERPL-SCNC: 143 MMOL/L (ref 136–145)

## 2018-07-02 PROCEDURE — 74011250637 HC RX REV CODE- 250/637: Performed by: GENERAL ACUTE CARE HOSPITAL

## 2018-07-02 PROCEDURE — 74011250637 HC RX REV CODE- 250/637: Performed by: INTERNAL MEDICINE

## 2018-07-02 PROCEDURE — 87045 FECES CULTURE AEROBIC BACT: CPT | Performed by: NURSE PRACTITIONER

## 2018-07-02 PROCEDURE — 82150 ASSAY OF AMYLASE: CPT | Performed by: COLON & RECTAL SURGERY

## 2018-07-02 PROCEDURE — 87177 OVA AND PARASITES SMEARS: CPT | Performed by: NURSE PRACTITIONER

## 2018-07-02 PROCEDURE — 65270000029 HC RM PRIVATE

## 2018-07-02 PROCEDURE — 36415 COLL VENOUS BLD VENIPUNCTURE: CPT | Performed by: COLON & RECTAL SURGERY

## 2018-07-02 PROCEDURE — 74011250637 HC RX REV CODE- 250/637: Performed by: COLON & RECTAL SURGERY

## 2018-07-02 PROCEDURE — 83520 IMMUNOASSAY QUANT NOS NONAB: CPT | Performed by: NURSE PRACTITIONER

## 2018-07-02 PROCEDURE — 74011000250 HC RX REV CODE- 250: Performed by: COLON & RECTAL SURGERY

## 2018-07-02 PROCEDURE — 80048 BASIC METABOLIC PNL TOTAL CA: CPT | Performed by: COLON & RECTAL SURGERY

## 2018-07-02 PROCEDURE — C9113 INJ PANTOPRAZOLE SODIUM, VIA: HCPCS | Performed by: COLON & RECTAL SURGERY

## 2018-07-02 PROCEDURE — 87329 GIARDIA AG IA: CPT | Performed by: NURSE PRACTITIONER

## 2018-07-02 PROCEDURE — 74011250636 HC RX REV CODE- 250/636: Performed by: COLON & RECTAL SURGERY

## 2018-07-02 PROCEDURE — 83993 ASSAY FOR CALPROTECTIN FECAL: CPT | Performed by: NURSE PRACTITIONER

## 2018-07-02 RX ORDER — AMITRIPTYLINE HYDROCHLORIDE 50 MG/1
50 TABLET, FILM COATED ORAL
Status: DISCONTINUED | OUTPATIENT
Start: 2018-07-02 | End: 2018-07-02 | Stop reason: SDUPTHER

## 2018-07-02 RX ORDER — MONTELUKAST SODIUM 4 MG/1
1 TABLET, CHEWABLE ORAL 2 TIMES DAILY
Status: DISCONTINUED | OUTPATIENT
Start: 2018-07-03 | End: 2018-07-04 | Stop reason: HOSPADM

## 2018-07-02 RX ORDER — PANTOPRAZOLE SODIUM 40 MG/1
40 TABLET, DELAYED RELEASE ORAL
Status: DISCONTINUED | OUTPATIENT
Start: 2018-07-03 | End: 2018-07-04 | Stop reason: HOSPADM

## 2018-07-02 RX ADMIN — SODIUM CHLORIDE 40 MG: 9 INJECTION INTRAMUSCULAR; INTRAVENOUS; SUBCUTANEOUS at 08:00

## 2018-07-02 RX ADMIN — Medication 10 ML: at 11:06

## 2018-07-02 RX ADMIN — DEXTROSE MONOHYDRATE, SODIUM CHLORIDE, AND POTASSIUM CHLORIDE 125 ML/HR: 50; 4.5; 1.49 INJECTION, SOLUTION INTRAVENOUS at 03:16

## 2018-07-02 RX ADMIN — GABAPENTIN 100 MG: 100 CAPSULE ORAL at 08:01

## 2018-07-02 RX ADMIN — GABAPENTIN 100 MG: 100 CAPSULE ORAL at 22:00

## 2018-07-02 RX ADMIN — Medication 10 ML: at 06:24

## 2018-07-02 RX ADMIN — Medication 10 ML: at 22:01

## 2018-07-02 RX ADMIN — GABAPENTIN 100 MG: 100 CAPSULE ORAL at 15:42

## 2018-07-02 RX ADMIN — PSYLLIUM HUSK 1 PACKET: 3.4 POWDER ORAL at 12:59

## 2018-07-02 RX ADMIN — AMITRIPTYLINE HYDROCHLORIDE 50 MG: 50 TABLET, FILM COATED ORAL at 22:00

## 2018-07-02 RX ADMIN — PSYLLIUM HUSK 1 PACKET: 3.4 POWDER ORAL at 22:01

## 2018-07-02 RX ADMIN — DEXTROSE MONOHYDRATE, SODIUM CHLORIDE, AND POTASSIUM CHLORIDE 125 ML/HR: 50; 4.5; 1.49 INJECTION, SOLUTION INTRAVENOUS at 11:01

## 2018-07-02 RX ADMIN — Medication 10 ML: at 08:01

## 2018-07-02 NOTE — H&P
[address]  HISTORY AND PHYSICAL      Blanca Nguyen  MR#: [id number]  : 1980  ACCOUNT #: [de-identified]   ADMIT DATE: 2018    CHIEF COMPLAINT:  Bowel obstruction versus ileus. HISTORY OF PRESENT ILLNESS:  The patient is a 15-year-old gentleman who is a mentally challenged young man who has had a history of a Lap-Band removal and history of obstruction as well as appendectomy in , done by Dr. Fran Obrien. The patient has done fairly well, but recently has developed nausea, vomiting, was brought from the adult home. I was told in the ER from the ER physician the patient had a CT scan, which I reviewed, which appeared to be small-bowel dilatation throughout, but no transition point. The patient therefore was admitted to our service for a partial small-bowel obstruction versus ileus. What I was not told, was that the patient had had diarrhea last week and was put on Colestid 3 times a day 1 gram, as well as Imodium 3 times a day. This was never elicited in the conversation and, therefore, when I came to see the patient at the bedside and spoke to the mother, all of this was clearly stated. The patient's symptoms on admission is having minimal NG output and now had a bowel movement. PAST SURGICAL HISTORY:  Includes bowel obstruction. 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 He has had appendectomy. CURRENT MEDICATIONS:  Include at home he is on vitamin D. He is on Zofran. He is on Colestid. He is on gabapentin. He is on amitriptyline, buspirone, and  Again, he was on the loperamide as well as the Colestid. ALLERGIES:  NONE LISTED. SOCIAL HISTORY:  He lives in an adult home. REVIEW OF SYSTEMS:  No recent chest pain or shortness of breath. PHYSICAL EXAMINATION:  VITAL SIGNS:  Stable. Heart rate was 130 on admission. His weight is 103 pounds. LUNGS:  Clear. CHEST:  Symmetric. ABDOMEN:  Soft, somewhat distended. Positive bowel sounds throughout. DATA:  Laboratory values show normal white count, his platelets are 926. His albumin is 4.3, creatinine is 1.69. X-ray shows dilated small-bowel loops, colon is nondilated, no transition point noted. IMPRESSION:  This is a 38-year-old gentleman with a pharmacologic cause of ileus. RECOMMENDATIONS:    1.  I would recommend NG tube clamping today. 2.  Medical consult to restart his home medicines. 3.  This is a nonsurgical problem at this point and will certainly encourage ambulation and hopefully get the NG tube out so he can advance his diet.       Ena Willis MD       CG/PN  D: 07/01/2018 11:11     T: 07/01/2018 15:29  JOB #: [id number]  CC: Ena Willis MD  CC: Leola Dow NP  CC: Deshaun Cooper DO

## 2018-07-02 NOTE — CDMP QUERY
Account Number: [de-identified]  MRN: [id number]  Patient: Wanda Rutherford  Created: [id number]  Clinician Name: Larry Zacarias RN, CCDS   Dr. Akira Irby :  Patient has an abnormal BMI of 15.33 kg/m2. Pt is mentally challenged admitted for Partial SBO. The  BMI cannot be coded as a secondary diagnosis unless the physician documents a diagnosis or condition relevant to the abnormal BMI. Please clarify & document that this patient is:     =>Underweight  =>Cachexia  =>Failure to Thrive  =>Other explanation of clinical findings  =>Unable to determine (no explanation for clinical findings)    Presentation:  5'9\", 103 Lbs, BMI 15 kg/m2. Please clarify and document your clinical opinion in the progress notes and discharge summary including the definitive and/or presumptive diagnosis, (suspected or probable), related to the above clinical findings. Please include clinical findings supporting your diagnosis.   Thank Erika Mccurdy RN, CCDS

## 2018-07-02 NOTE — PROGRESS NOTES
CRS    Pt seen socially (old pt of mine); Course reviewed -- agree with assessment and plans; Thanks to all for taking such good care of this special young man!

## 2018-07-02 NOTE — PROGRESS NOTES
Hospitalist Progress Note    NAME: Bradly Moctezuma   :  3/7/1949   MRN:  [id number]       Assessment / Plan:  Mental Retardation  Depression Hx  Anxiety Disorder  Seizure Disorder  -pt is able to tolerate po intake. Home medications resumed  -seizure precaution    SHERIF  -due to diarrhea, poor po intake  -resolved with IVF    Diarrhea  -GI consulted    SBO  -manage by CRS. Body mass index is 15.33 kg/(m^2). Code status: Full  Prophylaxis: per primary team     Subjective:     Chief Complaint / Reason for Physician Visit  Pt seen at bedside. Has no complaints. Discussed with RN events overnight. Review of Systems:  Symptom Y/N Comments  Symptom Y/N Comments   Fever/Chills n   Chest Pain n    Poor Appetite    Edema     Cough    Abdominal Pain n    Sputum    Joint Pain     SOB/MONROE nn   Pruritis/Rash     Nausea/vomit    Tolerating PT/OT     Diarrhea    Tolerating Diet     Constipation    Other       Could NOT obtain due to:      Objective:     VITALS:   Last 24hrs VS reviewed since prior progress note. Most recent are:  Patient Vitals for the past 24 hrs:   Temp Pulse Resp BP SpO2   18 1123 97.8 °F (36.6 °C) 73 18 138/81 98 %   18 0811 98 °F (36.7 °C) 79 16 128/67 95 %   18 1947 - - - 154/77 -   18 1746 - - - 148/64 96 %   18 1700 - - - 141/70 95 %     No intake or output data in the 24 hours ending 18 1618     PHYSICAL EXAM:  General: WD, WN. Alert, cooperative, no acute distress    EENT:  EOMI. Anicteric sclerae. MMM  Resp:  CTA bilaterally, no wheezing or rales. No accessory muscle use  CV:  Regular  rhythm,  No edema  GI:  Soft, ND, NT.  +BS  Neurologic:  Alert and oriented X 3, normal speech   Psych:   Good insight. Not anxious nor agitated  Skin:  No rashes.   No jaundice    Reviewed most current lab test results and cultures  YES  Reviewed most current radiology test results   YES  Review and summation of old records today    NO  Reviewed patient's current orders and MAR    YES  PMH/SH reviewed - no change compared to H&P  ________________________________________________________________________  Care Plan discussed with:    Comments   Patient x    Family      RN x    Care Manager     Consultant                        Multidiciplinary team rounds were held today with , nursing, pharmacist and clinical coordinator. Patient's plan of care was discussed; medications were reviewed and discharge planning was addressed. ________________________________________________________________________  Total NON critical care TIME:  35   Minutes    Total CRITICAL CARE TIME Spent:   Minutes non procedure based      Comments   >50% of visit spent in counseling and coordination of care     ________________________________________________________________________  Kayden Villar MD     Procedures: see electronic medical records for all procedures/Xrays and details which were not copied into this note but were reviewed prior to creation of Plan. LABS:  I reviewed today's most current labs and imaging studies.   Pertinent labs include:  Recent Labs      06/28/18   0535  06/27/18   1152   WBC  4.0*  3.4*   HGB  10.3*  12.9   HCT  28.9*  36.2*   PLT  35*  70*     Recent Labs      06/28/18   0535  06/27/18   1725  06/27/18   1152   NA  135*   --   133*   K  3.8   --   4.8   CL  103   --   96*   CO2  20*   --   26   GLU  303*   --   511*   BUN  35*   --   41*   CREA  1.42*   --   1.99*   CA  7.0*   --   8.2*   ALB  2.3*   --   3.1*   TBILI  0.5   --   0.6   SGOT  142*   --   158*   ALT  161*   --   231*   INR   --   1.0   --        Signed: Kayden Villar MD

## 2018-07-02 NOTE — INTERDISCIPLINARY ROUNDS
Interdisciplinary Rounds were completed on this patient. Rounds included nursing, clinical care leader, pharmacy, and case management.      Plan for the day:Gi lite diet, GI consult, DC IV fluids   Plan for the stay:continue current TX  Activity:up in chair  Anticipated discharge date: Home

## 2018-07-02 NOTE — PROGRESS NOTES
Bedside and Verbal shift change report given to Prabha Grace RN (oncoming nurse) by [facility], RN (offgoing nurse). Report included the following information SBAR, Kardex, Intake/Output and MAR.

## 2018-07-02 NOTE — PROGRESS NOTES
Call placed to Dr. Ruchi Aguila,  Mother is requesting a GI consult from Dr. Candelario Dakin. Pt started with diarrhea overnight again. No N/V. Had a good amount of small formed brown BM's yesterday, no diarrhea. Only new med was Amitriptyline started last night.

## 2018-07-02 NOTE — CONSULTS
GI Consultation Note [facility] for [facility])    NAME: Vince Hogue : 1980 MRN: [id number]   ATTG: Shmuel Noble MD  PCP: Cornelius Troy DO  Date/Time:  2018 1:05 PM  Subjective:   REASON FOR CONSULT:      Rocky Heard is a 42 y.o.  male who I was asked to see for diarrhea. Medical history includes mental retardation, epilepsy, depression, anxiety, colon resection 2013, and history of abdominal adhesions from previous hernia surgery. He was admitted to the hospital for possible bowel obstruction vs ileus (X-ray shows dilated small-bowel loops, colon is nondilated, no transition point noted.), diagnosed with ileus by Dr. Christian Hill. Found to have been taking colestid 1gm TID and loperamide x 1 weeks. Now that these have been stopped, he is back to having diarrhea. Mother reports that diarrhea has been ongoing for the last 2 years but intermittent in pattern until about 2 months ago. He has completed an elimination diet and now losing weight and diarrhea is persistent. He last saw Dr. Bolton in office in 2018 and was doing better at that time. He has since had a normal gastric emptying scan and negative C. Diff (18), no other stool studies completed at that time. Tolerating GI lite without any nausea, vomiting. Last night had liquid, \"like a river\" per mom last night. EGD 3/2017 completed by Dr Bolton found mild esophagitis, otherwise normal. No previous colonoscopy.      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, without mention of intractable epilepsy 2011    Mental retardation     Osteoporosis     Other ill-defined conditions(799.89)     intestinal problems    Psychiatric disorder     anxiety    Seizure disorder [facility])       Past Surgical History:   Procedure Laterality Date    HX APPENDECTOMY  1/28/13    APPENDECTOMY    HX GI      colon resection    HX GI  1/28/13    LAPAROTOMY EXPLORATORY - OPEN LYSIS OF ADHESIONS     HX HERNIA REPAIR      HX OTHER SURGICAL      imperforated anus birth defect    UPPER GI ENDOSCOPY,BIOPSY  3/17/2017          Social History   Substance Use Topics    Smoking status: Never Smoker    Smokeless tobacco: Never Used    Alcohol use No      Family History   Problem Relation Age of Onset    Heart Disease Father       No Known Allergies   Home Medications:  Prior to Admission Medications   Prescriptions Last Dose Informant Patient Reported? Taking? CALCITRATE-VITAMIN D tablet 6/30/2018 at Unknown time  No Yes   Sig: TAKE 1 TABLET BY MOUTH TWICE A DAY   L.acid-B.bifidum-B.animal-FOS (PROBIOTIC COMPLEX) 25 billion cell -100 mg cap 6/24/2018 at Unknown time Parent Yes Yes   Sig: Take 25 mg by mouth daily. LEVOMEFOLATE CALCIUM (DEPLIN PO) 6/30/2018 at Unknown time  Yes Yes   Sig: Take 7.5 mg by mouth daily. OTHER 6/30/2018 at Unknown time  No Yes   Sig: L-Theanine 100 mg 1 daily   OTHER 6/30/2018 at Unknown time  No Yes   Sig: Use to brush teeth as needed to prevent cavities,gingivitis, and plaque build up,   OTHER 6/30/2018 at Unknown time  No Yes   Sig: Protein powder: please add 2 scoops of protein powder to 8-10 oz of soy milk twice a day ( with I of those times being lunch) and serve to patient. Fax tp [phone] when finished   TAB-A-MARGIE tablet 6/30/2018 at Unknown time  No Yes   Sig: TAKE 1 TABLET BY MOUTH DAILY   VITAMIN D2 50,000 unit capsule 6/30/2018 at Unknown time  No Yes   Sig: TAKE 1 CAPSULE BY MOUTH EVERY 3 MONTHS ON THE FIRST (JAN, APRIL,JULY,OCTOBER)   WHEAT DEXTRIN (BENEFIBER CLEAR SF, DEXTRIN, PO) 6/30/2018 at Unknown time  Yes Yes   Sig: Take  by mouth. acetaminophen (TYLENOL) 325 mg tablet 6/30/2018 at Unknown time  Yes Yes   Sig: Take  by mouth every four (4) hours as needed for Pain.    amitriptyline (ELAVIL) 25 mg tablet 6/30/2018 at Unknown time  Yes Yes   Sig: Take 50 mg by mouth nightly. brief disposable (ADULT) misc 6/30/2018 at Unknown time  No Yes   Sig: by Does Not Apply route. Depends, size medium, 1 nightly   busPIRone (BUSPAR) 5 mg tablet 6/30/2018 at Unknown time  Yes Yes   Sig: Take 5 mg by mouth two (2) times a day. Changed to one tab daily per Dr. Joan Fofana. colestipol (COLESTID) 1 gram tablet 6/30/2018 at Unknown time  No Yes   Sig: Take 1 Tab by mouth two (2) times a day. fluticasone (FLONASE) 50 mcg/actuation nasal spray 3/15/2018 at Unknown time  No Yes   Sig: BLOW NOSE: 2 SPRAYS IN EACH NOSTRIL DAILY FOR ALLERGY.   gabapentin (NEURONTIN) 100 mg capsule 6/30/2018 at Unknown time  No Yes   Sig: Take 1 Cap by mouth three (3) times daily. guaiFENesin ER (MUCINEX) 600 mg ER tablet Not Taking at Unknown time  Yes No   Sig: Take 600 mg by mouth daily as needed for Congestion. levomefolate-algal oil (DEPLIN, ALGAL OIL,) 7.5-90.314 mg cap 6/30/2018 at Unknown time  Yes Yes   Sig: Take  by mouth. loperamide (IMODIUM A-D) 2 mg tablet 6/30/2018 at Unknown time  Yes Yes   Sig: Take 2 mg by mouth four (4) times daily as needed for Diarrhea.   melatonin 3 mg tablet 6/30/2018 at Unknown time  No Yes   Sig: Take 1 Tab by mouth nightly. ondansetron hcl (ZOFRAN) 4 mg tablet 6/30/2018 at Unknown time  Yes Yes   Sig: Take 4 mg by mouth every eight (8) hours as needed for Nausea. theanine 50 mg TbDi 6/30/2018 at Unknown time  Yes Yes   Sig: Take  by mouth.       Facility-Administered Medications: None     Hospital medications:  Current Facility-Administered Medications   Medication Dose Route Frequency    psyllium husk-aspartame (METAMUCIL FIBER) packet 1 Packet  1 Packet Oral BID    [START ON 7/3/2018] pantoprazole (PROTONIX) tablet 40 mg  40 mg Oral ACB    gabapentin (NEURONTIN) capsule 100 mg  100 mg Oral TID    amitriptyline (ELAVIL) tablet 50 mg  50 mg Oral QHS    sodium chloride (NS) flush 5-10 mL  5-10 mL IntraVENous Q8H    sodium chloride (NS) flush 5-10 mL  5-10 mL IntraVENous PRN    enoxaparin (LOVENOX) injection 25 mg  25 mg SubCUTAneous Q24H     REVIEW OF SYSTEMS:     [x]     Unable to obtain full ROS due to history of mental retardation and timeline of history is inaccurate. Some obtained with mother and mother's significant other. Const:   negative fever, negative chills  ENT:   negative sore throat  Resp:   negative cough, dyspnea  Cards:  negative for lower extremity edema  :  negative for frequency and hematuria  Skin:   negative for rash and pruritus  Heme:   negative for easy bruising  MS:  negative for muscle weakness  Neurolo:  negative for headaches, dizziness  Psych:  negative for feelings of anxiety, depression     Pertinent Positives include : Weight loss over the last year. Objective:   VITALS:    Visit Vitals    BP 94/68 (BP 1 Location: Left arm, BP Patient Position: At rest)    Pulse (!) 104    Temp 98 °F (36.7 °C)    Resp 16    Ht 5' 9\" (1.753 m)    Wt 47.1 kg (103 lb 13.4 oz)    SpO2 100%    BMI 15.33 kg/m2     Temp (24hrs), Av °F (36.7 °C), Min:97.5 °F (36.4 °C), Max:98.6 °F (37 °C)    PHYSICAL EXAM:   General:    Alert, cooperative, no distress, appears stated age. Head:   Normocephalic, without obvious abnormality, atraumatic. Eyes:   Conjunctivae clear, anicteric sclerae. Pupils are equal  Nose:  Nares normal. No drainage or sinus tenderness. Throat:    Lips, mucosa, and tongue normal.  Neck:  Supple, symmetrical,  no adenopathy. Back:    Symmetric,  No CVA tenderness. Lungs:   CTA bilaterally. No wheezing/rhonchi/rales. Chest wall:  No tenderness or deformity. No Accessory muscle use. Heart:   Regular rate and rhythm. Abdomen:   Soft, flat, non-tender. Not distended. + Bowel sounds. Extremities: Atraumatic, No cyanosis. No edema.  No clubbing  Skin:     Texture, turgor normal. No rashes/lesions/jaundice  Lymph: Cervical, supraclavicular normal.  Psych:  Poor insight. Not depressed. Not anxious or agitated. Neurologic: EOMs intact. No facial asymmetry. No aphasia or slurred speech. Normal  strength, A/O to self and place. LAB DATA REVIEWED:    Recent Results (from the past 48 hour(s))   URINALYSIS W/ REFLEX CULTURE    Collection Time: 06/30/18  3:37 PM   Result Value Ref Range    Color DARK YELLOW      Appearance TURBID (A) CLEAR      Specific gravity 1.028 1.003 - 1.030      pH (UA) 5.0 5.0 - 8.0      Protein 300 (A) NEG mg/dL    Glucose NEGATIVE  NEG mg/dL    Ketone TRACE (A) NEG mg/dL    Blood SMALL (A) NEG      Urobilinogen 0.2 0.2 - 1.0 EU/dL    Nitrites NEGATIVE  NEG      Leukocyte Esterase TRACE (A) NEG      WBC 5-10 0 - 4 /hpf    RBC 0-5 0 - 5 /hpf    Epithelial cells FEW FEW /lpf    Bacteria 1+ (A) NEG /hpf    UA:UC IF INDICATED URINE CULTURE ORDERED (A) CNI      Hyaline cast 2-5 0 - 5 /lpf   BILIRUBIN, CONFIRM    Collection Time: 06/30/18  3:37 PM   Result Value Ref Range    Bilirubin UA, confirm NEGATIVE  NEG     CULTURE, URINE    Collection Time: 06/30/18  3:37 PM   Result Value Ref Range    Special Requests: NO SPECIAL REQUESTS  Reflexed from [id number]        [place] Count <1,000 CFU/ML      Culture result: NO GROWTH 2 DAYS     CBC WITH AUTOMATED DIFF    Collection Time: 06/30/18  3:48 PM   Result Value Ref Range    WBC 7.7 4.1 - 11.1 K/uL    RBC 5.71 (H) 4.10 - 5.70 M/uL    HGB 18.1 (H) 12.1 - 17.0 g/dL    HCT 52.2 (H) 36.6 - 50.3 %    MCV 91.4 80.0 - 99.0 FL    MCH 31.7 26.0 - 34.0 PG    MCHC 34.7 30.0 - 36.5 g/dL    RDW 12.4 11.5 - 14.5 %    PLATELET 883 338 - 381 K/uL    MPV 8.8 (L) 8.9 - 12.9 FL    NRBC 0.0 0  WBC    ABSOLUTE NRBC 0.00 0.00 - 0.01 K/uL    NEUTROPHILS 67 32 - 75 %    LYMPHOCYTES 23 12 - 49 %    MONOCYTES 10 5 - 13 %    EOSINOPHILS 0 0 - 7 %    BASOPHILS 0 0 - 1 %    IMMATURE GRANULOCYTES 0 0.0 - 0.5 %    ABS. NEUTROPHILS 5.1 1.8 - 8.0 K/UL    ABS. LYMPHOCYTES 1.8 0.8 - 3.5 K/UL    ABS.  MONOCYTES 0.7 0.0 - 1.0 K/UL    ABS. EOSINOPHILS 0.0 0.0 - 0.4 K/UL    ABS. BASOPHILS 0.0 0.0 - 0.1 K/UL    ABS. IMM. GRANS. 0.0 0.00 - 0.04 K/UL    DF AUTOMATED     METABOLIC PANEL, COMPREHENSIVE    Collection Time: 06/30/18  3:48 PM   Result Value Ref Range    Sodium 136 136 - 145 mmol/L    Potassium 3.7 3.5 - 5.1 mmol/L    Chloride 95 (L) 97 - 108 mmol/L    CO2 34 (H) 21 - 32 mmol/L    Anion gap 7 5 - 15 mmol/L    Glucose 101 (H) 65 - 100 mg/dL    BUN 28 (H) 6 - 20 MG/DL    Creatinine 1.69 (H) 0.70 - 1.30 MG/DL    BUN/Creatinine ratio 17 12 - 20      GFR est AA 55 (L) >60 ml/min/1.73m2    GFR est non-AA 45 (L) >60 ml/min/1.73m2    Calcium 10.4 (H) 8.5 - 10.1 MG/DL    Bilirubin, total 1.1 (H) 0.2 - 1.0 MG/DL    ALT (SGPT) 28 12 - 78 U/L    AST (SGOT) 23 15 - 37 U/L    Alk.  phosphatase 57 45 - 117 U/L    Protein, total 8.6 (H) 6.4 - 8.2 g/dL    Albumin 4.3 3.5 - 5.0 g/dL    Globulin 4.3 (H) 2.0 - 4.0 g/dL    A-G Ratio 1.0 (L) 1.1 - 2.2     LIPASE    Collection Time: 06/30/18  3:48 PM   Result Value Ref Range    Lipase 67 (L) 73 - 393 U/L   EKG, 12 LEAD, INITIAL    Collection Time: 06/30/18  3:59 PM   Result Value Ref Range    Ventricular Rate 128 BPM    Atrial Rate 128 BPM    P-R Interval 128 ms    QRS Duration 70 ms    Q-T Interval 286 ms    QTC Calculation (Bezet) 417 ms    Calculated P Axis 81 degrees    Calculated R Axis 78 degrees    Calculated T Axis 64 degrees    Diagnosis       Sinus tachycardia    Confirmed by Pacheco Langley ([id number]) on 7/1/2018 3:36:51 PM     CBC WITH AUTOMATED DIFF    Collection Time: 07/01/18  6:42 AM   Result Value Ref Range    WBC 4.7 4.1 - 11.1 K/uL    RBC 4.28 4.10 - 5.70 M/uL    HGB 13.6 12.1 - 17.0 g/dL    HCT 39.6 36.6 - 50.3 %    MCV 92.5 80.0 - 99.0 FL    MCH 31.8 26.0 - 34.0 PG    MCHC 34.3 30.0 - 36.5 g/dL    RDW 12.6 11.5 - 14.5 %    PLATELET 603 (L) 391 - 400 K/uL    MPV 9.2 8.9 - 12.9 FL    NRBC 0.0 0  WBC    ABSOLUTE NRBC 0.00 0.00 - 0.01 K/uL    NEUTROPHILS 56 32 - 75 %    BAND NEUTROPHILS 15 %    LYMPHOCYTES 25 12 - 49 %    MONOCYTES 4 (L) 5 - 13 %    EOSINOPHILS 0 0 - 7 %    BASOPHILS 0 0 - 1 %    IMMATURE GRANULOCYTES 0 0.0 - 0.5 %    ABS. NEUTROPHILS 3.3 1.8 - 8.0 K/UL    ABS. LYMPHOCYTES 1.2 0.8 - 3.5 K/UL    ABS. MONOCYTES 0.2 0.0 - 1.0 K/UL    ABS. EOSINOPHILS 0.0 0.0 - 0.4 K/UL    ABS. BASOPHILS 0.0 0.0 - 0.1 K/UL    ABS. IMM. GRANS. 0.0 0.00 - 0.04 K/UL    DF MANUAL      RBC COMMENTS NORMOCYTIC, NORMOCHROMIC      WBC COMMENTS REACTIVE LYMPHS     AMYLASE    Collection Time: 07/02/18  6:25 AM   Result Value Ref Range    Amylase 52 25 - 278 U/L   METABOLIC PANEL, BASIC    Collection Time: 07/02/18  6:25 AM   Result Value Ref Range    Sodium 143 136 - 145 mmol/L    Potassium 3.9 3.5 - 5.1 mmol/L    Chloride 107 97 - 108 mmol/L    CO2 30 21 - 32 mmol/L    Anion gap 6 5 - 15 mmol/L    Glucose 109 (H) 65 - 100 mg/dL    BUN 8 6 - 20 MG/DL    Creatinine 0.98 0.70 - 1.30 MG/DL    BUN/Creatinine ratio 8 (L) 12 - 20      GFR est AA >60 >60 ml/min/1.73m2    GFR est non-AA >60 >60 ml/min/1.73m2    Calcium 8.5 8.5 - 10.1 MG/DL     IMAGING RESULTS:    DATE: 6/30/18  INDICATION: Abdominal pain, diarrhea.     Direct comparison is made to prior CT dated October 2016.     Findings:     Lung bases: The visualized lung bases are clear. Liver: The liver is normal.  Adrenals: Adrenal glands are normal.  Pancreas: The pancreas is normal.   Gallbladder: The gallbladder is normal.  Kidneys: There are several indeterminate subcentimeter renal hypodensities;  several of these have increased slightly in size. Spleen: The spleen is normal.  Lymph nodes. There is no yvrose hepatitis, mesenteric, retroperitoneal or pelvic  lymphadenopathy. Bowel: There are multiple markedly dilated loops of small bowel. The colon is  nondilated. No definite pneumatosis is visualized. There is no mesenteric vein  gas. There is no portal vein gas.   Urinary bladder: Urinary bladder is partially filled and grossly normal.  Miscellaneous: There is trace perihepatic free intraperitoneal fluid. There is  no free intraperitoneal gas. There is no focal fluid collection to suggest  abscess.     IMPRESSION:   1. Small bowel obstruction. No evidence of perforation. 2. Trace perihepatic free intraperitoneal fluid. 3. Indeterminant subcentimeter renal hypodensities, difficult to further  characterize due to small size. Renal mass CT or MR may be helpful for further  characterization. Recommendations/Plan:      Active Problems:    Partial small bowel obstruction ([address] (1/28/2013)       Donnie Tejada is a 42 y.o. male who I was asked to see for diarrhea, primarily post-prandially. History of small bowel resection January 2013 and abdominal adhesions from previous hernia surgery. Admission for bowel obstruction vs ileus, now believes to be an ileus likely from loperamide and colestipol overuse. Diarrhea has returned with discontinuation of loperamide and colestid. Mother reports that diarrhea has been ongoing for the last 2 years but intermittent in pattern until about 2 months ago, has had persistent weight loss and has done elimination diet. He last saw Dr. Marixa Elias in office in 2/2018 and was doing better at that time. He has since had a normal gastric emptying scan and negative C. Diff (6/21/18), no other stool studies completed at that time. EGD 3/2017 completed by Dr Marixa Elias found mild esophagitis, otherwise normal. No previous colonoscopy. Unclear etiology of diarrhea, likely functional bowel disorder but will need to rule out infectious cause with stools studies. ___________________________________________________  RECOMMENDATIONS:      1. Will add on additional stool studies to rule out infectious etiology. 2. Restart Colestid 1gm BID for now. 3. Continue GI lite diet   4. Will need colonoscopy, this can be completed outpatient with Dr. Marixa Elias.      Discussed Code Status:    [x]    Full Code      []    DNR ___________________________________________________  Care Plan discussed with:    [x]    Patient   [x]    Family   []    Nursing   []    Attending  Total Time :  50   minutes   ___________________________________________________  GI: Jeannine Ambrose, NP     Agree with note per Jeannine Ambrose NP. Pt personally seen and examined. D/w pt's mother on phone. Pt w/ chronic diarrhea presenting with SBO after taking a large amount of Immodium (in addition to known adhesions). SBO successfully managed non-operatively. Main issue is chronic diarrhea. C diff negative.    - agree with completing stool studies  - hold off on Immodium given this may have partially precipitated SBO  - continue Colestid  - Consider SIBO; pt has f/u with Dr. Michael Landry on Friday, would recommend setting up for SIBO testing  - pt likely needs colonoscopy as an outpatient for further evaluation; will defer to Dr. Michael Landry at f/u    Signed By: Rgio Rene MD     July 2, 2018

## 2018-07-02 NOTE — PROGRESS NOTES
Now that colestid stopped and immodium d/c'd he is back with loose stool  VSS  Abd: soft  A/p Will advance diet  Family wants Dr. Dina Blanca to evaluate and give opinion  I will hand over care to Dr. Bryson Urbina starting tomorrow. /\  If he tolerates diet probably home soon

## 2018-07-03 PROCEDURE — 82962 GLUCOSE BLOOD TEST: CPT

## 2018-07-03 PROCEDURE — 74011250637 HC RX REV CODE- 250/637: Performed by: COLON & RECTAL SURGERY

## 2018-07-03 PROCEDURE — 65270000029 HC RM PRIVATE

## 2018-07-03 PROCEDURE — 83497 ASSAY OF 5-HIAA: CPT | Performed by: SURGERY

## 2018-07-03 PROCEDURE — 74011250637 HC RX REV CODE- 250/637: Performed by: INTERNAL MEDICINE

## 2018-07-03 PROCEDURE — 74011250637 HC RX REV CODE- 250/637: Performed by: GENERAL ACUTE CARE HOSPITAL

## 2018-07-03 PROCEDURE — 81050 URINALYSIS VOLUME MEASURE: CPT | Performed by: SURGERY

## 2018-07-03 PROCEDURE — 84586 ASSAY OF VIP: CPT | Performed by: SURGERY

## 2018-07-03 PROCEDURE — 74011250637 HC RX REV CODE- 250/637: Performed by: NURSE PRACTITIONER

## 2018-07-03 PROCEDURE — 36415 COLL VENOUS BLD VENIPUNCTURE: CPT | Performed by: SURGERY

## 2018-07-03 RX ORDER — LANOLIN ALCOHOL/MO/W.PET/CERES
3 CREAM (GRAM) TOPICAL
Status: DISCONTINUED | OUTPATIENT
Start: 2018-07-03 | End: 2018-07-04 | Stop reason: HOSPADM

## 2018-07-03 RX ORDER — BUSPIRONE HYDROCHLORIDE 5 MG/1
5 TABLET ORAL 2 TIMES DAILY
Status: DISCONTINUED | OUTPATIENT
Start: 2018-07-03 | End: 2018-07-04 | Stop reason: HOSPADM

## 2018-07-03 RX ADMIN — Medication 10 ML: at 18:07

## 2018-07-03 RX ADMIN — GABAPENTIN 100 MG: 100 CAPSULE ORAL at 18:07

## 2018-07-03 RX ADMIN — GABAPENTIN 100 MG: 100 CAPSULE ORAL at 10:54

## 2018-07-03 RX ADMIN — AMITRIPTYLINE HYDROCHLORIDE 50 MG: 50 TABLET, FILM COATED ORAL at 21:58

## 2018-07-03 RX ADMIN — PSYLLIUM HUSK 1 PACKET: 3.4 POWDER ORAL at 18:07

## 2018-07-03 RX ADMIN — COLESTIPOL HYDROCHLORIDE 1 G: 1 TABLET ORAL at 12:15

## 2018-07-03 RX ADMIN — PANTOPRAZOLE SODIUM 40 MG: 40 TABLET, DELAYED RELEASE ORAL at 07:10

## 2018-07-03 RX ADMIN — COLESTIPOL HYDROCHLORIDE 1 G: 1 TABLET ORAL at 19:34

## 2018-07-03 RX ADMIN — Medication 10 ML: at 07:10

## 2018-07-03 RX ADMIN — PSYLLIUM HUSK 1 PACKET: 3.4 POWDER ORAL at 10:54

## 2018-07-03 RX ADMIN — GABAPENTIN 100 MG: 100 CAPSULE ORAL at 21:58

## 2018-07-03 RX ADMIN — BUSPIRONE HYDROCHLORIDE 5 MG: 5 TABLET ORAL at 18:07

## 2018-07-03 RX ADMIN — MELATONIN TAB 3 MG 3 MG: 3 TAB at 21:58

## 2018-07-03 NOTE — PROGRESS NOTES
Reason for Admission:   Partial SBO                   RRAT Score:       12              Plan for utilizing home health:   Pt lives in a group home and has caregivers from [facility] [phone], then goes to [facility] from 9-4pm and caregiver from 6pm-10pm 5 days/nights a week. Weekends at home or with parents. Pt is dependent on caregivers for ADL's and staff/parents for IADL's. Mother feels [facility] skilled nursing would benefit pt as there are no nurses at home to assist staff with medications/diet. CM phoned [facility] and they can service in group, order needed. CM will continue to follow for discharge needs. Pt has not had HH, DME or SNF/Rehab in past.                    Likelihood of Readmission:  low                       Transition of Care Plan:       Patient is a 42year old  male. CM met with pt, mother, friend of mother. Introduced self, role. Pt alert and oriented in no acute distress. Demographics verified. Address on record is pt's group home. He has overseer, Gustabo Part at [phone] (c) and home number listed is group home phone number. There are certified caretakers who administer medications. Pt has 24/7 coverage at home. Preferred pharmacy used is [address] [place] [address]. Mother will transport him to group home at discharge by car. Follow up appt will be by staff at group home. Mother would like dietary consult to help address weight loss and diarrhea. Bedside nurse aware. Pt already has follow up with PCP next week and with GI MD also. Appt added to AVS. CM will FYI chart for [facility] referral for SN. Referral to be to [facility] when order received. Update @ 6pm - Order for [facility] received and referral sent to [facility] for review. Care Management Interventions  PCP Verified by CM: Yes (sees dr. Anabell Hernandez every 3 months, saw 2 weeks ago)  Mode of Transport at Discharge:  Other (see comment) (mother will transport to home at discharge by car)  Transition of Care Consult (CM Consult): Discharge Planning  Discharge Durable Medical Equipment: No (none at home)  Physical Therapy Consult: No  Occupational Therapy Consult: No  Speech Therapy Consult: No  Current Support Network: Adult Group Home (lives in a bi level group home with 4 steps into entrance and 6 steps up to 2nd level with bedroom/bath)  Confirm Follow Up Transport: Other (see comment) (group home attendants transport to follow up appt and mother meets them there.)  Plan discussed with Pt/Family/Caregiver:  Yes       RUCHI LovingN, RN  Care Manager [facility]  [phone]

## 2018-07-03 NOTE — PROGRESS NOTES
Hospitalist Progress Note    NAME: Krupa Lemons   :  3/7/1949   MRN:  [id number]       Assessment / Plan:  Partial SBO on admission  Previous recent Hx of diarrhea   Hx of multiple abd surgeries, prior lysis of adhesions  S/p NG tube   N/V resolved, pt tolerating orals  Seen by GI, pending outpatient f/u & consideration of colonoscopy  GI lite diet, advancing per primary CRS team  Stool studies pending  amodium DCd on admission  C/w colestipol     Intellectual impairment  Lives in group home   Home health consult    Depression   Anxiety  Restart buspirone, cont amitriptyline    Seizure disorder  Sleep disorder  -cont melatonin  -seizure precautions    SHERIF  -due to diarrhea, poor po intake  -resolved with IVF     Failure to thrive  Seen by dietary, recs appreciated  Home nutritional supplements  Body mass index is 15.33 kg/(m^2). Code status: Full  Prophylaxis: per primary team     Subjective:     Chief Complaint / Reason for Physician Visit  Pt is pleasant 37M with intellectual disability admitted for nausea and vomiting in the setting of ongoing diarrhea for several months per his mother, who is present. Discussed with RN events overnight. Review of Systems:  Symptom Y/N Comments  Symptom Y/N Comments   Fever/Chills n   Chest Pain n    Poor Appetite    Edema     Cough    Abdominal Pain n    Sputum    Joint Pain     SOB/MONROE    Pruritis/Rash     Nausea/vomit    Tolerating PT/OT     Diarrhea n   Tolerating Diet y    Constipation    Other       Could NOT obtain due to:      Objective:     VITALS:   Last 24hrs VS reviewed since prior progress note.  Most recent are:  Patient Vitals for the past 24 hrs:   Temp Pulse Resp BP SpO2   18 1123 97.8 °F (36.6 °C) 73 18 138/81 98 %   18 0811 98 °F (36.7 °C) 79 16 128/67 95 %   18 1947 - - - 154/77 -   18 1746 - - - 148/64 96 %   18 1700 - - - 141/70 95 %     No intake or output data in the 24 hours ending 18 1618 PHYSICAL EXAM:  General: Pleasant, thin. Alert, cooperative, no acute distress    EENT:  EOMI. Anicteric sclerae. MMM  Resp:  CTA bilaterally, no wheezing or rales. No accessory muscle use  CV:  Regular  rhythm,  No edema  GI:  Soft, ND, NT.  +BS  Neurologic:  Alert, oriented to person and family, PERRL, EOMI, non focal exam, moves all extremities  Psych:   Limited insight. Not anxious nor agitated  Skin:  No rashes. No jaundice    Reviewed most current lab test results and cultures  YES  Reviewed most current radiology test results   YES  Review and summation of old records today    NO  Reviewed patient's current orders and MAR    YES  PMH/SH reviewed - no change compared to H&P  ________________________________________________________________________  Care Plan discussed with:    Comments   Patient x    Family  x    RN x    Care Manager     Consultant                        Multidiciplinary team rounds were held today with , nursing, pharmacist and clinical coordinator. Patient's plan of care was discussed; medications were reviewed and discharge planning was addressed. ________________________________________________________________________  Total NON critical care TIME:  35   Minutes    Total CRITICAL CARE TIME Spent:   Minutes non procedure based      Comments   >50% of visit spent in counseling and coordination of care x    ________________________________________________________________________  Sherron Hood MD     Procedures: see electronic medical records for all procedures/Xrays and details which were not copied into this note but were reviewed prior to creation of Plan. LABS:  I reviewed today's most current labs and imaging studies.   Pertinent labs include:  Recent Labs      06/28/18   0535  06/27/18   1152   WBC  4.0*  3.4*   HGB  10.3*  12.9   HCT  28.9*  36.2*   PLT  35*  70*     Recent Labs      06/28/18   0535  06/27/18   1725  06/27/18   1152   NA  135*   --   133*   K 3.8   --   4.8   CL  103   --   96*   CO2  20*   --   26   GLU  303*   --   511*   BUN  35*   --   41*   CREA  1.42*   --   1.99*   CA  7.0*   --   8.2*   ALB  2.3*   --   3.1*   TBILI  0.5   --   0.6   SGOT  142*   --   158*   ALT  161*   --   231*   INR   --   1.0   --        Signed: Jorge Cm D.O.

## 2018-07-03 NOTE — PROGRESS NOTES
Nutrition Assessment:    RECOMMENDATIONS:   Continue diet per GI/surgeon  Mother to bring in Vegan Protein supplements from home     DIETITIANS INTERVENTIONS/PLAN:   Continue diet as tolerated  Home supplements as pt does not tolerate commercial supplements available on formulary  Monitor appetite/PO intake    ASSESSMENT:   Pt admitted with partial SBO. PMH: MR, chronic diarrhea, colon resection. Chart reviewed, pt lying in bed awake and alert. GI workup and CRS is also following. He has issues with chronic diarrhea and most recently a partial SBO. Pt last seen by RD in November, at which point he was the same weight as he is now. However pt's mom claims 15lb wt loss over the past 3 months. He does not tolerate dairy, and they are trying to avoid corn syrup as an ingredient. Unfortunately all of the supplements on our formulary contain corn syrup and/or dairy ingredients. He does drink a vegan protein shake from [facility] that contains 21g Protein per scoop that they mix with soy milk and he tolerates this well. I encouraged her to bring this in hopes that it will help him gain some weight back. We discussed some other high kcal/high protein snacks he can eat. Pt reports a fairly good appetite at the moment and that diarrhea is better. SUBJECTIVE/OBJECTIVE:   Spoke with mother and pt at the bedside   Diet Order: Other (comment) (GI Lite diet )  % Eaten:  Patient Vitals for the past 72 hrs:   % Diet Eaten   07/03/18 1016 100 %   07/02/18 1915 100 %   07/02/18 1302 50 %   07/02/18 0830 75 %   07/01/18 1404 33 %   07/01/18 1346 0 %   07/01/18 0926 0 %     Pertinent Medications:colestid, protonix, metamucil.     Chemistries:  Lab Results   Component Value Date/Time    Sodium 143 07/02/2018 06:25 AM    Potassium 3.9 07/02/2018 06:25 AM    Chloride 107 07/02/2018 06:25 AM    CO2 30 07/02/2018 06:25 AM    Anion gap 6 07/02/2018 06:25 AM    Glucose 109 (H) 07/02/2018 06:25 AM    BUN 8 07/02/2018 06:25 AM Creatinine 0.98 07/02/2018 06:25 AM    BUN/Creatinine ratio 8 (L) 07/02/2018 06:25 AM    GFR est AA >60 07/02/2018 06:25 AM    GFR est non-AA >60 07/02/2018 06:25 AM    Calcium 8.5 07/02/2018 06:25 AM    AST (SGOT) 23 06/30/2018 03:48 PM    Alk. phosphatase 57 06/30/2018 03:48 PM    Protein, total 8.6 (H) 06/30/2018 03:48 PM    Albumin 4.3 06/30/2018 03:48 PM    Globulin 4.3 (H) 06/30/2018 03:48 PM    A-G Ratio 1.0 (L) 06/30/2018 03:48 PM    ALT (SGPT) 28 06/30/2018 03:48 PM      Anthropometrics: Height: 5' 9\" (175.3 cm) Weight: 47.1 kg (103 lb 13.4 oz)  [x]bed scale/standing (6/30)   []stated   []unknown    IBW (%IBW):   ( ) UBW (%UBW): 53.1 kg (117 lb) (per mother 3 months ago ) (  %)    BMI: Body mass index is 15.33 kg/(m^2). This BMI is indicative of:  [x]Underweight   []Normal   []Overweight   [] Obesity   [] Extreme Obesity (BMI>40)  Estimated Nutrition Needs (Based on): 2040 Kcals/day (MSJ 1377 x 1.3 (+250 for wt gain) ) , 61 g (1.3gPro/kg) Protein  Carbohydrate: At Least 130 g/day  Fluids: 2000 mL/day    Last BM: 7/3   [x]Active     []Hyperactive  []Hypoactive       [] Absent   BS  Skin:    [x] Intact   [] Incision  [] Breakdown   [] DTI   [] Tears/Excoriation/Abrasion  []Edema [] Other:    Wt Readings from Last 30 Encounters:   06/30/18 47.1 kg (103 lb 13.4 oz)   06/22/18 48.5 kg (107 lb)   05/23/18 49.9 kg (110 lb)   05/22/18 50.1 kg (110 lb 6.4 oz)   03/27/18 50.3 kg (111 lb)   02/16/18 50.8 kg (112 lb)   01/22/18 49.9 kg (110 lb)   11/27/17 47.6 kg (105 lb)   11/15/17 52.3 kg (115 lb 3.2 oz)   09/29/17 52.8 kg (116 lb 8 oz)   09/15/17 51.7 kg (114 lb)   06/16/17 53.1 kg (117 lb)   05/16/17 53.1 kg (117 lb)   05/08/17 53.1 kg (117 lb)   04/03/17 53.3 kg (117 lb 9.6 oz)   03/17/17 53.5 kg (118 lb)   02/06/17 53.5 kg (118 lb)   01/03/17 56.3 kg (124 lb 3.2 oz)   12/20/16 55.7 kg (122 lb 12.8 oz)   11/28/16 56.4 kg (124 lb 6.4 oz)   10/03/16 59 kg (130 lb)   09/21/16 59 kg (130 lb)   07/29/16 60.8 kg (134 lb)   02/01/16 67.1 kg (148 lb)   01/08/16 65.8 kg (145 lb)   11/10/15 67.1 kg (148 lb)   11/04/15 67.1 kg (148 lb)   10/23/15 64.4 kg (142 lb)   04/28/15 67.1 kg (148 lb)   12/04/14 62.5 kg (137 lb 12.8 oz)      NUTRITION DIAGNOSES:   Problem:  Altered GI function      Etiology: related to chronic diarrhea, and partial SBO      Signs/Symptoms: as evidenced by Sx, GI workup, wt loss. NUTRITION INTERVENTIONS:  Meals/Snacks: General/healthful diet   Supplements: Commercial supplement              GOAL:   Pt will consume >75% of meals/supplements from home in 2-4 days. Cultural, Hinduism, or Ethnic Dietary Needs: None     LEARNING NEEDS (Diet, Food/Nutrient-Drug Interaction):    [] None Identified   [x] Identified and Education Provided/Documented   [] Identified and Pt declined/was not appropriate      [x] Interdisciplinary Care Plan Reviewed/Documented    [x] Participated in Discharge Planning:  To be determined    [] Interdisciplinary Rounds     NUTRITION RISK:    [x] High              [x] Moderate           []  Low  []  Minimal/Uncompromised    PT SEEN FOR:    []  MD Consult: []Calorie Count      []Diabetic Diet Education        []Diet Education     []Electrolyte Management     []General Nutrition Management and Supplements     []Management of Tube Feeding     []TPN Recommendations    [x]  RN Referral:  [x]MST score >=2     []Enteral/Parenteral Nutrition PTA     []Pregnant: Gestational DM or Multigestation   [x]  Low BMI  []  Re-Screen   []  LOS   []  NPO/clears x 5 days   []  New TF/TPN    Jo Jack RD, [address]    Pager [phone]  Weekend Pager [phone]

## 2018-07-03 NOTE — PROGRESS NOTES
CRS progress note    No new events. Tolerating diet. Diarrhea has decreased with colestid. BP (!) 85/60 (BP 1 Location: Left arm, BP Patient Position: At rest;Sitting)  Pulse 99  Temp 98.1 °F (36.7 °C)  Resp 16  Ht 5' 9\" (1.753 m)  Wt 47.1 kg (103 lb 13.4 oz)  SpO2 96%  BMI 15.33 kg/m2    NAD, AAOx3  Abd soft, nontender    Plan  -Continue GI lite diet  -Stool studies pending per GI. Will also check 24hr urine 5-HIAA as well as serum VIP level.

## 2018-07-03 NOTE — PROGRESS NOTES
GI Progress Note (for Manisha Albarran)  NAME:Michael Vernon QZZ:[id number] BIK:[id number]   Prim GI: Brian Vallejo MD  PCP: Ron Pandey DO  Date/Time:  7/3/2018 12:02 PM   Assessment:   · H/o SBO in 2013 s/p resection and lysis of adhesions  · PSBO on admission; now resolved with conservative management (note pt was taking a large amount of imodium prior  · Chronic diarrhea     Plan:   · C diff, stool culture negative  · O&P, pancreatic elastase, calprotectin pending  · Pt has f/u with Dr. Manisha Albarran in clinic on 7/6; would recommend SIBO testing and consideration of colonoscopy  · Continue Colestid BID    GI will sign off. Feel free to page w/ any questions     Subjective:   Discussed with RN events overnight. Complaint Y/N Description   Abdominal Pain     Hematemesis     Hematochezia     Melena     Constipation     Diarrhea     Dyspepsia     Dysphagia     Jaundiced     Nausea/vomiting       Review of Systems:  Symptom Y/N Comments  Symptom Y/N Comments   Fever/Chills    Chest Pain     Cough    Headaches     Sputum    Joint Pain     SOB/MONROE    Pruritis/Rash     Tolerating Diet    Other       Could NOT obtain due to:      Objective:   VITALS:   Last 24hrs VS reviewed since prior progress note. Most recent are:  Visit Vitals    BP (!) 85/60 (BP 1 Location: Left arm, BP Patient Position: At rest;Sitting)    Pulse 99    Temp 98.1 °F (36.7 °C)    Resp 16    Ht 5' 9\" (1.753 m)    Wt 47.1 kg (103 lb 13.4 oz)    SpO2 96%    BMI 15.33 kg/m2       Intake/Output Summary (Last 24 hours) at 07/03/18 1202  Last data filed at 07/02/18 1302   Gross per 24 hour   Intake              240 ml   Output                0 ml   Net              240 ml     PHYSICAL EXAM:  General: No acute distress    HEENT: NC, Atraumatic. PERRLA, EOMI. Anicteric sclerae. Lungs:  CTA Bilaterally. No Wheezing/Rhonchi/Rales.   Heart:  Regular  rhythm,  No murmur (), No Rubs, No Gallops  Abdomen: Soft, Non distended, Non tender.  +Bowel sounds, no HSM  Extremities: No c/c/e    Lab and Radiology Data Reviewed: (see below)    Medications Reviewed: (see below)  PMH/SH reviewed - no change compared to H&P  ________________________________________________________________________  Care Plan discussed with:  Patient x   Family  x   RN               Consultant:       Belem Cash MD     Procedures: see electronic medical records for all procedures/Xrays and details which were not copied into this note but were reviewed prior to creation of Plan. LABS:  Recent Labs      07/01/18   0642  06/30/18   1548   WBC  4.7  7.7   HGB  13.6  18.1*   HCT  39.6  52.2*   PLT  116*  152     Recent Labs      07/02/18   0625  06/30/18   1548   NA  143  136   K  3.9  3.7   CL  107  95*   CO2  30  34*   BUN  8  28*   CREA  0.98  1.69*   GLU  109*  101*   CA  8.5  10.4*     Recent Labs      07/02/18   0625  06/30/18   1548   SGOT   --   23   AP   --   57   TP   --   8.6*   ALB   --   4.3   GLOB   --   4.3*   AML  52   --    LPSE   --   67*     No results for input(s): INR, PTP, APTT in the last 72 hours. No lab exists for component: INREXT   No results for input(s): FE, TIBC, PSAT, FERR in the last 72 hours. Lab Results   Component Value Date/Time    Folate >20.0 11/30/2016 08:57 AM     No results for input(s): PH, PCO2, PO2 in the last 72 hours. No results for input(s): CPK, CKMB in the last 72 hours.     No lab exists for component: TROPONINI  Lab Results   Component Value Date/Time    Color DARK YELLOW 06/30/2018 03:37 PM    Appearance TURBID (A) 06/30/2018 03:37 PM    Specific gravity 1.028 06/30/2018 03:37 PM    Specific gravity 1.025 01/21/2013 02:05 PM    pH (UA) 5.0 06/30/2018 03:37 PM    Protein 300 (A) 06/30/2018 03:37 PM    Glucose NEGATIVE  06/30/2018 03:37 PM    Ketone TRACE (A) 06/30/2018 03:37 PM    Bilirubin NEGATIVE  02/15/2012 09:00 PM    Urobilinogen 0.2 06/30/2018 03:37 PM    Nitrites NEGATIVE  06/30/2018 03:37 PM    Leukocyte Esterase TRACE (A) 06/30/2018 03:37 PM    Epithelial cells FEW 06/30/2018 03:37 PM    Bacteria 1+ (A) 06/30/2018 03:37 PM    WBC 5-10 06/30/2018 03:37 PM    RBC 0-5 06/30/2018 03:37 PM       MEDICATIONS:  Current Facility-Administered Medications   Medication Dose Route Frequency    psyllium husk-aspartame (METAMUCIL FIBER) packet 1 Packet  1 Packet Oral BID    pantoprazole (PROTONIX) tablet 40 mg  40 mg Oral ACB    colestipol (COLESTID) tablet 1 g  1 g Oral BID    gabapentin (NEURONTIN) capsule 100 mg  100 mg Oral TID    amitriptyline (ELAVIL) tablet 50 mg  50 mg Oral QHS    sodium chloride (NS) flush 5-10 mL  5-10 mL IntraVENous Q8H    sodium chloride (NS) flush 5-10 mL  5-10 mL IntraVENous PRN    enoxaparin (LOVENOX) injection 25 mg  25 mg SubCUTAneous Q24H

## 2018-07-03 NOTE — PROGRESS NOTES
Bedside and Verbal shift change report given to Aurora Levin RN (oncoming nurse) by Kiara Cha RN (offgoing nurse). Report included the following information SBAR, Kardex, Intake/Output and MAR.

## 2018-07-04 ENCOUNTER — HOME HEALTH ADMISSION (OUTPATIENT)
Dept: HOME HEALTH SERVICES | Facility: HOME HEALTH | Age: 38
End: 2018-07-04

## 2018-07-04 VITALS
TEMPERATURE: 97.9 F | DIASTOLIC BLOOD PRESSURE: 79 MMHG | RESPIRATION RATE: 16 BRPM | HEIGHT: 69 IN | OXYGEN SATURATION: 98 % | SYSTOLIC BLOOD PRESSURE: 101 MMHG | WEIGHT: 103.84 LBS | HEART RATE: 93 BPM | BODY MASS INDEX: 15.38 KG/M2

## 2018-07-04 PROBLEM — K59.1 FUNCTIONAL DIARRHEA: Status: ACTIVE | Noted: 2018-07-04

## 2018-07-04 PROBLEM — Z71.89 COUNSELING REGARDING GOALS OF CARE: Status: ACTIVE | Noted: 2018-07-04

## 2018-07-04 LAB
ANION GAP SERPL CALC-SCNC: 8 MMOL/L (ref 5–15)
BACTERIA SPEC CULT: NORMAL
BASOPHILS # BLD: 0 K/UL (ref 0–0.1)
BASOPHILS NFR BLD: 1 % (ref 0–1)
BUN SERPL-MCNC: 15 MG/DL (ref 6–20)
BUN/CREAT SERPL: 17 (ref 12–20)
C JEJUNI+C COLI AG STL QL: NEGATIVE
CALCIUM SERPL-MCNC: 8.6 MG/DL (ref 8.5–10.1)
CHLORIDE SERPL-SCNC: 102 MMOL/L (ref 97–108)
CO2 SERPL-SCNC: 29 MMOL/L (ref 21–32)
CREAT SERPL-MCNC: 0.89 MG/DL (ref 0.7–1.3)
DIFFERENTIAL METHOD BLD: ABNORMAL
E COLI SXT1+2 STL IA: NEGATIVE
EOSINOPHIL # BLD: 0.2 K/UL (ref 0–0.4)
EOSINOPHIL NFR BLD: 3 % (ref 0–7)
ERYTHROCYTE [DISTWIDTH] IN BLOOD BY AUTOMATED COUNT: 12.2 % (ref 11.5–14.5)
GLUCOSE BLD STRIP.AUTO-MCNC: 98 MG/DL (ref 65–100)
GLUCOSE SERPL-MCNC: 96 MG/DL (ref 65–100)
HCT VFR BLD AUTO: 36.8 % (ref 36.6–50.3)
HGB BLD-MCNC: 12.3 G/DL (ref 12.1–17)
IMM GRANULOCYTES # BLD: 0 K/UL (ref 0–0.04)
IMM GRANULOCYTES NFR BLD AUTO: 0 % (ref 0–0.5)
LYMPHOCYTES # BLD: 2.1 K/UL (ref 0.8–3.5)
LYMPHOCYTES NFR BLD: 47 % (ref 12–49)
MAGNESIUM SERPL-MCNC: 1.9 MG/DL (ref 1.6–2.4)
MCH RBC QN AUTO: 31.5 PG (ref 26–34)
MCHC RBC AUTO-ENTMCNC: 33.4 G/DL (ref 30–36.5)
MCV RBC AUTO: 94.1 FL (ref 80–99)
MONOCYTES # BLD: 0.5 K/UL (ref 0–1)
MONOCYTES NFR BLD: 11 % (ref 5–13)
NEUTS SEG # BLD: 1.7 K/UL (ref 1.8–8)
NEUTS SEG NFR BLD: 38 % (ref 32–75)
NRBC # BLD: 0 K/UL (ref 0–0.01)
NRBC BLD-RTO: 0 PER 100 WBC
PLATELET # BLD AUTO: 109 K/UL (ref 150–400)
PMV BLD AUTO: 8.8 FL (ref 8.9–12.9)
POTASSIUM SERPL-SCNC: 3.6 MMOL/L (ref 3.5–5.1)
RBC # BLD AUTO: 3.91 M/UL (ref 4.1–5.7)
SERVICE CMNT-IMP: NORMAL
SERVICE CMNT-IMP: NORMAL
SODIUM SERPL-SCNC: 139 MMOL/L (ref 136–145)
WBC # BLD AUTO: 4.4 K/UL (ref 4.1–11.1)

## 2018-07-04 PROCEDURE — 36415 COLL VENOUS BLD VENIPUNCTURE: CPT | Performed by: INTERNAL MEDICINE

## 2018-07-04 PROCEDURE — 74011250637 HC RX REV CODE- 250/637: Performed by: NURSE PRACTITIONER

## 2018-07-04 PROCEDURE — 80048 BASIC METABOLIC PNL TOTAL CA: CPT | Performed by: INTERNAL MEDICINE

## 2018-07-04 PROCEDURE — 74011250637 HC RX REV CODE- 250/637: Performed by: COLON & RECTAL SURGERY

## 2018-07-04 PROCEDURE — 85025 COMPLETE CBC W/AUTO DIFF WBC: CPT | Performed by: INTERNAL MEDICINE

## 2018-07-04 PROCEDURE — 74011250637 HC RX REV CODE- 250/637: Performed by: INTERNAL MEDICINE

## 2018-07-04 PROCEDURE — 74011250637 HC RX REV CODE- 250/637: Performed by: GENERAL ACUTE CARE HOSPITAL

## 2018-07-04 PROCEDURE — 83735 ASSAY OF MAGNESIUM: CPT | Performed by: INTERNAL MEDICINE

## 2018-07-04 RX ADMIN — COLESTIPOL HYDROCHLORIDE 1 G: 1 TABLET ORAL at 09:16

## 2018-07-04 RX ADMIN — GABAPENTIN 100 MG: 100 CAPSULE ORAL at 09:03

## 2018-07-04 RX ADMIN — Medication 10 ML: at 09:05

## 2018-07-04 RX ADMIN — PANTOPRAZOLE SODIUM 40 MG: 40 TABLET, DELAYED RELEASE ORAL at 06:46

## 2018-07-04 RX ADMIN — BUSPIRONE HYDROCHLORIDE 5 MG: 5 TABLET ORAL at 09:04

## 2018-07-04 RX ADMIN — PSYLLIUM HUSK 1 PACKET: 3.4 POWDER ORAL at 09:05

## 2018-07-04 NOTE — DISCHARGE SUMMARY
Discharge Summary     Patient: Meet Bansal MRN: [id number]  SSN: [id number]    YOB: 1980  Age: 42 y.o.   Sex: male       Admit Date: 6/30/2018    Discharge Date: 7/4/2018      Admission Diagnoses: Partial small bowel obstruction [facility])    Discharge Diagnoses:   Problem List as of 7/4/2018  Date Reviewed: 6/22/2018          Codes Class Noted - Resolved    Chronic diarrhea ICD-10-CM: K52.9  ICD-9-CM: 787.91  6/22/2018 - Present        Hypokalemia ICD-10-CM: E87.6  ICD-9-CM: 276.8  6/22/2018 - Present        Hypotension due to drugs ICD-10-CM: I95.2  ICD-9-CM: 458.8, E947.9  5/22/2018 - Present        History of abdominal surgery ICD-10-CM: Z98.890  ICD-9-CM: V45.89  3/27/2018 - Present        Recurrent depression ([facility] ICD-10-CM: F33.9  ICD-9-CM: 296.30  3/27/2018 - Present        Weight loss ICD-10-CM: R63.4  ICD-9-CM: 783.21  11/27/2017 - Present        Underweight ICD-10-CM: R63.6  ICD-9-CM: 783.22  5/8/2017 - Present        Anxiety ICD-10-CM: F41.9  ICD-9-CM: 300.00  7/29/2016 - Present        Stool incontinence ICD-10-CM: R15.9  ICD-9-CM: 787.60  7/29/2016 - Present        Static encephalopathy ICD-10-CM: G93.49  ICD-9-CM: 742.9  3/26/2013 - Present        S/p small bowel obstruction ICD-10-CM: Z87.19  ICD-9-CM: V12.79  3/12/2013 - Present        Bowel obstruction ([facility] ICD-10-CM: L68.993  ICD-9-CM: 560.9  5/14/2012 - Present        Encounter for long-term (current) use of other medications ICD-10-CM: Z79.899  ICD-9-CM: V58.69  5/14/2011 - Present        Osteoporosis ICD-10-CM: M81.0  ICD-9-CM: 733.00  1/10/2011 - Present        Depression ICD-10-CM: F32.9  ICD-9-CM: 039  1/10/2011 - Present        Vitamin D deficiency ICD-10-CM: E55.9  ICD-9-CM: 268.9  1/10/2011 - Present        RESOLVED: Partial small bowel obstruction ([facility] ICD-10-CM: K56.600  ICD-9-CM: 560.9  1/28/2013 - 7/4/2018        RESOLVED: Localization-related focal epilepsy with simple partial seizures ([facility] ICD-10-CM: G40.109  ICD-9-CM: 345.50  5/14/2011 - 5/23/2018        RESOLVED: Seizures ([address] ICD-10-CM: R56.9  ICD-9-CM: 780.39  1/10/2011 - 5/23/2018               Discharge Condition: [facility] Course: 37M presented with ileus/pSBO likely due to colestid and imodium. Bowel function improved after stopping these meds. Consults: Gastroenterology    Disposition: home    Discharge Medications:   Current Discharge Medication List      CONTINUE these medications which have NOT CHANGED    Details   VITAMIN D2 50,000 unit capsule TAKE 1 CAPSULE BY MOUTH EVERY 3 MONTHS ON THE FIRST (JAN, APRIL,JULY,OCTOBER)  Qty: 1 Cap, Refills: 0      ondansetron hcl (ZOFRAN) 4 mg tablet Take 4 mg by mouth every eight (8) hours as needed for Nausea. colestipol (COLESTID) 1 gram tablet Take 1 Tab by mouth two (2) times a day. Qty: 60 Tab, Refills: 2      CALCITRATE-VITAMIN D tablet TAKE 1 TABLET BY MOUTH TWICE A DAY  Qty: 62 Tab, Refills: PRN      gabapentin (NEURONTIN) 100 mg capsule Take 1 Cap by mouth three (3) times daily. Qty: 90 Cap, Refills: 3    Associated Diagnoses: Tremor; Anxiety      !! OTHER Protein powder: please add 2 scoops of protein powder to 8-10 oz of soy milk twice a day ( with I of those times being lunch) and serve to patient. Fax tp [phone] when finished  Qty: 1 Each, Refills: 0      !! OTHER Use to brush teeth as needed to prevent cavities,gingivitis, and plaque build up,  Qty: 1 Tube, Refills: PRN    Associated Diagnoses: Gingivitis      TAB-A-MARGIE tablet TAKE 1 TABLET BY MOUTH DAILY  Qty: 31 Tab, Refills: PRN      amitriptyline (ELAVIL) 25 mg tablet Take 50 mg by mouth nightly. theanine 50 mg TbDi Take  by mouth.      busPIRone (BUSPAR) 5 mg tablet Take 5 mg by mouth two (2) times a day. Changed to one tab daily per Dr. Esther Naik.       !! OTHER L-Theanine 100 mg 1 daily  Qty: 30 Tab, Refills: 11      fluticasone (FLONASE) 50 mcg/actuation nasal spray BLOW NOSE: 2 SPRAYS IN EACH NOSTRIL DAILY FOR ALLERGY. Qty: 16 g, Refills: 0    Associated Diagnoses: Allergic rhinitis      levomefolate-algal oil (DEPLIN, ALGAL OIL,) 7.5-90.314 mg cap Take  by mouth. LEVOMEFOLATE CALCIUM (DEPLIN PO) Take 7.5 mg by mouth daily. L.acid-B.bifidum-B.animal-FOS (PROBIOTIC COMPLEX) 25 billion cell -100 mg cap Take 25 mg by mouth daily. melatonin 3 mg tablet Take 1 Tab by mouth nightly. Qty: 30 Tab, Refills: 11    Associated Diagnoses: Insomnia, unspecified type      WHEAT DEXTRIN (BENEFIBER CLEAR SF, DEXTRIN, PO) Take  by mouth. acetaminophen (TYLENOL) 325 mg tablet Take  by mouth every four (4) hours as needed for Pain. brief disposable (ADULT) misc by Does Not Apply route. Depends, size medium, 1 nightly  Qty: 1 Package, Refills: 11      guaiFENesin ER (MUCINEX) 600 mg ER tablet Take 600 mg by mouth daily as needed for Congestion. !! - Potential duplicate medications found. Please discuss with provider. STOP taking these medications       loperamide (IMODIUM A-D) 2 mg tablet Comments:   Reason for Stopping:               Activity: Activity as tolerated  Diet: GI lite    No orders of the defined types were placed in this encounter.       Signed By: Nayely Mcfarland MD     July 4, 2018

## 2018-07-04 NOTE — PROGRESS NOTES
Patient being discharged late afternoon (after 24 hour urine collection is complete). Family in room with patient.

## 2018-07-04 NOTE — ROUTINE PROCESS
General Surgery End of Shift Nursing Note    Bedside shift change report given to   (oncoming nurse) by Shekhar Pelayo RN(offgoing nurse). Report included the following information SBAR, Kardex, ED Summary, OR Summary, Intake/Output, MAR and Recent Results. Shift worked:   [id number]   Summary of shift:   Mom states she does not want patient to have Levenox. Loose stools x 2 this shift. Issues for physician to address:   none     Number times ambulated in hallway past shift: 2    Number of times OOB to chair past shift: 3    Pain Management:  Current medication: see MAR   Patient states pain is manageable on current pain medication: NO c/o pain. GI:    Current diet:  DIET GI LITE (POST SURGICAL)    Tolerating current diet: YES  Passing flatus: YES  Last Bowel Movement: today   Appearance: soft, formed    Respiratory:    Incentive Spirometer at bedside: YES  Patient instructed on use: NO    Patient Safety:    Falls Score: 2  Bed Alarm On? No  Sitter?  No    Yareli Ramey

## 2018-07-04 NOTE — PROGRESS NOTES
General Surgery End of Shift Nursing Note    Bedside shift change report given to Shaan Camp (oncoming nurse) by Christiano Lopez (offgoing nurse). Report included the following information SBAR, Kardex, Intake/Output, MAR and Recent Results. Shift worked:   D   Summary of shift:    0   Issues for physician to address:   0     Number times ambulated in hallway past shift: 0    Number of times OOB to chair past shift: 2    Pain Management:  Current medication: 0  Patient states pain is manageable on current pain medication: YES    GI:    Current diet:  DIET GI LITE (POST SURGICAL)    Tolerating current diet: YES  Passing flatus: YES  Last Bowel Movement: today   Appearance: 0    Respiratory:    Incentive Spirometer at bedside: YES  Patient instructed on use: YES    Patient Safety:    Falls Score: 1  Bed Alarm On? No  Sitter?  No    Shamika Martino RN

## 2018-07-04 NOTE — ROUTINE PROCESS
General Surgery End of Shift Nursing Note    Bedside shift change report given to Vladimir Mesaalysonlisandro Briana  (oncoming nurse) by Melissa Doty (offgoing nurse). Report included the following information SBAR, Kardex, ED Summary, OR Summary, Intake/Output, MAR and Recent Results. Shift worked:   7-730   Summary of shift:    No c/o pain. Pt tolerated all meals. Stool has become more soft but formed. BM x2. Family at bedside. 24 hour urin collection started at 4pm. Night shift nurse notified. Issues for physician to address:   none     Number times ambulated in hallway past shift: 2    Number of times OOB to chair past shift: 3    Pain Management:  Current medication: see MAR   Patient states pain is manageable on current pain medication: NO c/o pain. GI:    Current diet:  DIET GI LITE (POST SURGICAL)    Tolerating current diet: YES  Passing flatus: YES  Last Bowel Movement: today   Appearance: soft, formed    Respiratory:    Incentive Spirometer at bedside: YES  Patient instructed on use: NO    Patient Safety:    Falls Score: 2  Bed Alarm On? No  Sitter?  No    Carolin Smyth

## 2018-07-04 NOTE — CONSULTS
Palliative Medicine Consult  Manan: [phone])    Patient Name: Miguel Butler  YOB: 1980    Date of Initial Consult: 7/4/18  Reason for Consult: mother requested to speak with Hospice, care decisions  Requesting Provider: Daina Lovett   Primary Care Physician: Sisto Blizzard, DO     SUMMARY:   Miguel Butler is a 42 y.o. with a past history of mentally challenged, seizures, fecal incontinence,imperforated anus birth defect, colon resection, Ex Lap with lysis of adhesions, who was admitted on 6/30/2018 from [facility] with a diagnosis of medication induced ileus. Current medical issues leading to Palliative Medicine involvement include: Mother requested a Hospice consult. PALLIATIVE DIAGNOSES:   1. Abdominal pain-resolved  2. Diarrhea  3. Weight loss:  36 lbs over past 2 years  4. Protein/calorie malnutrition  5. Depression: taking Buspar and Amitriptyline   6. Goals of care   PLAN:   1. Met with pt, who was shy and wanted me to talk with his mother in another room. 2. Met with mother and her significant other outside pt's room. (see below for history). After long discussion, mother's request to speak with hospice was because pt was sometimes talking about dying and mom wanted some guidance with what to say, and, at what point in an illness is it time to ask for hospice. I reassured mom that pt's talking about dying sometimes is very normal, when he brings this up, ask him why. Is he making the statement about \"being with dad\" because he misses him, or maybe the death of  his fellow group home resident's parents have got him thinking about his dad dying, or maybe even himself dying. We are all going to die someday, and it's ok to talk about it as the more people talk about it the less frightening it becomes. We talked about having pt further explain why he's making these statements and using his answers to guide the discussion.   We talked about how a person must meet certain criteria to qualify for hospice, and at this time pt does not meet criteria, however, if he were to continue to loose weight, become severely malnourished and suffer severely, continue to loose weight, and his medical team feels that there is nothing more to offer other than comfort care, and 2 of his physicians feel that he is going to die in the next 6 months or less, that would be the time to ask for Hospice support. That being said, it sounds like there is hope that the Colestid will work and help improve his QOL. 3. Encouraged mom to f/u   4. Initial consult note routed to primary continuity provider  5. Communicated plan of care with: Palliative IDT, RN   GOALS OF CARE / TREATMENT PREFERENCES:     GOALS OF CARE:  Patient/Health Care Proxy Stated Goals: Prolong life      TREATMENT PREFERENCES:   Code Status: Full Code    Advance Care Planning:  Advance Care Planning 7/4/2018   Patient's Healthcare Decision Maker is: Named in scanned ACP document   Primary Decision Maker Name Peterson Lipoma   Primary Decision Maker Phone Number [phone]   Primary Decision Maker Relationship to Patient Parent   Secondary Decision Maker Name Henry Cheuday   Secondary Decision Maker Phone Number [phone]   Secondary Decision Maker Relationship to Patient Sibling   Confirm Advance Directive Yes, on file       Medical Interventions: Limited additional interventions   Other Instructions: Other:    As far as possible, the palliative care team has discussed with patient / health care proxy about goals of care / treatment preferences for patient. HISTORY:     History obtained from:  Chart, mother    CHIEF COMPLAINT: abdominal pain and vomiting    HPI/SUBJECTIVE:    The patient is:   [x] Verbal and participatory  [] Non-participatory due to:     Presented to ED with c/o abdominal pain and vomiting that started the night before admission.   Per my discussion with mother, pt was born with GI problems, was told he would need enemas daily for life to produce BMs, however, during his childhood through prayer and her instruction pt was able to have controlled BMs without enemas. Pt was thriving until 2 years ago, when he developed explosive, uncontrollable diarrhea, which he still is having and despite many w/u no etiology has been found. Pt has lost approximately 36 lbs over the past 2 years, and has become somewhat withdrawn and anxious. He has made statements about \"I think I want to go be with Dad\" who  10 years ago, although he follows that statement up with \"I don't want to die. \"  In addition, he brings up dying because some of the other residents at the group home whose parents have  recently. Mother feels that pt is unhappy because he has fecal incontinence, unable to control it, sometimes has to leave his day program to go home due to the accidents. This, in addition to waking up in bed with incontinent stool is embarrassing to pt. Pt's PCP started him on Colestid (HDL drug with side effect is constipation) in addition to imodium for the diarrhea, which apparently caused constipation for which he was admitted 18. Since stopping those medications, stools have become very loose again; GI has restarted the Colestid without imodium with hopes of firming up the stools without causing an ileus. Patient is currently receiving medications for depression and anxiety. : pt shared that he is a positive thinker and is hoping for the best that the diarrhea will be better controlled.         Clinical Pain Assessment (nonverbal scale for severity on nonverbal patients):   Clinical Pain Assessment  Severity: 0     Activity (Movement): Lying quietly, normal position    Duration: for how long has pt been experiencing pain (e.g., 2 days, 1 month, years)  Frequency: how often pain is an issue (e.g., several times per day, once every few days, constant)     FUNCTIONAL ASSESSMENT:     Palliative Performance Scale (PPS):  PPS: 40       PSYCHOSOCIAL/SPIRITUAL SCREENING:     Palliative IDT has assessed this patient for cultural preferences / practices and a referral made as appropriate to needs (Cultural Services, Patient Advocacy, Ethics, etc.)    Advance Care Planning:  Advance Care Planning 7/4/2018   Patient's Healthcare Decision Maker is: Named in scanned ACP document   Primary Decision Maker Name Alisa Yoo   Primary Decision Maker Phone Number [phone]   Primary Decision Maker Relationship to Patient Parent   Secondary Decision Maker Name Sariah Monet   Secondary Decision Maker Phone Number [phone]   Secondary Decision Maker Relationship to Patient Sibling   Confirm Advance Directive Yes, on file       Any spiritual / Judaism concerns:  [] Yes /  [x] No    Caregiver Burnout:  [] Yes /  [] No /  [x] No Caregiver Present      Anticipatory grief assessment:   [x] Normal  / [] Maladaptive       ESAS Anxiety: Anxiety: 0    ESAS Depression: Depression: 0        REVIEW OF SYSTEMS:     Positive and pertinent negative findings in ROS are noted above in HPI. The following systems were [x] reviewed / [] unable to be reviewed as noted in HPI  Other findings are noted below. Systems: constitutional, ears/nose/mouth/throat, respiratory, gastrointestinal, genitourinary, musculoskeletal, integumentary, neurologic, psychiatric, endocrine. Positive findings noted below. Modified ESAS Completed by: provider   Fatigue: 0 Drowsiness: 0   Depression: 0 Pain: 0   Anxiety: 0 Nausea: 0   Anorexia: 8 Dyspnea: 0     Constipation: No     Stool Occurrence(s): 1        PHYSICAL EXAM:     From RN flowsheet:  Wt Readings from Last 3 Encounters:   06/30/18 103 lb 13.4 oz (47.1 kg)   06/22/18 107 lb (48.5 kg)   05/23/18 110 lb (49.9 kg)     Blood pressure 101/79, pulse 93, temperature 97.9 °F (36.6 °C), resp. rate 16, height 5' 9\" (1.753 m), weight 103 lb 13.4 oz (47.1 kg), SpO2 98 %.     Pain Scale 1: Numeric (0 - 10)  Pain Intensity 1: 0  Pain Onset 1: yesterday  Pain Location 1: Abdomen  Pain Orientation 1: Anterior  Pain Description 1: Aching  Pain Intervention(s) 1: Therapeutic presence  Last bowel movement, if known:     Constitutional: cachectic, frail appearing, AAOx2, NAD  Eyes: pupils equal, anicteric, poor eye contact  ENMT: no nasal discharge, moist mucous membranes  Cardiovascular: regular rhythm, distal pulses intact  Respiratory: breathing not labored, symmetric  Gastrointestinal: soft non-tender, +bowel sounds  Musculoskeletal: no deformity, no tenderness to palpation  Skin: warm, dry  Neurologic: following commands, moving all extremities  Psychiatric: full affect, no hallucinations         HISTORY:     Active Problems:    * No active hospital problems. 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, without mention of intractable epilepsy 5/14/2011    Mental retardation     Osteoporosis     Other ill-defined conditions(799.89)     intestinal problems    Psychiatric disorder     anxiety    Seizure disorder [facility])       Past Surgical History:   Procedure Laterality Date    HX APPENDECTOMY  1/28/13    APPENDECTOMY    HX GI      colon resection    HX GI  1/28/13    LAPAROTOMY EXPLORATORY - OPEN LYSIS OF ADHESIONS     HX HERNIA REPAIR      HX OTHER SURGICAL      imperforated anus birth defect    UPPER GI ENDOSCOPY,BIOPSY  3/17/2017           Family History   Problem Relation Age of Onset    Heart Disease Father       History reviewed, no pertinent family history.   Social History   Substance Use Topics    Smoking status: Never Smoker    Smokeless tobacco: Never Used    Alcohol use No     No Known Allergies   Current Facility-Administered Medications   Medication Dose Route Frequency    busPIRone (BUSPAR) tablet 5 mg  5 mg Oral BID    melatonin tablet 3 mg  3 mg Oral QHS    psyllium husk-aspartame (METAMUCIL FIBER) packet 1 Packet  1 Packet Oral BID    pantoprazole (PROTONIX) tablet 40 mg  40 mg Oral ACB    colestipol (COLESTID) tablet 1 g  1 g Oral BID    gabapentin (NEURONTIN) capsule 100 mg  100 mg Oral TID    amitriptyline (ELAVIL) tablet 50 mg  50 mg Oral QHS    sodium chloride (NS) flush 5-10 mL  5-10 mL IntraVENous Q8H    sodium chloride (NS) flush 5-10 mL  5-10 mL IntraVENous PRN    enoxaparin (LOVENOX) injection 25 mg  25 mg SubCUTAneous Q24H          LAB AND IMAGING FINDINGS:     Lab Results   Component Value Date/Time    WBC 4.4 07/04/2018 04:40 AM    HGB 12.3 07/04/2018 04:40 AM    PLATELET 882 (L) 45/67/2549 04:40 AM     Lab Results   Component Value Date/Time    Sodium 139 07/04/2018 04:40 AM    Potassium 3.6 07/04/2018 04:40 AM    Chloride 102 07/04/2018 04:40 AM    CO2 29 07/04/2018 04:40 AM    BUN 15 07/04/2018 04:40 AM    Creatinine 0.89 07/04/2018 04:40 AM    Calcium 8.6 07/04/2018 04:40 AM    Magnesium 1.9 07/04/2018 04:40 AM      Lab Results   Component Value Date/Time    AST (SGOT) 23 06/30/2018 03:48 PM    Alk. phosphatase 57 06/30/2018 03:48 PM    Protein, total 8.6 (H) 06/30/2018 03:48 PM    Albumin 4.3 06/30/2018 03:48 PM    Globulin 4.3 (H) 06/30/2018 03:48 PM     Lab Results   Component Value Date/Time    INR 1.2 (H) 01/21/2013 02:00 PM    Prothrombin time 11.9 (H) 01/21/2013 02:00 PM    aPTT 27.8 01/21/2013 02:00 PM      No results found for: IRON, FE, TIBC, IBCT, PSAT, FERR   No results found for: PH, PCO2, PO2  No components found for: GLPOC   No results found for: CPK, CKMB             Total time: 90  Counseling / coordination time, spent as noted above: 75  > 50% counseling / coordination?: yes    Prolonged service was provided for  []30 min   []75 min in face to face time in the presence of the patient, spent as noted above.   Time Start:   Time End:   Note: this can only be billed with 92449 (initial) or 38402 (follow up). If multiple start / stop times, list each separately.

## 2018-07-04 NOTE — PROGRESS NOTES
Problem: Falls - Risk of  Goal: *Absence of Falls  Document Pat Fall Risk and appropriate interventions in the flowsheet.    Outcome: Progressing Towards Goal  Fall Risk Interventions:       Mentation Interventions: Door open when patient unattended    Medication Interventions: Patient to call before getting OOB    Elimination Interventions: Call light in reach, Patient to call for help with toileting needs

## 2018-07-04 NOTE — PROGRESS NOTES
CM received a call from [facility] requesting an order for [facility] PT,OT & RN. CM wrote order. Patient is ready for discharge home today. Patients mother is requesting a hospice consult. CM phoned MD, who was not in agreement with a hospice consult, but did approve a palliative consult. MD agreed to have CM put a palliative consult in.    12:57pm  CM phoned the  to have palliative paged. 1:15pm  CM received a call from [facility], with palliative stating that there is not anyone available today to meet with the family, due to the holiday. Suha Martinez instructed CM to e-mail her with patient info and she will forward the info to outpatient palliative. CM did as instructed. CM informed patients mother that someone with palliative will be giving her a call. 3:15pm  CM was made aware that Palliative was able to meet with family before discharge. CM sent an e-mail to inform [facility] of meeting before d/c. Patients mother stated that the meeting with Palliative was helpful. Patient is expected to discharge around 4;00pm with [facility] provided by [facility]. Family don't have any other needs or concerns. Care Management Interventions  PCP Verified by CM: Yes (sees dr. Adilene Baker every 3 months, saw 2 weeks ago)  Palliative Care Criteria Met (RRAT>21 & CHF Dx)?:  (Per family request, patient and family did meet with palliative before d/c)  Mode of Transport at Discharge: Other (see comment) (Family)  Transition of Care Consult (CM Consult): Group Home  Discharge Durable Medical Equipment: No (none at home)  Physical Therapy Consult: No  Occupational Therapy Consult: No  Speech Therapy Consult: No  Current Support Network:  Adult Group Home  Confirm Follow Up Transport: Family  Plan discussed with Pt/Family/Caregiver: Yes  Discharge Location  Discharge Placement: Group home        Steve Bedoya  Ext [phone]

## 2018-07-04 NOTE — PROGRESS NOTES
Problem: Falls - Risk of  Goal: *Absence of Falls  Document Pat Fall Risk and appropriate interventions in the flowsheet. Fall Risk Interventions:  Mobility Interventions: OT consult for ADLs, Patient to call before getting OOB, PT Consult for mobility concerns, PT Consult for assist device competence    Mentation Interventions: Door open when patient unattended    Medication Interventions: Patient to call before getting OOB    Elimination Interventions: Call light in reach, Patient to call for help with toileting needs             Problem: Patient Education: Go to Patient Education Activity  Goal: Patient/Family Education  Outcome: Not Progressing Towards Goal  Call bell within reach, siderails upx3. Family in room with patient.

## 2018-07-05 ENCOUNTER — PATIENT OUTREACH (OUTPATIENT)
Dept: INTERNAL MEDICINE CLINIC | Age: 38
End: 2018-07-05

## 2018-07-05 NOTE — PROGRESS NOTES
Hospital Discharge Follow-Up      Date/Time:  2018 11:35 AM    Patient was admitted to [facility] on 2018 and discharged on 2018 for Partial SBO. The physician discharge summary was available at the time of outreach. Patient was contacted within 1 business days of discharge. Top Challenges reviewed with the provider   None          Method of communication with provider :none    Inpatient RRAT score: 12  Was this a readmission? no   Patient stated reason for the readmission: n/a    Nurse Navigator (NN) contacted the caregiver Sujit Obando and mother Doc Maki by telephone to perform post hospital discharge assessment. Verified name and  with caregiver and mother as identifiers. Provided introduction to self, and explanation of the Nurse Navigator role. Reviewed discharge instructions and red flags with caregiver who verbalized understanding. Caregiver given an opportunity to ask questions and does not have any further questions or concerns at this time. The caregiver agrees to contact the PCP office for questions related to their healthcare. NN provided contact information for future reference. Disease Specific:   N/A    Summary of patient's top problems:  1. Chronic diarrhea followed by Dr. Loomis [facility],   2. Mentally challenged lives in group home      [address] orders at discharge: [address]: [facility]  Date of initial visit: pending     Durable Medical Equipment ordered/company: n/a  Durable Medical Equipment received: n/a    Barriers to care? cognative unsure of learning ability     Advance Care Planning:   Does patient have an Advance Directive:  reviewed and current     Medication(s):     New Medications at Discharge: none  Changed Medications at Discharge: none  Discontinued Medications at  [address]     Medication reconciliation was performed with caregiver, who verbalizes understanding of administration of home medications.   There were no barriers to obtaining medications identified at this time. Referral to Pharm D needed: no     Current Outpatient Prescriptions   Medication Sig    VITAMIN D2 50,000 unit capsule TAKE 1 CAPSULE BY MOUTH EVERY 3 MONTHS ON THE FIRST (JAN, APRIL,JULY,OCTOBER)    ondansetron hcl (ZOFRAN) 4 mg tablet Take 4 mg by mouth every eight (8) hours as needed for Nausea.  colestipol (COLESTID) 1 gram tablet Take 1 Tab by mouth two (2) times a day.  CALCITRATE-VITAMIN D tablet TAKE 1 TABLET BY MOUTH TWICE A DAY    gabapentin (NEURONTIN) 100 mg capsule Take 1 Cap by mouth three (3) times daily.  TAB-A-MARGIE tablet TAKE 1 TABLET BY MOUTH DAILY    amitriptyline (ELAVIL) 25 mg tablet Take 50 mg by mouth nightly.  theanine 50 mg TbDi Take  by mouth.  busPIRone (BUSPAR) 5 mg tablet Take 5 mg by mouth two (2) times a day. Changed to one tab daily per Dr. Esther Naik.  OTHER L-Theanine 100 mg 1 daily    fluticasone (FLONASE) 50 mcg/actuation nasal spray BLOW NOSE: 2 SPRAYS IN EACH NOSTRIL DAILY FOR ALLERGY.  levomefolate-algal oil (DEPLIN, ALGAL OIL,) 7.5-90.314 mg cap Take  by mouth.  LEVOMEFOLATE CALCIUM (DEPLIN PO) Take 7.5 mg by mouth daily.  L.acid-B.bifidum-B.animal-FOS (PROBIOTIC COMPLEX) 25 billion cell -100 mg cap Take 25 mg by mouth daily.  melatonin 3 mg tablet Take 1 Tab by mouth nightly.  WHEAT DEXTRIN (BENEFIBER CLEAR SF, DEXTRIN, PO) Take  by mouth.  acetaminophen (TYLENOL) 325 mg tablet Take  by mouth every four (4) hours as needed for Pain.  guaiFENesin ER (MUCINEX) 600 mg ER tablet Take 600 mg by mouth daily as needed for Congestion.  brief disposable (ADULT) misc by Does Not Apply route. Depends, size medium, 1 nightly    OTHER Protein powder: please add 2 scoops of protein powder to 8-10 oz of soy milk twice a day ( with I of those times being lunch) and serve to patient.   Fax tp [phone] when finished    OTHER Use to brush teeth as needed to prevent cavities,gingivitis, and plaque build up,     No current facility-administered medications for this visit. There are no discontinued medications. PCP/Specialist follow up:   Future Appointments  Date Time Provider [place]   7/13/2018 9:30 AM DO EDDIE Zuleta   8/22/2018 10:30 AM Deleta Scale, NP [address] Provider Appointment: 7/5/2018  Jacey Soler. [facility]    Gastrointestinal      [facility]:  n/a       Goals      Prevent complications post hospitalization. 7/5/2018  · Patient and staff will be able to identify red flags for recurrence of SBO: dehydration, increased weakness, dark or blood streaks noted in stool, unable to have bowel movement, nausea, vomiting with fecal odor, fever or abdominal pain. · Patient to attend follow up appointment with Dr. Argelia Dominguez (GI) 7/6/2018 and Dr. Jessica Cao on 7/13/2018. · Patient to be assessed by [facility] skilled nursing.

## 2018-07-06 ENCOUNTER — HOME CARE VISIT (OUTPATIENT)
Dept: SCHEDULING | Facility: HOME HEALTH | Age: 38
End: 2018-07-06

## 2018-07-06 VITALS
OXYGEN SATURATION: 96 % | DIASTOLIC BLOOD PRESSURE: 68 MMHG | TEMPERATURE: 98.3 F | SYSTOLIC BLOOD PRESSURE: 106 MMHG | RESPIRATION RATE: 16 BRPM | HEART RATE: 88 BPM

## 2018-07-08 VITALS
TEMPERATURE: 98 F | HEART RATE: 71 BPM | OXYGEN SATURATION: 98 % | SYSTOLIC BLOOD PRESSURE: 123 MMHG | RESPIRATION RATE: 16 BRPM | DIASTOLIC BLOOD PRESSURE: 63 MMHG

## 2018-07-09 LAB
5OH-INDOLEACETATE 24H UR-MCNC: 2.8 MG/L
5OH-INDOLEACETATE 24H UR-MRATE: 5 MG/24 HR (ref 0–14.9)
CALPROTECTIN STL-MCNT: 163 UG/G (ref 0–120)
COLLECT DURATION TIME UR: 24 HR
SPECIMEN VOL ?TM UR: 1800 ML

## 2018-07-11 LAB
ELASTASE PANC STL-MCNT: >500 UG ELAST./G
VIP SERPL-MCNC: 34 PG/ML (ref 0–58.8)

## 2018-07-12 LAB
O+P SPEC MICRO: NORMAL
O+P STL CONC: NORMAL
SPECIMEN SOURCE: NORMAL

## 2018-07-13 ENCOUNTER — OFFICE VISIT (OUTPATIENT)
Dept: INTERNAL MEDICINE CLINIC | Age: 38
End: 2018-07-13

## 2018-07-13 VITALS
WEIGHT: 104.8 LBS | OXYGEN SATURATION: 98 % | RESPIRATION RATE: 18 BRPM | TEMPERATURE: 96.6 F | BODY MASS INDEX: 15.52 KG/M2 | DIASTOLIC BLOOD PRESSURE: 59 MMHG | HEART RATE: 71 BPM | HEIGHT: 69 IN | SYSTOLIC BLOOD PRESSURE: 89 MMHG

## 2018-07-13 DIAGNOSIS — Z98.890 HISTORY OF ABDOMINAL SURGERY: ICD-10-CM

## 2018-07-13 DIAGNOSIS — K59.1 FUNCTIONAL DIARRHEA: Primary | ICD-10-CM

## 2018-07-13 DIAGNOSIS — K56.600 PARTIAL INTESTINAL OBSTRUCTION, UNSPECIFIED CAUSE (HCC): ICD-10-CM

## 2018-07-13 DIAGNOSIS — R63.6 UNDERWEIGHT: ICD-10-CM

## 2018-07-13 RX ORDER — AMITRIPTYLINE HYDROCHLORIDE 50 MG/1
50 TABLET, FILM COATED ORAL
COMMUNITY
Start: 2018-06-15

## 2018-07-13 NOTE — LETTER
July 13, 2018 [facility]  
[address] 
[facility] [address] Dear Armani Blankenship: Thank you for requesting access to [employer]. Please follow the instructions below to securely access and download your online medical record. [employer] allows you to send messages to your doctor, view your test results, renew your prescriptions, schedule appointments, and more. How Do I Sign Up? 1. In your internet browser, go to [email or web address]  
2. Click on the First Time User? Click Here link in the Sign In box. You will be redirected to the New Member Sign Up page. 3. Enter your [employer] Access Code exactly as it appears below. You will not need to use this code after youve completed the sign-up process. If you do not sign up before the expiration date, you must request a new code. [employer] Access Code: [email or web address] Expires: 10/11/2018 10:03 AM  
 
4. Enter the last four digits of your Social Security Number (xxxx) and Date of Birth (mm/dd/yyyy) as indicated and click Submit. You will be taken to the next sign-up page. 5. Create a [employer] ID. This will be your [employer] login ID and cannot be changed, so think of one that is secure and easy to remember. 6. Create a [employer] password. You can change your password at any time. 7. Enter your Password Reset Question and Answer. This can be used at a later time if you forget your password. 8. Enter your e-mail address. You will receive e-mail notification when new information is available in [address] 9. Click Sign Up. You can now view and download portions of your medical record. 10. Click the Download Summary menu link to download a portable copy of your medical information. Additional Information If you have questions, please visit the Frequently Asked Questions section of the [employer] website at [email or web address] Remember, [employer] is NOT to be used for urgent needs. For medical emergencies, dial 911. Now available from your iPhone and Android! Sincerely, April Haskins

## 2018-07-13 NOTE — PROGRESS NOTES
HISTORY OF PRESENT ILLNESS  Kobi Naik is a 42 y.o. male. Pt. comes in patient's mother and caregiver for f/u. Has multiple medical problems. Recent hospitalization with SBO. I reviewed the records in [facility]. SPL was felt to be related to use of Imodium and Colestid. Patient has chronic fecal incontinence. Has had multiple GI surgeries in the past.  Tells me he is feeling much better. Reports compliance with medications and diet. Med list and most recent labs/studies reviewed with pt. Trying to be active physically as tolerated. He is chronically underweight. Gait is unsteady but no falls. Depends on others for some ADLs. Reports no other new c/o. Hospital Follow Up   Associated symptoms include abdominal pain. Pertinent negatives include no chest pain, no headaches and no shortness of breath. Review of Systems   Constitutional: Negative. HENT: Negative. Eyes: Negative. Respiratory: Negative for shortness of breath. Cardiovascular: Negative for chest pain and leg swelling. Gastrointestinal: Positive for abdominal pain and diarrhea (Fecal incontinence). Negative for blood in stool, heartburn, melena, nausea and vomiting. Stool incontinence   Genitourinary: Positive for urgency. Negative for dysuria. Musculoskeletal: Negative for back pain, falls and joint pain. Skin: Negative. Neurological: Positive for tremors and seizures. Negative for dizziness, sensory change and headaches. Endo/Heme/Allergies: Negative. Psychiatric/Behavioral: Positive for depression. The patient is nervous/anxious and has insomnia. All other systems reviewed and are negative. Physical Exam   Constitutional: He is oriented to person, place, and time. He appears well-developed and well-nourished. No distress. Pleasant,  cognitivelty a bit slow   HENT:   Head: Normocephalic and atraumatic.    Mouth/Throat: Oropharynx is clear and moist.   Eyes: Conjunctivae are normal.   Neck: Normal range of motion. Neck supple. No JVD present. No thyromegaly present. Cardiovascular: Normal rate, regular rhythm, normal heart sounds and intact distal pulses. No murmur heard. Pulmonary/Chest: Effort normal and breath sounds normal. No respiratory distress. He has no wheezes. He has no rales. Abdominal: Soft. Bowel sounds are normal. He exhibits no distension and no mass. There is no tenderness. There is no rebound and no guarding. Chronic surgical scars   Musculoskeletal: He exhibits no edema or tenderness. Neurological: He is alert and oriented to person, place, and time. Coordination abnormal.   Skin: Skin is warm and dry. No rash noted. Psychiatric: His behavior is normal.   Repeats words  Chronic cognitive deficit , about same   Nursing note and vitals reviewed. ASSESSMENT and PLAN  Diagnoses and all orders for this visit:    1. Functional diarrhea    2. Partial intestinal obstruction, unspecified cause ([facility] [address]    3. History of abdominal surgery    4. Underweight      Follow-up Disposition:  Return in about 3 months (around 10/13/2018).    lab results and schedule of future lab studies reviewed with patient  reviewed diet, exercise and weight control  reviewed medications and side effects in detail  F/u with other MD's as scheduled  Overall stable at this time  Reassurance  Keep up with fiber and fluids

## 2018-07-13 NOTE — PROGRESS NOTES
Chief Complaint   Patient presents with   [facility] Follow Up     bowel obstructuon     1. Have you been to the ER, urgent care clinic since your last visit? Hospitalized since your last visit? June 31st went to [facility] for bowel obstruction. 2. Have you seen or consulted any other health care providers outside of the [facility] since your last visit? Include any pap smears or colon screening.  No    Visit Vitals    BP (!) 89/59 (BP 1 Location: Right arm, BP Patient Position: Sitting)    Pulse 71    Temp 96.6 °F (35.9 °C) (Oral)    Resp 18    Ht 5' 9\" (1.753 m)    Wt 104 lb 12.8 oz (47.5 kg)    SpO2 98%    BMI 15.48 kg/m2

## 2018-07-13 NOTE — MR AVS SNAPSHOT
[address] 
[phone] Patient: Forrest Mckeon MRN:  [id number] Visit Information Date & Time Provider Department Dept. Phone Encounter #  
 7/13/2018  9:30 AM Charlette Hanks, 227 [facility] Internal Medicine [phone] [id number] Follow-up Instructions Return in about 3 months (around 10/13/2018). Your Appointments 8/22/2018 10:30 AM  
Follow Up with Tonia Colin NP [address] [facility] ([address] [facility]) Appt Note: 3 month f/u leathw [place] [address] [facility] [id number] [phone]  
  
   
 [place] [address] [id number] [address] [place] Upcoming Health Maintenance Date Due DTaP/Tdap/Td series (1 - Tdap) 11/4/1999 Influenza Age 5 to Adult 8/31/2018* *Topic was postponed. The date shown is not the original due date. Allergies as of 7/13/2018  Review Complete On: 7/13/2018 By: Charlette Hanks, DO No Known Allergies Current Immunizations  Reviewed on 2/6/2017 Name Date Influenza Vaccine 10/15/2016, 11/1/2014, 10/22/2013 Influenza Vaccine Split 10/21/2012 Pneumococcal Vaccine (Unspecified Type) 2/28/2012 Not reviewed this visit You Were Diagnosed With   
  
 Codes Comments Functional diarrhea    -  Primary ICD-10-CM: K59.1 ICD-9-CM: 564.5 Partial intestinal obstruction, unspecified cause ([facility] [address]     ICD-10-CM: K56.600 ICD-9-CM: 560.9 History of abdominal surgery     ICD-10-CM: Z98.890 ICD-9-CM: V45.89 Underweight     ICD-10-CM: R63.6 ICD-9-CM: 783.22 Vitals BP Pulse Temp Resp Height(growth percentile) Weight(growth percentile) (!) 89/59 (BP 1 Location: Right arm, BP Patient Position: Sitting) 71 96.6 °F (35.9 °C) (Oral) 18 5' 9\" (1.753 m) 104 lb 12.8 oz (47.5 kg) SpO2 BMI Smoking Status 98% 15.48 kg/m2 Never Smoker Vitals History BMI and BSA Data Body Mass Index Body Surface Area  
 15.48 kg/m 2 1.52 m 2 Preferred Pharmacy Pharmacy Name Phone [facility], [address] [phone] Your Updated Medication List  
  
   
This list is accurate as of 7/13/18  9:56 AM.  Always use your most recent med list.  
  
  
  
  
 amitriptyline 50 mg tablet Commonly known as:  ELAVIL BENEFIBER CLEAR SF (DEXTRIN) PO Take  by mouth. brief disposable Misc Commonly known as:  adult  
by Does Not Apply route. Depends, size medium, 1 nightly  
  
 busPIRone 5 mg tablet Commonly known as:  BUSPAR Take 5 mg by mouth daily. Changed to one tab daily per Dr. Sharif Jones. CALCITRATE-VITAMIN D tablet Generic drug:  calcium citrate-vitamin D3 TAKE 1 TABLET BY MOUTH TWICE A DAY  
  
 colestipol 1 gram tablet Commonly known as:  COLESTID Take 1 Tab by mouth two (2) times a day. DEPLIN (ALGAL OIL) 7.5-90.314 mg Cap Generic drug:  levomefolate-algal oil Take  by mouth. DEPLIN PO Take 7.5 mg by mouth daily. fluticasone 50 mcg/actuation nasal spray Commonly known as:  FLONASE  
BLOW NOSE: 2 SPRAYS IN EACH NOSTRIL DAILY FOR ALLERGY.  
  
 gabapentin 100 mg capsule Commonly known as:  NEURONTIN Take 1 Cap by mouth three (3) times daily. melatonin 3 mg tablet Take 1 Tab by mouth nightly. MUCINEX 600 mg ER tablet Generic drug:  guaiFENesin ER Take 600 mg by mouth daily as needed for Congestion. OTHER  
L-Theanine 100 mg 1 daily OTHER Use to brush teeth as needed to prevent cavities,gingivitis, and plaque build up, OTHER Protein powder: please add 2 scoops of protein powder to 8-10 oz of soy milk twice a day ( with I of those times being lunch) and serve to patient. Fax tp [phone] when finished PROBIOTIC COMPLEX 25 billion cell -100 mg Cap Generic drug:  L.acid-B.bifidum-B.animal-FOS Take 25 mg by mouth daily. TAB-A-MARGIE tablet Generic drug:  multivitamin TAKE 1 TABLET BY MOUTH DAILY theanine 50 mg Tbdi Take  by mouth. TYLENOL 325 mg tablet Generic drug:  acetaminophen Take  by mouth every four (4) hours as needed for Pain. VITAMIN D2 50,000 unit capsule Generic drug:  ergocalciferol TAKE 1 CAPSULE BY MOUTH EVERY 3 MONTHS ON THE FIRST (Reyes Do) ZOFRAN 4 mg tablet Generic drug:  ondansetron hcl Take 4 mg by mouth every eight (8) hours as needed for Nausea. Follow-up Instructions Return in about 3 months (around 10/13/2018). Introducing [facility]! Dear Donnie Tejada: Thank you for requesting a "[employer] account. Our records indicate that you already have an active "[employer] account. You can access your account anytime at [email or web address] Did you know that you can access your hospital and ER discharge instructions at any time in "[employer]? You can also review all of your test results from your hospital stay or ER visit. Additional Information If you have questions, please visit the Frequently Asked Questions section of the "[employer] website at [email or web address] Remember, "[employer] is NOT to be used for urgent needs. For medical emergencies, dial 911. Now available from your iPhone and Android! Please provide this summary of care documentation to your next provider. Your primary care clinician is listed as Bret Alva. If you have any questions after today's visit, please call [phone].

## 2018-07-17 NOTE — ADT AUTH CERT NOTES
Utilization Review           Intestinal Obstruction - Care Day 4 (7/3/2018) by Dayanara Gilliam        Review Status Review Entered       Completed 7/17/2018       Details              Care Day: 4 Care Date: 7/3/2018 Level of Care: Inpatient Floor       Guideline Day 3        Clinical Status       ( ) * Hemodynamic stability       7/17/2018 2:37 PM EDT by Porfirio Moreno         97.9-96/-56-98% RA BP down to 85/44              (X) * Nausea and vomiting absent or controlled and acceptable for next level of care       (X) * Electrolytes normal or acceptable for next level of care       (X) * Oral hydration maintained       (X) * Pain absent or managed       7/17/2018 2:37 PM EDT by Porfirio Moreno         managed              ( ) * Surgery not indicated       ( ) * Discharge plans and education understood              Activity       ( ) * Ambulatory [B]              Routes       (X) * Oral hydration, medications, and diet       7/17/2018 2:37 PM EDT by Porfirio Moreno         GI lite diet                                          * Milestone              Additional Notes       No new events.  Tolerating diet. Abel Tse has decreased with colestid.               Plan       -Continue GI lite diet       -Stool studies pending per GI.  Will also check 24hr urine 5-HIAA as well as serum VIP level.       H/o SBO in 2013 s/p resection and lysis of adhesions       PSBO on admission; now resolved with conservative management (note pt was taking a large amount of imodium prior       Chronic diarrhea       C diff, stool culture negative       O&P, pancreatic elastase, calprotectin pending       Pt has f/u with Dr. Maribel Ma in clinic on 7/6; would recommend SIBO testing and consideration of colonoscopy       Continue Colestid BID       consult home health       buspar po x1       colestid po x2       neurontin po x3       protonix po x1           Intestinal Obstruction - Care Day 3 (7/2/2018) by Dayanara Gilliam        Review Status Review Entered       Completed 7/17/2018       Details              Care Day: 3 Care Date: 7/2/2018 Level of Care: Inpatient Floor       Guideline Day 2        Level Of Care       (X) Floor       7/17/2018 2:30 PM EDT by Mary Cota         surgical                     Clinical Status       (X) * Hypotension absent       7/17/2018 2:30 PM EDT by Mary Cota         BP 95/65              (X) * Nausea and vomiting absent or improved       (X) * Pain absent or managed       7/17/2018 2:30 PM EDT by Mary Cota         managed              (X) * Abdominal exam stable or improved       7/17/2018 2:30 PM EDT by Mary Cota         improved                     Routes       (X) IV fluids, medications       7/17/2018 2:30 PM EDT by Mary Cota         ivf 125cc/hr              (X) Diet as tolerated       7/17/2018 2:30 PM EDT by Mary Cota         GI lite diet                     Interventions       (X) * NG tube absent                                   * Milestone              Additional Notes       97.9-95/-% RA       Mental Retardation       Depression Hx       Anxiety Disorder       Seizure Disorder       -pt is able to tolerate po intake.  Home medications resumed       -seizure precaution               SHERIF       -due to diarrhea, poor po intake       -resolved with IVF               Diarrhea       -GI consulted               SBO       -manage by CRS.               1. Will add on additional stool studies to rule out infectious etiology.       2. Restart Colestid 1gm BID for now.       3. Continue GI lite diet        4.  Will need colonoscopy, this can be completed outpatient with Dr. Marixa Elias.       glu 109        stool cultures pending       consult GI       Neurontin po x3       protonix iv x1           Intestinal Obstruction - Care Day 2 (7/1/2018) by Cassie Wren        Review Status Review Entered       Completed 7/17/2018       Details              Care Day: 2 Care Date: 7/1/2018 Level of Care: Inpatient Floor       Guideline Day 1        Level Of Care       (X) Floor       7/17/2018 2:10 PM EDT by Lakeishae Tyson         surgical                     Clinical Status       (X) * Clinical Indications met [A]       7/17/2018 2:10 PM EDT by Lajune Tyson         Partial small bowel obstruction                     Routes       (X) IV fluids, medications       7/17/2018 2:10 PM EDT by Lajune Tyson         ivf 125cc/hr                     Interventions       (X) Abdominal imaging       7/17/2018 2:10 PM EDT by Lajune Tyson         CT abd -1. Small bowel obstruction. No evidence of perforation. 2. Trace perihepatic free intraperitoneal fluid. 3. Indeterminant subcentimeter renal hypodensities, difficult to further characterize due to small size. Renal mass CT or MR may be helpf                                          * Milestone              Additional Notes       98.1-90/-54-97% RA       Active Problems:         Partial small bowel obstruction (HCC) (1/28/2013)               Mental Retardation       Depression Hx       Anxiety Disorder       Seizure Disorder       -Upon evaluation of previous documentation, pt is not on any AED's. Most recent note from 5/3/18 indicates that Tegretol was what he was last on, but even that was discontinued some time ago.        -Therefore pt is only on low dose Gabapentin for the above. Confirmed this with pt's step-father who is at bedside. Will resume this med PO as he is tolerating PO now.       -Can resume the rest of his meds tomorrow as he continues to improve / upon DC              SHERIF       -Likely secondary to decreased PO intake, and diarrhea       -Pt receiving IVF, likely to improve       -Repeat chemistry for tomorrow already ordered - monitor              SBO, improving       -As per Surgery       -Pt appears to be tolerating PO and having BM.  NGT was taken out earlier today.       plt 116        acetaminophen iv x2     Neurontin po x2       protonix iv x2

## 2018-07-23 ENCOUNTER — PATIENT OUTREACH (OUTPATIENT)
Dept: INTERNAL MEDICINE CLINIC | Age: 38
End: 2018-07-23

## 2018-07-23 NOTE — PROGRESS NOTES
Outgoing call placed for [facility]PER update [facility]. Patient identified utilizing 2 identifiers. Introduced self and reason for the call. [facility] verbalized understanding. [facility] reports patient continues to spend weekend at mother's residence and weekday at the group home. [facility] tolerating [facility]. [facility]'s current weight is 110 lbs. Patient caregiver state episodes of diarrhea have decreased. Will follow up with patient next week   Goals        Post Hospitalization     Prevent complications post hospitalization. 7/5/2018  · Patient and staff will be able to identify red flags for recurrence of SBO: dehydration, increased weakness, dark or blood streaks noted in stool, unable to have bowel movement, nausea, vomiting with fecal odor, fever or abdominal pain. · Patient to attend follow up appointment with Dr. Zayda Wallace (GI) 7/6/2018 and Dr. Anant Chandra on 7/13/2018. · Patient to be assessed by [facility] skilled nursing. 7/28/2018  · Patient not eligible for home health therapy as patient is utilizing outpatient physical therapy via [facility]. · Patient attended follow up appointment with providers.

## 2018-07-26 DIAGNOSIS — R63.4 WEIGHT LOSS: ICD-10-CM

## 2018-07-26 DIAGNOSIS — R63.6 UNDERWEIGHT: Primary | ICD-10-CM

## 2018-07-27 ENCOUNTER — OFFICE VISIT (OUTPATIENT)
Dept: URGENT CARE | Age: 38
End: 2018-07-27

## 2018-07-27 VITALS
BODY MASS INDEX: 15.02 KG/M2 | OXYGEN SATURATION: 98 % | SYSTOLIC BLOOD PRESSURE: 104 MMHG | RESPIRATION RATE: 18 BRPM | HEART RATE: 94 BPM | HEIGHT: 70 IN | DIASTOLIC BLOOD PRESSURE: 56 MMHG | TEMPERATURE: 97.2 F | WEIGHT: 104.9 LBS

## 2018-07-27 DIAGNOSIS — L30.9 DERMATITIS: Primary | ICD-10-CM

## 2018-07-27 RX ORDER — TRIAMCINOLONE ACETONIDE 1 MG/G
CREAM TOPICAL 2 TIMES DAILY
Qty: 30 G | Refills: 0 | Status: SHIPPED | OUTPATIENT
Start: 2018-07-27 | End: 2018-08-03

## 2018-07-27 NOTE — PATIENT INSTRUCTIONS
Follow up with pcp     Dermatitis: Care Instructions  Your Care Instructions  Dermatitis is the general name used for any rash or inflammation of the skin. Different kinds of dermatitis cause different kinds of rashes. Common causes of a rash include new medicines, plants (such as poison oak or poison ivy), heat, and stress. Certain illnesses can also cause a rash. An allergic reaction to something that touches your skin, such as latex, nickel, or poison ivy, is called contact dermatitis. Contact dermatitis may also be caused by something that irritates the skin, such as bleach, a chemical, or soap. These types of rashes cannot be spread from person to person. How long your rash will last depends on what caused it. Rashes may last a few days or months. Follow-up care is a key part of your treatment and safety. Be sure to make and go to all appointments, and call your doctor if you are having problems. It's also a good idea to know your test results and keep a list of the medicines you take. How can you care for yourself at home? · Do not scratch the rash. Cut your nails short, and file them smooth. Or wear gloves if this helps keep you from scratching. · Wash the area with water only. Pat dry. · Put cold, wet cloths on the rash to reduce itching. · Keep cool, and stay out of the sun. · Leave the rash open to the air as much as possible. · If the rash itches, use hydrocortisone cream. Follow the directions on the label. Calamine lotion may help for plant rashes. · Take an over-the-counter antihistamine, such as diphenhydramine (Benadryl) or loratadine (Claritin), to help calm the itching. Read and follow all instructions on the label. · If your doctor prescribed a cream, use it as directed. If your doctor prescribed medicine, take it exactly as directed. When should you call for help?   Call your doctor now or seek immediate medical care if:    · You have symptoms of infection, such as:  ¨ Increased pain, swelling, warmth, or redness. ¨ Red streaks leading from the area. ¨ Pus draining from the area. ¨ A fever.     · You have joint pain along with the rash.    Watch closely for changes in your health, and be sure to contact your doctor if:    · Your rash is changing or getting worse.     · You are not getting better as expected. Where can you learn more? Go to http://rachael-william.info/. Enter (47) [phone] in the search box to learn more about \"Dermatitis: Care Instructions. \"  Current as of: October 5, 2017  Content Version: 11.7  © 4771-0778 [employer]. Care instructions adapted under license by [employer] (which disclaims liability or warranty for this information). If you have questions about a medical condition or this instruction, always ask your healthcare professional. [address] any warranty or liability for your use of this information.

## 2018-07-27 NOTE — PROGRESS NOTES
Patient is a 42 y.o. male presenting with rash. Rash    The history is provided by the parent. This is a new problem. Episode onset: this morning, mother noticed red rash on lower legs; reports Carmencita Escalante is at baseline per mental status, normal appetite, no fever, no vomiting. The problem is associated with an unknown factor. There has been no fever. The rash is present on the left lower leg and right lower leg. The patient is experiencing no pain. Pertinent negatives include no itching and no pain. He has tried nothing for the symptoms. 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, without mention of intractable epilepsy 5/14/2011    Mental retardation     Osteoporosis     Other ill-defined conditions(799.89)     intestinal problems    Psychiatric disorder     anxiety    Seizure disorder [facility])         Past Surgical History:   Procedure Laterality Date    HX APPENDECTOMY  1/28/13    APPENDECTOMY    HX GI      colon resection    HX GI  1/28/13    LAPAROTOMY EXPLORATORY - OPEN LYSIS OF ADHESIONS     HX HERNIA REPAIR      HX OTHER SURGICAL      imperforated anus birth defect    UPPER GI ENDOSCOPY,BIOPSY  3/17/2017              Family History   Problem Relation Age of Onset    Heart Disease Father         Social History     Social History    Marital status: SINGLE     Spouse name: N/A    Number of children: N/A    Years of education: N/A     Occupational History    Not on file.      Social History Main Topics    Smoking status: Never Smoker    Smokeless tobacco: Never Used    Alcohol use No    Drug use: No    Sexual activity: No     Other Topics Concern    Not on file     Social History Narrative                ALLERGIES: Review of patient's allergies indicates no known allergies. Review of Systems   Constitutional: Negative for activity change, appetite change and fever. Respiratory: Negative for cough and shortness of breath. Gastrointestinal: Negative for vomiting. Skin: Positive for rash. Negative for itching. Vitals:    07/27/18 1303   BP: 104/56   Pulse: 94   Resp: 18   Temp: 97.2 °F (36.2 °C)   SpO2: 98%   Weight: 104 lb 14.4 oz (47.6 kg)   Height: 5' 10\" (1.778 m)       Physical Exam   Constitutional: He appears well-developed and well-nourished. No distress. Neurological: He is alert. Skin: Rash (BLE: scattered erythematous slightly raised papular lesions) noted. He is not diaphoretic. Nursing note and vitals reviewed. MDM    ICD-10-CM ICD-9-CM    1. Dermatitis L30.9 692.9      Medications Ordered Today   Medications    triamcinolone acetonide (KENALOG) 0.1 % topical cream     Sig: Apply  to affected area two (2) times a day for 7 days. use thin layer     Dispense:  30 g     Refill:  0     The patients condition was discussed with the patient and they understand. The patient is to follow up with PCP. If signs and symptoms become worse the pt is to go to the ER. The patient is to take medications as prescribed.              Procedures

## 2018-07-27 NOTE — MR AVS SNAPSHOT
[address] [facility] [address] 
[phone] Patient: Susannah Castillo MRN: [id number] FYE:[birth year] Visit Information Date & Time Provider Department Dept. Phone Encounter #  
 7/27/2018  1:00 PM [address] [facility] [phone] [id number] Your Appointments 8/22/2018 10:30 AM  
Follow Up with Santo Esposito NP [address] [facility] ([facility]) Appt Note: 3 month f/u leathw [place] [address] [facility] [address] [address] [phone]  
  
   
 [place] [address]  
  
    
 10/12/2018  2:30 PM  
ROUTINE CARE with Almaz Reese DO [facility] ([address] [facility]) Appt Note: 3 month f/u  
 [address] [facility] [address] [address]  
[phone]  
  
   
 [address] [place] Upcoming Health Maintenance Date Due DTaP/Tdap/Td series (1 - Tdap) 11/4/1999 Influenza Age 5 to Adult 8/31/2018* *Topic was postponed. The date shown is not the original due date. Allergies as of 7/27/2018  Review Complete On: 7/27/2018 By: Braydon Dunaway MD  
 No Known Allergies Current Immunizations  Reviewed on 2/6/2017 Name Date Influenza Vaccine 10/15/2016, 11/1/2014, 10/22/2013 Influenza Vaccine Split 10/21/2012 Pneumococcal Vaccine (Unspecified Type) 2/28/2012 Not reviewed this visit You Were Diagnosed With   
  
 Codes Comments Dermatitis    -  Primary ICD-10-CM: L30.9 ICD-9-CM: 692.9 Vitals BP Pulse Temp Resp Height(growth percentile) Weight(growth percentile) 104/56 94 97.2 °F (36.2 °C) 18 5' 10\" (1.778 m) 104 lb 14.4 oz (47.6 kg) SpO2 BMI Smoking Status 98% 15.05 kg/m2 Never Smoker BMI and BSA Data Body Mass Index Body Surface Area 15.05 kg/m 2 1.53 m 2 Preferred Pharmacy Pharmacy Name Phone [facility], [place] [address] [phone] Your Updated Medication List  
  
   
This list is accurate as of 7/27/18  1:26 PM.  Always use your most recent med list.  
  
  
  
  
 amitriptyline 50 mg tablet Commonly known as:  ELAVIL BENEFIBER CLEAR SF (DEXTRIN) PO Take  by mouth. brief disposable Misc Commonly known as:  adult  
by Does Not Apply route. Depends, size medium, 1 nightly  
  
 busPIRone 5 mg tablet Commonly known as:  BUSPAR Take 5 mg by mouth daily. Changed to one tab daily per Dr. Emile Fofana. CALCITRATE-VITAMIN D tablet Generic drug:  calcium citrate-vitamin D3 TAKE 1 TABLET BY MOUTH TWICE A DAY  
  
 colestipol 1 gram tablet Commonly known as:  COLESTID Take 1 Tab by mouth two (2) times a day. DEPLIN (ALGAL OIL) 7.5-90.314 mg Cap Generic drug:  levomefolate-algal oil Take  by mouth. DEPLIN PO Take 7.5 mg by mouth daily. fluticasone 50 mcg/actuation nasal spray Commonly known as:  FLONASE  
BLOW NOSE: 2 SPRAYS IN EACH NOSTRIL DAILY FOR ALLERGY.  
  
 gabapentin 100 mg capsule Commonly known as:  NEURONTIN Take 1 Cap by mouth three (3) times daily. melatonin 3 mg tablet Take 1 Tab by mouth nightly. MUCINEX 600 mg ER tablet Generic drug:  guaiFENesin ER Take 600 mg by mouth daily as needed for Congestion. OTHER  
L-Theanine 100 mg 1 daily OTHER Use to brush teeth as needed to prevent cavities,gingivitis, and plaque build up, OTHER Protein powder: please add 2 scoops of protein powder to 8-10 oz of soy milk twice a day ( with I of those times being lunch) and serve to patient. Fax tp [phone] when finished PROBIOTIC COMPLEX 25 billion cell -100 mg Cap Generic drug:  L.acid-B.bifidum-B.animal-FOS Take 25 mg by mouth daily. TAB-A-MARGIE tablet Generic drug:  multivitamin TAKE 1 TABLET BY MOUTH DAILY theanine 50 mg Tbdi Take  by mouth.  
  
 triamcinolone acetonide 0.1 % topical cream  
Commonly known as:  KENALOG Apply  to affected area two (2) times a day for 7 days. use thin layer TYLENOL 325 mg tablet Generic drug:  acetaminophen Take  by mouth every four (4) hours as needed for Pain. VITAMIN D2 50,000 unit capsule Generic drug:  ergocalciferol TAKE 1 CAPSULE BY MOUTH EVERY 3 MONTHS ON THE FIRST (Erika Octave) ZOFRAN 4 mg tablet Generic drug:  ondansetron hcl Take 4 mg by mouth every eight (8) hours as needed for Nausea. Prescriptions Sent to Pharmacy Refills  
 triamcinolone acetonide (KENALOG) 0.1 % topical cream 0 Sig: Apply  to affected area two (2) times a day for 7 days. use thin layer Class: Normal  
 Pharmacy: [address] [place] [address] #: [phone] Route: Topical  
  
Patient Instructions Follow up with pcp Dermatitis: Care Instructions Your Care Instructions Dermatitis is the general name used for any rash or inflammation of the skin. Different kinds of dermatitis cause different kinds of rashes. Common causes of a rash include new medicines, plants (such as poison oak or poison ivy), heat, and stress. Certain illnesses can also cause a rash. An allergic reaction to something that touches your skin, such as latex, nickel, or poison ivy, is called contact dermatitis. Contact dermatitis may also be caused by something that irritates the skin, such as bleach, a chemical, or soap. These types of rashes cannot be spread from person to person. How long your rash will last depends on what caused it. Rashes may last a few days or months. Follow-up care is a key part of your treatment and safety. Be sure to make and go to all appointments, and call your doctor if you are having problems.  It's also a good idea to know your test results and keep a list of the medicines you take. How can you care for yourself at home? · Do not scratch the rash. Cut your nails short, and file them smooth. Or wear gloves if this helps keep you from scratching. · Wash the area with water only. Pat dry. · Put cold, wet cloths on the rash to reduce itching. · Keep cool, and stay out of the sun. · Leave the rash open to the air as much as possible. · If the rash itches, use hydrocortisone cream. Follow the directions on the label. Calamine lotion may help for plant rashes. · Take an over-the-counter antihistamine, such as diphenhydramine (Benadryl) or loratadine (Claritin), to help calm the itching. Read and follow all instructions on the label. · If your doctor prescribed a cream, use it as directed. If your doctor prescribed medicine, take it exactly as directed. When should you call for help? Call your doctor now or seek immediate medical care if: 
  · You have symptoms of infection, such as: 
¨ Increased pain, swelling, warmth, or redness. ¨ Red streaks leading from the area. ¨ Pus draining from the area. ¨ A fever.  
  · You have joint pain along with the rash.  
 Watch closely for changes in your health, and be sure to contact your doctor if: 
  · Your rash is changing or getting worse.  
  · You are not getting better as expected. Where can you learn more? Go to http://rachael-william.info/. Enter [phone] in the search box to learn more about \"Dermatitis: Care Instructions. \" Current as of: October 5, 2017 Content Version: 11.7 © 3588-6273 [employer]. Care instructions adapted under license by [employer] (which disclaims liability or warranty for this information). If you have questions about a medical condition or this instruction, always ask your healthcare professional. [address]  any warranty or liability for your use of this information. Introducing [facility]! Dear Radha Ryan: Thank you for requesting a OnHand account. Our records indicate that you already have an active OnHand account. You can access your account anytime at [email or web address]. [employer]/Giraffe Friend Did you know that you can access your hospital and ER discharge instructions at any time in OnHand? You can also review all of your test results from your hospital stay or ER visit. Additional Information If you have questions, please visit the Frequently Asked Questions section of the OnHand website at [email or web address] [employer]/Giraffe Friend/. Remember, OnHand is NOT to be used for urgent needs. For medical emergencies, dial 911. Now available from your iPhone and Android! Please provide this summary of care documentation to your next provider. Your primary care clinician is listed as Kathy Robles. If you have any questions after today's visit, please call [phone].

## 2018-07-30 DIAGNOSIS — G47.00 INSOMNIA, UNSPECIFIED TYPE: ICD-10-CM

## 2018-07-30 RX ORDER — LANOLIN ALCOHOL/MO/W.PET/CERES
CREAM (GRAM) TOPICAL
Qty: 31 TAB | Refills: 10 | Status: SHIPPED | OUTPATIENT
Start: 2018-07-30 | End: 2019-05-14 | Stop reason: SDUPTHER

## 2018-08-01 ENCOUNTER — OFFICE VISIT (OUTPATIENT)
Dept: INTERNAL MEDICINE CLINIC | Age: 38
End: 2018-08-01

## 2018-08-01 ENCOUNTER — PATIENT OUTREACH (OUTPATIENT)
Dept: INTERNAL MEDICINE CLINIC | Age: 38
End: 2018-08-01

## 2018-08-01 VITALS
WEIGHT: 105.8 LBS | TEMPERATURE: 98.8 F | SYSTOLIC BLOOD PRESSURE: 94 MMHG | DIASTOLIC BLOOD PRESSURE: 59 MMHG | RESPIRATION RATE: 20 BRPM | BODY MASS INDEX: 15.15 KG/M2 | HEIGHT: 70 IN | HEART RATE: 105 BPM | OXYGEN SATURATION: 95 %

## 2018-08-01 DIAGNOSIS — B35.9 TINEA: Primary | ICD-10-CM

## 2018-08-01 DIAGNOSIS — L25.9 CONTACT DERMATITIS, UNSPECIFIED CONTACT DERMATITIS TYPE, UNSPECIFIED TRIGGER: ICD-10-CM

## 2018-08-01 RX ORDER — CHLORPHENIRAMINE MALEATE 4 MG
TABLET ORAL 2 TIMES DAILY
Qty: 15 G | Refills: 0 | Status: SHIPPED | OUTPATIENT
Start: 2018-08-01 | End: 2019-01-22

## 2018-08-01 NOTE — PROGRESS NOTES
Subjective:     Chief Complaint   Patient presents with    Rash     both legs    Chest Pain       History of Present Illness    Yessenia Cook is a 42y.o. year old male who is a patient of Dr. Fernando Daly that presents today with his  form JOHANNY for follow-up from . He was recently seen at [facility] and diagnosed with contact dermatitis to his bilateral legs and prescribed Kenalog. He states the rash to his right knee has improved with the cream.  However, he has a circular pruritic area to his left calf that has not improved. He denies any other new complaints at this time. NAD. No CP, SOB, GI, or  symptoms. Reviewed medications, recent lab work and imaging with patient. Pt reports compliance with medications. Current Outpatient Prescriptions on File Prior to Visit   Medication Sig Dispense Refill    melatonin 3 mg tablet TAKE ONE TABLET BY MOUTH AT BEDTIME 31 Tab 10    triamcinolone acetonide (KENALOG) 0.1 % topical cream Apply  to affected area two (2) times a day for 7 days. use thin layer 30 g 0    amitriptyline (ELAVIL) 50 mg tablet       VITAMIN D2 50,000 unit capsule TAKE 1 CAPSULE BY MOUTH EVERY 3 MONTHS ON THE FIRST (JAN, APRIL,JULY,OCTOBER) 1 Cap 0    ondansetron hcl (ZOFRAN) 4 mg tablet Take 4 mg by mouth every eight (8) hours as needed for Nausea.  colestipol (COLESTID) 1 gram tablet Take 1 Tab by mouth two (2) times a day. 60 Tab 2    CALCITRATE-VITAMIN D tablet TAKE 1 TABLET BY MOUTH TWICE A DAY 62 Tab PRN    gabapentin (NEURONTIN) 100 mg capsule Take 1 Cap by mouth three (3) times daily. 90 Cap 3    OTHER Protein powder: please add 2 scoops of protein powder to 8-10 oz of soy milk twice a day ( with I of those times being lunch) and serve to patient.   Fax tp [phone] when finished 1 Each 0    OTHER Use to brush teeth as needed to prevent cavities,gingivitis, and plaque build up, 1 Tube PRN    TAB-A-MARGIE tablet TAKE 1 TABLET BY MOUTH DAILY 31 Tab PRN    theanine 50 mg TbDi Take  by mouth.  busPIRone (BUSPAR) 5 mg tablet Take 5 mg by mouth daily. Changed to one tab daily per Dr. Vania Robbins.  OTHER L-Theanine 100 mg 1 daily 30 Tab 11    fluticasone (FLONASE) 50 mcg/actuation nasal spray BLOW NOSE: 2 SPRAYS IN EACH NOSTRIL DAILY FOR ALLERGY. 16 g 0    levomefolate-algal oil (DEPLIN, ALGAL OIL,) 7.5-90.314 mg cap Take  by mouth.  LEVOMEFOLATE CALCIUM (DEPLIN PO) Take 7.5 mg by mouth daily.  L.acid-B.bifidum-B.animal-FOS (PROBIOTIC COMPLEX) 25 billion cell -100 mg cap Take 25 mg by mouth daily.  WHEAT DEXTRIN (BENEFIBER CLEAR SF, DEXTRIN, PO) Take  by mouth.  acetaminophen (TYLENOL) 325 mg tablet Take  by mouth every four (4) hours as needed for Pain.  guaiFENesin ER (MUCINEX) 600 mg ER tablet Take 600 mg by mouth daily as needed for Congestion.  brief disposable (ADULT) misc by Does Not Apply route. Depends, size medium, 1 nightly 1 Package 11     No current facility-administered medications on file prior to visit.         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, without mention of intractable epilepsy 5/14/2011    Mental retardation     Osteoporosis     Other ill-defined conditions(739.71)     intestinal problems    Psychiatric disorder     anxiety    Seizure disorder [facility])       Past Surgical History:   Procedure Laterality Date    HX APPENDECTOMY  1/28/13    APPENDECTOMY    HX GI      colon resection    HX GI  1/28/13    LAPAROTOMY EXPLORATORY - OPEN LYSIS OF ADHESIONS     HX HERNIA REPAIR      HX OTHER SURGICAL      imperforated anus birth defect    UPPER GI ENDOSCOPY,BIOPSY  3/17/2017           Social History   Substance Use Topics    Smoking status: Never Smoker    Smokeless tobacco: Never Used    Alcohol use No      Family History   Problem Relation Age of Onset    Heart Disease Father         Objective:     Vitals:    08/01/18 1434   BP: 94/59   Pulse: (!) 105   Resp: 20   Temp: 98.8 °F (37.1 °C)   TempSrc: Oral   SpO2: 95%   Weight: 105 lb 12.8 oz (48 kg)   Height: 5' 10\" (1.778 m)       Review of Systems   Constitutional: Negative. HENT: Negative. Eyes: Negative. Respiratory: Negative. Cardiovascular: Negative. Gastrointestinal: Negative. Genitourinary: Negative. Musculoskeletal: Negative. Skin: Positive for itching and rash. Neurological: Negative. Psychiatric/Behavioral: Negative. Physical Exam   Constitutional: He is oriented to person, place, and time. He appears well-developed and well-nourished. No distress. Pleasant,  cognitivelty a bit slow   HENT:   Head: Normocephalic and atraumatic. Mouth/Throat: Oropharynx is clear and moist.   Eyes: Conjunctivae are normal.   Neck: Normal range of motion. Neck supple. No JVD present. No thyromegaly present. Cardiovascular: Normal rate, regular rhythm, normal heart sounds and intact distal pulses. No murmur heard. Pulmonary/Chest: Effort normal and breath sounds normal. No respiratory distress. He has no wheezes. He has no rales. Abdominal: Soft. Bowel sounds are normal. He exhibits no distension and no mass. There is no tenderness. There is no rebound and no guarding. Chronic surgical scars   Musculoskeletal: He exhibits no edema or tenderness. Neurological: He is alert and oriented to person, place, and time. Coordination abnormal.   Skin: Skin is warm and dry. Rash noted. Erythematous and pruritic skin lesions to right knee  +dry fissured skin  Dime sized pruritic circular red area to left calf. Psychiatric: His behavior is normal.   Repeats words  Chronic cognitive deficit , about same   Nursing note and vitals reviewed.       Assessment/ Plan:   .Diagnoses and all orders for this visit: 1. Tinea   Will order  -     clotrimazole (LOTRIMIN) 1 % topical cream; Apply  to affected area two (2) times a day. 2. Contact dermatitis, unspecified contact dermatitis type, unspecified trigger   Continue with Kenalog BID    Patient's plan of care has been reviewed with them. Patient and/or family have verbally conveyed their understanding and agreement of the patient's signs, symptoms, diagnosis, treatment and prognosis and additionally agree to follow up as recommended or return to [facility] should their condition change prior to follow-up. Discharge instructions have also been provided to the patient with some educational information regarding their diagnosis as well a list of reasons why they would want to return to the office prior to their follow-up appointment should their condition change. Follow-up with Dr. Addis Orozco as scheduled.

## 2018-08-01 NOTE — PATIENT INSTRUCTIONS
Dermatitis: Care Instructions  Your Care Instructions  Dermatitis is the general name used for any rash or inflammation of the skin. Different kinds of dermatitis cause different kinds of rashes. Common causes of a rash include new medicines, plants (such as poison oak or poison ivy), heat, and stress. Certain illnesses can also cause a rash. An allergic reaction to something that touches your skin, such as latex, nickel, or poison ivy, is called contact dermatitis. Contact dermatitis may also be caused by something that irritates the skin, such as bleach, a chemical, or soap. These types of rashes cannot be spread from person to person. How long your rash will last depends on what caused it. Rashes may last a few days or months. Follow-up care is a key part of your treatment and safety. Be sure to make and go to all appointments, and call your doctor if you are having problems. It's also a good idea to know your test results and keep a list of the medicines you take. How can you care for yourself at home? · Do not scratch the rash. Cut your nails short, and file them smooth. Or wear gloves if this helps keep you from scratching. · Wash the area with water only. Pat dry. · Put cold, wet cloths on the rash to reduce itching. · Keep cool, and stay out of the sun. · Leave the rash open to the air as much as possible. · If the rash itches, use hydrocortisone cream. Follow the directions on the label. Calamine lotion may help for plant rashes. · Take an over-the-counter antihistamine, such as diphenhydramine (Benadryl) or loratadine (Claritin), to help calm the itching. Read and follow all instructions on the label. · If your doctor prescribed a cream, use it as directed. If your doctor prescribed medicine, take it exactly as directed. When should you call for help?   Call your doctor now or seek immediate medical care if:    · You have symptoms of infection, such as:  ¨ Increased pain, swelling, warmth, or redness. ¨ Red streaks leading from the area. ¨ Pus draining from the area. ¨ A fever.     · You have joint pain along with the rash.    Watch closely for changes in your health, and be sure to contact your doctor if:    · Your rash is changing or getting worse.     · You are not getting better as expected. Where can you learn more? Go to http://rachael-william.info/. Enter ([phone] 8901 2279 in the search box to learn more about \"Dermatitis: Care Instructions. \"  Current as of: October 5, 2017  Content Version: 11.7  © 1675-3392 [employer]. Care instructions adapted under license by [employer] (which disclaims liability or warranty for this information). If you have questions about a medical condition or this instruction, always ask your healthcare professional. [address] any warranty or liability for your use of this information. Ringworm: Care Instructions  Your Care Instructions  Ringworm is a fungus infection of the skin. It is not caused by a worm. Ringworm causes a round, scaly rash that may crack and itch. The rash can spread over a wide area. One type of fungus that causes ringworm is often found in locker rooms and swimming pools. It grows well in warm, moist areas of the skin, such as in skin folds. You can get ringworm by sharing towels, clothing, and sports equipment. You can also get it by touching someone who has ringworm. Ringworm is treated with cream that kills the fungus. If the rash is widespread, you may need pills to get rid of it. Ringworm often comes back after treatment. If the rash becomes infected with bacteria, you may need antibiotics. Follow-up care is a key part of your treatment and safety. Be sure to make and go to all appointments, and call your doctor if you are having problems. It's also a good idea to know your test results and keep a list of the medicines you take.   How can you care for yourself at home?  · Take your medicines exactly as prescribed. Call your doctor if you have any problems with your medicine. · Wash the rash with soap and water, remove flaky skin, and dry thoroughly. · Try an over-the-counter cream with clotrimazole or miconazole in it. Brand names include Lotrimin, Micatin, and Tinactin. Terbinafine cream (Lamisil) is also available without a prescription. Spread the cream beyond the edge or border of the rash. Follow the directions on the package. Do not stop using the medicine just because your skin clears up. You will probably need to continue treatment for 2 to 4 weeks. · To keep from getting another infection:  ¨ Do not go barefoot in public places such as gyms or locker rooms. Avoid sharing towels and clothes. Use flip-flops or some other type of shoe in the shower. ¨ Do not wear tight clothes or let your skin stay damp for long periods, such as by staying in a wet bathing suit or sweaty clothes. When should you call for help? Call your doctor now or seek immediate medical care if:    · You have signs of infection such as:  ¨ Pain, warmth, or swelling in your skin. ¨ Red streaks near a wound in the skin. ¨ Pus coming from the rash on your skin. ¨ A fever.    Watch closely for changes in your health, and be sure to contact your doctor if:    · Your ringworm does not improve after 2 weeks of treatment.     · You do not get better as expected. Where can you learn more? Go to http://rachael-william.info/. Enter A772 in the search box to learn more about \"Ringworm: Care Instructions. \"  Current as of: October 5, 2017  Content Version: 11.7  © 4114-0870 [employer]. Care instructions adapted under license by [employer] (which disclaims liability or warranty for this information).  If you have questions about a medical condition or this instruction, always ask your healthcare professional. [employer] disclaims any warranty or liability for your use of this information.

## 2018-08-01 NOTE — PROGRESS NOTES
Chief Complaint   Patient presents with    Rash     both legs     1. Have you been to the ER, urgent care clinic since your last visit? Hospitalized since your last visit? No  URGENT CARE    2. Have you seen or consulted any other health care providers outside of the [address] [place] [address] since your last visit? Include any pap smears or colon screening.  no

## 2018-08-01 NOTE — MR AVS SNAPSHOT
[address] [facility] [address] Roselyn Goldstein 13 
[phone] Patient: Yessenia Cook MRN:  [id number] Visit Information Date & Time Provider Department Dept. Phone Encounter #  
 8/1/2018  2:20 PM Zenia Nye NP [facility] [phone] [id number] Your Appointments 8/22/2018 10:30 AM  
Follow Up with Jose Andrew NP [address] [facility] ([facility]) Appt Note: 3 month f/u leathw [place] [address] [facility] [address] [facility] 99 [address] [phone]  
  
   
 [place] [address]  
  
    
 10/12/2018  2:30 PM  
ROUTINE CARE with Lovely Sidhu DO [facility] ([facility]) Appt Note: 3 month f/u  
 [address] [facility] [address] [facility] 102 [facility] [address] [facility] [address]  
[phone]  
  
   
 [address] [facility] [address] Upcoming Health Maintenance Date Due DTaP/Tdap/Td series (1 - Tdap) 11/4/1999 Influenza Age 5 to Adult 8/31/2018* *Topic was postponed. The date shown is not the original due date. Allergies as of 8/1/2018  Review Complete On: 8/1/2018 By: Zenia Nye NP No Known Allergies Current Immunizations  Reviewed on 2/6/2017 Name Date Influenza Vaccine 10/15/2016, 11/1/2014, 10/22/2013 Influenza Vaccine Split 10/21/2012 Pneumococcal Vaccine (Unspecified Type) 2/28/2012 Not reviewed this visit You Were Diagnosed With   
  
 Codes Comments Tinea    -  Primary ICD-10-CM: B35.9 ICD-9-CM: 110.9 Vitals BP Pulse Temp Resp Height(growth percentile) Weight(growth percentile) 94/59 (BP 1 Location: Right arm, BP Patient Position: Sitting) (!) 105 98.8 °F (37.1 °C) (Oral) 20 5' 10\" (1.778 m) 105 lb 12.8 oz (48 kg) SpO2 BMI Smoking Status 95% 15.18 kg/m2 Never Smoker Vitals History BMI and BSA Data Body Mass Index Body Surface Area  
 15.18 kg/m 2 1.54 m 2 Preferred Pharmacy Pharmacy Name Phone [facility], [place] [address] [phone] Your Updated Medication List  
  
   
This list is accurate as of 8/1/18  2:51 PM.  Always use your most recent med list.  
  
  
  
  
 amitriptyline 50 mg tablet Commonly known as:  ELAVIL BENEFIBER CLEAR SF (DEXTRIN) PO Take  by mouth. brief disposable Misc Commonly known as:  adult  
by Does Not Apply route. Depends, size medium, 1 nightly  
  
 busPIRone 5 mg tablet Commonly known as:  BUSPAR Take 5 mg by mouth daily. Changed to one tab daily per Dr. Torrey Malcolm. CALCITRATE-VITAMIN D tablet Generic drug:  calcium citrate-vitamin D3 TAKE 1 TABLET BY MOUTH TWICE A DAY clotrimazole 1 % topical cream  
Commonly known as:  See Manuela Apply  to affected area two (2) times a day. colestipol 1 gram tablet Commonly known as:  COLESTID Take 1 Tab by mouth two (2) times a day. DEPLIN (ALGAL OIL) 7.5-90.314 mg Cap Generic drug:  levomefolate-algal oil Take  by mouth. DEPLIN PO Take 7.5 mg by mouth daily. fluticasone 50 mcg/actuation nasal spray Commonly known as:  FLONASE  
BLOW NOSE: 2 SPRAYS IN EACH NOSTRIL DAILY FOR ALLERGY.  
  
 gabapentin 100 mg capsule Commonly known as:  NEURONTIN Take 1 Cap by mouth three (3) times daily. melatonin 3 mg tablet TAKE ONE TABLET BY MOUTH AT BEDTIME  
  
 MUCINEX 600 mg ER tablet Generic drug:  guaiFENesin ER Take 600 mg by mouth daily as needed for Congestion. OTHER  
L-Theanine 100 mg 1 daily OTHER Use to brush teeth as needed to prevent cavities,gingivitis, and plaque build up, OTHER Protein powder: please add 2 scoops of protein powder to 8-10 oz of soy milk twice a day ( with I of those times being lunch) and serve to patient. Fax tp [phone] when finished PROBIOTIC COMPLEX 25 billion cell -100 mg Cap Generic drug:  L.acid-B.bifidum-B.animal-FOS Take 25 mg by mouth daily. TAB-A-MARGIE tablet Generic drug:  multivitamin TAKE 1 TABLET BY MOUTH DAILY theanine 50 mg Tbdi Take  by mouth.  
  
 triamcinolone acetonide 0.1 % topical cream  
Commonly known as:  KENALOG Apply  to affected area two (2) times a day for 7 days. use thin layer TYLENOL 325 mg tablet Generic drug:  acetaminophen Take  by mouth every four (4) hours as needed for Pain. VITAMIN D2 50,000 unit capsule Generic drug:  ergocalciferol TAKE 1 CAPSULE BY MOUTH EVERY 3 MONTHS ON THE FIRST (Reyes Do) ZOFRAN 4 mg tablet Generic drug:  ondansetron hcl Take 4 mg by mouth every eight (8) hours as needed for Nausea. Prescriptions Sent to Pharmacy Refills  
 clotrimazole (LOTRIMIN) 1 % topical cream 0 Sig: Apply  to affected area two (2) times a day. Class: Normal  
 Pharmacy: [address] [place] [address] #: [phone] Route: Topical  
  
 Please provide this summary of care documentation to your next provider. Your primary care clinician is listed as Bret Alva. If you have any questions after today's visit, please call [phone].

## 2018-08-01 NOTE — PROGRESS NOTES
Met with patient and caregiver at office visit with NP. Patient s/p visit to urgency care due to c/o rash on legs. Per urgent care documentation patient dx w/ dermatitis prescribed Kenalog cream to use times 7 days. Patient alert cooperative no concerns voiced tolerating diet. Introduced self. Will follow up with patient next week     8/1/2018  Goals        Post Hospitalization     Prevent complications post hospitalization. 7/5/2018  · Patient and staff will be able to identify red flags for recurrence of SBO: dehydration, increased weakness, dark or blood streaks noted in stool, unable to have bowel movement, nausea, vomiting with fecal odor, fever or abdominal pain. · Patient to attend follow up appointment with Dr. Anita Wei (GI) 7/6/2018 and Dr. Mira Root on 7/13/2018. · Patient to be assessed by [facility] skilled nursing. 7/28/2018  · Patient not eligible for home health therapy as patient is utilizing outpatient physical therapy via [facility]. · Patient attended follow up appointment with providers.

## 2018-08-02 RX ORDER — CARBOXYMETHYLCELLULOSE SODIUM 5 MG/ML
1 SOLUTION/ DROPS OPHTHALMIC 2 TIMES DAILY
Qty: 30 ML | Refills: 0 | Status: SHIPPED | OUTPATIENT
Start: 2018-08-02 | End: 2019-05-20 | Stop reason: SDUPTHER

## 2018-08-16 ENCOUNTER — PATIENT OUTREACH (OUTPATIENT)
Dept: INTERNAL MEDICINE CLINIC | Age: 38
End: 2018-08-16

## 2018-08-16 NOTE — PROGRESS NOTES
Patient has graduated from the Transitions of Care Coordination  program on 8/16/2018. Patient's symptoms are stable at this time. Patient/family has the ability to self-manage. Care management goals have been completed at this time. No further nurse navigator follow up scheduled. Goals Addressed        Post Hospitalization     COMPLETED: Prevent complications post hospitalization. 7/5/2018  · Patient and staff will be able to identify red flags for recurrence of SBO: dehydration, increased weakness, dark or blood streaks noted in stool, unable to have bowel movement, nausea, vomiting with fecal odor, fever or abdominal pain. · Patient to attend follow up appointment with Dr. Giuseppe Haney (GI) 7/6/2018 and Dr. Judit Villa on 7/13/2018. · Patient to be assessed by [facility] skilled nursing. 7/28/2018  · Patient not eligible for home health therapy as patient is utilizing outpatient physical therapy via [facility]. · Patient attended follow up appointment with providers. Pt has nurse navigator's contact information for any further questions, concerns, or needs.   Patients upcoming visits:  Future Appointments  Date Time Provider [place]   9/10/2018 2:30 PM RICHY Ta   10/12/2018 2:30 PM Meryle Sager, DO [address] [facility] [address]

## 2018-08-29 DIAGNOSIS — R25.1 TREMOR: ICD-10-CM

## 2018-08-29 DIAGNOSIS — F41.9 ANXIETY: ICD-10-CM

## 2018-08-29 RX ORDER — GABAPENTIN 100 MG/1
CAPSULE ORAL
Qty: 90 CAP | Refills: 98 | Status: SHIPPED | OUTPATIENT
Start: 2018-08-29 | End: 2018-09-10 | Stop reason: SDUPTHER

## 2018-09-10 ENCOUNTER — OFFICE VISIT (OUTPATIENT)
Dept: NEUROLOGY | Age: 38
End: 2018-09-10

## 2018-09-10 VITALS
HEART RATE: 57 BPM | HEIGHT: 70 IN | BODY MASS INDEX: 15.25 KG/M2 | SYSTOLIC BLOOD PRESSURE: 108 MMHG | WEIGHT: 106.5 LBS | DIASTOLIC BLOOD PRESSURE: 72 MMHG | RESPIRATION RATE: 20 BRPM | OXYGEN SATURATION: 95 %

## 2018-09-10 DIAGNOSIS — G93.49 STATIC ENCEPHALOPATHY: ICD-10-CM

## 2018-09-10 DIAGNOSIS — R25.1 TREMOR: Primary | ICD-10-CM

## 2018-09-10 DIAGNOSIS — F41.9 ANXIETY: ICD-10-CM

## 2018-09-10 RX ORDER — GABAPENTIN 100 MG/1
200 CAPSULE ORAL 3 TIMES DAILY
Qty: 180 CAP | Refills: 11 | Status: SHIPPED | OUTPATIENT
Start: 2018-09-10 | End: 2019-04-03 | Stop reason: SDUPTHER

## 2018-09-10 NOTE — PROGRESS NOTES
Date:  09/10/18     Name:  Nelida Srinivasan  :  1980  MRN:  [id number]     PCP:  Lori Chamorro DO    Chief Complaint   Patient presents with    Follow-up     tremor      HISTORY OF PRESENT ILLNESS: Follow up for tremors, anxiety, and failure to thrive. He is accompanied by his mother and a caregiver. Mother indicates that the gabapentin does seem to have helped some. However, the tremor did come back some after two months. This is still better than it was. The caregiver concurs. His cognitive issues are a little better though they have noticed he has to focus harder. He has been more likely to engage in conversation and follows directions a bit better. Except as noted above, denies  fever, chills, cough. No CP or SOB. No dysuria, loss of bowel or bladder control. No Weight loss. Appetite good. Sleeping well. No sweats. No edema. No bruising or bleeding. No nausea or vomit. No diarrhea. No frequency, urgency, No depressive sxs. No anxiety. Denies sore throat, nasal congestion, nasal discharge, epistaxis, tinnitus, hearing loss, back pain, muscle pain, or joint pain. Current Outpatient Prescriptions   Medication Sig    gabapentin (NEURONTIN) 100 mg capsule TAKE (1) CAPSULE BY MOUTH THREE TIMES DAILY.  food supplemt, lactose-reduced (PROTEIN NUTRITIONAL SHAKE) liqd Take 237 mL by mouth three (3) times daily.  carboxymethylcellulose sodium (REFRESH TEARS) 0.5 % drop ophthalmic solution Administer 1 Drop to both eyes two (2) times a day.  clotrimazole (LOTRIMIN) 1 % topical cream Apply  to affected area two (2) times a day.  melatonin 3 mg tablet TAKE ONE TABLET BY MOUTH AT BEDTIME    amitriptyline (ELAVIL) 50 mg tablet     VITAMIN D2 50,000 unit capsule TAKE 1 CAPSULE BY MOUTH EVERY 3 MONTHS ON THE FIRST (, APRIL,JULY,OCTOBER)    ondansetron hcl (ZOFRAN) 4 mg tablet Take 4 mg by mouth every eight (8) hours as needed for Nausea.     colestipol (COLESTID) 1 gram tablet Take 1 Tab by mouth two (2) times a day. (Patient taking differently: Take 1 g by mouth two (2) times a day. Can also have it as needed one more time during the day)    CALCITRATE-VITAMIN D tablet TAKE 1 TABLET BY MOUTH TWICE A DAY    OTHER Protein powder: please add 2 scoops of protein powder to 8-10 oz of soy milk twice a day ( with I of those times being lunch) and serve to patient. Fax tp [phone] when finished    OTHER Use to brush teeth as needed to prevent cavities,gingivitis, and plaque build up,    TAB-A-MARGIE tablet TAKE 1 TABLET BY MOUTH DAILY    theanine 50 mg TbDi Take  by mouth.  busPIRone (BUSPAR) 5 mg tablet Take 5 mg by mouth daily. Changed to one tab daily per Dr. Alaina Reis.  OTHER L-Theanine 100 mg 1 daily    fluticasone (FLONASE) 50 mcg/actuation nasal spray BLOW NOSE: 2 SPRAYS IN EACH NOSTRIL DAILY FOR ALLERGY.  levomefolate-algal oil (DEPLIN, ALGAL OIL,) 7.5-90.314 mg cap Take  by mouth.  LEVOMEFOLATE CALCIUM (DEPLIN PO) Take 7.5 mg by mouth daily.  L.acid-B.bifidum-B.animal-FOS (PROBIOTIC COMPLEX) 25 billion cell -100 mg cap Take 25 mg by mouth daily.  WHEAT DEXTRIN (BENEFIBER CLEAR SF, DEXTRIN, PO) Take  by mouth.  acetaminophen (TYLENOL) 325 mg tablet Take  by mouth every four (4) hours as needed for Pain.  guaiFENesin ER (MUCINEX) 600 mg ER tablet Take 600 mg by mouth daily as needed for Congestion.  brief disposable (ADULT) misc by Does Not Apply route. Depends, size medium, 1 nightly     No current facility-administered medications for this visit.       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, without mention of intractable epilepsy 5/14/2011    Mental retardation     Osteoporosis     Other ill-defined conditions(538.28) intestinal problems    Psychiatric disorder     anxiety    Seizure disorder [facility])      Past Surgical History:   Procedure Laterality Date    HX APPENDECTOMY  1/28/13    APPENDECTOMY    HX GI      colon resection    HX GI  1/28/13    LAPAROTOMY EXPLORATORY - OPEN LYSIS OF ADHESIONS     HX HERNIA REPAIR      HX OTHER SURGICAL      imperforated anus birth defect    UPPER GI ENDOSCOPY,BIOPSY  3/17/2017          Social History     Social History    Marital status: SINGLE     Spouse name: N/A    Number of children: N/A    Years of education: N/A     Occupational History    Not on file. Social History Main Topics    Smoking status: Never Smoker    Smokeless tobacco: Never Used    Alcohol use No    Drug use: No    Sexual activity: No     Other Topics Concern    Not on file     Social History Narrative     Family History   Problem Relation Age of Onset    Heart Disease Father        PHYSICAL EXAMINATION:    Visit Vitals    /72    Pulse (!) 57    Resp 20    Ht 5' 10\" (1.778 m)    Wt 48.3 kg (106 lb 8 oz)    SpO2 95%    BMI 15.28 kg/m2     General: Well defined, nourished, and groomed individual in no acute distress. Neck: Supple, nontender, no bruits, no pain with resistance to active range of motion. Heart: Regular rate and rhythm, no murmurs, rub, or gallop. Normal S1S2. Lungs: Clear to auscultation bilaterally with equal chest expansion, no cough, no wheeze  Musculoskeletal: Extremities revealed no edema and had full range of motion of joints. Psych: Good mood and bright affect      NEUROLOGICAL EXAMINATION:   Mental Status: Alert and oriented to person and place   Cranial Nerves:   II, III, IV, VI: Visual acuity grossly intact. Visual fields are normal.   Pupils are equal, round, and reactive to light and accommodation. Extra-ocular movements are full and fluid. Fundoscopic exam was benign, no ptosis or nystagmus. V-XII: Hearing is grossly intact.  Facial features are symmetric, with normal sensation and strength. The palate rises symmetrically and the tongue protrudes midline. Sternocleidomastoids 5/5. Motor Examination: decreased tone and bulk with generalized weakness. Coordination: Finger to nose was normal. No resting tremor. There is a noticeable tremor bilaterally, right greater than left. Gait and Station: Much steadier with rising and walking. He pins his hands to his sides with walking and has to be reminded to swing his arms. No pronator drift. No muscle wasting or fasiculations noted. Reflexes: DTRs 2+ throughout. ASSESSMENT AND PLAN    ICD-10-CM ICD-9-CM    1. Tremor R25.1 781.0 gabapentin (NEURONTIN) 100 mg capsule   2. Anxiety F41.9 300.00 gabapentin (NEURONTIN) 100 mg capsule   3. Static encephalopathy G93.49 742.9      Discussed his care with mother and caregiver. Mother was concerned about some mild potential sleeping issues and we discussed potential treatment for this. However, the anxiety, while better, is more of a concern as is the improved tremor and his weight, while stable. Discussed increasing the gabapentin to 200mg three times a day and this seemed to be the preference for the next step in his treatment. Follow up in three months  [address] [facility].  Nixon Moreland

## 2018-09-10 NOTE — MR AVS SNAPSHOT
[address] [place] [address] [address] [place] [facility] [id number] [phone] Patient: Sachin Meyers MRN:  [id number] Visit Information Date & Time Provider Department Dept. Phone Encounter #  
 9/10/2018  2:30 PM Ann-Marie Irvin NP [facility] [phone] [id number] Follow-up Instructions Return in about 3 months (around 12/10/2018). Your Appointments 10/12/2018  2:30 PM  
ROUTINE CARE with Rachell Lopes DO [facility] Internal [facility] ([address]) Appt Note: 3 month f/u  
 [address] [facility] [address] [facility] [address]  
[phone]  
  
   
 [address] [facility] [address] Upcoming Health Maintenance Date Due DTaP/Tdap/Td series (1 - Tdap) 11/4/1999 Influenza Age 5 to Adult 8/1/2018 MEDICARE YEARLY EXAM 8/9/2018 Allergies as of 9/10/2018  Review Complete On: 9/10/2018 By: Ann-Marie Irvin NP No Known Allergies Current Immunizations  Reviewed on 2/6/2017 Name Date Influenza Vaccine 10/15/2016, 11/1/2014, 10/22/2013 Influenza Vaccine Split 10/21/2012 Pneumococcal Vaccine (Unspecified Type) 2/28/2012 Not reviewed this visit You Were Diagnosed With   
  
 Codes Comments Tremor    -  Primary ICD-10-CM: R25.1 ICD-9-CM: 781.0 Anxiety     ICD-10-CM: F41.9 ICD-9-CM: 300.00 Static encephalopathy     ICD-10-CM: G93.49 
ICD-9-CM: 185. 9 Vitals BP Pulse Resp Height(growth percentile) Weight(growth percentile) SpO2  
 108/72 (!) 57 20 5' 10\" (1.778 m) 106 lb 8 oz (48.3 kg) 95% BMI Smoking Status 15.28 kg/m2 Never Smoker Vitals History BMI and BSA Data Body Mass Index Body Surface Area  
 15.28 kg/m 2 1.54 m 2 Preferred Pharmacy Pharmacy Name Phone [facility], [facility] 161 [phone] Your Updated Medication List  
  
   
This list is accurate as of 9/10/18  3:27 PM.  Always use your most recent med list.  
  
  
  
  
 amitriptyline 50 mg tablet Commonly known as:  ELAVIL BENEFIBER CLEAR SF (DEXTRIN) PO Take  by mouth. brief disposable Misc Commonly known as:  adult  
by Does Not Apply route. Depends, size medium, 1 nightly  
  
 busPIRone 5 mg tablet Commonly known as:  BUSPAR Take 5 mg by mouth daily. Changed to one tab daily per Dr. Shobha Jamil. CALCITRATE-VITAMIN D tablet Generic drug:  calcium citrate-vitamin D3 TAKE 1 TABLET BY MOUTH TWICE A DAY  
  
 carboxymethylcellulose sodium 0.5 % Drop ophthalmic solution Commonly known as:  REFRESH TEARS Administer 1 Drop to both eyes two (2) times a day. clotrimazole 1 % topical cream  
Commonly known as:  Claudene Kurtz Apply  to affected area two (2) times a day. colestipol 1 gram tablet Commonly known as:  COLESTID Take 1 Tab by mouth two (2) times a day. DEPLIN (ALGAL OIL) 7.5-90.314 mg Cap Generic drug:  levomefolate-algal oil Take  by mouth. DEPLIN PO Take 7.5 mg by mouth daily. fluticasone 50 mcg/actuation nasal spray Commonly known as:  FLONASE  
BLOW NOSE: 2 SPRAYS IN EACH NOSTRIL DAILY FOR ALLERGY. food supplemt, lactose-reduced Liqd Commonly known as:  PROTEIN NUTRITIONAL SHAKE Take 237 mL by mouth three (3) times daily. gabapentin 100 mg capsule Commonly known as:  NEURONTIN Take 2 Caps by mouth three (3) times daily. melatonin 3 mg tablet TAKE ONE TABLET BY MOUTH AT BEDTIME  
  
 MUCINEX 600 mg ER tablet Generic drug:  guaiFENesin ER Take 600 mg by mouth daily as needed for Congestion. OTHER  
L-Theanine 100 mg 1 daily OTHER Use to brush teeth as needed to prevent cavities,gingivitis, and plaque build up, OTHER Protein powder: please add 2 scoops of protein powder to 8-10 oz of soy milk twice a day ( with I of those times being lunch) and serve to patient. Fax tp [phone] when finished PROBIOTIC COMPLEX 25 billion cell -100 mg Cap Generic drug:  L.acid-B.bifidum-B.animal-FOS Take 25 mg by mouth daily. TAB-A-MARGIE tablet Generic drug:  multivitamin TAKE 1 TABLET BY MOUTH DAILY theanine 50 mg Tbdi Take  by mouth. TYLENOL 325 mg tablet Generic drug:  acetaminophen Take  by mouth every four (4) hours as needed for Pain. VITAMIN D2 50,000 unit capsule Generic drug:  ergocalciferol TAKE 1 CAPSULE BY MOUTH EVERY 3 MONTHS ON THE FIRST (Masha Vivek) ZOFRAN 4 mg tablet Generic drug:  ondansetron hcl Take 4 mg by mouth every eight (8) hours as needed for Nausea. Prescriptions Sent to Pharmacy Refills  
 gabapentin (NEURONTIN) 100 mg capsule 11 Sig: Take 2 Caps by mouth three (3) times daily. Class: Normal  
 Pharmacy: [address] [place] [address] #: [phone] Route: Oral  
  
Follow-up Instructions Return in about 3 months (around 12/10/2018). Patient Instructions A Healthy Lifestyle: Care Instructions Your Care Instructions A healthy lifestyle can help you feel good, stay at a healthy weight, and have plenty of energy for both work and play. A healthy lifestyle is something you can share with your whole family. A healthy lifestyle also can lower your risk for serious health problems, such as high blood pressure, heart disease, and diabetes. You can follow a few steps listed below to improve your health and the health of your family. Follow-up care is a key part of your treatment and safety. Be sure to make and go to all appointments, and call your doctor if you are having problems. It's also a good idea to know your test results and keep a list of the medicines you take. How can you care for yourself at home? · Do not eat too much sugar, fat, or fast foods. You can still have dessert and treats now and then. The goal is moderation. · Start small to improve your eating habits. Pay attention to portion sizes, drink less juice and soda pop, and eat more fruits and vegetables. ¨ Eat a healthy amount of food. A 3-ounce serving of meat, for example, is about the size of a deck of cards. Fill the rest of your plate with vegetables and whole grains. ¨ Limit the amount of soda and sports drinks you have every day. Drink more water when you are thirsty. ¨ Eat at least 5 servings of fruits and vegetables every day. It may seem like a lot, but it is not hard to reach this goal. A serving or helping is 1 piece of fruit, 1 cup of vegetables, or 2 cups of leafy, raw vegetables. Have an apple or some carrot sticks as an afternoon snack instead of a candy bar. Try to have fruits and/or vegetables at every meal. 
· Make exercise part of your daily routine. You may want to start with simple activities, such as walking, bicycling, or slow swimming. Try to be active 30 to 60 minutes every day. You do not need to do all 30 to 60 minutes all at once. For example, you can exercise 3 times a day for 10 or 20 minutes. Moderate exercise is safe for most people, but it is always a good idea to talk to your doctor before starting an exercise program. 
· Keep moving. Minoo Goltz the lawn, work in the garden, or [employer]. Take the stairs instead of the elevator at work. · If you smoke, quit. People who smoke have an increased risk for heart attack, stroke, cancer, and other lung illnesses. Quitting is hard, but there are ways to boost your chance of quitting tobacco for good. ¨ Use nicotine gum, patches, or lozenges. ¨ Ask your doctor about stop-smoking programs and medicines. ¨ Keep trying.  
In addition to reducing your risk of diseases in the future, you will notice some benefits soon after you stop using tobacco. If you have shortness of breath or asthma symptoms, they will likely get better within a few weeks after you quit. · Limit how much alcohol you drink. Moderate amounts of alcohol (up to 2 drinks a day for men, 1 drink a day for women) are okay. But drinking too much can lead to liver problems, high blood pressure, and other health problems. Family health If you have a family, there are many things you can do together to improve your health. · Eat meals together as a family as often as possible. · Eat healthy foods. This includes fruits, vegetables, lean meats and dairy, and whole grains. · Include your family in your fitness plan. Most people think of activities such as jogging or tennis as the way to fitness, but there are many ways you and your family can be more active. Anything that makes you breathe hard and gets your heart pumping is exercise. Here are some tips: 
¨ Walk to do errands or to take your child to school or the bus. ¨ Go for a family bike ride after dinner instead of watching TV. Where can you learn more? Go to http://rachael-william.info/. Enter Q246 in the search box to learn more about \"A Healthy Lifestyle: Care Instructions. \" Current as of: December 7, 2017 Content Version: 11.7 © 0843-6219 [employer]. Care instructions adapted under license by [employer] (which disclaims liability or warranty for this information). If you have questions about a medical condition or this instruction, always ask your healthcare professional. [place] [address] any warranty or liability for your use of this information. Please provide this summary of care documentation to your next provider. Your primary care clinician is listed as Alayna Gomes. If you have any questions after today's visit, please call [phone].

## 2018-09-10 NOTE — PATIENT INSTRUCTIONS

## 2018-09-10 NOTE — PROGRESS NOTES
Tremors did improve atleast 2 months, once it kicked in   Some days if he is really focused then it wont be bad, still has some cueing issues, follows directions a little bit better and with casual conversation   Still takes multiple prompts to finish a task or to get something completed

## 2018-10-12 ENCOUNTER — TELEPHONE (OUTPATIENT)
Dept: INTERNAL MEDICINE CLINIC | Age: 38
End: 2018-10-12

## 2018-10-12 ENCOUNTER — OFFICE VISIT (OUTPATIENT)
Dept: INTERNAL MEDICINE CLINIC | Age: 38
End: 2018-10-12

## 2018-10-12 VITALS
SYSTOLIC BLOOD PRESSURE: 101 MMHG | WEIGHT: 108 LBS | DIASTOLIC BLOOD PRESSURE: 64 MMHG | OXYGEN SATURATION: 98 % | BODY MASS INDEX: 15.46 KG/M2 | HEIGHT: 70 IN | HEART RATE: 84 BPM | TEMPERATURE: 98.4 F | RESPIRATION RATE: 20 BRPM

## 2018-10-12 DIAGNOSIS — Z98.890 HISTORY OF ABDOMINAL SURGERY: ICD-10-CM

## 2018-10-12 DIAGNOSIS — Z87.19 S/P SMALL BOWEL OBSTRUCTION: ICD-10-CM

## 2018-10-12 DIAGNOSIS — Z23 ENCOUNTER FOR IMMUNIZATION: ICD-10-CM

## 2018-10-12 DIAGNOSIS — R15.9 INCONTINENCE OF FECES, UNSPECIFIED FECAL INCONTINENCE TYPE: ICD-10-CM

## 2018-10-12 DIAGNOSIS — K59.1 FUNCTIONAL DIARRHEA: ICD-10-CM

## 2018-10-12 DIAGNOSIS — R63.6 UNDERWEIGHT: Primary | ICD-10-CM

## 2018-10-12 DIAGNOSIS — E55.9 VITAMIN D DEFICIENCY: ICD-10-CM

## 2018-10-12 DIAGNOSIS — F33.9 RECURRENT DEPRESSION (HCC): ICD-10-CM

## 2018-10-12 NOTE — PATIENT INSTRUCTIONS
Vaccine Information Statement    Influenza (Flu) Vaccine (Inactivated or Recombinant): What you need to know    Many Vaccine Information Statements are available in Divehi and other languages. See www.immunize.org/vis  Hojas de Información Sobre Vacunas están disponibles en Español y en muchos otros idiomas. Visite www.immunize.org/vis    1. Why get vaccinated? Influenza (flu) is a contagious disease that spreads around the [place] every year, usually between October and May. Flu is caused by influenza viruses, and is spread mainly by coughing, sneezing, and close contact. Anyone can get flu. Flu strikes suddenly and can last several days. Symptoms vary by age, but can include:   fever/chills   sore throat   muscle aches   fatigue   cough   headache    runny or stuffy nose    Flu can also lead to pneumonia and blood infections, and cause diarrhea and seizures in children. If you have a medical condition, such as heart or lung disease, flu can make it worse. Flu is more dangerous for some people. Infants and young children, people 72years of age and older, pregnant women, and people with certain health conditions or a weakened immune system are at greatest risk. Each year thousands of people in the [facility] die from flu, and many more are hospitalized. Flu vaccine can:   keep you from getting flu,   make flu less severe if you do get it, and   keep you from spreading flu to your family and other people. 2. Inactivated and recombinant flu vaccines    A dose of flu vaccine is recommended every flu season. Children 6 months through 6years of age may need two doses during the same flu season. Everyone else needs only one dose each flu season.        Some inactivated flu vaccines contain a very small amount of a mercury-based preservative called thimerosal. Studies have not shown thimerosal in vaccines to be harmful, but flu vaccines that do not contain thimerosal are available. There is no live flu virus in flu shots. They cannot cause the flu. There are many flu viruses, and they are always changing. Each year a new flu vaccine is made to protect against three or four viruses that are likely to cause disease in the upcoming flu season. But even when the vaccine doesnt exactly match these viruses, it may still provide some protection    Flu vaccine cannot prevent:   flu that is caused by a virus not covered by the vaccine, or   illnesses that look like flu but are not. It takes about 2 weeks for protection to develop after vaccination, and protection lasts through the flu season. 3. Some people should not get this vaccine    Tell the person who is giving you the vaccine:     If you have any severe, life-threatening allergies. If you ever had a life-threatening allergic reaction after a dose of flu vaccine, or have a severe allergy to any part of this vaccine, you may be advised not to get vaccinated. Most, but not all, types of flu vaccine contain a small amount of egg protein.  If you ever had Guillain-Barré Syndrome (also called GBS). Some people with a history of GBS should not get this vaccine. This should be discussed with your doctor.  If you are not feeling well. It is usually okay to get flu vaccine when you have a mild illness, but you might be asked to come back when you feel better. 4. Risks of a vaccine reaction    With any medicine, including vaccines, there is a chance of reactions. These are usually mild and go away on their own, but serious reactions are also possible. Most people who get a flu shot do not have any problems with it.      Minor problems following a flu shot include:    soreness, redness, or swelling where the shot was given     hoarseness   sore, red or itchy eyes   cough   fever   aches   headache   itching   fatigue  If these problems occur, they usually begin soon after the shot and last 1 or 2 days. More serious problems following a flu shot can include the following:     There may be a small increased risk of Guillain-Barré Syndrome (GBS) after inactivated flu vaccine. This risk has been estimated at 1 or 2 additional cases per million people vaccinated. This is much lower than the risk of severe complications from flu, which can be prevented by flu vaccine.  Young children who get the flu shot along with pneumococcal vaccine (PCV13) and/or DTaP vaccine at the same time might be slightly more likely to have a seizure caused by fever. Ask your doctor for more information. Tell your doctor if a child who is getting flu vaccine has ever had a seizure. Problems that could happen after any injected vaccine:      People sometimes faint after a medical procedure, including vaccination. Sitting or lying down for about 15 minutes can help prevent fainting, and injuries caused by a fall. Tell your doctor if you feel dizzy, or have vision changes or ringing in the ears.  Some people get severe pain in the shoulder and have difficulty moving the arm where a shot was given. This happens very rarely.  Any medication can cause a severe allergic reaction. Such reactions from a vaccine are very rare, estimated at about 1 in a million doses, and would happen within a few minutes to a few hours after the vaccination. As with any medicine, there is a very remote chance of a vaccine causing a serious injury or death. The safety of vaccines is always being monitored. For more information, visit: www.cdc.gov/vaccinesafety/    5. What if there is a serious reaction? What should I look for?  Look for anything that concerns you, such as signs of a severe allergic reaction, very high fever, or unusual behavior.     Signs of a severe allergic reaction can include hives, swelling of the face and throat, difficulty breathing, a fast heartbeat, dizziness, and weakness - usually within a few minutes to a few hours after the vaccination. What should I do?  If you think it is a severe allergic reaction or other emergency that cant wait, call 9-1-1 and get the person to the nearest hospital. Otherwise, call your doctor.  Reactions should be reported to the Vaccine Adverse Event Reporting System (VAERS). Your doctor should file this report, or you can do it yourself through  the VAERS web site at www.vaers. [facility].gov, or by calling 1-602.923.6782. VAERS does not give medical advice. 6. The National Vaccine Injury Compensation Program    The [facility] Vaccine Injury Compensation Program (VICP) is a federal program that was created to compensate people who may have been injured by certain vaccines. Persons who believe they may have been injured by a vaccine can learn about the program and about filing a claim by calling 7-227.940.7665 or visiting the [employer] website at www.[facility].gov/vaccinecompensation. There is a time limit to file a claim for compensation. 7. How can I learn more?  Ask your healthcare provider. He or she can give you the vaccine package insert or suggest other sources of information.  Call your local or state health department.  Contact the Centers for Disease Control and Prevention (CDC):  - Call 5-706.962.1143 (1-800-CDC-INFO) or  - Visit CDCs website at www.cdc.gov/flu    Vaccine Information Statement   Inactivated Influenza Vaccine   8/7/2015  42 CARL Thomas [id number]    Department of Health and Human Services  Centers for Disease Control and Prevention    Office Use Only

## 2018-10-12 NOTE — PROGRESS NOTES
Health Maintenance Due   Topic Date Due    DTaP/Tdap/Td series (1 - Tdap) 11/04/1999    Influenza Age 5 to Adult  08/01/2018    [address] [place] [address]  08/09/2018       Chief Complaint   Patient presents with    Depression    Osteoporosis    Hypotension       1. Have you been to the ER, urgent care clinic since your last visit? Hospitalized since your last visit? No    2. Have you seen or consulted any other health care providers outside of the [facility] since your last visit? Include any pap smears or colon screening. No    3) Do you have an Advance Directive on file? no    4) Are you interested in receiving information on Advance Directives? NO      Patient is accompanied by self I have received verbal consent from Samuel Sidhu to discuss any/all medical information while they are present in the room.

## 2018-10-12 NOTE — MR AVS SNAPSHOT
[address]  
[phone] Patient: Saravanan Alvarez MRN:  [id number] Visit Information Date & Time Provider Department Dept. Phone Encounter #  
 10/12/2018  2:30 PM Edward Lo [facility] Internal Medicine [phone] [id number] Follow-up Instructions Return in about 4 months (around 2/12/2019). Your Appointments 12/4/2018  2:30 PM  
Follow Up with Evens Pope NP [address] [facility] ([facility]) Appt Note: 3mo f/up tremor cr  
 [place] [address] [facility] [address] [phone]  
  
   
 [place] [address] [id number] [address] Upcoming Health Maintenance Date Due DTaP/Tdap/Td series (1 - Tdap) 11/4/1999 Influenza Age 5 to Adult 8/1/2018 MEDICARE YEARLY EXAM 8/9/2018 Allergies as of 10/12/2018  Review Complete On: 10/12/2018 By: Linnette Urbina,  No Known Allergies Current Immunizations  Reviewed on 2/6/2017 Name Date Influenza Vaccine 10/15/2016, 11/1/2014, 10/22/2013 Influenza Vaccine Split 10/21/2012 Pneumococcal Vaccine (Unspecified Type) 2/28/2012 Not reviewed this visit You Were Diagnosed With   
  
 Codes Comments Underweight    -  Primary ICD-10-CM: R63.6 ICD-9-CM: 783.22 History of abdominal surgery     ICD-10-CM: Z98.890 ICD-9-CM: V45.89 Vitamin D deficiency     ICD-10-CM: E55.9 ICD-9-CM: 268.9 Recurrent depression ([facility] [address]     ICD-10-CM: F33.9 ICD-9-CM: 296.30 Functional diarrhea     ICD-10-CM: K59.1 ICD-9-CM: 564.5 Encounter for immunization     ICD-10-CM: F08 ICD-9-CM: V03.89 Incontinence of feces, unspecified fecal incontinence type     ICD-10-CM: R15.9 ICD-9-CM: 787.60 S/p small bowel obstruction     ICD-10-CM: Z87.19 ICD-9-CM: V12.79 Vitals BP Pulse Temp Resp Height(growth percentile) Weight(growth percentile) 101/64 (BP 1 Location: Left arm, BP Patient Position: Sitting) 84 98.4 °F (36.9 °C) (Oral) 20 5' 10\" (1.778 m) 108 lb (49 kg) SpO2 BMI Smoking Status 98% 15.5 kg/m2 Never Smoker Vitals History BMI and BSA Data Body Mass Index Body Surface Area 15.5 kg/m 2 1.56 m 2 Preferred Pharmacy Pharmacy Name Phone [facility] L[facility], [facility]urch [phone] Your Updated Medication List  
  
   
This list is accurate as of 10/12/18  3:12 PM.  Always use your most recent med list.  
  
  
  
  
 amitriptyline 50 mg tablet Commonly known as:  ELAVIL BENEFIBER CLEAR SF (DEXTRIN) PO Take  by mouth. brief disposable Misc Commonly known as:  adult  
by Does Not Apply route. Depends, size medium, 1 nightly CALCITRATE-VITAMIN D tablet Generic drug:  calcium citrate-vitamin D3 TAKE 1 TABLET BY MOUTH TWICE A DAY  
  
 CAPRYLIC-CAPRIC TRIGLY (BULK)  
by Does Not Apply route. carboxymethylcellulose sodium 0.5 % Drop ophthalmic solution Commonly known as:  REFRESH TEARS Administer 1 Drop to both eyes two (2) times a day. clotrimazole 1 % topical cream  
Commonly known as:  Helayne Swift Apply  to affected area two (2) times a day. colestipol 1 gram tablet Commonly known as:  COLESTID Take 1 Tab by mouth two (2) times a day. DEPLIN (ALGAL OIL) 7.5-90.314 mg Cap Generic drug:  levomefolate-algal oil Take  by mouth. DEPLIN PO Take 7.5 mg by mouth daily. fluticasone 50 mcg/actuation nasal spray Commonly known as:  FLONASE  
BLOW NOSE: 2 SPRAYS IN EACH NOSTRIL DAILY FOR ALLERGY. food supplemt, lactose-reduced Liqd Commonly known as:  PROTEIN NUTRITIONAL SHAKE Take 237 mL by mouth three (3) times daily. gabapentin 100 mg capsule Commonly known as:  NEURONTIN  
 Take 2 Caps by mouth three (3) times daily. GRAPEFRUIT FORMULA PO Take  by mouth.  
  
 melatonin 3 mg tablet TAKE ONE TABLET BY MOUTH AT BEDTIME  
  
 MUCINEX 600 mg ER tablet Generic drug:  guaiFENesin ER Take 600 mg by mouth daily as needed for Congestion. OTHER  
L-Theanine 100 mg 1 daily OTHER Use to brush teeth as needed to prevent cavities,gingivitis, and plaque build up, OTHER Protein powder: please add 2 scoops of protein powder to 8-10 oz of soy milk twice a day ( with I of those times being lunch) and serve to patient. Fax tp [phone] when finished PROBIOTIC COMPLEX 25 billion cell -100 mg Cap Generic drug:  L.acid-B.bifidum-B.animal-FOS Take 25 mg by mouth daily. TAB-A-MARGIE tablet Generic drug:  multivitamin TAKE 1 TABLET BY MOUTH DAILY theanine 50 mg Tbdi Take  by mouth. TYLENOL 325 mg tablet Generic drug:  acetaminophen Take  by mouth every four (4) hours as needed for Pain. VITAMIN D2 50,000 unit capsule Generic drug:  ergocalciferol TAKE 1 CAPSULE BY MOUTH EVERY 3 MONTHS ON THE FIRST (Junius Blazing) ZOFRAN 4 mg tablet Generic drug:  ondansetron hcl Take 4 mg by mouth every eight (8) hours as needed for Nausea. Follow-up Instructions Return in about 4 months (around 2/12/2019). Patient Instructions Vaccine Information Statement Influenza (Flu) Vaccine (Inactivated or Recombinant): What you need to know Many Vaccine Information Statements are available in Yoruba and other languages. See www.immunize.org/vis Hojas de Información Sobre Vacunas están disponibles en Español y en muchos otros idiomas. Visite www.immunize.org/vis 1. Why get vaccinated? Influenza (flu) is a contagious disease that spreads around the [place] every year, usually between October and May.   
 
Flu is caused by influenza viruses, and is spread mainly by coughing, sneezing, and close contact. Anyone can get flu. Flu strikes suddenly and can last several days. Symptoms vary by age, but can include: 
 fever/chills  sore throat  muscle aches  fatigue  cough  headache  runny or stuffy nose Flu can also lead to pneumonia and blood infections, and cause diarrhea and seizures in children. If you have a medical condition, such as heart or lung disease, flu can make it worse. Flu is more dangerous for some people. Infants and young children, people 72years of age and older, pregnant women, and people with certain health conditions or a weakened immune system are at greatest risk. Each year thousands of people in the [facility] die from flu, and many more are hospitalized. Flu vaccine can: 
 keep you from getting flu, 
 make flu less severe if you do get it, and 
 keep you from spreading flu to your family and other people. 2. Inactivated and recombinant flu vaccines A dose of flu vaccine is recommended every flu season. Children 6 months through 6years of age may need two doses during the same flu season. Everyone else needs only one dose each flu season. Some inactivated flu vaccines contain a very small amount of a mercury-based preservative called thimerosal. Studies have not shown thimerosal in vaccines to be harmful, but flu vaccines that do not contain thimerosal are available. There is no live flu virus in flu shots. They cannot cause the flu. There are many flu viruses, and they are always changing. Each year a new flu vaccine is made to protect against three or four viruses that are likely to cause disease in the upcoming flu season. But even when the vaccine doesnt exactly match these viruses, it may still provide some protection Flu vaccine cannot prevent: 
 flu that is caused by a virus not covered by the vaccine, or 
 illnesses that look like flu but are not. It takes about 2 weeks for protection to develop after vaccination, and protection lasts through the flu season. 3. Some people should not get this vaccine Tell the person who is giving you the vaccine:  If you have any severe, life-threatening allergies. If you ever had a life-threatening allergic reaction after a dose of flu vaccine, or have a severe allergy to any part of this vaccine, you may be advised not to get vaccinated. Most, but not all, types of flu vaccine contain a small amount of egg protein.  If you ever had Guillain-Barré Syndrome (also called GBS). Some people with a history of GBS should not get this vaccine. This should be discussed with your doctor.  If you are not feeling well. It is usually okay to get flu vaccine when you have a mild illness, but you might be asked to come back when you feel better. 4. Risks of a vaccine reaction With any medicine, including vaccines, there is a chance of reactions. These are usually mild and go away on their own, but serious reactions are also possible. Most people who get a flu shot do not have any problems with it. Minor problems following a flu shot include:  
 soreness, redness, or swelling where the shot was given  hoarseness  sore, red or itchy eyes  cough  fever  aches  headache  itching  fatigue If these problems occur, they usually begin soon after the shot and last 1 or 2 days. More serious problems following a flu shot can include the following:  There may be a small increased risk of Guillain-Barré Syndrome (GBS) after inactivated flu vaccine. This risk has been estimated at 1 or 2 additional cases per million people vaccinated. This is much lower than the risk of severe complications from flu, which can be prevented by flu vaccine.    
 
 Young children who get the flu shot along with pneumococcal vaccine (PCV13) and/or DTaP vaccine at the same time might be slightly more likely to have a seizure caused by fever. Ask your doctor for more information. Tell your doctor if a child who is getting flu vaccine has ever had a seizure. Problems that could happen after any injected vaccine:  People sometimes faint after a medical procedure, including vaccination. Sitting or lying down for about 15 minutes can help prevent fainting, and injuries caused by a fall. Tell your doctor if you feel dizzy, or have vision changes or ringing in the ears.  Some people get severe pain in the shoulder and have difficulty moving the arm where a shot was given. This happens very rarely.  Any medication can cause a severe allergic reaction. Such reactions from a vaccine are very rare, estimated at about 1 in a million doses, and would happen within a few minutes to a few hours after the vaccination. As with any medicine, there is a very remote chance of a vaccine causing a serious injury or death. The safety of vaccines is always being monitored. For more information, visit: www.cdc.gov/vaccinesafety/ 
 
 
The [facility] Vaccine Injury Compensation Program (VICP) is a federal program that was created to compensate people who may have been injured by certain vaccines. Persons who believe they may have been injured by a vaccine can learn about the program and about filing a claim by calling 9-988.404.9944 or visiting the [employer] "[employer] website at www.[facility].gov/vaccinecompensation. There is a time limit to file a claim for compensation. 7. How can I learn more?  Ask your healthcare provider. He or she can give you the vaccine package insert or suggest other sources of information.  Call your local or state health department.  Contact the Centers for Disease Control and Prevention (CDC): 
- Call 6-299.483.4863 (1-800-CDC-INFO) or 
- Visit CDCs website at www.cdc.gov/flu Vaccine Information Statement Inactivated Influenza Vaccine 8/7/2015 
42 CARL Cuellar [id number] Department of [facility] and [employer] Centers for Disease Control and Prevention Office Use Only Please provide this summary of care documentation to your next provider. Your primary care clinician is listed as Jose Johnson. If you have any questions after today's visit, please call [phone].

## 2018-10-12 NOTE — PROGRESS NOTES
HISTORY OF PRESENT ILLNESS  Tyler Meadows is a 42 y.o. male. Pt. comes in with a staff member from his group home for f/u. Has multiple medical problems. He has mild cognitive deficit. He has chronic GI issues with intestinal surgery as a child. Has been followed by GI and colorectal surgery. Plans for colonoscopy in near future. Continues to have issues with fecal incontinence but Carlos De Los Santos is helping. Followed by psychiatrist.  Medications are helping his depression anxiety. Apparently had some blood work and stool studies for allergies. Taking some supplements and changes to his diet is been made. He has gained 2 pounds since last time. Depends on others for some ADLs. Reports compliance with medications and diet. Med list and most recent labs/studies reviewed with pt. Trying to be active physically as tolerated. No tobacco or alcohol use. Reports no other new c/o. Depression   Associated symptoms include abdominal pain. Pertinent negatives include no chest pain, no headaches and no shortness of breath. Osteoporosis   Associated symptoms include abdominal pain. Pertinent negatives include no chest pain, no headaches and no shortness of breath. Hypotension   Associated symptoms include abdominal pain. Pertinent negatives include no chest pain, no headaches and no shortness of breath. Review of Systems   Constitutional: Negative. HENT: Negative. Eyes: Negative. Respiratory: Negative for shortness of breath. Cardiovascular: Negative for chest pain and leg swelling. Gastrointestinal: Positive for abdominal pain and diarrhea (Fecal incontinence). Negative for blood in stool, heartburn, melena, nausea and vomiting. Stool incontinence   Genitourinary: Positive for urgency. Negative for dysuria. Musculoskeletal: Negative for back pain, falls and joint pain. Skin: Negative. Neurological: Positive for tremors and seizures.  Negative for dizziness, sensory change and headaches. Endo/Heme/Allergies: Negative. Psychiatric/Behavioral: Positive for depression. The patient is nervous/anxious and has insomnia. All other systems reviewed and are negative. Physical Exam   Constitutional: He is oriented to person, place, and time. He appears well-developed and well-nourished. No distress. Pleasant,  cognitivelty a bit slow   HENT:   Head: Normocephalic and atraumatic. Mouth/Throat: Oropharynx is clear and moist.   Eyes: Conjunctivae are normal.   Neck: Normal range of motion. Neck supple. No JVD present. No thyromegaly present. Cardiovascular: Normal rate, regular rhythm, normal heart sounds and intact distal pulses. No murmur heard. Pulmonary/Chest: Effort normal and breath sounds normal. No respiratory distress. He has no wheezes. He has no rales. Abdominal: Soft. Bowel sounds are normal. He exhibits no distension. There is no tenderness. Chronic surgical scars   Musculoskeletal: He exhibits no edema or tenderness. Neurological: He is alert and oriented to person, place, and time. Coordination abnormal.   Skin: Skin is warm and dry. No rash noted. Psychiatric: His behavior is normal.   Repeats words  Chronic cognitive deficit , about same   Nursing note and vitals reviewed. ASSESSMENT and PLAN  Diagnoses and all orders for this visit:    1. Underweight    2. History of abdominal surgery  -     REFERRAL TO GASTROENTEROLOGY    3. Vitamin D deficiency    4. Recurrent depression ([facility] [address]    5. Functional diarrhea    6. Encounter for immunization  -     Influenza virus vaccine (QUADRIVALENT PRES FREE SYRINGE) IM (60347)  -     Administration fee () for Medicare insured patients    7. Incontinence of feces, unspecified fecal incontinence type  -     REFERRAL TO GASTROENTEROLOGY    8. S/p small bowel obstruction      Follow-up Disposition:  Return in about 4 months (around 2/12/2019).    lab results and schedule of future lab studies reviewed with patient  reviewed diet, exercise and weight control  reviewed medications and side effects in detail  F/u with other MD's as scheduled  Overall stable

## 2018-11-30 ENCOUNTER — TELEPHONE (OUTPATIENT)
Dept: INTERNAL MEDICINE CLINIC | Age: 38
End: 2018-11-30

## 2018-11-30 NOTE — TELEPHONE ENCOUNTER
Wants a referral requisition to [facility] audiology to get his hearing tested    Fax to [phone]

## 2018-12-03 ENCOUNTER — DOCUMENTATION ONLY (OUTPATIENT)
Dept: INTERNAL MEDICINE CLINIC | Age: 38
End: 2018-12-03

## 2018-12-04 ENCOUNTER — OFFICE VISIT (OUTPATIENT)
Dept: NEUROLOGY | Age: 38
End: 2018-12-04

## 2018-12-04 ENCOUNTER — TELEPHONE (OUTPATIENT)
Dept: INTERNAL MEDICINE CLINIC | Age: 38
End: 2018-12-04

## 2018-12-04 VITALS
OXYGEN SATURATION: 98 % | BODY MASS INDEX: 14.88 KG/M2 | DIASTOLIC BLOOD PRESSURE: 72 MMHG | HEIGHT: 70 IN | WEIGHT: 103.9 LBS | SYSTOLIC BLOOD PRESSURE: 104 MMHG | HEART RATE: 70 BPM | RESPIRATION RATE: 20 BRPM

## 2018-12-04 DIAGNOSIS — G93.49 STATIC ENCEPHALOPATHY: ICD-10-CM

## 2018-12-04 DIAGNOSIS — R25.1 TREMOR: Primary | ICD-10-CM

## 2018-12-04 DIAGNOSIS — R53.1 WEAKNESS GENERALIZED: ICD-10-CM

## 2018-12-04 DIAGNOSIS — F41.9 SEVERE ANXIETY: ICD-10-CM

## 2018-12-04 DIAGNOSIS — H91.90 HEARING DIFFICULTY, UNSPECIFIED LATERALITY: Primary | ICD-10-CM

## 2018-12-04 DIAGNOSIS — R26.9 GAIT DISTURBANCE: ICD-10-CM

## 2018-12-04 DIAGNOSIS — R63.4 WEIGHT LOSS: ICD-10-CM

## 2018-12-04 NOTE — PROGRESS NOTES
On a medication that is holistic for his gut: called Candibactin-AR has been on it a couple of months to help regulate him she thinks its working     Tremors- today they are actually good   HE has good days and some bad days the bad days his hands are really shaking a  Lot

## 2018-12-04 NOTE — TELEPHONE ENCOUNTER
Patient called, spoke to [facility], Release of Information verified, name &  verified of patient, discussed location for requested referral for audiologist. Mother shared insurance is with  [employer] which does not cover referral visit at this time but [employer] is covering patient effective  which will cover. Mother concerned of  pts bowel output. Appt scheduled w/provider to discuss concerns.

## 2018-12-04 NOTE — PROGRESS NOTES
Date:  18     Name:  Dyan Yen  :  1980  MRN:  [id number]     PCP:  Jose Canas DO    Chief Complaint   Patient presents with    Follow-up     tremors      HISTORY OF PRESENT ILLNESS: Follow up for tremors, anxiety, and failure to thrive. He is accompanied by his mother and a caregiver. at his last office visit we discussed his care with mother and caregiver. Mother was concerned about some mild potential sleeping issues but the anxiety, while better, was more of a concern as was the improved tremor and his weight, while stable. It was decided we would try to increase the gabapentin to 200mg three times a day as this could help with these three primary issues. The weight continues to be stable. Currently, he is seeing a holistic medicine provider who has him taking some over the counter holistic things to help with digestion. Hopefully, he will start putting on more weight once he is done with this process. However, his caregiver and his mother really have not noticed any difference with regard to the tremor or the anxiety. The tremor is still much worse if he is tired or anxious which is pretty typical.          Current Outpatient Medications   Medication Sig    GRAPEFRUIT FORMULA PO Take  by mouth.  caprylic/capric triglyceride (CAPRYLIC-CAPRIC TRIGLY, BULK,) by Does Not Apply route.  gabapentin (NEURONTIN) 100 mg capsule Take 2 Caps by mouth three (3) times daily.  food supplemt, lactose-reduced (PROTEIN NUTRITIONAL SHAKE) liqd Take 237 mL by mouth three (3) times daily.  carboxymethylcellulose sodium (REFRESH TEARS) 0.5 % drop ophthalmic solution Administer 1 Drop to both eyes two (2) times a day.  clotrimazole (LOTRIMIN) 1 % topical cream Apply  to affected area two (2) times a day.     melatonin 3 mg tablet TAKE ONE TABLET BY MOUTH AT BEDTIME    amitriptyline (ELAVIL) 50 mg tablet     VITAMIN D2 50,000 unit capsule TAKE 1 CAPSULE BY MOUTH EVERY 3 MONTHS ON THE FIRST (JAN, APRIL,JULY,OCTOBER)    ondansetron hcl (ZOFRAN) 4 mg tablet Take 4 mg by mouth every eight (8) hours as needed for Nausea.  colestipol (COLESTID) 1 gram tablet Take 1 Tab by mouth two (2) times a day. (Patient taking differently: Take 1 g by mouth two (2) times a day. Can also have it as needed one more time during the day)    CALCITRATE-VITAMIN D tablet TAKE 1 TABLET BY MOUTH TWICE A DAY    OTHER Protein powder: please add 2 scoops of protein powder to 8-10 oz of soy milk twice a day ( with I of those times being lunch) and serve to patient. Fax tp [phone] when finished    OTHER Use to brush teeth as needed to prevent cavities,gingivitis, and plaque build up,    TAB-A-MARGIE tablet TAKE 1 TABLET BY MOUTH DAILY    theanine 50 mg TbDi Take  by mouth.  OTHER L-Theanine 100 mg 1 daily    fluticasone (FLONASE) 50 mcg/actuation nasal spray BLOW NOSE: 2 SPRAYS IN EACH NOSTRIL DAILY FOR ALLERGY.  levomefolate-algal oil (DEPLIN, ALGAL OIL,) 7.5-90.314 mg cap Take  by mouth.  LEVOMEFOLATE CALCIUM (DEPLIN PO) Take 7.5 mg by mouth daily.  L.acid-B.bifidum-B.animal-FOS (PROBIOTIC COMPLEX) 25 billion cell -100 mg cap Take 25 mg by mouth daily.  WHEAT DEXTRIN (BENEFIBER CLEAR SF, DEXTRIN, PO) Take  by mouth.  acetaminophen (TYLENOL) 325 mg tablet Take  by mouth every four (4) hours as needed for Pain.  guaiFENesin ER (MUCINEX) 600 mg ER tablet Take 600 mg by mouth daily as needed for Congestion.  brief disposable (ADULT) misc by Does Not Apply route. Depends, size medium, 1 nightly     No current facility-administered medications for this visit.       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, without mention of intractable epilepsy 5/14/2011    Mental retardation     Osteoporosis     Other ill-defined conditions(799.89)     intestinal problems    Psychiatric disorder     anxiety    Seizure disorder [facility])      Past Surgical History:   Procedure Laterality Date    HX APPENDECTOMY  1/28/13    APPENDECTOMY    HX GI      colon resection    HX GI  1/28/13    LAPAROTOMY EXPLORATORY - OPEN LYSIS OF ADHESIONS     HX HERNIA REPAIR      HX OTHER SURGICAL      imperforated anus birth defect    UPPER GI ENDOSCOPY,BIOPSY  3/17/2017          Social History     Socioeconomic History    Marital status: SINGLE     Spouse name: Not on file    Number of children: Not on file    Years of education: Not on file    Highest education level: Not on file   Social Needs    Financial resource strain: Not on file    Food insecurity - worry: Not on file    Food insecurity - inability: Not on file   [employer] needs - medical: Not on file   [employer] needs - non-medical: Not on file   Occupational History    Not on file   Tobacco Use    Smoking status: Never Smoker    Smokeless tobacco: Never Used   Substance and Sexual Activity    Alcohol use: No    Drug use: No    Sexual activity: No   Other Topics Concern    Not on file   Social History Narrative    Not on file     Family History   Problem Relation Age of Onset    No Known Problems Mother     Heart Disease Father     No Known Problems Sister     No Known Problems Sister        PHYSICAL EXAMINATION:    Visit Vitals  /72   Pulse 70   Resp 20   Ht 5' 10\" (1.778 m)   Wt 47.1 kg (103 lb 14.4 oz)   SpO2 98%   BMI 14.91 kg/m²     General: Well defined, nourished, and groomed individual in no acute distress. Neck: Supple, nontender, no bruits, no pain with resistance to active range of motion. Heart: Regular rate and rhythm, no murmurs, rub, or gallop. Normal S1S2.   Lungs: Clear to auscultation bilaterally with equal chest expansion, no cough, no wheeze  Musculoskeletal: Extremities revealed no edema and had full range of motion of joints. Psych: Good mood and bright affect      NEUROLOGICAL EXAMINATION:   Mental Status: Alert and oriented to person and place   Cranial Nerves:   II, III, IV, VI: Visual acuity grossly intact. Visual fields are normal.   Pupils are equal, round, and reactive to light and accommodation. Extra-ocular movements are full and fluid. Fundoscopic exam was benign, no ptosis or nystagmus. V-XII: Hearing is grossly intact. Facial features are symmetric, with normal sensation and strength. The palate rises symmetrically and the tongue protrudes midline. Sternocleidomastoids 5/5. Motor Examination: decreased tone and bulk with generalized weakness. Coordination: Finger to nose was normal. No resting tremor. There is a noticeable tremor bilaterally, right greater than left. Gait and Station: Much steadier with rising and walking. He pins his hands to his sides with walking and has to be reminded to swing his arms. No pronator drift. No muscle wasting or fasiculations noted. Reflexes: DTRs 2+ throughout. ASSESSMENT AND PLAN    ICD-10-CM ICD-9-CM    1. Tremor R25.1 781.0    2. Static encephalopathy G93.49 742.9    3. Weakness generalized R53.1 780.79    4. Severe anxiety F41.9 300.00    5. Gait disturbance R26.9 781.2    6. Weight loss R63.4 783.21      Tremor and generalized anxiety are no better or worse with the addition of the gabapentin. We did discuss discontinuing this medication as it does not appear to be providing any benefit versus increasing the dose again. Currently, they are working with a holistic medicine provider who has been providing them with a very specific regimen of holistic care in the hopes that it will help with some of his weight loss issues. They will continue with this for now. They will continue to discuss anxiety with this psychiatry. No changes to plan of care at this point. Cielo Reagan

## 2018-12-04 NOTE — PATIENT INSTRUCTIONS
A Healthy Lifestyle: Care Instructions  Your Care Instructions    A healthy lifestyle can help you feel good, stay at a healthy weight, and have plenty of energy for both work and play. A healthy lifestyle is something you can share with your whole family. A healthy lifestyle also can lower your risk for serious health problems, such as high blood pressure, heart disease, and diabetes. You can follow a few steps listed below to improve your health and the health of your family. Follow-up care is a key part of your treatment and safety. Be sure to make and go to all appointments, and call your doctor if you are having problems. It's also a good idea to know your test results and keep a list of the medicines you take. How can you care for yourself at home? · Do not eat too much sugar, fat, or fast foods. You can still have dessert and treats now and then. The goal is moderation. · Start small to improve your eating habits. Pay attention to portion sizes, drink less juice and soda pop, and eat more fruits and vegetables. ? Eat a healthy amount of food. A 3-ounce serving of meat, for example, is about the size of a deck of cards. Fill the rest of your plate with vegetables and whole grains. ? Limit the amount of soda and sports drinks you have every day. Drink more water when you are thirsty. ? Eat at least 5 servings of fruits and vegetables every day. It may seem like a lot, but it is not hard to reach this goal. A serving or helping is 1 piece of fruit, 1 cup of vegetables, or 2 cups of leafy, raw vegetables. Have an apple or some carrot sticks as an afternoon snack instead of a candy bar. Try to have fruits and/or vegetables at every meal.  · Make exercise part of your daily routine. You may want to start with simple activities, such as walking, bicycling, or slow swimming. Try to be active 30 to 60 minutes every day. You do not need to do all 30 to 60 minutes all at once.  For example, you can exercise 3 times a day for 10 or 20 minutes. Moderate exercise is safe for most people, but it is always a good idea to talk to your doctor before starting an exercise program.  · Keep moving. Sh[place]ola Levee the lawn, work in the garden, or [employer]. Take the stairs instead of the elevator at work. · If you smoke, quit. People who smoke have an increased risk for heart attack, stroke, cancer, and other lung illnesses. Quitting is hard, but there are ways to boost your chance of quitting tobacco for good. ? Use nicotine gum, patches, or lozenges. ? Ask your doctor about stop-smoking programs and medicines. ? Keep trying. In addition to reducing your risk of diseases in the future, you will notice some benefits soon after you stop using tobacco. If you have shortness of breath or asthma symptoms, they will likely get better within a few weeks after you quit. · Limit how much alcohol you drink. Moderate amounts of alcohol (up to 2 drinks a day for men, 1 drink a day for women) are okay. But drinking too much can lead to liver problems, high blood pressure, and other health problems. Family health  If you have a family, there are many things you can do together to improve your health. · Eat meals together as a family as often as possible. · Eat healthy foods. This includes fruits, vegetables, lean meats and dairy, and whole grains. · Include your family in your fitness plan. Most people think of activities such as jogging or tennis as the way to fitness, but there are many ways you and your family can be more active. Anything that makes you breathe hard and gets your heart pumping is exercise. Here are some tips:  ? Walk to do errands or to take your child to school or the bus.  ? Go for a family bike ride after dinner instead of watching TV. Where can you learn more? Go to http://rachael-william.info/. Enter L635 in the search box to learn more about \"A Healthy Lifestyle: Care Instructions. \"  Current as of: December 7, 2017  Content Version: 11.8  © 2266-0218 [employer]. Care instructions adapted under license by [employer] (which disclaims liability or warranty for this information). If you have questions about a medical condition or this instruction, always ask your healthcare professional. [address] any warranty or liability for your use of this information.

## 2018-12-11 ENCOUNTER — OFFICE VISIT (OUTPATIENT)
Dept: INTERNAL MEDICINE CLINIC | Age: 38
End: 2018-12-11

## 2018-12-11 VITALS
TEMPERATURE: 96.7 F | HEIGHT: 70 IN | HEART RATE: 101 BPM | BODY MASS INDEX: 15.52 KG/M2 | SYSTOLIC BLOOD PRESSURE: 107 MMHG | RESPIRATION RATE: 18 BRPM | WEIGHT: 108.4 LBS | DIASTOLIC BLOOD PRESSURE: 67 MMHG | OXYGEN SATURATION: 97 %

## 2018-12-11 DIAGNOSIS — K90.9 INTESTINAL MALABSORPTION, UNSPECIFIED TYPE: ICD-10-CM

## 2018-12-11 DIAGNOSIS — Z87.19 S/P SMALL BOWEL OBSTRUCTION: ICD-10-CM

## 2018-12-11 DIAGNOSIS — Z98.890 HISTORY OF ABDOMINAL SURGERY: ICD-10-CM

## 2018-12-11 DIAGNOSIS — R63.6 UNDERWEIGHT: Primary | ICD-10-CM

## 2018-12-11 DIAGNOSIS — K59.1 FUNCTIONAL DIARRHEA: ICD-10-CM

## 2018-12-11 DIAGNOSIS — H91.93 BILATERAL HEARING LOSS, UNSPECIFIED HEARING LOSS TYPE: ICD-10-CM

## 2018-12-11 DIAGNOSIS — Z00.00 MEDICARE ANNUAL WELLNESS VISIT, INITIAL: ICD-10-CM

## 2018-12-11 RX ORDER — PANCREATIN
1 POWDER (GRAM) MISCELLANEOUS 3 TIMES DAILY
Qty: 500 G | Refills: 5 | Status: SHIPPED | OUTPATIENT
Start: 2018-12-11 | End: 2019-03-26

## 2018-12-11 NOTE — PROGRESS NOTES
Schedule of Personalized Health Plan  (Provide Copy to Patient)  The best way to stay healthy is to live a healthy lifestyle. A healthy lifestyle includes regular exercise, eating a well-balanced diet, keeping a healthy weight and not smoking. Regular physical exams and screening tests are another important way to take care of yourself. Preventive exams provided by health care providers can find health problems early when treatment works best and can keep you from getting certain diseases or illnesses. Preventive services include exams, lab tests, screenings, shots, monitoring and information to help you take care of your own health. All people over 65 should have a pneumonia shot. Pneumonia shots are usually only needed once in a lifetime unless your doctor decides differently. All people over 65 should have a yearly flu shot. People over 65 are at medium to high risk for Hepatitis B. Three shots are needed for complete protection. In addition to your physical exam, some screening tests are recommended:    Bone mass measurement (dexa scan) is recommended every two years  Diabetes Mellitus screening is recommended every year. Glaucoma is an eye disease caused by high pressure in the eye. An eye exam is recommended every year. Cardiovascular screening tests that check your cholesterol and other blood fat (lipid) levels are recommended every five years. Colorectal Cancer screening tests help to find pre-cancerous polyps (growths in the colon) so they can be removed before they turn into cancer. Tests ordered for screening depend on your personal and family history risk factors.     Screening for Breast Cancer is recommended yearly with a mammogram.    Screening for Cervical Cancer is recommended every two years (annually for certain risk factors, such as previous history of STD or abnormal PAP in past 7 years), with a Pelvic Exam with PAP    Here is a list of your current Health Maintenance items with a due date:  Health Maintenance   Topic Date Due    DTaP/Tdap/Td series (1 - Tdap) 11/04/1999    MEDICARE YEARLY EXAM  12/12/2019    Influenza Age 5 to Adult  Completed

## 2018-12-11 NOTE — PROGRESS NOTES
HISTORY OF PRESENT ILLNESS  Joaquin Rodriguez is a 34 y.o. male. Pt. comes in with his mother from his group home for f/u. He has multiple medical problems. He has chronic GI issues with history of multiple intestinal operations as a child and malabsorption. Has had chronic diarrhea and fecal incontinence. He is underweight. Has been followed by GI. Colestipol helps some. He is seeing an alternative medical care provider who has done some blood work and recommended different natural supplements. Patient's mom believes they are helping and he has gained some weight. Tells me 1 of the expensive protein/peptide supplements if written by me will be covered by Medicare. It would require my office opening and account with company that makes it. Patient reports compliance with medications and diet. Med list and most recent labs/studies reviewed with pt. Trying to be active physically as tolerated. Mother reports patient having hearing issues and asking for ENT referral.  No tobacco or alcohol use. According to mother he is also scheduled for colonoscopy in a few weeks. Reports no other new c/o. HPI    Review of Systems   Constitutional: Negative. HENT: Negative. Eyes: Negative. Respiratory: Negative for shortness of breath. Cardiovascular: Negative for chest pain and leg swelling. Gastrointestinal: Positive for abdominal pain and diarrhea (Fecal incontinence). Negative for blood in stool, heartburn, melena, nausea and vomiting. Stool incontinence   Genitourinary: Positive for urgency. Negative for dysuria. Musculoskeletal: Negative for back pain, falls and joint pain. Skin: Negative. Neurological: Positive for tremors and seizures. Negative for dizziness, sensory change and headaches. Endo/Heme/Allergies: Negative. Psychiatric/Behavioral: Positive for depression. The patient is nervous/anxious and has insomnia. All other systems reviewed and are negative.       Physical Exam Constitutional: He is oriented to person, place, and time. He appears well-developed and well-nourished. No distress. Pleasant,  cognitivelty a bit slow   HENT:   Head: Normocephalic and atraumatic. Mouth/Throat: Oropharynx is clear and moist.   Eyes: Conjunctivae are normal. No scleral icterus. Neck: Normal range of motion. Neck supple. No JVD present. No thyromegaly present. Cardiovascular: Normal rate, regular rhythm, normal heart sounds and intact distal pulses. No murmur heard. Pulmonary/Chest: Effort normal and breath sounds normal. No respiratory distress. He has no wheezes. He has no rales. Abdominal: Soft. Bowel sounds are normal. He exhibits no distension. There is no tenderness. Chronic surgical scars   Musculoskeletal: He exhibits no edema or tenderness. Neurological: He is alert and oriented to person, place, and time. Coordination abnormal.   Skin: Skin is warm and dry. No rash noted. Psychiatric: His behavior is normal.   Repeats words  Chronic cognitive deficit , about same   Nursing note and vitals reviewed. ASSESSMENT and PLAN  Diagnoses and all orders for this visit:    1. Underweight    2. History of abdominal surgery    3. Functional diarrhea    4. S/p small bowel obstruction    5. Intestinal malabsorption, unspecified type    6. Bilateral hearing loss, unspecified hearing loss type  -     REFERRAL TO ENT-OTOLARYNGOLOGY    7. Medicare annual wellness visit, initial    Other orders  -     OTHER; Compounded colestipol, ultrabinder from [employer] bid and prn  -     Pancreatin powd; 1 Scoop by Does Not Apply route three (3) times daily. Follow-up Disposition:  Return in about 3 months (around 3/11/2019).    lab results and schedule of future lab studies reviewed with patient  reviewed diet, exercise and weight control  reviewed medications and side effects in detail  F/u with other MD's/healthcare providers as scheduled  As patient's mom to have his alternative medical provider send me an official recommendation including the supplements that she recommends  Overall stable

## 2018-12-11 NOTE — PROGRESS NOTES
Health Maintenance Due   Topic Date Due    DTaP/Tdap/Td series (1 - Tdap) 11/04/1999    MEDICARE YEARLY EXAM  08/09/2018       Chief Complaint   Patient presents with    Abdominal Pain    Hypotension       1. Have you been to the ER, urgent care clinic since your last visit? Hospitalized since your last visit? No    2. Have you seen or consulted any other health care providers outside of the [facility] since your last visit? Include any pap smears or colon screening. No    3) Do you have an Advance Directive on file? yes    4) Are you interested in receiving information on Advance Directives? NO      Patient is accompanied by adult caretaker I have received verbal consent from Joaquin Jennifer to discuss any/all medical information while they are present in the room.

## 2018-12-11 NOTE — PROGRESS NOTES
Mark Holly is a 34 y.o. male and presents for annual Medicare Wellness Visit. Problem List: Reviewed with patient and discussed risk factors.     Patient Active Problem List   Diagnosis Code    Osteoporosis M81.0    Depression F32.9    Vitamin D deficiency E55.9    Encounter for long-term (current) use of other medications Z79.899    Bowel obstruction ([facility] K56.609    S/p small bowel obstruction Z87.19    Static encephalopathy G93.49    Anxiety F41.9    Stool incontinence R15.9    Underweight R63.6    Weight loss R63.4    History of abdominal surgery Z98.890    Recurrent depression ([facility] F33.9    Hypotension due to drugs I95.2    Chronic diarrhea K52.9    Hypokalemia E87.6    Functional diarrhea K59.1    Counseling regarding goals of care Z71.89    Intestinal malabsorption K90.9       Current medical providers:  Patient Care Team:  Madhavi Fenton DO as PCP - General  Sidney Gan MD (Colon and Rectal Surgery)  Arthur Fothergill, NP (Neurology)  Kath Ruiz PsyD (Psychology)    PSH: Reviewed with patient  Past Surgical History:   Procedure Laterality Date    HX APPENDECTOMY  1/28/13    APPENDECTOMY    HX GI      colon resection    HX GI  1/28/13    LAPAROTOMY EXPLORATORY - OPEN LYSIS OF ADHESIONS     HX HERNIA REPAIR      HX OTHER SURGICAL      imperforated anus birth defect    UPPER GI ENDOSCOPY,BIOPSY  3/17/2017             SH: Reviewed with patient  Social History     Tobacco Use    Smoking status: Never Smoker    Smokeless tobacco: Never Used   Substance Use Topics    Alcohol use: No    Drug use: No       FH: Reviewed with patient  Family History   Problem Relation Age of Onset    No Known Problems Mother     Heart Disease Father     No Known Problems Sister     No Known Problems Sister        Medications/Allergies: Reviewed with patient  Current Outpatient Medications on File Prior to Visit   Medication Sig Dispense Refill    GRAPEFRUIT FORMULA PO Take  by mouth.      caprylic/capric triglyceride (CAPRYLIC-CAPRIC TRIGLY, BULK,) by Does Not Apply route.  gabapentin (NEURONTIN) 100 mg capsule Take 2 Caps by mouth three (3) times daily. 180 Cap 11    food supplemt, lactose-reduced (PROTEIN NUTRITIONAL SHAKE) liqd Take 237 mL by mouth three (3) times daily. 84 Can 11    carboxymethylcellulose sodium (REFRESH TEARS) 0.5 % drop ophthalmic solution Administer 1 Drop to both eyes two (2) times a day. 30 mL 0    clotrimazole (LOTRIMIN) 1 % topical cream Apply  to affected area two (2) times a day. 15 g 0    melatonin 3 mg tablet TAKE ONE TABLET BY MOUTH AT BEDTIME 31 Tab 10    amitriptyline (ELAVIL) 50 mg tablet       VITAMIN D2 50,000 unit capsule TAKE 1 CAPSULE BY MOUTH EVERY 3 MONTHS ON THE FIRST (JAN, APRIL,JULY,OCTOBER) 1 Cap 0    ondansetron hcl (ZOFRAN) 4 mg tablet Take 4 mg by mouth every eight (8) hours as needed for Nausea.  colestipol (COLESTID) 1 gram tablet Take 1 Tab by mouth two (2) times a day. (Patient taking differently: Take 1 g by mouth two (2) times a day. Can also have it as needed one more time during the day) 60 Tab 2    CALCITRATE-VITAMIN D tablet TAKE 1 TABLET BY MOUTH TWICE A DAY 62 Tab PRN    OTHER Protein powder: please add 2 scoops of protein powder to 8-10 oz of soy milk twice a day ( with I of those times being lunch) and serve to patient. Fax tp [phone] when finished 1 Each 0    OTHER Use to brush teeth as needed to prevent cavities,gingivitis, and plaque build up, 1 Tube PRN    TAB-A-MARGIE tablet TAKE 1 TABLET BY MOUTH DAILY 31 Tab PRN    theanine 50 mg TbDi Take  by mouth.  OTHER L-Theanine 100 mg 1 daily 30 Tab 11    fluticasone (FLONASE) 50 mcg/actuation nasal spray BLOW NOSE: 2 SPRAYS IN EACH NOSTRIL DAILY FOR ALLERGY. 16 g 0    levomefolate-algal oil (DEPLIN, ALGAL OIL,) 7.5-90.314 mg cap Take  by mouth.  LEVOMEFOLATE CALCIUM (DEPLIN PO) Take 7.5 mg by mouth daily.       L. acid-B.bifidum-B.animal-FOS (PROBIOTIC COMPLEX) 25 billion cell -100 mg cap Take 25 mg by mouth daily.  WHEAT DEXTRIN (BENEFIBER CLEAR SF, DEXTRIN, PO) Take  by mouth.  acetaminophen (TYLENOL) 325 mg tablet Take  by mouth every four (4) hours as needed for Pain.  guaiFENesin ER (MUCINEX) 600 mg ER tablet Take 600 mg by mouth daily as needed for Congestion.  brief disposable (ADULT) misc by Does Not Apply route. Depends, size medium, 1 nightly 1 Package 11     No current facility-administered medications on file prior to visit. No Known Allergies    Objective:  Visit Vitals  /67 (BP 1 Location: Right arm, BP Patient Position: Sitting)   Pulse (!) 101   Temp 96.7 °F (35.9 °C) (Oral)   Resp 18   Ht 5' 10\" (1.778 m)   Wt 108 lb 6.4 oz (49.2 kg)   SpO2 97%   BMI 15.55 kg/m²    Body mass index is 15.55 kg/m². Assessment of cognitive impairment: Alert and oriented x 2    Depression Screen:   PHQ over the last two weeks 3/27/2018   Little interest or pleasure in doing things Not at all   Feeling down, depressed, irritable, or hopeless Not at all   Total Score PHQ 2 0       Fall Risk Assessment:    Fall Risk Assessment, last 12 mths 12/11/2018   Able to walk? Yes   Fall in past 12 months? Yes   Fall with injury? Yes   Number of falls in past 12 months 2   Fall Risk Score 3       Functional Ability:   Does the patient exhibit a steady gait? yes   How long did it take the patient to get up and walk from a sitting position? 8 sec   Is the patient self reliant?  (ie can do own laundry, meals, household chores)  no     Does the patient handle his/her own medications?  no     Does the patient handle his/her own money? no     Is the patients home safe (ie good lighting, handrails on stairs and bath, etc.)? yes     Did you notice or did patient express any hearing difficulties?    yes     Did you notice or did patient express any vision difficulties?   no     Were distance and reading eye charts used? yes       Advance Care Planning:   Patient was offered the opportunity to discuss advance care planning:  yes     Does patient have an Advance Directive:  yes   If no, did you provide information on Caring Connections?  no       Plan:      Orders Placed This Encounter    REFERRAL TO ENT-OTOLARYNGOLOGY   [address] [address] Maintenance   Topic Date Due    DTaP/Tdap/Td series (1 - Tdap) 11/04/1999    MEDICARE YEARLY EXAM  12/12/2019    Influenza Age 9 to Adult  Completed       *Patient verbalized understanding and agreement with the plan. A copy of the After Visit Summary with personalized health plan was given to the patient today.

## 2018-12-11 NOTE — ACP (ADVANCE CARE PLANNING)
Advance Care Planning (ACP) Provider Conversation Snapshot    Date of ACP Conversation: 12/11/18  Persons included in Conversation:  Pt and POA/Mom  Length of ACP Conversation in minutes:  <16 minutes (Non-Billable)    Authorized Decision Maker (if patient is incapable of making informed decisions):    This person is:   Healthcare Agent/Medical Power of  under Advance Directive          For Patients with Decision Making Capacity:   Values/Goals: Exploration of values, goals, and preferences if recovery is not expected, even with continued medical treatment in the event of:  Imminent death    Conversation Outcomes / Follow-Up Plan:   Recommended completion of Advance Directive form after review of ACP materials and conversation with prospective healthcare agent

## 2019-01-15 ENCOUNTER — OFFICE VISIT (OUTPATIENT)
Dept: INTERNAL MEDICINE CLINIC | Age: 39
End: 2019-01-15

## 2019-01-15 VITALS
HEART RATE: 83 BPM | BODY MASS INDEX: 15.17 KG/M2 | HEIGHT: 70 IN | WEIGHT: 106 LBS | TEMPERATURE: 97.8 F | SYSTOLIC BLOOD PRESSURE: 111 MMHG | RESPIRATION RATE: 20 BRPM | DIASTOLIC BLOOD PRESSURE: 58 MMHG | OXYGEN SATURATION: 93 %

## 2019-01-15 DIAGNOSIS — Z98.890 HISTORY OF ABDOMINAL SURGERY: ICD-10-CM

## 2019-01-15 DIAGNOSIS — K52.9 CHRONIC DIARRHEA: Primary | ICD-10-CM

## 2019-01-15 DIAGNOSIS — R63.6 UNDERWEIGHT: ICD-10-CM

## 2019-01-15 DIAGNOSIS — Z87.19 S/P SMALL BOWEL OBSTRUCTION: ICD-10-CM

## 2019-01-15 DIAGNOSIS — F33.9 RECURRENT DEPRESSION (HCC): ICD-10-CM

## 2019-01-15 RX ORDER — MONTELUKAST SODIUM 4 MG/1
1 TABLET, CHEWABLE ORAL 2 TIMES DAILY
Qty: 60 TAB | Refills: 2 | Status: CANCELLED | OUTPATIENT
Start: 2019-01-15

## 2019-01-15 NOTE — PROGRESS NOTES
Health Maintenance Due   Topic Date Due    DTaP/Tdap/Td series (1 - Tdap) 11/04/1999       Chief Complaint   Patient presents with    Medication Evaluation    Red Eye    Skin Exam     Foreskin of penis needs to be evaluated       1. Have you been to the ER, urgent care clinic since your last visit? Hospitalized since your last visit? No    2. Have you seen or consulted any other health care providers outside of the [address] [place] [address] since your last visit? Include any pap smears or colon screening. No    3) Do you have an Advance Directive on file? no    4) Are you interested in receiving information on Advance Directives? NO      Patient is accompanied by mother and house staff I have received verbal consent from Torrie Young to discuss any/all medical information while they are present in the room.

## 2019-01-16 NOTE — TELEPHONE ENCOUNTER
Below message sent to Johnathon Natarajan for patients request for the following:    LOV 1/15/18  Please advise:  - requesting  referral req. [facility]. - RX for Colestid to go to [facility]  TY  ===View-only below this line===    ---- Message -----  From: Noelle Lisa  Sent: 1/15/2019   1:35 PM  To:  [facility] Int Nurse Loepoldo Goldman

## 2019-01-21 ENCOUNTER — TELEPHONE (OUTPATIENT)
Dept: INTERNAL MEDICINE CLINIC | Age: 39
End: 2019-01-21

## 2019-01-21 RX ORDER — MONTELUKAST SODIUM 4 MG/1
1 TABLET, CHEWABLE ORAL 2 TIMES DAILY
Qty: 60 TAB | Refills: 2 | Status: SHIPPED | OUTPATIENT
Start: 2019-01-21 | End: 2020-01-09 | Stop reason: SDUPTHER

## 2019-01-21 NOTE — TELEPHONE ENCOUNTER
Patients mother called and would like to know if Dr Raiza Cardenas can order a bedside commode. He is having a colonoscopy on Wednesday and they would like to get this ordered today to help him be more comfortable. .she wants to know if this can be done at a pharmacy.

## 2019-01-23 ENCOUNTER — ANESTHESIA EVENT (OUTPATIENT)
Dept: ENDOSCOPY | Age: 39
End: 2019-01-23
Payer: MEDICARE

## 2019-01-23 ENCOUNTER — HOSPITAL ENCOUNTER (OUTPATIENT)
Age: 39
Setting detail: OUTPATIENT SURGERY
Discharge: HOME | End: 2019-01-23
Attending: INTERNAL MEDICINE | Admitting: INTERNAL MEDICINE
Payer: MEDICARE

## 2019-01-23 ENCOUNTER — ANESTHESIA (OUTPATIENT)
Dept: ENDOSCOPY | Age: 39
End: 2019-01-23
Payer: MEDICARE

## 2019-01-23 VITALS
HEART RATE: 107 BPM | OXYGEN SATURATION: 100 % | SYSTOLIC BLOOD PRESSURE: 85 MMHG | DIASTOLIC BLOOD PRESSURE: 52 MMHG | RESPIRATION RATE: 24 BRPM

## 2019-01-23 VITALS
DIASTOLIC BLOOD PRESSURE: 74 MMHG | RESPIRATION RATE: 17 BRPM | SYSTOLIC BLOOD PRESSURE: 138 MMHG | BODY MASS INDEX: 14.46 KG/M2 | HEIGHT: 70 IN | TEMPERATURE: 97.5 F | HEART RATE: 97 BPM | OXYGEN SATURATION: 100 % | WEIGHT: 101 LBS

## 2019-01-23 PROCEDURE — 77030009426 HC FCPS BIOP ENDOSC BSC -B: Performed by: INTERNAL MEDICINE

## 2019-01-23 PROCEDURE — 76060000031 HC ANESTHESIA FIRST 0.5 HR: Performed by: INTERNAL MEDICINE

## 2019-01-23 PROCEDURE — 74011250636 HC RX REV CODE- 250/636

## 2019-01-23 PROCEDURE — 74011250636 HC RX REV CODE- 250/636: Performed by: INTERNAL MEDICINE

## 2019-01-23 PROCEDURE — 76040000019: Performed by: INTERNAL MEDICINE

## 2019-01-23 PROCEDURE — 88305 TISSUE EXAM BY PATHOLOGIST: CPT

## 2019-01-23 RX ORDER — PROPOFOL 10 MG/ML
INJECTION, EMULSION INTRAVENOUS AS NEEDED
Status: DISCONTINUED | OUTPATIENT
Start: 2019-01-23 | End: 2019-01-23 | Stop reason: HOSPADM

## 2019-01-23 RX ORDER — SODIUM CHLORIDE 0.9 % (FLUSH) 0.9 %
5-40 SYRINGE (ML) INJECTION AS NEEDED
Status: DISCONTINUED | OUTPATIENT
Start: 2019-01-23 | End: 2019-01-23 | Stop reason: HOSPADM

## 2019-01-23 RX ORDER — FLUMAZENIL 0.1 MG/ML
0.2 INJECTION INTRAVENOUS
Status: DISCONTINUED | OUTPATIENT
Start: 2019-01-23 | End: 2019-01-23 | Stop reason: CLARIF

## 2019-01-23 RX ORDER — DEXTROMETHORPHAN/PSEUDOEPHED 2.5-7.5/.8
1.2 DROPS ORAL
Status: DISCONTINUED | OUTPATIENT
Start: 2019-01-23 | End: 2019-01-23 | Stop reason: CLARIF

## 2019-01-23 RX ORDER — FLUMAZENIL 0.1 MG/ML
0.2 INJECTION INTRAVENOUS
Status: DISCONTINUED | OUTPATIENT
Start: 2019-01-23 | End: 2019-01-23 | Stop reason: HOSPADM

## 2019-01-23 RX ORDER — NALOXONE HYDROCHLORIDE 0.4 MG/ML
0.4 INJECTION, SOLUTION INTRAMUSCULAR; INTRAVENOUS; SUBCUTANEOUS
Status: DISCONTINUED | OUTPATIENT
Start: 2019-01-23 | End: 2019-01-23 | Stop reason: HOSPADM

## 2019-01-23 RX ORDER — ATROPINE SULFATE 0.1 MG/ML
0.5 INJECTION INTRAVENOUS
Status: DISCONTINUED | OUTPATIENT
Start: 2019-01-23 | End: 2019-01-23 | Stop reason: CLARIF

## 2019-01-23 RX ORDER — SODIUM CHLORIDE 9 MG/ML
100 INJECTION, SOLUTION INTRAVENOUS CONTINUOUS
Status: DISCONTINUED | OUTPATIENT
Start: 2019-01-23 | End: 2019-01-23 | Stop reason: CLARIF

## 2019-01-23 RX ORDER — ATROPINE SULFATE 0.1 MG/ML
0.5 INJECTION INTRAVENOUS
Status: DISCONTINUED | OUTPATIENT
Start: 2019-01-23 | End: 2019-01-23 | Stop reason: HOSPADM

## 2019-01-23 RX ORDER — SODIUM CHLORIDE 9 MG/ML
100 INJECTION, SOLUTION INTRAVENOUS CONTINUOUS
Status: DISCONTINUED | OUTPATIENT
Start: 2019-01-23 | End: 2019-01-23 | Stop reason: HOSPADM

## 2019-01-23 RX ORDER — SODIUM CHLORIDE 0.9 % (FLUSH) 0.9 %
5-40 SYRINGE (ML) INJECTION EVERY 8 HOURS
Status: DISCONTINUED | OUTPATIENT
Start: 2019-01-23 | End: 2019-01-23 | Stop reason: HOSPADM

## 2019-01-23 RX ORDER — FENTANYL CITRATE 50 UG/ML
50 INJECTION, SOLUTION INTRAMUSCULAR; INTRAVENOUS
Status: DISCONTINUED | OUTPATIENT
Start: 2019-01-23 | End: 2019-01-23 | Stop reason: HOSPADM

## 2019-01-23 RX ORDER — MIDAZOLAM HYDROCHLORIDE 1 MG/ML
.25-5 INJECTION, SOLUTION INTRAMUSCULAR; INTRAVENOUS
Status: DISCONTINUED | OUTPATIENT
Start: 2019-01-23 | End: 2019-01-23 | Stop reason: HOSPADM

## 2019-01-23 RX ORDER — DEXTROMETHORPHAN/PSEUDOEPHED 2.5-7.5/.8
1.2 DROPS ORAL
Status: DISCONTINUED | OUTPATIENT
Start: 2019-01-23 | End: 2019-01-23 | Stop reason: HOSPADM

## 2019-01-23 RX ADMIN — PROPOFOL 20 MG: 10 INJECTION, EMULSION INTRAVENOUS at 10:22

## 2019-01-23 RX ADMIN — PROPOFOL 20 MG: 10 INJECTION, EMULSION INTRAVENOUS at 10:18

## 2019-01-23 RX ADMIN — PROPOFOL 30 MG: 10 INJECTION, EMULSION INTRAVENOUS at 10:16

## 2019-01-23 RX ADMIN — SODIUM CHLORIDE: 900 INJECTION, SOLUTION INTRAVENOUS at 10:03

## 2019-01-23 RX ADMIN — PROPOFOL 80 MG: 10 INJECTION, EMULSION INTRAVENOUS at 10:12

## 2019-01-23 RX ADMIN — PROPOFOL 20 MG: 10 INJECTION, EMULSION INTRAVENOUS at 10:20

## 2019-01-23 NOTE — PROCEDURES
[facility]                  Colonoscopy Operative Report    1/23/2019      Reynaldo Claros  [id number]  1980    Procedure Type:   Colonoscopy with biopsy     Indications:  Chronic diarrhea and weight loss     Pre-operative Diagnosis: see indication above    Post-operative Diagnosis:  See findings below    :  Rc Byrd MD    Referring Provider: Scotty Mansfield DO      Sedation:  MAC anesthesia Propofol    Pre-Procedural Exam:      Airway: clear,  No airway problems anticipated  Heart: RRR, without gallops or rubs  Lungs: clear bilaterally without wheezes, crackles, or rhonchi  Abdomen: soft, nontender, nondistended, bowel sounds present  Mental Status: awake, alert and oriented to person, place and time     Procedure Details:  After informed consent was obtained with all risks and benefits of procedure explained and preoperative exam completed, the patient was taken to the endoscopy suite and placed in the left lateral decubitus position. Upon sequential sedation as per above, a digital rectal exam was performed . The Olympus videocolonoscope  was inserted in the rectum and carefully advanced to the cecum, which was identified by the ileocecal valve and appendiceal orifice. The cecum was identified by the ileocecal valve and appendiceal orifice. The quality of preparation was good. The colonoscope was slowly withdrawn with careful evaluation between folds. Retroflexion in the rectum was completed demonstrating no hemorrhoids. Findings:   Rectum: Surgical changes from imperforate anus surgery. Normal mucosa  Sigmoid: surgically absent  Descending Colon: normal, biopsied  Transverse Colon: normal  Ascending Colon: normal, biopsied  Cecum: normal  Terminal Ileum: normal      Specimen Removed:  1. biopsies of ascending colon  2. biopsies of descending colon    Complications: None. EBL:  None.     Impression:    Post op rectal changs from imperforate anus surgery, otherwise normal    Recommendations: --Await pathology. , -Follow up with me. Regular diet. Resume normal medication(s). Discharge Disposition:  Home in the company of a  when able to ambulate. Barbara Hung MD    1/23/2019     P. Ozzie Carrel, MD  [facility], [address] [employer]  [address] [facility]  [phone]  [email or web address]

## 2019-01-23 NOTE — PERIOP NOTES
,Endoscope was pre-cleaned at the bedside immediately following procedure by Milana Easton ET    Glasses returned to patient     Anesthesia reports 170mg Propofol,  and 400mL NS given during procedure. Received report from anesthesia staff on vital signs and status of patient. Lucas Rivero

## 2019-01-23 NOTE — ANESTHESIA POSTPROCEDURE EVALUATION
Procedure(s):  COLONOSCOPY  COLON BIOPSY. Anesthesia Post Evaluation        Patient location during evaluation: PACU  Note status: Adequate. Level of consciousness: responsive to verbal stimuli and sleepy but conscious  Pain management: satisfactory to patient  Airway patency: patent  Anesthetic complications: no  Cardiovascular status: acceptable  Respiratory status: acceptable  Hydration status: acceptable  Comments: +Post-Anesthesia Evaluation and Assessment    Patient: Dolores Goyal MRN: [id number]  SSN: [id number]   YOB: 1980  Age: 34 y.o. Sex: male      Cardiovascular Function/Vital Signs    /74   Pulse 97   Temp 36.4 °C (97.5 °F)   Resp 17   Ht 5' 10\" (1.778 m)   Wt 45.8 kg (101 lb)   SpO2 100%   BMI 14.49 kg/m²     Patient is status post Procedure(s):  COLONOSCOPY  COLON BIOPSY. Nausea/Vomiting: Controlled. Postoperative hydration reviewed and adequate. Pain:  Pain Scale 1: Numeric (0 - 10) (01/23/19 1056)  Pain Intensity 1: 0 (01/23/19 1056)   Managed. Neurological Status: At baseline. Mental Status and Level of Consciousness: Arousable. Pulmonary Status:   O2 Device: Room air (01/23/19 1056)   Adequate oxygenation and airway patent. Complications related to anesthesia: None    Post-anesthesia assessment completed. No concerns.     Signed By: Cydney Cook MD    1/23/2019  Post anesthesia nausea and vomiting:  controlled      Visit Vitals  /74   Pulse 97   Temp 36.4 °C (97.5 °F)   Resp 17   Ht 5' 10\" (1.778 m)   Wt 45.8 kg (101 lb)   SpO2 100%   BMI 14.49 kg/m²

## 2019-01-23 NOTE — ANESTHESIA PREPROCEDURE EVALUATION
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  Hx of colon resection  Hx of Explor lap for partial small bowel obstruction/appendectomy, SANDY  2013 Endo/Other             Other Findings   Comments: Osteoporosis  VIt D defic  Mental Retardation           Physical Exam    Airway  Mallampati: II  TM Distance: 4 - 6 cm  Neck ROM: normal range of motion   Mouth opening: Normal     Cardiovascular    Rhythm: regular  Rate: normal         Dental      Comments: Missing a few teeth, none loose per pt   Pulmonary  Breath sounds clear to auscultation               Abdominal  GI exam deferred       Other Findings            Anesthetic Plan    ASA: 2  Anesthesia type: total IV anesthesia and general          Induction: Intravenous  Anesthetic plan and risks discussed with:  Mother and Patient      Propofol MAC

## 2019-01-23 NOTE — H&P
Justus Chew MD  [facility], [address] [place], [place] [address]  [phone]  [email or web address]      Gastroenterology Outpatient History and Physical    Patient: Cheryl Barton    Physician: Jyothi Schwab MD    Vital Signs: Blood pressure 97/57, pulse (!) 111, temperature 98 °F (36.7 °C), resp. rate 16, height 5' 10\" (1.778 m), weight 45.8 kg (101 lb), SpO2 100 %. Allergies: No Known Allergies    Chief Complaint: diarrhea    History of Present Illness: here for colonoscopy to evaluate weight loss and chronic diarrhea.     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, without mention of intractable epilepsy 5/14/2011    Mental retardation     Osteoporosis     Other ill-defined conditions(799.89)     intestinal problems    Psychiatric disorder     anxiety    Seizure disorder [facility])       Past Surgical History:   Procedure Laterality Date    HX APPENDECTOMY  1/28/13    APPENDECTOMY    HX GI      colon resection    HX GI  1/28/13    LAPAROTOMY EXPLORATORY - OPEN LYSIS OF ADHESIONS     HX HERNIA REPAIR      HX OTHER SURGICAL      imperforated anus birth defect    UPPER GI ENDOSCOPY,BIOPSY  3/17/2017           Social History     Socioeconomic History    Marital status: SINGLE     Spouse name: Not on file    Number of children: Not on file    Years of education: Not on file    Highest education level: Not on file   Tobacco Use    Smoking status: Never Smoker    Smokeless tobacco: Never Used   Substance and Sexual Activity    Alcohol use: No    Drug use: No    Sexual activity: No      Family History   Problem Relation Age of Onset    No Known Problems Mother     Heart Disease Father     No Known Problems Sister     No Known Problems Sister       Patient Active Problem List   Diagnosis Code    Osteoporosis M81.0    Depression F32.9    Vitamin D deficiency E55.9    Encounter for long-term (current) use of other medications Z79.899    Bowel obstruction ([facility] [address] K61.56    S/p small bowel obstruction Z87.19    Static encephalopathy G93.49    Anxiety F41.9    Stool incontinence R15.9    Underweight R63.6    Weight loss R63.4    History of abdominal surgery Z98.890    Recurrent depression ([facility] [address] F33.9    Hypotension due to drugs I95.2    Chronic diarrhea K52.9    Hypokalemia E87.6    Functional diarrhea K59.1    Counseling regarding goals of care Z71.89    Intestinal malabsorption K90.9         Medications:   Prior to Admission medications    Medication Sig Start Date End Date Taking? Authorizing Provider   colestipol (COLESTID) 1 gram tablet Take 1 Tab by mouth two (2) times a day. 1/21/19  Yes Drea Villa, DO   MYRRH by Does Not Apply route. Yes Provider, Historical   immune glob,rl caprylate,IgG, (IMMUNE GLOBULIN,HUM,,CAPR,IGG, IV) by IntraVENous route. Yes Provider, Historical   GRAPEFRUIT FORMULA PO Take  by mouth. Yes Provider, Historical   caprylic/capric triglyceride (CAPRYLIC-CAPRIC TRIGLY, BULK,) by Does Not Apply route. Yes Provider, Historical   gabapentin (NEURONTIN) 100 mg capsule Take 2 Caps by mouth three (3) times daily. 9/10/18  Yes Elizabeth INFANTE NP   carboxymethylcellulose sodium (REFRESH TEARS) 0.5 % drop ophthalmic solution Administer 1 Drop to both eyes two (2) times a day. 8/2/18  Yes Yvette Gómez NP   melatonin 3 mg tablet TAKE ONE TABLET BY MOUTH AT BEDTIME 7/30/18  Yes Moshe Cagle,    amitriptyline (ELAVIL) 50 mg tablet Take 50 mg by mouth nightly.  6/15/18  Yes Provider, Historical   VITAMIN D2 50,000 unit capsule TAKE 1 CAPSULE BY MOUTH EVERY 3 MONTHS ON THE FIRST (Isabel Ceron) 6/24/18  Yes Isela Gómez NP CALCITRATE-VITAMIN D tablet TAKE 1 TABLET BY MOUTH TWICE A DAY 5/30/18  Yes Yvette Gómez NP   TAB-A-MARGIE tablet TAKE 1 TABLET BY MOUTH DAILY 3/30/18  Yes Yvette Gómez NP   OTHER L-Theanine 100 mg 1 daily 1/22/18  Yes Moshe Cagle, DO   levomefolate-algal oil (DEPLIN, ALGAL OIL,) 7.5-90.314 mg cap Take  by mouth. Yes Provider, Historical   WHEAT DEXTRIN (BENEFIBER CLEAR SF, DEXTRIN, PO) Take  by mouth. Yes Provider, Historical   brief disposable (ADULT) misc by Does Not Apply route. Depends, size medium, 1 nightly 6/10/15  Yes Juan Hankins, NP   OTHER Pancreatin 10x - 200, I TID with meals 1/15/19   Torrey Garcia, DO   OTHER Compounded colestipol, ultrabinder from [facility] bid and prn 12/11/18   Torrey Garcia, DO   Pancreatin powd 1 Scoop by Does Not Apply route three (3) times daily. 12/11/18   Torrey Garcia, DO   food supplemt, lactose-reduced (PROTEIN NUTRITIONAL SHAKE) liqd Take 237 mL by mouth three (3) times daily. 8/2/18   Yvette Gómez NP   ondansetron hcl (ZOFRAN) 4 mg tablet Take 4 mg by mouth every eight (8) hours as needed for Nausea. Provider, Historical   OTHER Protein powder: please add 2 scoops of protein powder to 8-10 oz of soy milk twice a day ( with I of those times being lunch) and serve to patient. Fax tp [phone] when finished 4/30/18   Yvette Gómez NP   OTHER Use to brush teeth as needed to prevent cavities,gingivitis, and plaque build up, 4/2/18   Moshe Cagle, DO   fluticasone (FLONASE) 50 mcg/actuation nasal spray BLOW NOSE: 2 SPRAYS IN EACH NOSTRIL DAILY FOR ALLERGY. 12/19/17   Juan Hankins, NP   L.acid-B.bifidum-B.animal-FOS (PROBIOTIC COMPLEX) 25 billion cell -100 mg cap Take 25 mg by mouth daily. Provider, Historical   acetaminophen (TYLENOL) 325 mg tablet Take  by mouth every four (4) hours as needed for Pain.     Provider, Historical   guaiFENesin ER (MUCINEX) 600 mg ER tablet Take 600 mg by mouth daily as needed for Congestion.     Provider, Historical       Physical Exam:     General: well developed, well nourished   HEENT: unremarkable   Heart: regular rhythm no mumur    Lungs: clear   Abdominal:  benign   Neurological: unremarkable   Extremities: no edema     Findings/Diagnosis: weight loss, chronic diarrhea    Plan of Care/Planned Procedure: Colonoscopy with  monitored anesthesia care sedation       Signed:  Simin Lindsay MD 1/23/2019

## 2019-01-23 NOTE — DISCHARGE INSTRUCTIONS
Avril Boo MD  [facility], [address]  [place], [address] [facility]  [phone]  [email or web address]. [email or web address] Sue Erica  [id number]  1980    COLON DISCHARGE INSTRUCTIONS  Discomfort:  Redness at IV site- apply warm compress to area; if redness or soreness persist- contact your physician  There may be a slight amount of blood passed from the rectum  Gaseous discomfort- walking, belching will help relieve any discomfort  You may not operate a vehicle for 12 hours  You may not engage in an occupation involving machinery or appliances for rest of today  You may not drink alcoholic beverages for at least 12 hours  Avoid making any critical decisions for at least 24 hour  DIET:   Regular diet. - however -  remember your colon is empty and a heavy meal will produce gas. Avoid these foods:  vegetables, fried / greasy foods, carbonated drinks for today      ACTIVITY:  You may resume your normal daily activities it is recommended that you spend the remainder of the day resting -  avoid any strenuous activity. CALL M.D. ANY SIGN OF:   Increasing pain, nausea, vomiting  Abdominal distension (swelling)  New increased bleeding (oral or rectal)  Fever (chills)  Pain in chest area  Bloody discharge from nose or mouth  Shortness of breath     COLONOSCOPY FINDINGS:  Your colonoscopy showed: surgical changes in the rectum look good. All the mucosa is normal, but took some biopsied to check for microscopic colitis. Follow-up Instructions:   Call Dr. Avril Boo if any questions or problems. Telephone # [phone]  Dr. Ross Mcelroy office will notify you of the biopsy results within 7 to 10 days. Should have a repeat colonoscopy in 10 years. [employer] Activation    Thank you for requesting access to [employer]. Please follow the instructions below to securely access and download your online medical record.  [employer] allows you to send messages to your doctor, view your test results, renew your prescriptions, schedule appointments, and more. How Do I Sign Up? 1. In your internet browser, go to [email or web address]  2. Click on the First Time User? Click Here link in the Sign In box. You will be redirect to the New Member Sign Up page. 3. Enter your [employer] Access Code exactly as it appears below. You will not need to use this code after youve completed the sign-up process. If you do not sign up before the expiration date, you must request a new code. MyChart Access Code: Activation code not generated  Current [employer] Status: Patient Declined (This is the date your MyChart access code will )    4. Enter the last four digits of your Social Security Number (xxxx) and Date of Birth (mm/dd/yyyy) as indicated and click Submit. You will be taken to the next sign-up page. 5. Create a [employer] ID. This will be your [employer] login ID and cannot be changed, so think of one that is secure and easy to remember. 6. Create a [employer] password. You can change your password at any time. 7. Enter your Password Reset Question and Answer. This can be used at a later time if you forget your password. 8. Enter your e-mail address. You will receive e-mail notification when new information is available in [address] 9. Click Sign Up. You can now view and download portions of your medical record. 10. Click the Download Summary menu link to download a portable copy of your medical information. Additional Information    If you have questions, please visit the Frequently Asked Questions section of the [employer] website at [email or web address] Remember, [employer] is NOT to be used for urgent needs. For medical emergencies, dial 911.

## 2019-01-23 NOTE — ROUTINE PROCESS
[facility]  1980  [id number]    Situation:  Verbal report received from:ayad Velazquez rn  Procedure: Procedure(s):  COLONOSCOPY  COLON BIOPSY    Background:    Preoperative diagnosis: DIARRHEA  Postoperative diagnosis: Normal colon    :  Dr. You Hutchins  Assistant(s): Endoscopy Technician-1: Candida Tom  Endoscopy RN-1: Juana Tovar RN    Specimens:   ID Type Source Tests Collected by Time Destination   1 : Ascending colon BX Preservative Colon, Ascending  Jeff Soriano MD 1/23/2019 1017 Pathology   2 : Descinding colon BX Preservative Colon, Descending  Jeff Soriano MD 1/23/2019 1021 Pathology     H. Pylori  no    Assessment:  Intra-procedure medications     Anesthesia gave intra-procedure sedation and medications, see anesthesia flow sheet     Intravenous fluids: NS@ KVO     Vital signs stable     Abdominal assessment: round and soft     Recommendation:  Discharge patient per MD order.   Family or Friend   Permission to share finding with family or friend yes

## 2019-01-30 DIAGNOSIS — R82.90 URINE ABNORMALITY: Primary | ICD-10-CM

## 2019-01-30 NOTE — TELEPHONE ENCOUNTER
Writer ordered referral as requested by patient, provider put in order for medication and group home notified with information of  referral being mailed.

## 2019-01-30 NOTE — PROGRESS NOTES
HISTORY OF PRESENT ILLNESS  Mario Devine is a 34 y.o. male. Pt. comes in with his mother and a staff member from his group home for f/u. Has multiple medical problems. He has extensive GI issues with previous intestinal surgery as a child. Continues to have chronic diarrhea and fecal incontinence. Current regimen seems to be helping. Is been underweight for long time. Weight seems to be stable now. Has been followed by a naturopathic provider. He is now taking a number of supplements which according to his mother seems to be helping. Also has been followed by GI and colorectal surgery. Is a scheduled for a colonoscopy in near future. He is denying any significant new problems today. Has not had a seizure in a long time off medicines. Reports compliance with medications and diet. Med list and most recent labs/studies reviewed with pt. Trying to be active physically as tolerated. Needs med refills. No tobacco or alcohol use. Depends on others for some ADLs. Reports no other new c/o. HPI    Review of Systems   Constitutional: Negative. HENT: Negative. Eyes: Negative. Respiratory: Negative for shortness of breath. Cardiovascular: Negative for chest pain and leg swelling. Gastrointestinal: Positive for abdominal pain and diarrhea (Fecal incontinence). Negative for blood in stool, heartburn, melena, nausea and vomiting. Stool incontinence   Genitourinary: Positive for urgency. Negative for dysuria. Musculoskeletal: Negative for back pain, falls and joint pain. Skin: Negative. Neurological: Positive for tremors and seizures. Negative for dizziness, sensory change and headaches. Endo/Heme/Allergies: Negative. Psychiatric/Behavioral: Positive for depression. The patient is nervous/anxious and has insomnia. All other systems reviewed and are negative. Physical Exam   Constitutional: He is oriented to person, place, and time. He appears well-developed and well-nourished. No distress. Pleasant, chronically underweight  cognitivelty a bit slow   HENT:   Head: Normocephalic and atraumatic. Mouth/Throat: Oropharynx is clear and moist.   Eyes: Conjunctivae are normal. No scleral icterus. Neck: Normal range of motion. Neck supple. No JVD present. No thyromegaly present. Cardiovascular: Normal rate, regular rhythm, normal heart sounds and intact distal pulses. No murmur heard. Pulmonary/Chest: Effort normal and breath sounds normal. No respiratory distress. He has no wheezes. He has no rales. Abdominal: Soft. Bowel sounds are normal. He exhibits no distension. There is no tenderness. Chronic surgical scars   Musculoskeletal: He exhibits no edema or tenderness. Neurological: He is alert and oriented to person, place, and time. Coordination abnormal.   Skin: Skin is warm and dry. No rash noted. Psychiatric: His behavior is normal.   Repeats words  Chronic cognitive deficit , about same   Nursing note and vitals reviewed. ASSESSMENT and PLAN  Diagnoses and all orders for this visit:    1. Chronic diarrhea    2. Underweight    3. Recurrent depression ([facility] [address]    4. History of abdominal surgery    5. S/p small bowel obstruction    Other orders  -     OTHER; Pancreatin 10x - 200, I TID with meals      Follow-up Disposition:  Return in about 3 months (around 4/15/2019).    lab results and schedule of future lab studies reviewed with patient  reviewed diet, exercise and weight control  reviewed medications and side effects in detail  F/u with other MD's/providers as scheduled  I did talk to patient's mother about the costs of all supplements that he is on  She says she is willing to pay for them although is starting to stress her financial  Overall stable

## 2019-03-01 ENCOUNTER — OFFICE VISIT (OUTPATIENT)
Dept: INTERNAL MEDICINE CLINIC | Age: 39
End: 2019-03-01

## 2019-03-01 VITALS
TEMPERATURE: 97 F | HEART RATE: 64 BPM | SYSTOLIC BLOOD PRESSURE: 93 MMHG | BODY MASS INDEX: 14.98 KG/M2 | WEIGHT: 104.6 LBS | DIASTOLIC BLOOD PRESSURE: 65 MMHG | OXYGEN SATURATION: 99 % | RESPIRATION RATE: 16 BRPM | HEIGHT: 70 IN

## 2019-03-01 DIAGNOSIS — F41.9 ANXIETY: ICD-10-CM

## 2019-03-01 DIAGNOSIS — K52.9 CHRONIC DIARRHEA: Primary | ICD-10-CM

## 2019-03-01 DIAGNOSIS — Z98.890 HISTORY OF ABDOMINAL SURGERY: ICD-10-CM

## 2019-03-01 DIAGNOSIS — M81.0 OSTEOPOROSIS, UNSPECIFIED OSTEOPOROSIS TYPE, UNSPECIFIED PATHOLOGICAL FRACTURE PRESENCE: ICD-10-CM

## 2019-03-01 DIAGNOSIS — E55.9 VITAMIN D DEFICIENCY: ICD-10-CM

## 2019-03-01 DIAGNOSIS — Z87.19 S/P SMALL BOWEL OBSTRUCTION: ICD-10-CM

## 2019-03-01 DIAGNOSIS — R63.6 UNDERWEIGHT: ICD-10-CM

## 2019-03-01 NOTE — PROGRESS NOTES
Cornell Strauss is a 34 y.o. male    No chief complaint on file. 1. Have you been to the ER, urgent care clinic since your last visit? Hospitalized since your last visit? No     2. Have you seen or consulted any other health care providers outside of the [address] [place] [address] since your last visit? Include any pap smears or colon screening.   No     Visit Vitals  Ht 5' 10\" (1.778 m)   Wt 104 lb 9.6 oz (47.4 kg)   BMI 15.01 kg/m²

## 2019-03-04 LAB
25(OH)D3+25(OH)D2 SERPL-MCNC: 37.7 NG/ML (ref 30–100)
ALBUMIN SERPL-MCNC: 4.4 G/DL (ref 3.5–5.5)
ALBUMIN/GLOB SERPL: 2.1 {RATIO} (ref 1.2–2.2)
ALP SERPL-CCNC: 65 IU/L (ref 39–117)
ALT SERPL-CCNC: 15 IU/L (ref 0–44)
AMITRIP SERPL-MCNC: NORMAL NG/ML
AMITRIP+NOR SERPL-MCNC: NORMAL NG/ML (ref 80–200)
AST SERPL-CCNC: 18 IU/L (ref 0–40)
BILIRUB SERPL-MCNC: 0.2 MG/DL (ref 0–1.2)
BUN SERPL-MCNC: 19 MG/DL (ref 6–24)
BUN/CREAT SERPL: 21 (ref 9–20)
CALCIUM SERPL-MCNC: 9.5 MG/DL (ref 8.7–10.2)
CHLORIDE SERPL-SCNC: 102 MMOL/L (ref 96–106)
CO2 SERPL-SCNC: 31 MMOL/L (ref 20–29)
CREAT SERPL-MCNC: 0.92 MG/DL (ref 0.76–1.27)
ERYTHROCYTE [DISTWIDTH] IN BLOOD BY AUTOMATED COUNT: 13.2 % (ref 12.3–15.4)
GLOBULIN SER CALC-MCNC: 2.1 G/DL (ref 1.5–4.5)
GLUCOSE SERPL-MCNC: 83 MG/DL (ref 65–99)
HCT VFR BLD AUTO: 41.6 % (ref 37.5–51)
HGB BLD-MCNC: 13.6 G/DL (ref 13–17.7)
MCH RBC QN AUTO: 31.5 PG (ref 26.6–33)
MCHC RBC AUTO-ENTMCNC: 32.7 G/DL (ref 31.5–35.7)
MCV RBC AUTO: 96 FL (ref 79–97)
NORTRIP SERPL-MCNC: 22 NG/ML
PLATELET # BLD AUTO: 145 X10E3/UL (ref 150–379)
POTASSIUM SERPL-SCNC: 4.4 MMOL/L (ref 3.5–5.2)
PROT SERPL-MCNC: 6.5 G/DL (ref 6–8.5)
RBC # BLD AUTO: 4.32 X10E6/UL (ref 4.14–5.8)
SODIUM SERPL-SCNC: 144 MMOL/L (ref 134–144)
WBC # BLD AUTO: 4.8 X10E3/UL (ref 3.4–10.8)

## 2019-03-06 NOTE — PROGRESS NOTES
Spoke with the patients mother who is his care provider and she voiced her understanding of the lab results.

## 2019-03-15 ENCOUNTER — TELEPHONE (OUTPATIENT)
Dept: INTERNAL MEDICINE CLINIC | Age: 39
End: 2019-03-15

## 2019-03-20 ENCOUNTER — TELEPHONE (OUTPATIENT)
Dept: INTERNAL MEDICINE CLINIC | Age: 39
End: 2019-03-20

## 2019-03-20 NOTE — TELEPHONE ENCOUNTER
Pt's mother is requesting a script for CBD oil for the pt to help with his anxiety and to help the pt with his sleep. The pt's mother will have [address] send over a fax request with the prescription information on it.

## 2019-03-21 NOTE — TELEPHONE ENCOUNTER
Called the pt's mother (HIPAA) and advised her that Dr. Phill Rey is not comfortable prescribing CBD. Per the pt's mother, the pt's psychiatrist is unable to order this. She stated that she will discuss it with the pt's neurologist. She voiced thanks and understanding.

## 2019-03-26 ENCOUNTER — OFFICE VISIT (OUTPATIENT)
Dept: NEUROLOGY | Age: 39
End: 2019-03-26

## 2019-03-26 VITALS
RESPIRATION RATE: 20 BRPM | BODY MASS INDEX: 14.53 KG/M2 | DIASTOLIC BLOOD PRESSURE: 62 MMHG | OXYGEN SATURATION: 98 % | HEIGHT: 70 IN | WEIGHT: 101.5 LBS | SYSTOLIC BLOOD PRESSURE: 108 MMHG | HEART RATE: 82 BPM

## 2019-03-26 DIAGNOSIS — R25.1 TREMOR: Primary | ICD-10-CM

## 2019-03-26 DIAGNOSIS — F41.9 SEVERE ANXIETY: ICD-10-CM

## 2019-03-26 DIAGNOSIS — R26.9 GAIT DISTURBANCE: ICD-10-CM

## 2019-03-26 NOTE — PROGRESS NOTES
Date:  19     Name:  Brendan Huertas  :  1980  MRN:  [id number]     PCP:  Jonathan Soto DO    Chief Complaint   Patient presents with    Tremors     HISTORY OF PRESENT ILLNESS: Follow up for tremors, anxiety, and failure to thrive. He is accompanied by his mother and a caregiver. At his last office visit we discussed his care with mother and caregiver. No seizures despite no antiepileptic medication. His tremors are not any different and they were wondering about possibly decreasing or stopping the gabapentin. He has had some issues with mild imbalance. His anxiety is better and he has been saying that he is happy. He has been sleeping well at night using the Melatonin. He is eating better. He has had two episodes of vomiting in the last two weeks while eating. Current Outpatient Medications   Medication Sig    fluticasone (FLONASE) 50 mcg/actuation nasal spray BLOW NOSE: 2 SPRAYS IN EACH NOSTRIL DAILY FOR ALLERGY.  colestipol (COLESTID) 1 gram tablet Take 1 Tab by mouth two (2) times a day.  MYRRH by Does Not Apply route.  immune glob,rl caprylate,IgG, (IMMUNE GLOBULIN,HUM,,CAPR,IGG, IV) by IntraVENous route.  OTHER Pancreatin 10x - 200, I TID with meals    GRAPEFRUIT FORMULA PO Take  by mouth.  gabapentin (NEURONTIN) 100 mg capsule Take 2 Caps by mouth three (3) times daily.  food supplemt, lactose-reduced (PROTEIN NUTRITIONAL SHAKE) liqd Take 237 mL by mouth three (3) times daily.  carboxymethylcellulose sodium (REFRESH TEARS) 0.5 % drop ophthalmic solution Administer 1 Drop to both eyes two (2) times a day.  melatonin 3 mg tablet TAKE ONE TABLET BY MOUTH AT BEDTIME    amitriptyline (ELAVIL) 50 mg tablet Take 50 mg by mouth nightly.  CALCITRATE-VITAMIN D tablet TAKE 1 TABLET BY MOUTH TWICE A DAY    OTHER Protein powder: please add 2 scoops of protein powder to 8-10 oz of soy milk twice a day ( with I of those times being lunch) and serve to patient. Fax tp [phone] when finished    TAB-A-MARGIE tablet TAKE 1 TABLET BY MOUTH DAILY    OTHER L-Theanine 100 mg 1 daily    levomefolate-algal oil (DEPLIN, ALGAL OIL,) 7.5-90.314 mg cap Take  by mouth.  L.acid-B.bifidum-B.animal-FOS (PROBIOTIC COMPLEX) 25 billion cell -100 mg cap Take 25 mg by mouth daily.  WHEAT DEXTRIN (BENEFIBER CLEAR SF, DEXTRIN, PO) Take  by mouth.  acetaminophen (TYLENOL) 325 mg tablet Take  by mouth every four (4) hours as needed for Pain.  guaiFENesin ER (MUCINEX) 600 mg ER tablet Take 600 mg by mouth daily as needed for Congestion.  brief disposable (ADULT) misc by Does Not Apply route. Depends, size medium, 1 nightly     No current facility-administered medications for this visit.       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, without mention of intractable epilepsy 5/14/2011    Mental retardation     Osteoporosis     Other ill-defined conditions(799.89)     intestinal problems    Psychiatric disorder     anxiety    Seizure disorder [facility])      Past Surgical History:   Procedure Laterality Date    COLONOSCOPY N/A 1/23/2019    COLONOSCOPY performed by [facility] Deal [facility] at [facility]  1/23/2019         HX APPENDECTOMY  1/28/13    APPENDECTOMY    HX GI      colon resection    HX GI  1/28/13    LAPAROTOMY EXPLORATORY - OPEN LYSIS OF ADHESIONS     HX HERNIA REPAIR      HX OTHER SURGICAL      imperforated anus birth defect    UPPER GI ENDOSCOPY,BIOPSY  3/17/2017          Social History     Socioeconomic History    Marital status: SINGLE     Spouse name: Not on file    Number of children: Not on file    Years of education: Not on file    Highest education level: Not on file   Occupational History    Not on file   Social Needs    Financial resource strain: Not on file    Food insecurity:     Worry: Not on file     Inability: Not on file    Transportation needs:     Medical: Not on file     Non-medical: Not on file   Tobacco Use    Smoking status: Never Smoker    Smokeless tobacco: Never Used   Substance and Sexual Activity    Alcohol use: No    Drug use: No    Sexual activity: Never   Lifestyle    Physical activity:     Days per week: Not on file     Minutes per session: Not on file    Stress: Not on file   Relationships    Social connections:     Talks on phone: Not on file     Gets together: Not on file     Attends Scientologist service: Not on file     Active member of club or organization: Not on file     Attends meetings of clubs or organizations: Not on file     Relationship status: Not on file    Intimate partner violence:     Fear of current or ex partner: Not on file     Emotionally abused: Not on file     Physically abused: Not on file     Forced sexual activity: Not on file   Other Topics Concern    Not on file   Social History Narrative    Not on file     Family History   Problem Relation Age of Onset    No Known Problems Mother     Heart Disease Father     No Known Problems Sister     No Known Problems Sister        PHYSICAL EXAMINATION:    Visit Vitals  /62   Pulse 82   Resp 20   Ht 5' 10\" (1.778 m)   Wt 46 kg (101 lb 8 oz)   SpO2 98%   BMI 14.56 kg/m²     General: Well defined, nourished, and groomed individual in no acute distress. Neck: Supple, nontender, no bruits, no pain with resistance to active range of motion. Heart: Regular rate and rhythm, no murmurs, rub, or gallop. Normal S1S2. Lungs: Clear to auscultation bilaterally with equal chest expansion, no cough, no wheeze  Musculoskeletal: Extremities revealed no edema and had full range of motion of joints.    Psych: Good mood and bright affect      NEUROLOGICAL EXAMINATION:   Mental Status: Alert and oriented to person and place   Cranial Nerves:   II, III, IV, VI: Visual acuity grossly intact. Visual fields are normal.   Pupils are equal, round, and reactive to light and accommodation. Extra-ocular movements are full and fluid. Fundoscopic exam was benign, no ptosis or nystagmus. V-XII: Hearing is grossly intact. Facial features are symmetric, with normal sensation and strength. The palate rises symmetrically and the tongue protrudes midline. Sternocleidomastoids 5/5. Motor Examination: decreased tone and bulk with generalized weakness. Coordination: Finger to nose was normal. No resting tremor. There is a noticeable tremor bilaterally, right greater than left. Gait and Station: Much steadier with rising and walking. He pins his hands to his sides with walking and has to be reminded to swing his arms. No pronator drift. No muscle wasting or fasiculations noted. Reflexes: DTRs 2+ throughout. ASSESSMENT AND PLAN    ICD-10-CM ICD-9-CM    1. Tremor R25.1 781.0    2. Severe anxiety F41.9 300.00    3. Gait disturbance R26.9 781. 2      Will decrease the gabapentin slightly to 100mg three times a day and see if the imbalance is better with the decreased dose. Recommended he follow up with the gastroenterologist for the vomiting especially if this is persistent. Follow up in three months     [facility].  Jaiden Ford

## 2019-03-26 NOTE — PROGRESS NOTES
His focus has been on and off  Bowel issues have been back and forth and his weight has been refecting that he has dropped in a couple of lbs since his last visit   He still has diarrhea twice a week   Per the mother she feels like he has improved since his last visit   He gets a protein shake each day

## 2019-03-29 NOTE — PROGRESS NOTES
2HISTORY OF PRESENT ILLNESS  Makenna Chatman is a 34 y.o. male. Pt. comes in with a caregiver from his group home for f/u. Has multiple medical problems. He has chronic GI issues with intestinal surgery as a child. He chronically underweight. Has history of SBO. Has been followed by colorectal specialist as well. Has chronic malabsorption issues. Has chronic diarrhea. They are reporting seeing some blood recently but nothing recurrent. He is denying any significant issues today. Has fecal incontinence. Has been followed by a alternative medical provider and given different supplements which is helping some. Supplements are costing a lot. Is followed by psychiatrist for chronic anxiety/depression. Remains on amitriptyline. I have warned  them about significant side effects of that medication. He has had dizziness and postural issues in the past.  Reports compliance with medications and diet. Med list and most recent labs/studies reviewed with pt. Trying to be active physically as tolerated. Due for repeat labs. Reports no other new c/o. HPI    Review of Systems   Constitutional: Negative. HENT: Negative. Eyes: Negative. Respiratory: Negative for shortness of breath. Cardiovascular: Negative for chest pain and leg swelling. Gastrointestinal: Positive for abdominal pain and diarrhea (Fecal incontinence). Negative for blood in stool, heartburn, melena, nausea and vomiting. Stool incontinence   Genitourinary: Positive for urgency. Negative for dysuria. Musculoskeletal: Negative for back pain, falls and joint pain. Skin: Negative. Neurological: Positive for tremors and seizures. Negative for dizziness, sensory change and headaches. Endo/Heme/Allergies: Negative. Psychiatric/Behavioral: Positive for depression. The patient is nervous/anxious and has insomnia. All other systems reviewed and are negative.       Physical Exam   Constitutional: He is oriented to person, place, and time. He appears well-developed and well-nourished. No distress. Pleasant, chronically underweight  cognitivelty a bit slow   HENT:   Head: Normocephalic and atraumatic. Mouth/Throat: Oropharynx is clear and moist.   Eyes: Conjunctivae are normal.   Neck: Normal range of motion. Neck supple. No JVD present. No thyromegaly present. Cardiovascular: Normal rate, regular rhythm, normal heart sounds and intact distal pulses. No murmur heard. Pulmonary/Chest: Effort normal and breath sounds normal. No respiratory distress. He has no wheezes. He has no rales. Abdominal: Soft. Bowel sounds are normal. He exhibits no distension. There is no tenderness. Chronic surgical scars   Musculoskeletal: He exhibits no edema or tenderness. Neurological: He is alert and oriented to person, place, and time. Coordination abnormal.   Skin: Skin is warm and dry. No rash noted. Psychiatric: His behavior is normal.   Repeats words  Chronic cognitive deficit , about same   Nursing note and vitals reviewed. ASSESSMENT and PLAN  Diagnoses and all orders for this visit:    1. Chronic diarrhea  -     METABOLIC PANEL, COMPREHENSIVE  -     CBC W/O DIFF    2. Underweight    3. History of abdominal surgery    4. S/p small bowel obstruction    5. Osteoporosis, unspecified osteoporosis type, unspecified pathological fracture presence  -     VITAMIN D, 25 HYDROXY    6. Vitamin D deficiency  -     VITAMIN D, 25 HYDROXY    7. Anxiety  -     AMITRIPTYLINE+METABOLITE      Follow-up and Dispositions    · Return in about 3 months (around 6/1/2019).      lab results and schedule of future lab studies reviewed with patient  reviewed diet, exercise and weight control  reviewed medications and side effects in detail  F/u with other MD's as scheduled  Discussed avoidance of unnecessary supplements and meds  Overall stable

## 2019-04-03 ENCOUNTER — TELEPHONE (OUTPATIENT)
Dept: INTERNAL MEDICINE CLINIC | Age: 39
End: 2019-04-03

## 2019-04-03 ENCOUNTER — TELEPHONE (OUTPATIENT)
Dept: NEUROLOGY | Age: 39
End: 2019-04-03

## 2019-04-03 DIAGNOSIS — F41.9 ANXIETY: ICD-10-CM

## 2019-04-03 DIAGNOSIS — R25.1 TREMOR: ICD-10-CM

## 2019-04-03 RX ORDER — GABAPENTIN 100 MG/1
100 CAPSULE ORAL 3 TIMES DAILY
Qty: 90 CAP | Refills: 11 | Status: SHIPPED | OUTPATIENT
Start: 2019-04-03 | End: 2019-08-28 | Stop reason: SDUPTHER

## 2019-04-03 NOTE — TELEPHONE ENCOUNTER
----- Message from Espinoza Garcia 1555 sent at 4/3/2019 10:04 AM EDT -----  Regarding: RICHY Valdovinos/ rosibel Albright, staff at group [facility], called because the pt's \"gabapentin\" was supposed to be decreased from 200 mg to 100 mg, but the prescription hasn't been received.       A good phone and fax number is [phone]

## 2019-04-03 NOTE — TELEPHONE ENCOUNTER
----- Message from Mikael Valentin sent at 4/3/2019 10:29 AM EDT -----  Regarding: Dr. Toma Landin  Patient [facility] [phone].

## 2019-04-10 ENCOUNTER — TELEPHONE (OUTPATIENT)
Dept: NEUROLOGY | Age: 39
End: 2019-04-10

## 2019-04-10 NOTE — TELEPHONE ENCOUNTER
----- Message from Rg Hayden sent at 4/10/2019  2:54 PM EDT -----  Regarding: RICHY Valdovinos/cornelio  Kimmy from [id number] [place] [address] [phone], received a  Order on 4/3/19  for gabapentin 100 mg to take 1 cap 3x a day, his current verbal  order is 2 cap 3x a day , when they receive the verbal it is never updated in the system, so they need clarification which dosage is correct the  1 cap 3x a day or the 2 caps 3x a day, please call back to confirm  And make the changes into your computer system, this happens every time a electronic request is sent last one was in September

## 2019-04-15 NOTE — TELEPHONE ENCOUNTER
Attempted to contact x2 to give them an update on the Rx and the line just keeps ringing busy. Will try again later.

## 2019-04-18 NOTE — TELEPHONE ENCOUNTER
----- Message from [employer] sent at 4/18/2019  9:57 AM EDT -----  Regarding: RICHY Valdovinos/Jose Eduardo Clarke ([address]) is requesting a call back from NP [facility] in reference to medication \"gabapentin 100mg\". Needs clarification. Maribel Clarke best contact [phone].

## 2019-04-18 NOTE — TELEPHONE ENCOUNTER
One tid per NP. Returned call to explain the clarification needed for pt's medication gabapentin. Spoke with Shakir Dawkins and she has verbalized understanding.

## 2019-04-19 NOTE — PROGRESS NOTES
General Surgery End of Shift Nursing Note    Bedside shift change report given to Kristen Cortez RN (oncoming nurse) by Cuate Stafford RN (offgoing nurse). Report included the following information SBAR, Kardex, ED Summary, Intake/Output and MAR. Shift worked:   7A to Trending Taste of shift:    NGT clamped x 4 hours, residual checked and was 100 ml, NGT removed . Diet advanced to clear liquid. Consult called to hospitalist. Lab work in am.   Issues for physician to address:        Number times ambulated in hallway past shift: 2  ,ambulates in room with one assist  Number of times OOB to chair past shift: 1, most of day    Pain Management:  Current medication:   Patient states pain is manageable on current pain medication: YES    GI:    Current diet:  DIET CLEAR LIQUID    Tolerating current diet: YES  Passing flatus: YES  Last Bowel Movement: today x3    Appearance: formed, small     Respiratory:    Incentive Spirometer at bedside: NO  Patient instructed on use: NO    Patient Safety:    Falls Score: 2  Bed Alarm On? No  Sitter?  No    Clotilde Schrader RN Prior auth started in separate telephone encounter.    Micah Harrison CMA on 4/19/2019 at 9:14 AM

## 2019-05-14 ENCOUNTER — OFFICE VISIT (OUTPATIENT)
Dept: INTERNAL MEDICINE CLINIC | Age: 39
End: 2019-05-14

## 2019-05-14 VITALS
DIASTOLIC BLOOD PRESSURE: 66 MMHG | SYSTOLIC BLOOD PRESSURE: 111 MMHG | HEIGHT: 70 IN | HEART RATE: 73 BPM | OXYGEN SATURATION: 96 % | BODY MASS INDEX: 15.23 KG/M2 | WEIGHT: 106.4 LBS | RESPIRATION RATE: 18 BRPM | TEMPERATURE: 97.6 F

## 2019-05-14 DIAGNOSIS — K52.9 CHRONIC DIARRHEA: Primary | ICD-10-CM

## 2019-05-14 DIAGNOSIS — R63.6 UNDERWEIGHT: ICD-10-CM

## 2019-05-14 DIAGNOSIS — Z98.890 HISTORY OF ABDOMINAL SURGERY: ICD-10-CM

## 2019-05-14 DIAGNOSIS — Z87.19 S/P SMALL BOWEL OBSTRUCTION: ICD-10-CM

## 2019-05-14 DIAGNOSIS — G47.00 INSOMNIA, UNSPECIFIED TYPE: ICD-10-CM

## 2019-05-14 RX ORDER — CHOLECALCIFEROL (VITAMIN D3) 125 MCG
CAPSULE ORAL
Qty: 90 TAB | Refills: 1 | Status: SHIPPED | OUTPATIENT
Start: 2019-05-14 | End: 2019-05-16 | Stop reason: SDUPTHER

## 2019-05-14 NOTE — PROGRESS NOTES
Health Maintenance Due   Topic Date Due    DTaP/Tdap/Td series (1 - Tdap) 11/04/1999       Chief Complaint   Patient presents with    Osteoporosis     4 month follow up    Depression    Hypotension       1. Have you been to the ER, urgent care clinic since your last visit? Hospitalized since your last visit? No    2. Have you seen or consulted any other health care providers outside of the [address] [place] [address] since your last visit? Include any pap smears or colon screening. No    3) Do you have an Advance Directive on file? no    4) Are you interested in receiving information on Advance Directives? NO      Patient is accompanied by self I have received verbal consent from Terrence Armijo to discuss any/all medical information while they are present in the room. negative...

## 2019-05-16 DIAGNOSIS — G47.00 INSOMNIA, UNSPECIFIED TYPE: ICD-10-CM

## 2019-05-17 RX ORDER — CHOLECALCIFEROL (VITAMIN D3) 125 MCG
CAPSULE ORAL
Qty: 90 TAB | Refills: 1 | Status: SHIPPED | OUTPATIENT
Start: 2019-05-17 | End: 2019-06-04 | Stop reason: SDUPTHER

## 2019-05-31 ENCOUNTER — TELEPHONE (OUTPATIENT)
Dept: INTERNAL MEDICINE CLINIC | Age: 39
End: 2019-05-31

## 2019-05-31 NOTE — TELEPHONE ENCOUNTER
Per MD order should be for Melatonin 5 mg. Called [facility] and gave the pharmacist the clarification. He read it back for correctness and had no further questions at this time.

## 2019-05-31 NOTE — PROGRESS NOTES
HISTORY OF PRESENT ILLNESS  Melanie Wang is a 34 y.o. male. Pt. comes in with his mother and caregiver from his group home for f/u. Has multiple medical problems. He has chronic GI issues with intestinal surgery as a child. Has chronic diarrhea and stool incontinence which is improved on current regimen of medications and supplements. He is chronically underweight and malnourished but has gained some weight and is eating better. He is followed by psychiatry. His insomnia seems to be better with medications. Has been followed by an alternative medicine practitioner and also. She had put him on different supplements which according to mom are helping. Reports compliance with medications and diet. Med list and most recent labs/studies reviewed with pt. Trying to be active physically as tolerated. Reports no other new c/o. HPI    Review of Systems   Constitutional: Negative. HENT: Negative. Eyes: Negative. Respiratory: Negative for shortness of breath. Cardiovascular: Negative for chest pain and leg swelling. Gastrointestinal: Positive for diarrhea (Fecal incontinence). Negative for abdominal pain, blood in stool, heartburn, melena, nausea and vomiting. Stool incontinence   Genitourinary: Positive for urgency. Negative for dysuria. Musculoskeletal: Negative for back pain, falls and joint pain. Skin: Negative. Neurological: Positive for tremors. Negative for dizziness, sensory change, seizures and headaches. Endo/Heme/Allergies: Negative. Psychiatric/Behavioral: Positive for depression. The patient is nervous/anxious and has insomnia. All other systems reviewed and are negative. Physical Exam   Constitutional: He is oriented to person, place, and time. He appears well-developed and well-nourished. No distress. Pleasant, chronically underweight  cognitivelty a bit slow   HENT:   Head: Normocephalic and atraumatic.    Mouth/Throat: Oropharynx is clear and moist.   Eyes: Conjunctivae are normal. No scleral icterus. Neck: Normal range of motion. Neck supple. No JVD present. No thyromegaly present. Cardiovascular: Normal rate, regular rhythm, normal heart sounds and intact distal pulses. No murmur heard. Pulmonary/Chest: Effort normal and breath sounds normal. No respiratory distress. He has no wheezes. He has no rales. Abdominal: Soft. Bowel sounds are normal. He exhibits no distension. There is no tenderness. Chronic surgical scars   Musculoskeletal: He exhibits no edema or tenderness. Neurological: He is alert and oriented to person, place, and time. Coordination abnormal.   Skin: Skin is warm and dry. No rash noted. Psychiatric: His behavior is normal.   Repeats words  Chronic cognitive deficit , about same   Nursing note and vitals reviewed. ASSESSMENT and PLAN  Diagnoses and all orders for this visit:    1. Chronic diarrhea    2. Underweight    3. History of abdominal surgery    4. S/p small bowel obstruction    5. Insomnia, unspecified type      Follow-up and Dispositions    · Return in about 4 months (around 9/14/2019).      lab results and schedule of future lab studies reviewed with patient  reviewed diet, exercise and weight control  reviewed medications and side effects in detail  F/u with other MD's/providers as scheduled  Fall precautions discussed  Overall stable

## 2019-06-04 ENCOUNTER — TELEPHONE (OUTPATIENT)
Dept: INTERNAL MEDICINE CLINIC | Age: 39
End: 2019-06-04

## 2019-06-04 DIAGNOSIS — G47.00 INSOMNIA, UNSPECIFIED TYPE: ICD-10-CM

## 2019-06-04 NOTE — TELEPHONE ENCOUNTER
Pts mother called and advised that [facility] still hasn't released pts melatonin Rx and that the phcy needs clarification. Please give [facility] phcy a call to see what the hold up is.

## 2019-06-04 NOTE — TELEPHONE ENCOUNTER
Per verbal order by Dr. Mira Root, read back for confirmation, the pt requires 3 mg of melatonin QHS. The previous script was sent with no dosage and therefore was not filled.

## 2019-06-05 RX ORDER — LANOLIN ALCOHOL/MO/W.PET/CERES
3 CREAM (GRAM) TOPICAL
Qty: 90 TAB | Refills: 1 | Status: SHIPPED | OUTPATIENT
Start: 2019-06-05 | End: 2019-06-06 | Stop reason: SDUPTHER

## 2019-06-06 DIAGNOSIS — G47.00 INSOMNIA, UNSPECIFIED TYPE: ICD-10-CM

## 2019-06-06 RX ORDER — LANOLIN ALCOHOL/MO/W.PET/CERES
3 CREAM (GRAM) TOPICAL
Qty: 90 TAB | Refills: 1 | Status: SHIPPED | OUTPATIENT
Start: 2019-06-06 | End: 2019-08-13 | Stop reason: SDUPTHER

## 2019-06-17 ENCOUNTER — TELEPHONE (OUTPATIENT)
Dept: INTERNAL MEDICINE CLINIC | Age: 39
End: 2019-06-17

## 2019-06-17 NOTE — TELEPHONE ENCOUNTER
Group home is calling to clarify script for Melatonin  Patient is suppose to be taking 5mg melatonin per last visit on 5/15/19  Patients recent script is for 3mg- according to group home RICHY Aceves made a change?  Please contact pharmacy to update

## 2019-06-20 NOTE — TELEPHONE ENCOUNTER
Called and spoke with Morelia at [facility] and give clarification for Melatonin 3 mg Take 1 tab qhs.

## 2019-07-10 ENCOUNTER — OFFICE VISIT (OUTPATIENT)
Dept: FAMILY MEDICINE CLINIC | Age: 39
End: 2019-07-10

## 2019-07-10 VITALS
HEIGHT: 70 IN | TEMPERATURE: 98.6 F | DIASTOLIC BLOOD PRESSURE: 69 MMHG | WEIGHT: 101.6 LBS | OXYGEN SATURATION: 95 % | RESPIRATION RATE: 14 BRPM | HEART RATE: 87 BPM | SYSTOLIC BLOOD PRESSURE: 95 MMHG | BODY MASS INDEX: 14.54 KG/M2

## 2019-07-10 DIAGNOSIS — H00.15 CHALAZION OF LEFT LOWER EYELID: Primary | ICD-10-CM

## 2019-07-10 RX ORDER — ERYTHROMYCIN 5 MG/G
OINTMENT OPHTHALMIC
Qty: 3.5 G | Refills: 0 | Status: SHIPPED | OUTPATIENT
Start: 2019-07-10 | End: 2019-08-06 | Stop reason: ALTCHOICE

## 2019-07-10 NOTE — PROGRESS NOTES
Yenni Root is a 34 y.o. male    Room 2 with Mom    Warm eye compresses done x 1 week, not improving    Chief Complaint   Patient presents with    Eye Problem     1. Have you been to the ER, urgent care clinic since your last visit? Hospitalized since your last visit? no    2. Have you seen or consulted any other health care providers outside of the [address] [place] [address] since your last visit? Include any pap smears or colon screening.   No    Visit Vitals  BP 95/69   Pulse 87   Temp 98.6 °F (37 °C) (Oral)   Resp 14   Ht 5' 10\" (1.778 m)   Wt 101 lb 9.6 oz (46.1 kg)   SpO2 95%   BMI 14.58 kg/m²

## 2019-07-10 NOTE — PATIENT INSTRUCTIONS
Apply warm moist clean compress to the left eye for 10 minutes 3 x daily for 3 days then 2 x daily for 7 days. After warm compress, apply 1/4 inch of the erythromycin ophthalmic ointment to the left lower eyelid inside the eye: it can be applied over the red skin outside the eye also if the area is painful. Styes and Chalazia: Care Instructions  Your Care Instructions    Styes and chalazia (say \"zeu-SFT-lnn-\") are both conditions that can cause swelling of the eyelid. A stye is an infection in the root of an eyelash. The infection causes a tender red lump on the edge of the eyelid. The infection can spread until the whole eyelid becomes red and inflamed. Styes usually break open, and a tiny amount of pus drains. They usually clear up on their own in about a week, but they sometimes need treatment with antibiotics. A chalazion is a lump or cyst in the eyelid (chalazion is singular; chalazia is plural). It is caused by swelling and inflammation of deep oil glands inside the eyelid. Chalazia are usually not infected. They can take a few months to heal.  If a chalazion becomes more swollen and painful or does not go away, you may need to have it drained by your doctor. Follow-up care is a key part of your treatment and safety. Be sure to make and go to all appointments, and call your doctor if you are having problems. It's also a good idea to know your test results and keep a list of the medicines you take. How can you care for yourself at home? · Do not rub your eyes. Do not squeeze or try to open a stye or chalazion. · To help a stye or chalazion heal faster:  ? Put a warm, moist compress on your eye for 5 to 10 minutes, 3 to 6 times a day. Heat often brings a stye to a point where it drains on its own. Keep in mind that warm compresses will often increase swelling a little at first.  ? Do not use hot water or heat a wet cloth in a microwave oven.  The compress may get too hot and can burn the eyelid. · Always wash your hands before and after you use a compress or touch your eyes. · If the doctor gave you antibiotic drops or ointment, use the medicine exactly as directed. Use the medicine for as long as instructed, even if your eye starts to feel better. · To put in eyedrops or ointment:  ? Tilt your head back, and pull your lower eyelid down with one finger. ? Drop or squirt the medicine inside the lower lid. ? Close your eye for 30 to 60 seconds to let the drops or ointment move around. ? Do not touch the ointment or dropper tip to your eyelashes or any other surface. · Do not wear eye makeup or contact lenses until the stye or chalazion heals. · Do not share towels, pillows, or washcloths while you have a stye. When should you call for help? Call your doctor now or seek immediate medical care if:    · You have pain in your eye.     · You have a change in vision or loss of vision.     · Redness and swelling get much worse.    Watch closely for changes in your health, and be sure to contact your doctor if:    · Your stye does not get better in 1 week.     · Your chalazion does not start to get better after several weeks. Where can you learn more? Go to http://rachael-william.info/. Enter S100 in the search box to learn more about \"Styes and Chalazia: Care Instructions. \"  Current as of: July 17, 2018  Content Version: 11.9  © 7780-0553 [employer]. Care instructions adapted under license by [employer] (which disclaims liability or warranty for this information). If you have questions about a medical condition or this instruction, always ask your healthcare professional. [place] [address] any warranty or liability for your use of this information.

## 2019-07-11 NOTE — PROGRESS NOTES
Subjective:   Lida Milian is a 34 y.o. male who presents for evaluation of redness. He has noticed the above symptoms in the right eye for 2 weeks. Onset was gradual.   Symptoms have included a mild swelling just below the left eye lash line. Patient denies tearing, pain, blurred vision, foreign body sensation, visual field deficit, discharge, photophobia, itching. There is a history of allergies    Review of Systems  negative    Objective:     Visit Vitals  BP 95/69   Pulse 87   Temp 98.6 °F (37 °C) (Oral)   Resp 14   Ht 5' 10\" (1.778 m)   Wt 101 lb 9.6 oz (46.1 kg)   SpO2 95%   BMI 14.58 kg/m²                 General: alert, cooperative, no distress, appears stated age   Eyes:  positive findings: eyelids/periorbital: chalazion on the left lower, mild erythema   Vision: Not performed   Fluorescein:  not done     Assessment/Plan:     Chalazion on the left lower eye    1. Ophthalmic ointment per orders. 2. Warm compress to eye(s). 3. Patient instructions: compresses: 2-3 x daily  4. Risks and side effects of medications was discussed. 5. Pt advised to read written information provided by the pharmacist: yes  6. Followup as needed. ICD-10-CM ICD-9-CM    1. Chalazion of left lower eyelid H00.15 373.2 erythromycin (ILOTYCIN) ophthalmic ointment   .

## 2019-08-06 ENCOUNTER — OFFICE VISIT (OUTPATIENT)
Dept: NEUROLOGY | Age: 39
End: 2019-08-06

## 2019-08-06 VITALS
SYSTOLIC BLOOD PRESSURE: 92 MMHG | HEART RATE: 80 BPM | HEIGHT: 70 IN | DIASTOLIC BLOOD PRESSURE: 60 MMHG | OXYGEN SATURATION: 98 % | RESPIRATION RATE: 20 BRPM | BODY MASS INDEX: 14.32 KG/M2 | WEIGHT: 100 LBS

## 2019-08-06 DIAGNOSIS — Z74.09 IMPAIRED FUNCTIONAL MOBILITY, BALANCE, GAIT, AND ENDURANCE: ICD-10-CM

## 2019-08-06 DIAGNOSIS — G93.49 STATIC ENCEPHALOPATHY: ICD-10-CM

## 2019-08-06 DIAGNOSIS — R25.1 TREMOR: Primary | ICD-10-CM

## 2019-08-06 DIAGNOSIS — F41.9 ANXIETY: ICD-10-CM

## 2019-08-06 DIAGNOSIS — R53.1 WEAKNESS GENERALIZED: ICD-10-CM

## 2019-08-06 NOTE — PROGRESS NOTES
Tremors- they have increased   His anxiety has increased as well   No reports of being dizzy except for today but they said they were talking about it so they were thinking he just over heard them talking about it

## 2019-08-06 NOTE — PROGRESS NOTES
Date:  19     Name:  Daria Roca  :  1980  MRN:  [id number]     PCP:  Esme Mora DO    Chief Complaint   Patient presents with    Tremors     HISTORY OF PRESENT ILLNESS: Follow up for tremors, anxiety, and failure to thrive. He is accompanied by his mother and a caregiver. At his last office visit, we decreased the gabapentin slightly to 100mg three times a day to see if the imbalance is better with the decreased dose. The caregiver states she thinks the balance is better. He is not double stepping like he was. Unfortunately, the anxiety and the tremors are worse since he decreased the gabapentin. He continues to have issues with not being able to gain weight. No seizures despite no antiepileptic medication. Current Outpatient Medications   Medication Sig    melatonin 3 mg tablet Take 1 Tab by mouth nightly. TAKE ONE TABLET BY MOUTH AT BEDTIME    REFRESH TEARS 0.5 % drop ophthalmic solution INSTILL 1 DROP INTO EACH EYE TWICE A DAY    CALCITRATE-VITAMIN D tablet TAKE 1 TABLET BY MOUTH TWICE A DAY    OTHER L-Theanine 100 mg 1 daily    gabapentin (NEURONTIN) 100 mg capsule Take 1 Cap by mouth three (3) times daily.  TAB-A-MARGIE tablet TAKE 1 TABLET BY MOUTH DAILY    fluticasone (FLONASE) 50 mcg/actuation nasal spray BLOW NOSE: 2 SPRAYS IN EACH NOSTRIL DAILY FOR ALLERGY.  colestipol (COLESTID) 1 gram tablet Take 1 Tab by mouth two (2) times a day.  OTHER Pancreatin 10x - 200, I TID with meals    amitriptyline (ELAVIL) 50 mg tablet Take 50 mg by mouth nightly.  levomefolate-algal oil (DEPLIN, ALGAL OIL,) 7.5-90.314 mg cap Take  by mouth.  WHEAT DEXTRIN (BENEFIBER CLEAR SF, DEXTRIN, PO) Take  by mouth.  acetaminophen (TYLENOL) 325 mg tablet Take  by mouth every four (4) hours as needed for Pain.  guaiFENesin ER (MUCINEX) 600 mg ER tablet Take 600 mg by mouth daily as needed for Congestion.  brief disposable (ADULT) misc by Does Not Apply route.  Depends, size medium, 1 nightly     No current facility-administered medications for this visit.       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, without mention of intractable epilepsy 5/14/2011    Mental retardation     Osteoporosis     Other ill-defined conditions(799.89)     intestinal problems    Psychiatric disorder     anxiety    Seizure disorder [facility])      Past Surgical History:   Procedure Laterality Date    COLONOSCOPY N/A 1/23/2019    COLONOSCOPY performed by Rosario Villaseñor [facility]  1/23/2019         HX APPENDECTOMY  1/28/13    APPENDECTOMY    HX GI      colon resection    HX GI  1/28/13    LAPAROTOMY EXPLORATORY - OPEN LYSIS OF ADHESIONS     HX HERNIA REPAIR      HX OTHER SURGICAL      imperforated anus birth defect    UPPER GI ENDOSCOPY,BIOPSY  3/17/2017          Social History     Socioeconomic History    Marital status: SINGLE     Spouse name: Not on file    Number of children: Not on file    Years of education: Not on file    Highest education level: Not on file   Occupational History    Not on file   Social Needs    Financial resource strain: Not on file    Food insecurity:     Worry: Not on file     Inability: Not on file    Transportation needs:     Medical: Not on file     Non-medical: Not on file   Tobacco Use    Smoking status: Never Smoker    Smokeless tobacco: Never Used   Substance and Sexual Activity    Alcohol use: No    Drug use: No    Sexual activity: Never   Lifestyle    Physical activity:     Days per week: Not on file     Minutes per session: Not on file    Stress: Not on file   Relationships    Social connections:     Talks on phone: Not on file     Gets together: Not on file Attends [employer] service: Not on file     Active member of club or organization: Not on file     Attends meetings of clubs or organizations: Not on file     Relationship status: Not on file    Intimate partner violence:     Fear of current or ex partner: Not on file     Emotionally abused: Not on file     Physically abused: Not on file     Forced sexual activity: Not on file   Other Topics Concern    Not on file   Social History Narrative    Not on file     Family History   Problem Relation Age of Onset    No Known Problems Mother     Heart Disease Father     No Known Problems Sister     No Known Problems Sister        PHYSICAL EXAMINATION:    Visit Vitals  BP 92/60   Pulse 80   Resp 20   Ht 5' 10\" (1.778 m)   Wt 45.4 kg (100 lb)   SpO2 98%   BMI 14.35 kg/m²     General: Well defined, nourished, and groomed individual in no acute distress. Neck: Supple, nontender, no bruits, no pain with resistance to active range of motion. Heart: Regular rate and rhythm, no murmurs, rub, or gallop. Normal S1S2. Lungs: Clear to auscultation bilaterally with equal chest expansion, no cough, no wheeze  Musculoskeletal: Extremities revealed no edema and had full range of motion of joints. Psych: Good mood and bright affect      NEUROLOGICAL EXAMINATION:   Mental Status: Alert and oriented to person and place   Cranial Nerves:   II, III, IV, VI: Visual acuity grossly intact. Visual fields are normal.   Pupils are equal, round, and reactive to light and accommodation. Extra-ocular movements are full and fluid. Fundoscopic exam was benign, no ptosis or nystagmus. V-XII: Hearing is grossly intact. Facial features are symmetric, with normal sensation and strength. The palate rises symmetrically and the tongue protrudes midline. Sternocleidomastoids 5/5. Motor Examination: decreased tone and bulk with generalized weakness. Coordination: Finger to nose was normal. No resting tremor.  There is a noticeable tremor bilaterally, right greater than left. Gait and Station: Much steadier with rising and walking. He pins his hands to his sides with walking and has to be reminded to swing his arms. No pronator drift. No muscle wasting or fasiculations noted. Reflexes: DTRs 2+ throughout. ASSESSMENT AND PLAN    ICD-10-CM ICD-9-CM    1. Tremor R25.1 781.0    2. Anxiety F41.9 300.00    3. Impaired functional mobility, balance, gait, and endurance Z74.09 V49.89    4. Weakness generalized R53.1 780.79    5. Static encephalopathy G93.49 742. 9      The tremor is about the same and his balance is better with the decrease in the Gabapentin. He is not having seizures despite the discontinuation of the AED. I am deferring treatment of the anxiety to his psychiatrist. He continues to have a general failure to thrive and generalized weakness. Again, recommended to his mother that she address these issues with the psychiatrist.  His mother always has a lot of questions about what his care of plan is and how we can make it as naturopathic as possible. While I am happy to discuss what I do know, I am by no means an expert nor have I had any specific training to help in this regard. Follow up in six months     [address] [facility].  [facility]

## 2019-08-28 DIAGNOSIS — F41.9 ANXIETY: ICD-10-CM

## 2019-08-28 DIAGNOSIS — R25.1 TREMOR: ICD-10-CM

## 2019-08-28 RX ORDER — GABAPENTIN 100 MG/1
CAPSULE ORAL
Qty: 93 CAP | Refills: 0 | Status: SHIPPED | OUTPATIENT
Start: 2019-08-28 | End: 2019-10-07 | Stop reason: SDUPTHER

## 2019-09-13 NOTE — PROGRESS NOTES
General Surgery End of Shift Nursing Note    Bedside shift change report given to Veto Sheppard RN (oncoming nurse) by Manuela Mckoy RN (offgoing nurse). Report included the following information SBAR, Kardex, ED Summary, Intake/Output and MAR. Shift worked:   7A to [employer] of shift:  Diarrhea overnight. BP low this morning,  Better later morning. Diet advanced to GI Lite. Metamucil added to meds. GI consult ordered. Stool studies ordered, all were sent except the O & P,  Specimen cups are in room. Issues for physician to address:        Number times ambulated in hallway past shift: 3  ,ambulates in room with one assist  Number of times OOB to chair past shift: 1, most of day    Pain Management:  Current medication:   Patient states pain is manageable on current pain medication: YES    GI:    Current diet:  DIET GI LITE (POST SURGICAL)    Tolerating current diet: YES  Passing flatus: YES  Last Bowel Movement: today x3    Appearance: formed, small     Respiratory:    Incentive Spirometer at bedside: NO  Patient instructed on use: NO    Patient Safety:    Falls Score: 2  Bed Alarm On? No  Sitter?  No    Benjamin Clancy RN DISCHARGE

## 2019-09-17 ENCOUNTER — OFFICE VISIT (OUTPATIENT)
Dept: INTERNAL MEDICINE CLINIC | Age: 39
End: 2019-09-17

## 2019-09-17 VITALS
BODY MASS INDEX: 14.43 KG/M2 | HEIGHT: 70 IN | TEMPERATURE: 97.9 F | OXYGEN SATURATION: 99 % | SYSTOLIC BLOOD PRESSURE: 96 MMHG | HEART RATE: 74 BPM | RESPIRATION RATE: 16 BRPM | WEIGHT: 100.8 LBS | DIASTOLIC BLOOD PRESSURE: 62 MMHG

## 2019-09-17 DIAGNOSIS — Z87.19 S/P SMALL BOWEL OBSTRUCTION: ICD-10-CM

## 2019-09-17 DIAGNOSIS — F33.9 RECURRENT DEPRESSION (HCC): ICD-10-CM

## 2019-09-17 DIAGNOSIS — Z23 ENCOUNTER FOR IMMUNIZATION: ICD-10-CM

## 2019-09-17 DIAGNOSIS — R63.6 UNDERWEIGHT: ICD-10-CM

## 2019-09-17 DIAGNOSIS — Z98.890 HISTORY OF ABDOMINAL SURGERY: ICD-10-CM

## 2019-09-17 DIAGNOSIS — G47.00 INSOMNIA, UNSPECIFIED TYPE: Primary | ICD-10-CM

## 2019-09-17 DIAGNOSIS — K52.9 CHRONIC DIARRHEA: ICD-10-CM

## 2019-09-17 RX ORDER — CHOLECALCIFEROL (VITAMIN D3) 125 MCG
CAPSULE ORAL
Qty: 90 TAB | Refills: 1 | Status: SHIPPED | OUTPATIENT
Start: 2019-09-17 | End: 2019-09-19 | Stop reason: DRUGHIGH

## 2019-09-17 RX ORDER — IBUPROFEN 200 MG
CAPSULE ORAL
Refills: 99 | COMMUNITY
Start: 2019-08-15 | End: 2020-01-30

## 2019-09-17 RX ORDER — ACETYLCYSTEINE 600 MG
CAPSULE ORAL
COMMUNITY

## 2019-09-17 NOTE — PROGRESS NOTES
Garima Estes is a 34 y.o. male  who presents for routine immunizations. He/She denies any symptoms , reactions or allergies that would exclude them from being immunized today. Risks and adverse reactions were discussed and the VIS was given to them. All questions were addressed. He/She was observed for 10 min post injection. There were no reactions observed. Rosina Aldana LPN

## 2019-09-17 NOTE — PROGRESS NOTES
Lida Milian is a 34 y.o. male     Chief Complaint   Patient presents with    Sleep Problem     follow up       Visit Vitals  BP 96/62 (BP 1 Location: Left arm, BP Patient Position: Sitting)   Pulse 74   Temp 97.9 °F (36.6 °C) (Oral)   Resp 16   Ht 5' 10\" (1.778 m)   Wt 100 lb 12.8 oz (45.7 kg)   SpO2 99%   BMI 14.46 kg/m²       Health Maintenance Due   Topic Date Due    DTaP/Tdap/Td series (1 - Tdap) 11/04/1999    Influenza Age 5 to Adult  08/01/2019       1. Have you been to the ER, urgent care clinic since your last visit? Hospitalized since your last visit? No    2. Have you seen or consulted any other health care providers outside of the [facility] since your last visit? Include any pap smears or colon screening.  No

## 2019-09-19 DIAGNOSIS — G47.00 INSOMNIA, UNSPECIFIED TYPE: ICD-10-CM

## 2019-09-20 RX ORDER — CHOLECALCIFEROL (VITAMIN D3) 125 MCG
5 CAPSULE ORAL
Qty: 90 TAB | Refills: 1 | Status: SHIPPED | OUTPATIENT
Start: 2019-09-20 | End: 2019-10-14 | Stop reason: DRUGHIGH

## 2019-09-30 NOTE — PROGRESS NOTES
HISTORY OF PRESENT ILLNESS  Tor Adhikari is a 34 y.o. male. Pt. comes in with his mother and caregiver for f/u. Has multiple medical problems. Reports any issues with insomnia. Melatonin helps. Apparently needs to get a higher dose. Has had GI surgeries as a child. Has chronic diarrhea with history of SBO. Followed by GI and colorectal surgeon. Also followed by a naturopathic doctor. He is on a number of supplements which according to mom are helping. They are expensive. Patient himself denies any new problems. Has cognitive deficit. Reports compliance with medications and diet. Med list and most recent labs/studies reviewed with pt. Trying to be active physically to control weight. Needs med refills. Reports no other new c/o. HPI    Review of Systems   Constitutional: Negative. HENT: Negative. Eyes: Negative. Respiratory: Negative for shortness of breath. Cardiovascular: Negative for chest pain and leg swelling. Gastrointestinal: Positive for diarrhea (Fecal incontinence). Negative for abdominal pain, blood in stool, heartburn, melena, nausea and vomiting. Stool incontinence   Genitourinary: Positive for urgency. Negative for dysuria. Musculoskeletal: Negative for back pain, falls and joint pain. Skin: Negative. Neurological: Positive for tremors. Negative for dizziness, sensory change, seizures and headaches. Endo/Heme/Allergies: Negative. Psychiatric/Behavioral: Positive for depression. The patient is nervous/anxious and has insomnia. All other systems reviewed and are negative. Physical Exam   Constitutional: He is oriented to person, place, and time. He appears well-developed and well-nourished. No distress. Pleasant, chronically underweight  cognitivelty a bit slow   HENT:   Head: Normocephalic and atraumatic. Mouth/Throat: Oropharynx is clear and moist.   Eyes: Conjunctivae are normal. No scleral icterus. Neck: Normal range of motion. Neck supple. No JVD present. No thyromegaly present. Cardiovascular: Normal rate, regular rhythm, normal heart sounds and intact distal pulses. No murmur heard. Pulmonary/Chest: Effort normal and breath sounds normal. No respiratory distress. He has no wheezes. He has no rales. Abdominal: Soft. Bowel sounds are normal. He exhibits no distension. There is no tenderness. Chronic surgical scars   Musculoskeletal: He exhibits no edema or tenderness. Neurological: He is alert and oriented to person, place, and time. Coordination abnormal.   Skin: Skin is warm and dry. No rash noted. Psychiatric: His behavior is normal.   Repeats words  Chronic cognitive deficit , about same   Nursing note and vitals reviewed. ASSESSMENT and PLAN  Diagnoses and all orders for this visit:    1. Insomnia, unspecified type    2. Chronic diarrhea    3. Underweight    4. History of abdominal surgery    5. S/p small bowel obstruction    6. Recurrent depression ([facility] [address]    7. Encounter for immunization  -     INFLUENZA VIRUS VAC QUAD,SPLIT,PRESV FREE SYRINGE IM    Other orders  -     Wheat Dextrin (BENEFIBER CLEAR) 3 gram/3.5 gram pwpk; 1 pack daily  -     OTHER; theanine 100 mg 1 po daily      Follow-up and Dispositions    · Return in about 3 months (around 12/17/2019).      lab results and schedule of future lab studies reviewed with patient  reviewed diet, exercise and weight control  reviewed medications and side effects in detail  F/u with other MD's as scheduled  Fall precautions discussed  Explained to patient's mom that doing more does not necessarily means doing better  I advised him to avoid polypharmacy  Overall he seems to be stable

## 2019-10-07 DIAGNOSIS — R25.1 TREMOR: ICD-10-CM

## 2019-10-07 DIAGNOSIS — F41.9 ANXIETY: ICD-10-CM

## 2019-10-07 RX ORDER — GABAPENTIN 100 MG/1
CAPSULE ORAL
Qty: 93 CAP | Refills: 0 | Status: SHIPPED | OUTPATIENT
Start: 2019-10-07 | End: 2019-11-04 | Stop reason: SDUPTHER

## 2019-10-10 ENCOUNTER — TELEPHONE (OUTPATIENT)
Dept: INTERNAL MEDICINE CLINIC | Age: 39
End: 2019-10-10

## 2019-10-10 NOTE — TELEPHONE ENCOUNTER
Pt mother states pt recent cat scan Dr Manoj Amezquita (colon rectal surgeon) showed a 1.7cm mass/ leison. Mother states she is not sure if pt needs labs done to test kidney function.  She would like advise as to what to do next

## 2019-10-11 NOTE — TELEPHONE ENCOUNTER
Called pt's mother and Isra Franco, to inform her that the message regarding pt's CT results is being addressed today with Dr. Florence Sanchez. Informed her that MD is with Pt's this morning but I am going to speak with him regarding results and if pt needs further testing and/or a referral of given the 1.7 cm Renal mass/leison from CT scan per her request. Informed her that she will receive a call by end of day today, 10/11/2019. Ms. Miller verbalized understanding and stated if unable to answer my call to leave a detailed message as it's a private VM and no one else has access to her phone. No further questions at this time.

## 2019-10-14 DIAGNOSIS — G47.09 OTHER INSOMNIA: Primary | ICD-10-CM

## 2019-10-14 RX ORDER — CHOLECALCIFEROL (VITAMIN D3) 125 MCG
5 CAPSULE ORAL
Qty: 90 TAB | Refills: 1 | Status: SHIPPED | OUTPATIENT
Start: 2019-10-14 | End: 2020-05-01

## 2019-10-14 NOTE — TELEPHONE ENCOUNTER
Requested Prescriptions     Signed Prescriptions Disp Refills    melatonin 5 mg tablet 90 Tab 1     Sig: Take 1 Tab by mouth nightly. Authorizing Provider: Salma Carrera     Ordering User: Elle Llanes, verbal order received.

## 2019-10-25 ENCOUNTER — OFFICE VISIT (OUTPATIENT)
Dept: URGENT CARE | Age: 39
End: 2019-10-25

## 2019-10-25 VITALS
RESPIRATION RATE: 16 BRPM | OXYGEN SATURATION: 99 % | BODY MASS INDEX: 14.17 KG/M2 | WEIGHT: 99 LBS | TEMPERATURE: 98.8 F | HEART RATE: 68 BPM | DIASTOLIC BLOOD PRESSURE: 51 MMHG | SYSTOLIC BLOOD PRESSURE: 103 MMHG | HEIGHT: 70 IN

## 2019-10-25 DIAGNOSIS — H01.005 BLEPHARITIS OF LEFT LOWER EYELID, UNSPECIFIED TYPE: Primary | ICD-10-CM

## 2019-10-25 RX ORDER — ERYTHROMYCIN 5 MG/G
OINTMENT OPHTHALMIC
Qty: 3.5 G | Refills: 0 | Status: SHIPPED | OUTPATIENT
Start: 2019-10-25 | End: 2020-02-04

## 2019-10-25 NOTE — PROGRESS NOTES
Velma Clay with hx of MR, Seizure d/o who is accompanied by caregiver presents with left lower eyelid redness, swelling, pain x 2 days. Has hx of recurrent eyelid infections. No fever, nausea, vomiting. The history is provided by a caregiver and the patient.         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, without mention of intractable epilepsy 5/14/2011    Mental retardation     Osteoporosis     Other ill-defined conditions(799.89)     intestinal problems    Psychiatric disorder     anxiety    Seizure disorder [facility])         Past Surgical History:   Procedure Laterality Date    COLONOSCOPY N/A 1/23/2019    [facility]  1/23/2019         HX APPENDECTOMY  1/28/13    APPENDECTOMY    HX GI      colon resection    HX GI  1/28/13    LAPAROTOMY EXPLORATORY - OPEN LYSIS OF ADHESIONS     HX HERNIA REPAIR      HX OTHER SURGICAL      imperforated anus birth defect    UPPER GI ENDOSCOPY,BIOPSY  3/17/2017              Family History   Problem Relation Age of Onset    No Known Problems Mother     Heart Disease Father     No Known Problems Sister     No Known Problems Sister         Social History     Socioeconomic History    Marital status: SINGLE     Spouse name: Not on file    Number of children: Not on file    Years of education: Not on file    Highest education level: Not on file   Occupational History    Not on file   Social Needs    Financial resource strain: Not on file    Food insecurity:     Worry: Not on file     Inability: Not on file    Transportation needs:     Medical: Not on file     Non-medical: Not on file   Tobacco Use    Smoking status: Never Smoker    Smokeless tobacco: Never Used Substance and Sexual Activity    Alcohol use: No    Drug use: No    Sexual activity: Never   Lifestyle    Physical activity:     Days per week: Not on file     Minutes per session: Not on file    Stress: Not on file   Relationships    Social connections:     Talks on phone: Not on file     Gets together: Not on file     Attends [employer] service: Not on file     Active member of club or organization: Not on file     Attends meetings of clubs or organizations: Not on file     Relationship status: Not on file    Intimate partner violence:     Fear of current or ex partner: Not on file     Emotionally abused: Not on file     Physically abused: Not on file     Forced sexual activity: Not on file   Other Topics Concern    Not on file   Social History Narrative    Not on file                ALLERGIES: Patient has no known allergies. Review of Systems   Constitutional: Negative for fever. Eyes: Positive for pain and redness. Gastrointestinal: Negative for nausea and vomiting. Skin: Positive for color change. Vitals:    10/25/19 1011   BP: 103/51   Pulse: 68   Resp: 16   Temp: 98.8 °F (37.1 °C)   SpO2: 99%   Weight: 99 lb (44.9 kg)   Height: 5' 10\" (1.778 m)       Physical Exam   Constitutional: He appears well-developed and well-nourished. No distress. Eyes: Pupils are equal, round, and reactive to light. Conjunctivae and EOM are normal.       Neurological: He is alert. Skin: He is not diaphoretic. Nursing note and vitals reviewed. [facility]    ICD-10-CM ICD-9-CM   1. Blepharitis of left lower eyelid, unspecified type H01.005 373.00     Medications Ordered Today   Medications    erythromycin (ILOTYCIN) ophthalmic ointment     Sig: Apply to left inner lower eyelid TID x 7 days     Dispense:  3.5 g     Refill:  0     Warm compresses    The patients condition was discussed with the patient and they understand. The patient is to follow up with PCP.  If signs and symptoms become worse the pt is to go to the ER. The patient is to take medications as prescribed. AVS given with patient instructions.             Procedures

## 2019-10-25 NOTE — PATIENT INSTRUCTIONS
Blepharitis: Care Instructions  Your Care Instructions    Blepharitis is an inflammation or infection of the eyelids. It causes dry, scaly crusts on the eyelids. It can also cause your eyes to itch, burn, and look red. This problem is more common in people who have rosacea, dandruff, skin allergies, or eczema. Home treatment can help you keep your eyes comfortable. Your doctor may also prescribe an ointment to put on your eyelids. Follow-up care is a key part of your treatment and safety. Be sure to make and go to all appointments, and call your doctor if you are having problems. It's also a good idea to know your test results and keep a list of the medicines you take. How can you care for yourself at home? · Wash your eyelids and eyebrows daily with baby shampoo. To wash your eyelids:  ? Place a very warm washcloth over your eyes for about a minute. This will help soften and loosen the crusts on your eyelashes. ? Put a few drops of baby shampoo on a warm washcloth. ? Gently wipe your eyelids. This helps remove any crust. It also cleans your eyelids. ? Rinse well with water. · Be safe with medicines. If your doctor prescribed medicine for you, use it exactly as directed. Call your doctor if you think you are having a problem with your medicine. When should you call for help? Call your doctor now or seek immediate medical care if:    · You have signs of an eye infection, such as:  ? Pus or thick discharge coming from the eye.  ? Redness or swelling around the eye.  ? A fever.    Watch closely for changes in your health, and be sure to contact your doctor if:    · You have vision changes.     · You do not get better as expected. Where can you learn more? Go to http://rachael-william.info/. Enter Z719 in the search box to learn more about \"Blepharitis: Care Instructions. \"  Current as of: May 5, 2019  Content Version: 12.2  © 9806-8048 [employer].  Care instructions adapted under license by [employer] (which disclaims liability or warranty for this information). If you have questions about a medical condition or this instruction, always ask your healthcare professional. [address] any warranty or liability for your use of this information.

## 2019-11-04 DIAGNOSIS — F41.9 ANXIETY: ICD-10-CM

## 2019-11-04 DIAGNOSIS — R25.1 TREMOR: ICD-10-CM

## 2019-11-06 RX ORDER — GABAPENTIN 100 MG/1
CAPSULE ORAL
Qty: 93 CAP | Refills: 4 | Status: SHIPPED | OUTPATIENT
Start: 2019-11-06 | End: 2020-04-07 | Stop reason: SDUPTHER

## 2019-11-15 ENCOUNTER — HOSPITAL ENCOUNTER (EMERGENCY)
Age: 39
Discharge: HOME | End: 2019-11-15
Attending: EMERGENCY MEDICINE
Payer: MEDICARE

## 2019-11-15 ENCOUNTER — APPOINTMENT (OUTPATIENT)
Dept: GENERAL RADIOLOGY | Age: 39
End: 2019-11-15
Attending: EMERGENCY MEDICINE
Payer: MEDICARE

## 2019-11-15 VITALS
WEIGHT: 100.75 LBS | HEIGHT: 70 IN | DIASTOLIC BLOOD PRESSURE: 53 MMHG | SYSTOLIC BLOOD PRESSURE: 105 MMHG | OXYGEN SATURATION: 100 % | RESPIRATION RATE: 14 BRPM | HEART RATE: 74 BPM | TEMPERATURE: 98.3 F | BODY MASS INDEX: 14.42 KG/M2

## 2019-11-15 DIAGNOSIS — R09.89 CHOKING EPISODE: Primary | ICD-10-CM

## 2019-11-15 LAB
APPEARANCE UR: CLEAR
BILIRUB UR QL: NEGATIVE
COLOR UR: NORMAL
GLUCOSE UR STRIP.AUTO-MCNC: NEGATIVE MG/DL
HGB UR QL STRIP: NEGATIVE
KETONES UR QL STRIP.AUTO: NEGATIVE MG/DL
LEUKOCYTE ESTERASE UR QL STRIP.AUTO: NEGATIVE
NITRITE UR QL STRIP.AUTO: NEGATIVE
PH UR STRIP: 8 [PH] (ref 5–8)
PROT UR STRIP-MCNC: NEGATIVE MG/DL
SP GR UR REFRACTOMETRY: 1.01 (ref 1–1.03)
UROBILINOGEN UR QL STRIP.AUTO: 0.2 EU/DL (ref 0.2–1)

## 2019-11-15 PROCEDURE — 74011636637 HC RX REV CODE- 636/637: Performed by: EMERGENCY MEDICINE

## 2019-11-15 PROCEDURE — 71046 X-RAY EXAM CHEST 2 VIEWS: CPT

## 2019-11-15 PROCEDURE — 99284 EMERGENCY DEPT VISIT MOD MDM: CPT

## 2019-11-15 PROCEDURE — 74011250637 HC RX REV CODE- 250/637: Performed by: EMERGENCY MEDICINE

## 2019-11-15 PROCEDURE — 81003 URINALYSIS AUTO W/O SCOPE: CPT

## 2019-11-15 RX ORDER — PREDNISONE 20 MG/1
20 TABLET ORAL
Status: COMPLETED | OUTPATIENT
Start: 2019-11-15 | End: 2019-11-15

## 2019-11-15 RX ORDER — DIPHENHYDRAMINE HCL 25 MG
25 CAPSULE ORAL
Status: COMPLETED | OUTPATIENT
Start: 2019-11-15 | End: 2019-11-15

## 2019-11-15 RX ADMIN — PREDNISONE 20 MG: 20 TABLET ORAL at 17:17

## 2019-11-15 RX ADMIN — DIPHENHYDRAMINE HYDROCHLORIDE 25 MG: 25 CAPSULE ORAL at 17:17

## 2019-11-15 NOTE — ED PROVIDER NOTES
EMERGENCY DEPARTMENT HISTORY AND PHYSICAL EXAM          Date: 11/15/2019  Patient Name: Joel Alexandra    History of Presenting Illness     Chief Complaint   Patient presents with    Foreign Body Swallowed     pt's mother reports that pt had a choking episode yesterday at his group home - states that per their policy, he has to be evaluated to make sure \"everything is clear\"; pt has not had any issues today - normal PO intake without difficulty       History Provided By: Patient    HPI: Joel Alexandra is a 37 y.o. male, pmhx imperforate anus/fecal incontinence, mental delay, anxiety, epilepsy, who presents ambulatory with his family member sees durable POA) to the ED c/o choking episode. Patient's mom explains that he had a choking episode while eating a piece of steak yesterday at his  facility. They wanted him evaluated with a swallow eval.  She notes that he was at his program today had a normal sandwich and swallowed everything without complication. He has not had any fevers, shortness of breath, chest pain or abdominal pain. PCP: Mo Bravo DO    Allergies: No known medication allergies    There are no other complaints, changes, or physical findings at this time. Current Outpatient Medications   Medication Sig Dispense Refill    gabapentin (NEURONTIN) 100 mg capsule TAKE (1) CAPSULE BY MOUTH THREE TIMES DAILY. 93 Cap 4    erythromycin (ILOTYCIN) ophthalmic ointment Apply to left inner lower eyelid TID x 7 days 3.5 g 0    melatonin 5 mg tablet Take 1 Tab by mouth nightly. 90 Tab 1    TAB-A-MARGIE tablet TAKE 1 TABLET BY MOUTH DAILY 30 Tab 5    CALCIUM 600 + D,3, 600 mg(1,500mg) -200 unit tab   99    acetylcysteine 600 mg cap capsule Take  by mouth.       Wheat Dextrin (BENEFIBER CLEAR) 3 gram/3.5 gram pwpk 1 pack daily 90 Packet 1    OTHER theanine 100 mg 1 po daily 90 Cap 3    REFRESH TEARS 0.5 % drop ophthalmic solution INSTILL 1 DROP INTO EACH EYE TWICE A DAY 30 mL 5    CALCITRATE-VITAMIN D tablet TAKE 1 TABLET BY MOUTH TWICE A DAY 60 Tab 5    OTHER L-Theanine 100 mg 1 daily 30 Tab 11    fluticasone (FLONASE) 50 mcg/actuation nasal spray BLOW NOSE: 2 SPRAYS IN EACH NOSTRIL DAILY FOR ALLERGY. 16 g 5    colestipol (COLESTID) 1 gram tablet Take 1 Tab by mouth two (2) times a day. 60 Tab 2    OTHER Pancreatin 10x - 200, I TID with meals 100 Cap 11    amitriptyline (ELAVIL) 50 mg tablet Take 50 mg by mouth nightly.  levomefolate-algal oil (DEPLIN, ALGAL OIL,) 7.5-90.314 mg cap Take  by mouth.  acetaminophen (TYLENOL) 325 mg tablet Take  by mouth every four (4) hours as needed for Pain.  guaiFENesin ER (MUCINEX) 600 mg ER tablet Take 600 mg by mouth daily as needed for Congestion.  brief disposable (ADULT) misc by Does Not Apply route.  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, without mention of intractable epilepsy 5/14/2011    Mental retardation     Osteoporosis     Other ill-defined conditions(809.49)     intestinal problems    Psychiatric disorder     anxiety    Seizure disorder [facility])        Past Surgical History:  Past Surgical History:   Procedure Laterality Date    COLONOSCOPY N/A 1/23/2019    COLONOSCOPY performed by Samantha Maynard MD at [facility]  1/23/2019         HX APPENDECTOMY  1/28/13    APPENDECTOMY    HX GI      colon resection    HX GI  1/28/13    LAPAROTOMY EXPLORATORY - OPEN LYSIS OF ADHESIONS     HX HERNIA REPAIR      HX OTHER SURGICAL      imperforated anus birth defect    UPPER GI ENDOSCOPY,BIOPSY  3/17/2017            Family History:  Family History   Problem Relation Age of Onset  No Known Problems Mother     Heart Disease Father     No Known Problems Sister     No Known Problems Sister        Social History:  Social History     Tobacco Use    Smoking status: Never Smoker    Smokeless tobacco: Never Used   Substance Use Topics    Alcohol use: No    Drug use: No       Allergies:  No Known Allergies      Review of Systems   Review of Systems   Constitutional: Negative for activity change, appetite change, chills, fever and unexpected weight change. HENT: Negative for congestion. Eyes: Negative for pain and visual disturbance. Respiratory: Positive for choking. Negative for cough and shortness of breath. Cardiovascular: Negative for chest pain, palpitations and leg swelling. Gastrointestinal: Negative for abdominal pain, diarrhea, nausea and vomiting. Genitourinary: Negative for dysuria. Musculoskeletal: Negative for back pain. Skin: Negative for rash. Neurological: Negative for headaches. Physical Exam   Physical Exam   Constitutional: He is oriented to person, place, and time. He appears well-developed and well-nourished. This is an anxious, thin, middle-aged male currently in minimal distress   HENT:   Head: Normocephalic and atraumatic. Mouth/Throat: Oropharynx is clear and moist.   Eyes: Pupils are equal, round, and reactive to light. Conjunctivae and EOM are normal. Right eye exhibits no discharge. Left eye exhibits no discharge. Neck: Normal range of motion. Neck supple. Cardiovascular: Normal rate, regular rhythm, normal heart sounds and intact distal pulses. Exam reveals no gallop and no friction rub. No murmur heard. Pulmonary/Chest: Effort normal and breath sounds normal. No respiratory distress. He has no wheezes. He has no rales. He exhibits no tenderness. Abdominal: Soft. Bowel sounds are normal. He exhibits no distension and no mass. There is no tenderness. There is no rebound and no guarding.    Musculoskeletal: Normal range of motion. He exhibits no edema. Neurological: He is alert and oriented to person, place, and time. No cranial nerve deficit. He exhibits normal muscle tone. Skin: Skin is warm and dry. Rash (Is a diffuse, fine rash on the anterior and posterior trunk radiating up into the face without any evidence of edema or excoriations.) noted. He is not diaphoretic. Nursing note and vitals reviewed. Diagnostic Study Results     Labs -     Recent Results (from the past 12 hour(s))   URINALYSIS W/ RFLX MICROSCOPIC    Collection Time: 11/15/19  5:19 PM   Result Value Ref Range    Color YELLOW/STRAW      Appearance CLEAR CLEAR      Specific gravity 1.012 1.003 - 1.030      pH (UA) 8.0 5.0 - 8.0      Protein NEGATIVE  NEG mg/dL    Glucose NEGATIVE  NEG mg/dL    Ketone NEGATIVE  NEG mg/dL    Bilirubin NEGATIVE  NEG      Blood NEGATIVE  NEG      Urobilinogen 0.2 0.2 - 1.0 EU/dL    Nitrites NEGATIVE  NEG      Leukocyte Esterase NEGATIVE  NEG         Radiologic Studies -   XR CHEST PA LAT   Final Result   IMPRESSION:    No acute cardiopulmonary disease radiographically. .  . CT Results  (Last 48 hours)    None        CXR Results  (Last 48 hours)               11/15/19 1703  XR CHEST PA LAT Final result    Impression:  IMPRESSION:    No acute cardiopulmonary disease radiographically. .  . Narrative:  INDICATION:  chokng episode at group home. EXAM: 2 VIEW CHEST RADIOGRAPH. COMPARISON: 11/21/2012. FINDINGS: Frontal and lateral views of the chest show clear lungs. . Biapical   pleural-parenchymal thickening is stable. The heart, mediastinum and pulmonary   vasculature are stable. The bony thorax is unremarkable for age, except for   stable upper thoracic dextrocurvature. . ..                   Medical Decision Making   I am the first provider for this patient.     I reviewed the vital signs, available nursing notes, past medical history, past surgical history, family history and social history. Vital Signs-Reviewed the patient's vital signs. Patient Vitals for the past 12 hrs:   Temp Pulse Resp BP SpO2   11/15/19 1859 -- 74 14 105/53 --   11/15/19 1544 98.3 °F (36.8 °C) 79 16 109/66 100 %       Pulse Oximetry Analysis - 100% on RA    Cardiac Monitor:   Rate: 74bpm  Rhythm: Normal Sinus Rhythm      Records Reviewed: Nursing Notes and Old Medical Records    Provider Notes (Medical Decision Making):   MDM: Middle-aged male with a choking episode yesterday that required Heimlich who has not had any complications and has been eating and breathing normally since. Will check a two-view x-ray to make sure there is no evidence of aspiration and do a bedside swallow eval to see if patient is safe to go back to regular meals. For his rash I do not see any evidence of hives but he is rashes pretty diffuse and without fever. Will initiate a dose of Benadryl and prednisone and watch his symptoms. ED Course:   Initial assessment performed. The patients presenting problems have been discussed, and they are in agreement with the care plan formulated and outlined with them. I have encouraged them to ask questions as they arise throughout their visit. PROGRESS NOTE:  6:30 PM  Pt doing well and we discussed the results of his chest x-ray. On exam his rash has completely resolved and he is ambulating around without complication. Awaiting nurse to complete bedside evaluation. If he tolerates without problems we will discharge him back to facility with a paper recommending him to see specialty as needed. Discharge note:  7 PM  Pt re-evaluated and noted to be feeling better, ready for discharge. Updated pt and family on all final findings. Will follow up as instructed. All questions have been answered, pt voiced understanding and agreement with plan. Specific return precautions provided as well as instructions to return to the ED should sx worsen at any time. Vital signs stable for discharge. Critical Care Time:   0      Diagnosis     Clinical Impression:   1. Choking episode        PLAN:  1. Discharge Medication List as of 11/15/2019  6:20 PM        2. Follow-up Information    None       Return to ED if worse     Disposition:  home      Please note, this dictation was completed with [email or web address], the computer voice recognition software. Quite often unanticipated grammatical, syntax, homophones, and other interpretive errors are inadvertently transcribed by the computer software. Please disregard these errors. Please excuse any errors that have escaped final proof reading.

## 2019-11-15 NOTE — ED NOTES
Per mother pt unable to tolerate aspertane in [facility].  Pt tolerates crackers and water w/out issue

## 2019-11-15 NOTE — DISCHARGE INSTRUCTIONS
You were sent to the ER for choking. We do not perform barium/swallow diagnostics in the ER. Your bedside swallow test was normal and without concern. No special diet needed. You can take benadryl 25mg up to three times a day for the rash if it causes itching.

## 2019-11-16 NOTE — ED NOTES
Pt ambulates out of er, A+Ox3, vss, nad, gait steady, denies further needs at this time.  Accompanied by mother

## 2019-11-18 ENCOUNTER — OFFICE VISIT (OUTPATIENT)
Dept: INTERNAL MEDICINE CLINIC | Age: 39
End: 2019-11-18

## 2019-11-18 VITALS
BODY MASS INDEX: 14.72 KG/M2 | OXYGEN SATURATION: 97 % | RESPIRATION RATE: 18 BRPM | SYSTOLIC BLOOD PRESSURE: 100 MMHG | HEIGHT: 70 IN | TEMPERATURE: 97.5 F | DIASTOLIC BLOOD PRESSURE: 64 MMHG | WEIGHT: 102.8 LBS | HEART RATE: 76 BPM

## 2019-11-18 DIAGNOSIS — F41.9 ANXIETY: ICD-10-CM

## 2019-11-18 DIAGNOSIS — T17.308A CHOKING, INITIAL ENCOUNTER: Primary | ICD-10-CM

## 2019-11-18 DIAGNOSIS — R63.6 UNDERWEIGHT: ICD-10-CM

## 2019-11-18 DIAGNOSIS — Z98.890 HISTORY OF ABDOMINAL SURGERY: ICD-10-CM

## 2019-11-18 DIAGNOSIS — K52.9 CHRONIC DIARRHEA: ICD-10-CM

## 2019-11-18 NOTE — PROGRESS NOTES
HISTORY OF PRESENT ILLNESS  Cait Cantor is a 37 y.o. male. Pt. comes in with his caregiver for f/u. Has a few chronic medical issues as documented. Went to ER 3 days ago because she choked on a large piece of steak the day before. I reviewed records in [facility]. Evaluation including CXR and UA were negative. Patient has not had any more issues since then. Denies any difficulty swallowing. Has chronic GI issues which have been stable. His chronic diarrhea is doing much better. Is followed by multiple providers including GI, colorectal surgeon, and a .  Apparently developed a rash in the ER which cleared with Benadryl and prednisone. Has not had any more issues. He depends on staff for many ADLs. He goes to day program as well. PMH/PSH/Allergies/Social History/medication list and most recent studies reviewed with patient. Tobacco use: No  Alcohol use: No    Reports compliance with medications and diet. Trying to be active physically as tolerated. Reports no other new c/o. HPI    Review of Systems   Constitutional: Negative. HENT: Negative. Eyes: Negative. Respiratory: Negative for shortness of breath. Cardiovascular: Negative for chest pain and leg swelling. Gastrointestinal: Positive for diarrhea (Fecal incontinence). Negative for abdominal pain, blood in stool, constipation, heartburn, melena, nausea and vomiting. Stool incontinence   Genitourinary: Positive for urgency. Negative for dysuria. Musculoskeletal: Negative for back pain, falls and joint pain. Skin: Negative. Neurological: Positive for tremors. Negative for dizziness, sensory change, seizures and headaches. Endo/Heme/Allergies: Negative. Psychiatric/Behavioral: Positive for depression. The patient is nervous/anxious and has insomnia. All other systems reviewed and are negative. Physical Exam   Constitutional: He is oriented to person, place, and time.  He appears well-developed and well-nourished. No distress. Pleasant, chronically underweight  cognitivelty a bit slow   HENT:   Head: Normocephalic and atraumatic. Mouth/Throat: Oropharynx is clear and moist. No oropharyngeal exudate. Eyes: Conjunctivae are normal. No scleral icterus. Neck: Normal range of motion. Neck supple. No JVD present. No thyromegaly present. Cardiovascular: Normal rate, regular rhythm, normal heart sounds and intact distal pulses. No murmur heard. Pulmonary/Chest: Effort normal and breath sounds normal. No respiratory distress. He has no wheezes. He has no rales. Abdominal: Soft. Bowel sounds are normal. He exhibits no distension and no mass. There is no tenderness. There is no rebound and no guarding. Chronic surgical scars   Musculoskeletal: He exhibits no edema or tenderness. Neurological: He is alert and oriented to person, place, and time. Coordination abnormal.   Skin: Skin is warm and dry. No rash noted. Psychiatric: His behavior is normal.   Repeats words  Chronic cognitive deficit , about same   Nursing note and vitals reviewed. ASSESSMENT and PLAN  Diagnoses and all orders for this visit:    1. Choking, initial encounter    2. Chronic diarrhea    3. Underweight    4. History of abdominal surgery    5.  Anxiety      Follow-up and Dispositions    · Return if symptoms worsen or fail to improve.     lab results and schedule of future lab studies reviewed with patient  reviewed diet, exercise and weight control  reviewed medications and side effects in detail  F/u with other MD's as scheduled  Reassurance that everything is stable at this point   swallowing precautions discussed  Advised patient to avoid eating large pieces of hard food like steak and chew well  No need for further testing or referrals at this point

## 2019-11-18 NOTE — PROGRESS NOTES
Health Maintenance Due   Topic Date Due    DTaP/Tdap/Td series (1 - Tdap) 11/04/1999    MEDICARE YEARLY EXAM  12/12/2019       Chief Complaint   Patient presents with   [facility] ED Follow-up     [id number] [facility] ED on 11/15/2019    Foreign Body Swallowed    Allergic Reaction     Unknown    Referral Request       1. Have you been to the ER, urgent care clinic since your last visit? Hospitalized since your last visit? Yes When: 11/15/2019 [id number] [facility] ED for Foreign Body Swallow    2. Have you seen or consulted any other health care providers outside of the [address] [place] [address] since your last visit? Include any pap smears or colon screening. No    3) Do you have an Advance Directive on file? yes    4) Are you interested in receiving information on Advance Directives? NO      Patient is accompanied by self and adult caretaker I have received verbal consent from Alanna Malik to discuss any/all medical information while they are present in the room.

## 2019-12-06 ENCOUNTER — TELEPHONE (OUTPATIENT)
Dept: INTERNAL MEDICINE CLINIC | Age: 39
End: 2019-12-06

## 2019-12-06 NOTE — TELEPHONE ENCOUNTER
----- Message from Nina Branham. [facility] sent at 12/6/2019 10:53 AM EST -----  Regarding: Visit Follow-Up Question  Contact: [phone]  Good news: Rufino Jones has not had diarrhea for 9 weeks. His stool is multiple large round shape and it is difficult for him to push it out. Sometimes he is sitting on toilet for over 10 minutes. The evening colestid dose is good. We would like to stop the morning dose and see how he does. The Group home needs a new order to make this change. Please write the order so that if we want to add the morning dose periodically we can without needing another order, ie make it an optional dose. Jai Oliver is eating well yet there is minimal weight gain. 95 pounds nude. I plan to call the office next week to see status. Thank you, Mrs. Miller

## 2019-12-06 NOTE — TELEPHONE ENCOUNTER
Pts mom called and advised that pt sent a message via my chart and that they would like a response no later than Monday.

## 2019-12-09 NOTE — TELEPHONE ENCOUNTER
Conferred with provider:  Change morning Colestid as needed   Continue other meds as before     Faxed new order to group home

## 2020-01-02 RX ORDER — MONTELUKAST SODIUM 4 MG/1
1 TABLET, CHEWABLE ORAL 2 TIMES DAILY
Qty: 60 TAB | Refills: 2 | Status: CANCELLED | OUTPATIENT
Start: 2020-01-02

## 2020-01-03 ENCOUNTER — HOSPITAL ENCOUNTER (OUTPATIENT)
Dept: CT IMAGING | Age: 40
Discharge: HOME | End: 2020-01-03
Attending: UROLOGY
Payer: MEDICARE

## 2020-01-03 DIAGNOSIS — N28.89 RENAL MASS: ICD-10-CM

## 2020-01-03 PROCEDURE — 74170 CT ABD WO CNTRST FLWD CNTRST: CPT

## 2020-01-03 PROCEDURE — 74011636320 HC RX REV CODE- 636/320: Performed by: RADIOLOGY

## 2020-01-03 PROCEDURE — 74011000258 HC RX REV CODE- 258: Performed by: RADIOLOGY

## 2020-01-03 RX ORDER — SODIUM CHLORIDE 0.9 % (FLUSH) 0.9 %
10 SYRINGE (ML) INJECTION
Status: COMPLETED | OUTPATIENT
Start: 2020-01-03 | End: 2020-01-03

## 2020-01-03 RX ADMIN — Medication 10 ML: at 13:08

## 2020-01-03 RX ADMIN — IOPAMIDOL 100 ML: 755 INJECTION, SOLUTION INTRAVENOUS at 13:08

## 2020-01-03 RX ADMIN — SODIUM CHLORIDE 100 ML: 900 INJECTION, SOLUTION INTRAVENOUS at 13:07

## 2020-01-06 RX ORDER — MONTELUKAST SODIUM 4 MG/1
1 TABLET, CHEWABLE ORAL 2 TIMES DAILY
Qty: 60 TAB | Refills: 2 | Status: CANCELLED | OUTPATIENT
Start: 2020-01-06

## 2020-01-10 RX ORDER — MONTELUKAST SODIUM 4 MG/1
1 TABLET, CHEWABLE ORAL EVERY EVENING
Qty: 60 TAB | Refills: 2 | Status: SHIPPED | OUTPATIENT
Start: 2020-01-10 | End: 2020-03-30

## 2020-01-17 ENCOUNTER — OFFICE VISIT (OUTPATIENT)
Dept: INTERNAL MEDICINE CLINIC | Age: 40
End: 2020-01-17

## 2020-01-17 VITALS
OXYGEN SATURATION: 99 % | SYSTOLIC BLOOD PRESSURE: 96 MMHG | RESPIRATION RATE: 18 BRPM | DIASTOLIC BLOOD PRESSURE: 60 MMHG | TEMPERATURE: 98.6 F | WEIGHT: 104 LBS | BODY MASS INDEX: 14.89 KG/M2 | HEART RATE: 69 BPM | HEIGHT: 70 IN

## 2020-01-17 DIAGNOSIS — R63.6 UNDERWEIGHT: Primary | ICD-10-CM

## 2020-01-17 DIAGNOSIS — F41.9 ANXIETY: ICD-10-CM

## 2020-01-17 DIAGNOSIS — Z98.890 HISTORY OF ABDOMINAL SURGERY: ICD-10-CM

## 2020-01-17 DIAGNOSIS — Z87.19 S/P SMALL BOWEL OBSTRUCTION: ICD-10-CM

## 2020-01-17 DIAGNOSIS — E55.9 VITAMIN D DEFICIENCY: ICD-10-CM

## 2020-01-17 DIAGNOSIS — Z00.00 MEDICARE ANNUAL WELLNESS VISIT, SUBSEQUENT: ICD-10-CM

## 2020-01-17 DIAGNOSIS — M81.0 OSTEOPOROSIS, UNSPECIFIED OSTEOPOROSIS TYPE, UNSPECIFIED PATHOLOGICAL FRACTURE PRESENCE: ICD-10-CM

## 2020-01-17 DIAGNOSIS — K59.1 FUNCTIONAL DIARRHEA: ICD-10-CM

## 2020-01-17 NOTE — PROGRESS NOTES
Janel Michael is a 37 y.o. male    Chief Complaint   Patient presents with    Follow-up    Depression       1. Have you been to the ER, urgent care clinic since your last visit? Hospitalized since your last visit? No      2. Have you seen or consulted any other health care providers outside of the [address] [place] [address] since your last visit? Include any pap smears or colon screening.  No

## 2020-01-17 NOTE — PROGRESS NOTES
Schedule of Personalized Health Plan  (Provide Copy to Patient)  The best way to stay healthy is to live a healthy lifestyle. A healthy lifestyle includes regular exercise, eating a well-balanced diet, keeping a healthy weight and not smoking. Regular physical exams and screening tests are another important way to take care of yourself. Preventive exams provided by health care providers can find health problems early when treatment works best and can keep you from getting certain diseases or illnesses. Preventive services include exams, lab tests, screenings, shots, monitoring and information to help you take care of your own health. All people over 65 should have a pneumonia shot. Pneumonia shots are usually only needed once in a lifetime unless your doctor decides differently. All people over 65 should have a yearly flu shot. People over 65 are at medium to high risk for Hepatitis B. Three shots are needed for complete protection. In addition to your physical exam, some screening tests are recommended:    Bone mass measurement (dexa scan) is recommended every two years if you have certain risk factors, such as personal history of vertebral fracture or chronic steroid medication use    Diabetes Mellitus screening is recommended every year. Glaucoma is an eye disease caused by high pressure in the eye. An eye exam is recommended every year. Cardiovascular screening tests that check your cholesterol and other blood fat (lipid) levels are recommended every five years. Colorectal Cancer screening tests help to find pre-cancerous polyps (growths in the colon) so they can be removed before they turn into cancer. Tests ordered for screening depend on your personal and family history risk factors.     Screening for Prostate Cancer is recommended yearly with a digital rectal exam and/or a PSA test    Here is a list of your current Health Maintenance items with a due date:  Health Maintenance Topic Date Due    DTaP/Tdap/Td series (1 - Tdap) 11/04/1989    MEDICARE YEARLY EXAM  01/17/2021    Influenza Age 5 to Adult  Completed    Pneumococcal 0-64 years  Aged Out

## 2020-01-18 LAB
25(OH)D3+25(OH)D2 SERPL-MCNC: 41.6 NG/ML (ref 30–100)
ALBUMIN SERPL-MCNC: 4.2 G/DL (ref 3.5–5.5)
ALBUMIN/GLOB SERPL: 1.8 {RATIO} (ref 1.2–2.2)
ALP SERPL-CCNC: 53 IU/L (ref 39–117)
ALT SERPL-CCNC: 14 IU/L (ref 0–44)
AST SERPL-CCNC: 22 IU/L (ref 0–40)
BILIRUB SERPL-MCNC: 0.3 MG/DL (ref 0–1.2)
BUN SERPL-MCNC: 16 MG/DL (ref 6–24)
BUN/CREAT SERPL: 18 (ref 9–20)
CALCIUM SERPL-MCNC: 9.4 MG/DL (ref 8.7–10.2)
CHLORIDE SERPL-SCNC: 100 MMOL/L (ref 96–106)
CO2 SERPL-SCNC: 28 MMOL/L (ref 20–29)
CREAT SERPL-MCNC: 0.87 MG/DL (ref 0.76–1.27)
ERYTHROCYTE [DISTWIDTH] IN BLOOD BY AUTOMATED COUNT: 12.6 % (ref 11.6–15.4)
GLOBULIN SER CALC-MCNC: 2.3 G/DL (ref 1.5–4.5)
GLUCOSE SERPL-MCNC: 82 MG/DL (ref 65–99)
HCT VFR BLD AUTO: 42.8 % (ref 37.5–51)
HGB BLD-MCNC: 14.5 G/DL (ref 13–17.7)
MCH RBC QN AUTO: 32.7 PG (ref 26.6–33)
MCHC RBC AUTO-ENTMCNC: 33.9 G/DL (ref 31.5–35.7)
MCV RBC AUTO: 97 FL (ref 79–97)
PLATELET # BLD AUTO: 160 X10E3/UL (ref 150–450)
POTASSIUM SERPL-SCNC: 4.1 MMOL/L (ref 3.5–5.2)
PROT SERPL-MCNC: 6.5 G/DL (ref 6–8.5)
RBC # BLD AUTO: 4.43 X10E6/UL (ref 4.14–5.8)
SODIUM SERPL-SCNC: 143 MMOL/L (ref 134–144)
WBC # BLD AUTO: 4.2 X10E3/UL (ref 3.4–10.8)

## 2020-01-29 NOTE — PROGRESS NOTES
HISTORY OF PRESENT ILLNESS  Miguel Butler is a 37 y.o. male. Pt. comes in with his caregiver from his group home for f/u. Has a few chronic medical issues as documented. His chronic GI issues have been stable. His chronic diarrhea is doing much better on Colestid and probiotic. Is followed by multiple providers including GI, colorectal surgeon, and a .  He depends on staff for many ADLs. He goes to day program as well. Has chronic cognitive deficit. Has gained some weight. Chronically underweight. PMH/PSH/Allergies/Social History/medication list and most recent studies reviewed with patient. Remains on supplements. Tobacco use: No  Alcohol use: No    Reports compliance with medications and diet. Trying to be active physically as tolerated. Reports no other new c/o. Follow-up   Pertinent negatives include no chest pain, no abdominal pain, no headaches and no shortness of breath. Depression   Pertinent negatives include no chest pain, no abdominal pain, no headaches and no shortness of breath. Review of Systems   Constitutional: Negative. HENT: Negative. Eyes: Negative. Respiratory: Negative for shortness of breath. Cardiovascular: Negative for chest pain and leg swelling. Gastrointestinal: Positive for diarrhea (Fecal incontinence). Negative for abdominal pain, blood in stool, constipation, heartburn, melena, nausea and vomiting. Stool incontinence   Genitourinary: Positive for urgency. Negative for dysuria. Musculoskeletal: Negative for back pain, falls and joint pain. Skin: Negative. Neurological: Positive for tremors. Negative for dizziness, sensory change, seizures and headaches. Endo/Heme/Allergies: Negative. Psychiatric/Behavioral: Positive for depression. The patient is nervous/anxious and has insomnia. All other systems reviewed and are negative. Physical Exam  Vitals signs and nursing note reviewed.    Constitutional:       General: He is not in acute distress. Appearance: He is well-developed. Comments: Pleasant, chronically underweight  cognitivelty a bit slow   HENT:      Head: Normocephalic and atraumatic. Mouth/Throat:      Pharynx: No oropharyngeal exudate. Eyes:      General: No scleral icterus. Conjunctiva/sclera: Conjunctivae normal.   Neck:      Musculoskeletal: Normal range of motion and neck supple. Thyroid: No thyromegaly. Vascular: No JVD. Cardiovascular:      Rate and Rhythm: Normal rate and regular rhythm. Heart sounds: Normal heart sounds. No murmur. Pulmonary:      Effort: Pulmonary effort is normal. No respiratory distress. Breath sounds: Normal breath sounds. No wheezing or rales. Abdominal:      General: Bowel sounds are normal. There is no distension. Palpations: Abdomen is soft. There is no mass. Tenderness: There is no abdominal tenderness. There is no guarding or rebound. Comments: Chronic surgical scars   Musculoskeletal:         General: No tenderness. Skin:     General: Skin is warm and dry. Findings: No rash. Neurological:      Mental Status: He is alert and oriented to person, place, and time. Coordination: Coordination abnormal.   Psychiatric:         Behavior: Behavior normal.      Comments: Repeats words  Chronic cognitive deficit , about same         ASSESSMENT and PLAN  Diagnoses and all orders for this visit:    1. Underweight  -     METABOLIC PANEL, COMPREHENSIVE  -     CBC W/O DIFF    2. History of abdominal surgery    3. S/p small bowel obstruction    4. Osteoporosis, unspecified osteoporosis type, unspecified pathological fracture presence    5. Anxiety    6. Functional diarrhea  -     METABOLIC PANEL, COMPREHENSIVE  -     CBC W/O DIFF    7.  Vitamin D deficiency  -     METABOLIC PANEL, COMPREHENSIVE  -     CBC W/O DIFF  -     VITAMIN D, 25 HYDROXY    8. Medicare annual wellness visit, subsequent    Other orders  -     OTHER; Pancreatin 10x - 200, I TID with meals      Follow-up and Dispositions    · Return in about 4 months (around 5/17/2020).      lab results and schedule of future lab studies reviewed with patient  reviewed diet, exercise and weight control  reviewed medications and side effects in detail  F/u with other MD's as scheduled  Overall stable

## 2020-01-30 RX ORDER — IBUPROFEN 200 MG
CAPSULE ORAL
Qty: 62 TAB | Status: SHIPPED | OUTPATIENT
Start: 2020-01-30 | End: 2020-12-29

## 2020-02-03 ENCOUNTER — TELEPHONE (OUTPATIENT)
Dept: INTERNAL MEDICINE CLINIC | Age: 40
End: 2020-02-03

## 2020-02-03 NOTE — TELEPHONE ENCOUNTER
Patient's mother called wanting to have the script for colestipol to be changed from once a day to prn.  Celina's number is [phone]

## 2020-02-04 ENCOUNTER — OFFICE VISIT (OUTPATIENT)
Dept: NEUROLOGY | Age: 40
End: 2020-02-04

## 2020-02-04 VITALS
BODY MASS INDEX: 14.89 KG/M2 | OXYGEN SATURATION: 97 % | DIASTOLIC BLOOD PRESSURE: 70 MMHG | RESPIRATION RATE: 18 BRPM | SYSTOLIC BLOOD PRESSURE: 108 MMHG | HEART RATE: 98 BPM | WEIGHT: 104 LBS | HEIGHT: 70 IN

## 2020-02-04 DIAGNOSIS — G93.49 STATIC ENCEPHALOPATHY: ICD-10-CM

## 2020-02-04 DIAGNOSIS — R25.1 TREMOR: Primary | ICD-10-CM

## 2020-02-04 DIAGNOSIS — Z74.09 IMPAIRED FUNCTIONAL MOBILITY, BALANCE, GAIT, AND ENDURANCE: ICD-10-CM

## 2020-02-04 DIAGNOSIS — R53.1 WEAKNESS GENERALIZED: ICD-10-CM

## 2020-02-04 NOTE — PROGRESS NOTES
Date:  20     Name:  Fran Collins  :  1980  MRN:  [id number]     PCP:  Dusty Hodge DO    Chief Complaint   Patient presents with    Tremors     followup     HISTORY OF PRESENT ILLNESS: Follow up for tremors, anxiety, and failure to thrive. He is accompanied by his mother and a caregiver. He continues to take gabapentin 100 mg 3 times daily for treatment of the tremor. Her his mother and caregiver, the tremor is about the same and does not seem to be bothersome. He does seem to be eating a bit better and his weight while still low has at least stabilized some. He is no longer experiencing diarrhea at this point. He has not had any seizures despite being off of antiepileptic medication. His anxiety is also better and he seems to be participating in activities more frequently. Recap from last office visit:  The tremor is about the same and his balance is better with the decrease in the Gabapentin. He is not having seizures despite the discontinuation of the AED. I am deferring treatment of the anxiety to his psychiatrist. He continues to have a general failure to thrive and generalized weakness. Again, recommended to his mother that she address these issues with the psychiatrist.  His mother always has a lot of questions about what his care of plan is and how we can make it as naturopathic as possible. While I am happy to discuss what I do know, I am by no means an expert nor have I had any specific training to help in this regard. Current Outpatient Medications   Medication Sig    CALCIUM 600 + D,3, 600 mg(1,500mg) -200 unit tab TAKE 1 TABLET BY MOUTH TWICE A DAY    OTHER Pancreatin 10x - 200, I TID with meals    colestipol (COLESTID) 1 gram tablet Take 1 Tab by mouth every evening. And 1 po qam prn    B.infantis-B.ani-B.long-B.bifi (PROBIOTIC 4X) 10-15 mg TbEC Take  by mouth daily.     OTHER Indications: Elemental Diet Shake    gabapentin (NEURONTIN) 100 mg capsule TAKE (1) CAPSULE BY MOUTH THREE TIMES DAILY.  melatonin 5 mg tablet Take 1 Tab by mouth nightly.  TAB-A-MARGIE tablet TAKE 1 TABLET BY MOUTH DAILY    acetylcysteine 600 mg cap capsule Take  by mouth.  Wheat Dextrin (BENEFIBER CLEAR) 3 gram/3.5 gram pwpk 1 pack daily    REFRESH TEARS 0.5 % drop ophthalmic solution INSTILL 1 DROP INTO EACH EYE TWICE A DAY    OTHER L-Theanine 100 mg 1 daily    fluticasone (FLONASE) 50 mcg/actuation nasal spray BLOW NOSE: 2 SPRAYS IN EACH NOSTRIL DAILY FOR ALLERGY.  amitriptyline (ELAVIL) 50 mg tablet Take 50 mg by mouth nightly.  levomefolate-algal oil (DEPLIN, ALGAL OIL,) 7.5-90.314 mg cap Take  by mouth.  acetaminophen (TYLENOL) 325 mg tablet Take  by mouth every four (4) hours as needed for Pain.  guaiFENesin ER (MUCINEX) 600 mg ER tablet Take 600 mg by mouth daily as needed for Congestion.  brief disposable (ADULT) misc by Does Not Apply route. Depends, size medium, 1 nightly     No current facility-administered medications for this visit.       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, without mention of intractable epilepsy 5/14/2011    Mental retardation     Osteoporosis     Other ill-defined conditions(799.89)     intestinal problems    Psychiatric disorder     anxiety    Seizure disorder [facility])      Past Surgical History:   Procedure Laterality Date    COLONOSCOPY N/A 1/23/2019    COLONOSCOPY performed by Wanda De La Garza MD at [address]  1/23/2019         HX APPENDECTOMY  1/28/13    APPENDECTOMY    HX GI      colon resection    HX GI  1/28/13    LAPAROTOMY EXPLORATORY - OPEN LYSIS OF ADHESIONS     HX HERNIA REPAIR      HX OTHER SURGICAL      imperforated anus birth defect    UPPER GI ENDOSCOPY,BIOPSY  3/17/2017          Social History     Socioeconomic History    Marital status: SINGLE     Spouse name: Not on file    Number of children: Not on file    Years of education: Not on file    Highest education level: Not on file   Occupational History    Not on file   Social Needs    Financial resource strain: Not on file    Food insecurity:     Worry: Not on file     Inability: Not on file    Transportation needs:     Medical: Not on file     Non-medical: Not on file   Tobacco Use    Smoking status: Never Smoker    Smokeless tobacco: Never Used   Substance and Sexual Activity    Alcohol use: No    Drug use: No    Sexual activity: Never   Lifestyle    Physical activity:     Days per week: Not on file     Minutes per session: Not on file    Stress: Not on file   Relationships    Social connections:     Talks on phone: Not on file     Gets together: Not on file     Attends Latter day service: Not on file     Active member of club or organization: Not on file     Attends meetings of clubs or organizations: Not on file     Relationship status: Not on file    Intimate partner violence:     Fear of current or ex partner: Not on file     Emotionally abused: Not on file     Physically abused: Not on file     Forced sexual activity: Not on file   Other Topics Concern    Not on file   Social History Narrative    Not on file     Family History   Problem Relation Age of Onset    No Known Problems Mother     Heart Disease Father     No Known Problems Sister     No Known Problems Sister        PHYSICAL EXAMINATION:    Visit Vitals  /70 (BP 1 Location: Right arm, BP Patient Position: Sitting)   Pulse 98   Resp 18   Ht 5' 10\" (1.778 m)   Wt 47.2 kg (104 lb)   SpO2 97%   BMI 14.92 kg/m²     General: Well defined, nourished, and groomed individual in no acute distress. Neck: Supple, nontender, no bruits, no pain with resistance to active range of motion.    Heart: Regular rate and rhythm, no murmurs, rub, or gallop. Normal S1S2. Lungs: Clear to auscultation bilaterally with equal chest expansion, no cough, no wheeze  Musculoskeletal: Extremities revealed no edema and had full range of motion of joints. Psych: Good mood and bright affect      NEUROLOGICAL EXAMINATION:   Mental Status: Alert and oriented to person and place   Cranial Nerves:   II, III, IV, VI: Visual acuity grossly intact. Visual fields are normal.   Pupils are equal, round, and reactive to light and accommodation. Extra-ocular movements are full and fluid. Fundoscopic exam was benign, no ptosis or nystagmus. V-XII: Hearing is grossly intact. Facial features are symmetric, with normal sensation and strength. The palate rises symmetrically and the tongue protrudes midline. Sternocleidomastoids 5/5. Motor Examination: decreased tone and bulk with generalized weakness. Coordination: Finger to nose was normal. No resting tremor. There is a noticeable tremor bilaterally, right greater than left. Gait and Station: Much steadier with rising and walking. He pins his hands to his sides with walking and has to be reminded to swing his arms. No pronator drift. No muscle wasting or fasiculations noted. Reflexes: DTRs 2+ throughout. ASSESSMENT AND PLAN    ICD-10-CM ICD-9-CM    1. Tremor R25.1 781.0    2. Weakness generalized R53.1 780.79    3. Static encephalopathy G93.49 742.9    4. Impaired functional mobility, balance, gait, and endurance Z74.09 V49.89       Tremor and anxiety seem to be pretty well controlled on his present therapy of gabapentin. He does continue with generalized weakness and impaired functional mobility and they were encouraged to continue to keep him active. Follow-up in 6 months or sooner if needed. Follow up in six months     [facility].  Patricia Mcgee

## 2020-02-04 NOTE — PROGRESS NOTES
Patient caregiver states his psychiatrist told him stop amitriptyline and started trazodone. She states its caused a negative effect and within four weeks they switched it back. She said she have not noticed a big change in his tremors she notice it more when he is trying to do things with his hands. She also states he has been seizure free since his lat visit. .  Chief Complaint   Patient presents with    Tremors     followup     .   Visit Vitals  /70 (BP 1 Location: Right arm, BP Patient Position: Sitting)   Pulse 98   Resp 18   Ht 5' 10\" (1.778 m)   Wt 104 lb (47.2 kg)   SpO2 97%   BMI 14.92 kg/m²

## 2020-02-06 ENCOUNTER — TELEPHONE (OUTPATIENT)
Dept: INTERNAL MEDICINE CLINIC | Age: 40
End: 2020-02-06

## 2020-02-06 DIAGNOSIS — F41.9 ANXIETY: ICD-10-CM

## 2020-02-06 DIAGNOSIS — F32.A DEPRESSION, UNSPECIFIED DEPRESSION TYPE: ICD-10-CM

## 2020-02-06 DIAGNOSIS — G93.49 STATIC ENCEPHALOPATHY: Primary | ICD-10-CM

## 2020-02-06 NOTE — TELEPHONE ENCOUNTER
Received call from patient mother for referral for Dr Jesus Macias, mother was unaware of need for insurance referral. Original appointment was 9/18/2019 and has another appointment scheduled today, will have insurance personnel to begin process

## 2020-02-06 NOTE — TELEPHONE ENCOUNTER
Call placed to [facility] and they do not have a replacement for this supplement

## 2020-02-06 NOTE — TELEPHONE ENCOUNTER
Call placed to mother and made aware that they do not have pancreatin available nor can they get it, mother stated that she would pay for it and supply it to the group home which he resides.

## 2020-02-12 NOTE — TELEPHONE ENCOUNTER
Called and spoke with Alyssa Jacobsperez, pt's mother, to assure she received MD's Janehart message regarding the change of his Colestipol from daily to PRN. Alyssa Gibbons stated she did receive the message and the change have been very good for the pt. She then stated she called regarding a referral for Dr. Chikis London, Urology, this morning as the pt has his f/u tomorrow 2/13/2020 @ 1430 and was told a referral is needed for tomorrows visit. She stated the pt's initial visit was on 12/2/2019. ARNOLD FuentesR was present during the conversation regarding the referral and stated she will look into this and call Alyssa Gibbons today with the information and/or to assure her that the referral was approved in time. Alyssa Gibbons stated she will be awaiting Naomi's call and thanked us for our time and efforts. No further questions at this time.

## 2020-02-16 PROBLEM — Z00.00 MEDICARE ANNUAL WELLNESS VISIT, SUBSEQUENT: Status: RESOLVED | Noted: 2018-07-04 | Resolved: 2020-02-16

## 2020-03-30 RX ORDER — MONTELUKAST SODIUM 4 MG/1
TABLET, CHEWABLE ORAL
Qty: 31 TAB | Status: SHIPPED | OUTPATIENT
Start: 2020-03-30 | End: 2021-02-25

## 2020-03-30 RX ORDER — MULTIVITAMIN
TABLET ORAL
Qty: 31 TAB | Status: SHIPPED | OUTPATIENT
Start: 2020-03-30 | End: 2021-03-31

## 2020-04-07 DIAGNOSIS — R25.1 TREMOR: ICD-10-CM

## 2020-04-07 DIAGNOSIS — F41.9 ANXIETY: ICD-10-CM

## 2020-04-07 DIAGNOSIS — J30.9 ALLERGIC RHINITIS: ICD-10-CM

## 2020-04-07 RX ORDER — GABAPENTIN 100 MG/1
CAPSULE ORAL
Qty: 93 CAP | Refills: 4 | Status: SHIPPED | OUTPATIENT
Start: 2020-04-07 | End: 2020-09-04 | Stop reason: SDUPTHER

## 2020-04-07 RX ORDER — GABAPENTIN 100 MG/1
CAPSULE ORAL
Qty: 93 CAP | Refills: 4 | Status: CANCELLED | OUTPATIENT
Start: 2020-04-07

## 2020-04-30 DIAGNOSIS — G47.09 OTHER INSOMNIA: ICD-10-CM

## 2020-05-01 RX ORDER — CHOLECALCIFEROL (VITAMIN D3) 125 MCG
CAPSULE ORAL
Qty: 30 TAB | Refills: 11 | Status: SHIPPED | OUTPATIENT
Start: 2020-05-01 | End: 2021-04-30

## 2020-05-15 ENCOUNTER — VIRTUAL VISIT (OUTPATIENT)
Dept: INTERNAL MEDICINE CLINIC | Age: 40
End: 2020-05-15

## 2020-05-15 VITALS — HEIGHT: 70 IN | BODY MASS INDEX: 15.12 KG/M2 | WEIGHT: 105.6 LBS

## 2020-05-15 DIAGNOSIS — Z98.890 HISTORY OF ABDOMINAL SURGERY: ICD-10-CM

## 2020-05-15 DIAGNOSIS — K59.1 FUNCTIONAL DIARRHEA: ICD-10-CM

## 2020-05-15 DIAGNOSIS — L81.9 DISCOLORATION OF SKIN OF FOOT: Primary | ICD-10-CM

## 2020-05-15 DIAGNOSIS — F41.9 ANXIETY: ICD-10-CM

## 2020-05-15 DIAGNOSIS — R63.6 UNDERWEIGHT: ICD-10-CM

## 2020-05-15 NOTE — PROGRESS NOTES
Travis Ferrera is a 37 y.o. male who was seen by synchronous (real-time) audio-video technology on 5/15/2020. Consent: Travis Ferrera, who was seen by synchronous (real-time) audio-video technology, and/or his healthcare decision maker, is aware that this patient-initiated, Telehealth encounter on 5/15/2020 is a billable service, with coverage as determined by his insurance carrier. He is aware that he may receive a bill and has provided verbal consent to proceed: Yes. Assessment & Plan:   Diagnoses and all orders for this visit:    1. Discoloration of skin of foot    2. Underweight    3. History of abdominal surgery    4. Anxiety    5. Functional diarrhea          I spent at least 23 minutes on this visit with this established patient. ([id number])    Subjective:   Travis Ferrera is a 37 y.o. male who was seen for Follow-up (4 month routine follow up) and Skin Discoloration (discuss skin discoloration on feet for the past few months. )    Patient is seen with his caregiver at the group home. His mother joined us on the phone also. He has a few chronic medical issues. They report noticing some skin discoloration on his feet for the past few months. Has been evaluated by podiatrist.  Was told that the pulses were okay. No evidence of ulcers, redness, swelling. Denies any numbness or tingling or other related neurological issues. There is been no trauma. No other areas of darkness on the skin. His chronic GI issues have been stable on current medications. His anxiety and depression are stable with medications. Gait is unsteady but no recent falls. No signs or symptoms of COVID-19 including fever, cough, dyspnea. Med list and most recent studies reviewed. All labs were stable. Reports compliance with medications. Depends on staff for some ADLs. No tobacco or alcohol use. No other new complaints. I looked at pictures that they have sent me from his feet.   Reassurance that this is probably hyperpigmentation given he does not have any other signs or symptoms   Advised  them to monitor patient's feet and contact me if there is any changes  Continue current medications  Fall precautions discussed  COVID-19 precautions discussed with pt  F/u with other MD's as scheduled  Follow-up 1 month or as needed    Prior to Admission medications    Medication Sig Start Date End Date Taking? Authorizing Provider   melatonin 5 mg tablet TAKE ONE TABLET BY MOUTH AT BEDTIME 5/1/20  Yes Donna Farrell NP   gabapentin (NEURONTIN) 100 mg capsule TAKE (1) CAPSULE BY MOUTH THREE TIMES DAILY. 4/7/20  Yes Quinn Owen NP   One Daily Multivitamin tablet TAKE 1 TABLET BY MOUTH DAILY 3/30/20  Yes Randal Farrell, RICHY   colestipoL (COLESTID) 1 gram tablet TAKE 1 TABLET BY MOUTH EVERY EVENING TAKE 1 TABLET BY MOUTH EVERY MORNING AS NEEDED 3/30/20  Yes Moshe Cagle DO   fluticasone propionate (FLONASE) 50 mcg/actuation nasal spray BLOW NOSE: 2 SPRAYS IN EACH NOSTRIL DAILY FOR ALLERGY. Patient taking differently: as needed. BLOW NOSE: 2 SPRAYS IN EACH NOSTRIL DAILY FOR ALLERGY. 2/10/20  Yes Moshe Cagle DO   CALCIUM 600 + D,3, 600 mg(1,500mg) -200 unit tab TAKE 1 TABLET BY MOUTH TWICE A DAY 1/30/20  Yes Moshe Cagle DO   OTHER Pancreatin 10x - 200, I TID with meals 1/17/20  Yes Moshe Cagle DO   B.infantis-B.ani-B.long-B.bifi (PROBIOTIC 4X) 10-15 mg TbEC Take  by mouth daily. Yes Provider, Historical   OTHER Indications: Elemental Diet Shake   Yes Provider, Historical   acetylcysteine 600 mg cap capsule Take  by mouth. Yes Provider, Historical   Wheat Dextrin (BENEFIBER CLEAR) 3 gram/3.5 gram pwpk 1 pack daily 9/17/19  Yes Moshe Cagle DO   REFRESH TEARS 0.5 % drop ophthalmic solution INSTILL 1 DROP INTO EACH EYE TWICE A DAY 5/21/19  Yes Marylen Fielding, NP   OTHER L-Theanine 100 mg 1 daily 4/4/19  Yes Moshe Cagle DO   amitriptyline (ELAVIL) 50 mg tablet Take 50 mg by mouth nightly.  6/15/18  Yes Provider, Historical   levomefolate-algal oil (DEPLIN, ALGAL OIL,) 7.5-90.314 mg cap Take  by mouth. Yes Provider, Historical   acetaminophen (TYLENOL) 325 mg tablet Take  by mouth every four (4) hours as needed for Pain. Yes Provider, Historical   guaiFENesin ER (MUCINEX) 600 mg ER tablet Take 600 mg by mouth daily as needed for Congestion. Yes Provider, Historical   brief disposable (ADULT) misc by Does Not Apply route. Depends, size medium, 1 nightly 6/10/15  Yes Paralee Margarita, NP     No Known Allergies    Patient Active Problem List    Diagnosis Date Noted    Intestinal malabsorption 12/11/2018    Functional diarrhea 07/04/2018    Chronic diarrhea 06/22/2018    Hypokalemia 06/22/2018    Hypotension due to drugs 05/22/2018    History of abdominal surgery 03/27/2018    Recurrent depression ([facility] [address] 03/27/2018    Weight loss 11/27/2017    Underweight 05/08/2017    Anxiety 07/29/2016    Stool incontinence 07/29/2016    Static encephalopathy 03/26/2013    S/p small bowel obstruction 03/12/2013    Bowel obstruction ([facility] [address] 05/14/2012    Encounter for long-term (current) use of other medications 05/14/2011    Osteoporosis 01/10/2011    Depression 01/10/2011    Vitamin D deficiency 01/10/2011     Current Outpatient Medications   Medication Sig Dispense Refill    melatonin 5 mg tablet TAKE ONE TABLET BY MOUTH AT BEDTIME 30 Tab 11    gabapentin (NEURONTIN) 100 mg capsule TAKE (1) CAPSULE BY MOUTH THREE TIMES DAILY. 93 Cap 4    One Daily Multivitamin tablet TAKE 1 TABLET BY MOUTH DAILY 31 Tab PRN    colestipoL (COLESTID) 1 gram tablet TAKE 1 TABLET BY MOUTH EVERY EVENING TAKE 1 TABLET BY MOUTH EVERY MORNING AS NEEDED 31 Tab PRN    fluticasone propionate (FLONASE) 50 mcg/actuation nasal spray BLOW NOSE: 2 SPRAYS IN EACH NOSTRIL DAILY FOR ALLERGY. (Patient taking differently: as needed.  BLOW NOSE: 2 SPRAYS IN EACH NOSTRIL DAILY FOR ALLERGY.) 16 g 5    CALCIUM 600 + D,3, 600 mg(1,500mg) -200 unit tab TAKE 1 TABLET BY MOUTH TWICE A DAY 62 Tab PRN    OTHER Pancreatin 10x - 200, I TID with meals 100 Cap 11    B.infantis-B.ani-B.long-B.bifi (PROBIOTIC 4X) 10-15 mg TbEC Take  by mouth daily.  OTHER Indications: Elemental Diet Shake      acetylcysteine 600 mg cap capsule Take  by mouth.  Wheat Dextrin (BENEFIBER CLEAR) 3 gram/3.5 gram pwpk 1 pack daily 90 Packet 1    REFRESH TEARS 0.5 % drop ophthalmic solution INSTILL 1 DROP INTO EACH EYE TWICE A DAY 30 mL 5    OTHER L-Theanine 100 mg 1 daily 30 Tab 11    amitriptyline (ELAVIL) 50 mg tablet Take 50 mg by mouth nightly.  levomefolate-algal oil (DEPLIN, ALGAL OIL,) 7.5-90.314 mg cap Take  by mouth.  acetaminophen (TYLENOL) 325 mg tablet Take  by mouth every four (4) hours as needed for Pain.  guaiFENesin ER (MUCINEX) 600 mg ER tablet Take 600 mg by mouth daily as needed for Congestion.  brief disposable (ADULT) misc by Does Not Apply route.  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, without mention of intractable epilepsy 5/14/2011    Mental retardation     Osteoporosis     Other ill-defined conditions(799.89)     intestinal problems    Psychiatric disorder     anxiety    Seizure disorder [facility])      Past Surgical History:   Procedure Laterality Date    COLONOSCOPY N/A 1/23/2019    [facility]  1/23/2019         HX APPENDECTOMY  1/28/13    APPENDECTOMY    HX GI      colon resection    HX GI  1/28/13    LAPAROTOMY EXPLORATORY - OPEN LYSIS OF ADHESIONS     HX HERNIA REPAIR      HX OTHER SURGICAL      imperforated anus birth defect    UPPER GI ENDOSCOPY,BIOPSY  3/17/2017          Social History     Tobacco Use    Smoking status: Never Smoker    Smokeless tobacco: Never Used   Substance Use Topics    Alcohol use: No       ROS    Objective:   Vital Signs: (As obtained by patient/caregiver at home)  Visit Vitals  Height 5' 10\" (1.778 m)   Weight 105 lb 9.6 oz (47.9 kg)   Body Mass Index 15.15 kg/m²        [INSTRUCTIONS:  \"[x]\" Indicates a positive item  \"[]\" Indicates a negative item  -- DELETE ALL ITEMS NOT EXAMINED]    Constitutional: [x] Appears well-developed and well-nourished [x] No apparent distress      [] Abnormal -     Mental status: [x] Alert and awake  [x] Oriented to person/place/time [x] Able to follow commands    [] Abnormal -     Eyes:   EOM    [x]  Normal    [] Abnormal -   Sclera  [x]  Normal    [] Abnormal -          Discharge [x]  None visible   [] Abnormal -     HENT: [x] Normocephalic, atraumatic  [] Abnormal -   [x] Mouth/Throat: Mucous membranes are moist    External Ears [x] Normal  [] Abnormal -    Neck: [x] No visualized mass [] Abnormal -     Pulmonary/Chest: [x] Respiratory effort normal   [x] No visualized signs of difficulty breathing or respiratory distress        [] Abnormal -      Musculoskeletal:   [x] Normal gait with no signs of ataxia         [x] Normal range of motion of neck        [] Abnormal -     Neurological:        [x] No Facial Asymmetry (Cranial nerve 7 motor function) (limited exam due to video visit)          [x] No gaze palsy        [] Abnormal -          Skin:        [x] No significant exanthematous lesions or discoloration noted on facial skin         [] Abnormal -            Psychiatric:       [x] Normal Affect [] Abnormal -        [x] No Hallucinations    Other pertinent observable physical exam findings:-        We discussed the expected course, resolution and complications of the diagnosis(es) in detail. Medication risks, benefits, costs, interactions, and alternatives were discussed as indicated. I advised him to contact the office if his condition worsens, changes or fails to improve as anticipated. He expressed understanding with the diagnosis(es) and plan. Leona Parsons is a 37 y.o. male who was evaluated by a video visit encounter for concerns as above. Patient identification was verified prior to start of the visit. A caregiver was present when appropriate. Due to this being a TeleHealth encounter (During [facility] public [facility] emergency), evaluation of the following organ systems was limited: Vitals/Constitutional/EENT/Resp/CV/GI//MS/Neuro/Skin/Heme-Lymph-Imm. Pursuant to the emergency declaration under the [facility] [address] waiver authority and the [employer] Act, this Virtual  Visit was conducted, with patient's (and/or legal guardian's) consent, to reduce the patient's risk of exposure to COVID-19 and provide necessary medical care. Services were provided through a video synchronous discussion virtually to substitute for in-person clinic visit. Patient and provider were located at their individual homes.       Ryley Turner, DO

## 2020-05-15 NOTE — PROGRESS NOTES
1. Have you been to the ER, urgent care clinic since your last visit? Hospitalized since your last visit? No    2. Have you seen or consulted any other health care providers outside of the [address] [place] [address] since your last visit? Include any pap smears or colon screening. No     Chief Complaint   Patient presents with    Follow-up     4 month routine follow up    Skin Discoloration     discuss skin discoloration on feet for the past few months.

## 2020-05-21 RX ORDER — CARBOXYMETHYLCELLULOSE SODIUM 5 MG/ML
SOLUTION/ DROPS OPHTHALMIC
Qty: 15 ML | Refills: 0 | Status: SHIPPED | OUTPATIENT
Start: 2020-05-21 | End: 2021-04-30

## 2020-05-27 ENCOUNTER — NURSE TRIAGE (OUTPATIENT)
Dept: OTHER | Facility: CLINIC | Age: 40
End: 2020-05-27

## 2020-05-27 NOTE — TELEPHONE ENCOUNTER
Caller is calling on behalf Pt. Caller is a care taker at group home. Caller states Pt was exposed to a worker who tested positive for COVID-19. Pt has displayed sore throat, headaches, and fever. No fever currently. Denies SOB. Group home wants Pt tested. Triage recommendation is to discuss with PCP within an hour. Currently after hours. Caller states she will call in the morning tomorrow to contact with PCP regarding testing. Care advice provided per protocol. Reason for Disposition   [1] Fever (or feeling feverish) OR cough AND [2] within 14 Days of COVID-19 EXPOSURE (Close Contact)    Answer Assessment - Initial Assessment Questions  1. CLOSE CONTACT: \"Who is the person with the confirmed or suspected COVID-19 infection that you were exposed to?\"      Care taker   2. PLACE of CONTACT: \"Where were you when you were exposed to COVID-19? \" (e.g., home, school, medical waiting room; which city?)     Home. 3. TYPE of CONTACT: \"How much contact was there? \" (e.g., sitting next to, live in same house, work in same office, same building)      Close contact, care provider. 4. DURATION of CONTACT: \"How long were you in contact with the COVID-19 patient? \" (e.g., a few seconds, passed by person, a few minutes, live with the patient)      10 hours. 5. DATE of CONTACT: \"When did you have contact with a COVID-19 patient? \" (e.g., how many days ago)  5/18/20  6. TRAVEL: \"Have you traveled out of the country recently? \" If so, \"When and where? \"      * Also ask about out-of-state travel, since the CDC has identified some high risk cities for community spread in the [address]. * Note: Travel becomes less relevant if there is widespread community transmission where the patient lives. Denies. 7. COMMUNITY SPREAD: \"Are there lots of cases of COVID-19 (community spread) where you live? \" (See public health department website, if unsure)    * MAJOR community spread: high number of cases; numbers of cases are increasing; many people hospitalized. * MINOR community spread: low number of cases; not increasing; few or no people hospitalized     Unsure. 8. SYMPTOMS: \"Do you have any symptoms? \" (e.g., fever, cough, breathing difficulty)     Sore throat. 9. PREGNANCY OR POSTPARTUM: \"Is there any chance you are pregnant? \" \"When was your last menstrual period? \" \"Did you deliver in the last 2 weeks? \" N/A.       10. HIGH RISK: \"Do you have any heart or lung problems? Do you have a weak immune system? \" (e.g., CHF, COPD, asthma, HIV positive, chemotherapy, renal failure, diabetes mellitus, sickle cell anemia)       Autism. Protocols used: CORONAVIRUS (COVID-19) EXPOSURE-ADULT-[place]    Please do not respond to the triage nurse through this encounter. Any subsequent communication should be directly with the patient.

## 2020-05-28 ENCOUNTER — OFFICE VISIT (OUTPATIENT)
Dept: PRIMARY CARE CLINIC | Age: 40
End: 2020-05-28

## 2020-05-28 ENCOUNTER — DOCUMENTATION ONLY (OUTPATIENT)
Dept: INTERNAL MEDICINE CLINIC | Age: 40
End: 2020-05-28

## 2020-05-28 DIAGNOSIS — R50.9 FEVER IN ADULT: Primary | ICD-10-CM

## 2020-05-28 DIAGNOSIS — U07.1 COVID-19: Primary | ICD-10-CM

## 2020-05-28 NOTE — PROGRESS NOTES
Patient was seen at [facility] thru flu clinic. Please seen scanned form for additional visit documentation. Patient consented to treatment.     s- brought in by care giver due to fever since Friday - tmax 99.9  Noted congestion  They desire covid testing    o -   Hr 70, resp 17, o2 sat 96, temp 99.2  Lungs cta bilat  Alert  Cooperative  Skin color normal  Not ill appearing  Lungs cta bilat  rrr  Not diaphoretic    A/p-  Fever  Concern for covid    covid test done today  Supportive care advised

## 2020-05-29 LAB — SARS-COV-2, NAA: DETECTED

## 2020-05-29 NOTE — PROGRESS NOTES
I attempted to call the caregiver that brought him yesterday - I left message about positive results. Treat symptoms and isolate for 14 days. F/up with PCP by phone prn. I also sent my chart message.

## 2020-06-03 ENCOUNTER — TELEPHONE (OUTPATIENT)
Dept: INTERNAL MEDICINE CLINIC | Age: 40
End: 2020-06-03

## 2020-06-03 NOTE — TELEPHONE ENCOUNTER
Pt has had ongoing issues with his feet. Mother would like a call back regarding this issue  Mother wants to know if ac is on could it give her son a lower body temp or is this a symptom of covid  Pt is doing well with his O2 levels 97%  Mother is considering removing pt from home. How long should she wait? Will she be able to test pt to confirm when he is negative?

## 2020-06-04 NOTE — TELEPHONE ENCOUNTER
Returned Tonia's call and left a VM advising her that Dr. Rickie Velasco addressed some, but not all of her questions and advised that it would be best for her to schedule a virtual visit for the pt and for her to address her concerns and ask questions. Asked that she call back as writer would like to give pt an appt tomorrow at 2:15 PM. Will await return call.

## 2020-07-16 ENCOUNTER — TELEPHONE (OUTPATIENT)
Dept: INTERNAL MEDICINE CLINIC | Age: 40
End: 2020-07-16

## 2020-07-16 NOTE — TELEPHONE ENCOUNTER
Pt tested positive for covid in may. Does pt need to schedule a follow up in office or virtual? Pt is still in his group home. Also,  Pharmacy wants to switch benefiber to metamucil due to insurance- would md approve this change?

## 2020-07-17 NOTE — TELEPHONE ENCOUNTER
I spoke with patient and mother. Patient is scheduled for follow up for July the 24 th at 3:15 pm. Medications have been changed at patients group home.

## 2020-07-24 ENCOUNTER — VIRTUAL VISIT (OUTPATIENT)
Dept: INTERNAL MEDICINE CLINIC | Age: 40
End: 2020-07-24

## 2020-07-24 DIAGNOSIS — Z98.890 HISTORY OF ABDOMINAL SURGERY: ICD-10-CM

## 2020-07-24 DIAGNOSIS — F41.9 ANXIETY: ICD-10-CM

## 2020-07-24 DIAGNOSIS — Z86.16 HISTORY OF 2019 NOVEL CORONAVIRUS DISEASE (COVID-19): ICD-10-CM

## 2020-07-24 DIAGNOSIS — R63.6 UNDERWEIGHT: ICD-10-CM

## 2020-07-24 DIAGNOSIS — K59.00 CONSTIPATION, UNSPECIFIED CONSTIPATION TYPE: Primary | ICD-10-CM

## 2020-07-24 NOTE — PROGRESS NOTES
ADVISED PATIENT OF THE FOLLOWING HEALTH MAINTAINCE DUE  Health Maintenance Due   Topic Date Due    DTaP/Tdap/Td series (1 - Tdap) 11/04/1999      Chief Complaint   Patient presents with    Constipation     once or twice a week     Depression    Concern For COVID-19 (Coronavirus)     was positive in may, has now recovered        1. Have you been to the ER, urgent care clinic since your last visit? Hospitalized since your last visit? No    2. Have you seen or consulted any other health care providers outside of the [address] [place] [address] since your last visit? Include any DEXA scan, mammography  or colon screening. No    3. Do you have an Advance Directive on file? no    4. Do you have a DNR on file? no    Patient is accompanied by self I have received verbal consent from Pansy Barthel to discuss any/all medical information while they are present in the room. Advance Care Planning 7/4/2018   Patient's Healthcare Decision Maker is: Named in scanned ACP document   Primary Decision Maker Name Jerry Jonas   Primary Decision Maker Phone Number [phone]   Primary Decision Maker Relationship to Patient Parent   Secondary Decision Maker Name Jania Baker   Secondary Decision Maker Phone Number [phone]   Secondary Decision Maker Relationship to Patient Sibling   Confirm Advance Directive Yes, on file         [address] [place] [address], [address] [place] [address]  Phone: [phone] Fax: [phone] [address], [address] [facility] [address]  [address] [place] [address]  Phone: [phone] Fax: [phone]        Patient reminded during visit to bring all medication bottles, OTC medications to all appointments.

## 2020-07-29 PROBLEM — Z86.16 HISTORY OF 2019 NOVEL CORONAVIRUS DISEASE (COVID-19): Status: ACTIVE | Noted: 2020-07-29

## 2020-07-29 NOTE — PROGRESS NOTES
Jerry Feldman is a 37 y.o. male who was seen by synchronous (real-time) audio-video technology on 7/24/2020 for Constipation (once or twice a week ); Depression; and Concern For COVID-19 (Coronavirus) (was positive in may, has now recovered )        Assessment & Plan:   Diagnoses and all orders for this visit:    1. Constipation, unspecified constipation type    2. Underweight    3. History of abdominal surgery    4. Anxiety    5. History of 2019 novel coronavirus disease (COVID-19)        I spent at least 25 minutes on this visit with this established patient. Subjective:     Pt. is seen virtually with his caregiver at his group home for f/u. His mother joined us on the phone as well. Has a few chronic medical issues as documented. Patient had COVID-19 a few weeks ago. All his symptoms have resolved. Other residents and group home have been tested as well. Tells me he is feeling okay now. He has chronic GI issues with previous surgery. Has had issues with chronic diarrhea and constipation. Has been more constipated sleep. They want to cut back on colestipol which has helped his diarrhea a lot. Is chronically underweight. Appetite is fair. Denies any pain. Followed by psychiatrist.  His chronic depression anxiety is stable on medications. Gait is unsteady but no recent falls. Depends on staff for some ADLs. Has some cognitive deficit as well. Taking precautions for Covid 19. Denies any related signs or symptoms including fever, cough, SOB or CP. PMH/PSH/Allergies/Social History/medication list and most recent studies reviewed with patient. Tobacco use: No  Alcohol use: No  Reports compliance with medications and diet. Trying to be active physically as tolerated. Reports no other new c/o.     Plan:  Agree with cutting back on colestipol  Continue other medications  Keep up with fluids  F/u with other MD's as scheduled  Fall precautions discussed  COVID-19 precautions discussed with pt  Explained to them that he could potentially get COVID again despite having it once  Follow-up 4 months or as needed  Prior to Admission medications    Medication Sig Start Date End Date Taking? Authorizing Provider   Refresh Tears 0.5 % drop ophthalmic solution INSTILL 1 DROP INTO EACH EYE TWICE DAILY 5/21/20  Yes Donna Farrell NP   melatonin 5 mg tablet TAKE ONE TABLET BY MOUTH AT BEDTIME 5/1/20  Yes Donna Farrell NP   gabapentin (NEURONTIN) 100 mg capsule TAKE (1) CAPSULE BY MOUTH THREE TIMES DAILY. 4/7/20  Yes Tanisha Tyson NP   One Daily Multivitamin tablet TAKE 1 TABLET BY MOUTH DAILY 3/30/20  Yes Taras Farrell NP   colestipoL (COLESTID) 1 gram tablet TAKE 1 TABLET BY MOUTH EVERY EVENING TAKE 1 TABLET BY MOUTH EVERY MORNING AS NEEDED 3/30/20  Yes Moshe Cagle DO   fluticasone propionate (FLONASE) 50 mcg/actuation nasal spray BLOW NOSE: 2 SPRAYS IN EACH NOSTRIL DAILY FOR ALLERGY. Patient taking differently: as needed. BLOW NOSE: 2 SPRAYS IN EACH NOSTRIL DAILY FOR ALLERGY. 2/10/20  Yes Moshe Cagle DO   CALCIUM 600 + D,3, 600 mg(1,500mg) -200 unit tab TAKE 1 TABLET BY MOUTH TWICE A DAY 1/30/20  Yes Moshe Cagle DO   OTHER Pancreatin 10x - 200, I TID with meals 1/17/20  Yes Moshe Cagle DO   B.infantis-B.ani-B.long-B.bifi (PROBIOTIC 4X) 10-15 mg TbEC Take  by mouth daily. Yes Provider, Historical   OTHER Indications: Elemental Diet Shake   Yes Provider, Historical   acetylcysteine 600 mg cap capsule Take  by mouth. Yes Provider, Historical   OTHER L-Theanine 100 mg 1 daily 4/4/19  Yes Moshe Cagle DO   amitriptyline (ELAVIL) 50 mg tablet Take 50 mg by mouth nightly. 6/15/18  Yes Provider, Historical   levomefolate-algal oil (DEPLIN, ALGAL OIL,) 7.5-90.314 mg cap Take  by mouth. Yes Provider, Historical   acetaminophen (TYLENOL) 325 mg tablet Take  by mouth every four (4) hours as needed for Pain.    Yes Provider, Historical   guaiFENesin ER (MUCINEX) 600 mg ER tablet Take 600 mg by mouth daily as needed for Congestion. Yes Provider, Historical   brief disposable (ADULT) misc by Does Not Apply route. Depends, size medium, 1 nightly 6/10/15  Yes Rosalina Norwood NP     Patient Active Problem List    Diagnosis Date Noted    History of 2019 novel coronavirus disease (COVID-19) 07/29/2020    Intestinal malabsorption 12/11/2018    Functional diarrhea 07/04/2018    Chronic diarrhea 06/22/2018    Hypokalemia 06/22/2018    Hypotension due to drugs 05/22/2018    History of abdominal surgery 03/27/2018    Recurrent depression ([facility] [address] 03/27/2018    Weight loss 11/27/2017    Underweight 05/08/2017    Anxiety 07/29/2016    Stool incontinence 07/29/2016    Static encephalopathy 03/26/2013    S/p small bowel obstruction 03/12/2013    Bowel obstruction ([facility] [address] 05/14/2012    Encounter for long-term (current) use of other medications 05/14/2011    Osteoporosis 01/10/2011    Depression 01/10/2011    Vitamin D deficiency 01/10/2011     Current Outpatient Medications   Medication Sig Dispense Refill    Refresh Tears 0.5 % drop ophthalmic solution INSTILL 1 DROP INTO EACH EYE TWICE DAILY 15 mL 0    melatonin 5 mg tablet TAKE ONE TABLET BY MOUTH AT BEDTIME 30 Tab 11    gabapentin (NEURONTIN) 100 mg capsule TAKE (1) CAPSULE BY MOUTH THREE TIMES DAILY. 93 Cap 4    One Daily Multivitamin tablet TAKE 1 TABLET BY MOUTH DAILY 31 Tab PRN    colestipoL (COLESTID) 1 gram tablet TAKE 1 TABLET BY MOUTH EVERY EVENING TAKE 1 TABLET BY MOUTH EVERY MORNING AS NEEDED 31 Tab PRN    fluticasone propionate (FLONASE) 50 mcg/actuation nasal spray BLOW NOSE: 2 SPRAYS IN EACH NOSTRIL DAILY FOR ALLERGY. (Patient taking differently: as needed.  BLOW NOSE: 2 SPRAYS IN EACH NOSTRIL DAILY FOR ALLERGY.) 16 g 5    CALCIUM 600 + D,3, 600 mg(1,500mg) -200 unit tab TAKE 1 TABLET BY MOUTH TWICE A DAY 62 Tab PRN    OTHER Pancreatin 10x - 200, I TID with meals 100 Cap 11    B.infantis-B.ani-B.long-B.bifi (PROBIOTIC 4X) 10-15 mg TbEC Take  by mouth daily.  OTHER Indications: Elemental Diet Shake      acetylcysteine 600 mg cap capsule Take  by mouth.  OTHER L-Theanine 100 mg 1 daily 30 Tab 11    amitriptyline (ELAVIL) 50 mg tablet Take 50 mg by mouth nightly.  levomefolate-algal oil (DEPLIN, ALGAL OIL,) 7.5-90.314 mg cap Take  by mouth.  acetaminophen (TYLENOL) 325 mg tablet Take  by mouth every four (4) hours as needed for Pain.  guaiFENesin ER (MUCINEX) 600 mg ER tablet Take 600 mg by mouth daily as needed for Congestion.  brief disposable (ADULT) misc by Does Not Apply route.  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, without mention of intractable epilepsy 5/14/2011    Mental retardation     Osteoporosis     Other ill-defined conditions(799.89)     intestinal problems    Psychiatric disorder     anxiety    Seizure disorder [facility])      Past Surgical History:   Procedure Laterality Date    COLONOSCOPY N/A 1/23/2019    COL[facility]COPY performed by Ap [facility] at [facility]  1/23/2019         HX APPENDECTOMY  1/28/13    APPENDECTOMY    HX GI      colon resection    HX GI  1/28/13    LAPAROTOMY EXPLORATORY - OPEN LYSIS OF ADHESIONS     HX HERNIA REPAIR      HX OTHER SURGICAL      imperforated anus birth defect    UPPER GI ENDOSCOPY,BIOPSY  3/17/2017          Social History     Tobacco Use    Smoking status: Never Smoker    Smokeless tobacco: Never Used   Substance Use Topics    Alcohol use: No       ROS    Objective:     Patient-Reported Vitals 7/24/2020   Patient-Reported Height 5f10        [INSTRUCTIONS:  \"[x]\" Indicates a positive item  \"[]\" Indicates a negative item  -- DELETE ALL ITEMS NOT EXAMINED]    Constitutional: [x] Appears well-developed and well-nourished [x] No apparent distress      [] Abnormal -     Mental status: [x] Alert and awake  [x] Oriented to person/place/time [x] Able to follow commands    [] Abnormal -     Eyes:   EOM    [x]  Normal    [] Abnormal -   Sclera  [x]  Normal    [] Abnormal -          Discharge [x]  None visible   [] Abnormal -     HENT: [x] Normocephalic, atraumatic  [] Abnormal -   [x] Mouth/Throat: Mucous membranes are moist    External Ears [x] Normal  [] Abnormal -    Neck: [x] No visualized mass [] Abnormal -     Pulmonary/Chest: [x] Respiratory effort normal   [x] No visualized signs of difficulty breathing or respiratory distress        [] Abnormal -      Musculoskeletal:   [x] Normal gait with no signs of ataxia         [x] Normal range of motion of neck        [] Abnormal -     Neurological:        [x] No Facial Asymmetry (Cranial nerve 7 motor function) (limited exam due to video visit)          [x] No gaze palsy        [] Abnormal -          Skin:        [x] No significant exanthematous lesions or discoloration noted on facial skin         [] Abnormal -            Psychiatric:       [x] Normal Affect [] Abnormal -        [x] No Hallucinations    Other pertinent observable physical exam findings:-        We discussed the expected course, resolution and complications of the diagnosis(es) in detail. Medication risks, benefits, costs, interactions, and alternatives were discussed as indicated. I advised him to contact the office if his condition worsens, changes or fails to improve as anticipated. He expressed understanding with the diagnosis(es) and plan. Jayleen Landrum, who was evaluated through a patient-initiated, synchronous (real-time) audio-video encounter, and/or his healthcare decision maker, is aware that it is a billable service, with coverage as determined by his insurance carrier.  He provided verbal consent to proceed: Yes, and patient identification was verified. It was conducted pursuant to the emergency declaration under the [address] [place] [address] waiver authority and the [employer]. A caregiver was present when appropriate. Ability to conduct physical exam was limited. I was at home. The patient was at home.       Hollis Garcia DO

## 2020-07-30 ENCOUNTER — TELEPHONE (OUTPATIENT)
Dept: INTERNAL MEDICINE CLINIC | Age: 40
End: 2020-07-30

## 2020-07-30 NOTE — TELEPHONE ENCOUNTER
Ms Julianne Asif Is calling regarding discontinuing benefiber and reguloid.  A note needs to be sent to the patients pharmacist

## 2020-07-31 NOTE — TELEPHONE ENCOUNTER
I called and spoke with Satnam Dias to confirm correct pharmacy and to confirm discontinuation of benefiber and colestipol (Colestid) (Reguloid).

## 2020-08-26 ENCOUNTER — OFFICE VISIT (OUTPATIENT)
Dept: NEUROLOGY | Age: 40
End: 2020-08-26
Payer: MEDICARE

## 2020-08-26 VITALS
SYSTOLIC BLOOD PRESSURE: 101 MMHG | TEMPERATURE: 97.7 F | OXYGEN SATURATION: 81 % | HEIGHT: 70 IN | RESPIRATION RATE: 17 BRPM | BODY MASS INDEX: 14.46 KG/M2 | WEIGHT: 101 LBS | HEART RATE: 147 BPM | DIASTOLIC BLOOD PRESSURE: 70 MMHG

## 2020-08-26 DIAGNOSIS — F41.9 ANXIETY: ICD-10-CM

## 2020-08-26 DIAGNOSIS — R25.1 TREMOR: Primary | ICD-10-CM

## 2020-08-26 PROCEDURE — 99213 OFFICE O/P EST LOW 20 MIN: CPT | Performed by: NURSE PRACTITIONER

## 2020-08-26 PROCEDURE — G8418 CALC BMI BLW LOW PARAM F/U: HCPCS | Performed by: NURSE PRACTITIONER

## 2020-08-26 PROCEDURE — G9717 DOC PT DX DEP/BP F/U NT REQ: HCPCS | Performed by: NURSE PRACTITIONER

## 2020-08-26 PROCEDURE — G8427 DOCREV CUR MEDS BY ELIG CLIN: HCPCS | Performed by: NURSE PRACTITIONER

## 2020-08-26 NOTE — PROGRESS NOTES
Date:  20     Name:  Kay Flores  :  1980  MRN:  [id number]     PCP:  Mariam Livingston DO    Chief Complaint   Patient presents with    Tremors     HISTORY OF PRESENT ILLNESS: Follow up for tremors, anxiety, and failure to thrive. He is accompanied by his caregiver his residential facility. He continues to take gabapentin 100 mg 3 times daily for treatment of the tremor. This does seem to be about the same. He is eating well at this point. Bowel movements have been normal and regular. His caregiver only mentions today that he has had some urinary incontinence issues on occasion which may be more behavioral.  She is also noticed that depending on the staff who is working with him at that particular time, he will seem more nervous, ask the same questions, and seem to be less independent. He has not had any seizures despite being off of antiepileptic medication. Recap from last office visit:  Tremor and anxiety seem to be pretty well controlled on his present therapy of gabapentin. He does continue with generalized weakness and impaired functional mobility and they were encouraged to continue to keep him active. Follow-up in 6 months or sooner if needed. Current Outpatient Medications   Medication Sig    Refresh Tears 0.5 % drop ophthalmic solution INSTILL 1 DROP INTO EACH EYE TWICE DAILY    melatonin 5 mg tablet TAKE ONE TABLET BY MOUTH AT BEDTIME    gabapentin (NEURONTIN) 100 mg capsule TAKE (1) CAPSULE BY MOUTH THREE TIMES DAILY.  One Daily Multivitamin tablet TAKE 1 TABLET BY MOUTH DAILY    colestipoL (COLESTID) 1 gram tablet TAKE 1 TABLET BY MOUTH EVERY EVENING TAKE 1 TABLET BY MOUTH EVERY MORNING AS NEEDED    fluticasone propionate (FLONASE) 50 mcg/actuation nasal spray BLOW NOSE: 2 SPRAYS IN EACH NOSTRIL DAILY FOR ALLERGY. (Patient taking differently: as needed.  BLOW NOSE: 2 SPRAYS IN EACH NOSTRIL DAILY FOR ALLERGY.)    CALCIUM 600 + D,3, 600 mg(1,500mg) -200 unit tab TAKE 1 TABLET BY MOUTH TWICE A DAY    OTHER Pancreatin 10x - 200, I TID with meals    B.infantis-B.ani-B.long-B.bifi (PROBIOTIC 4X) 10-15 mg TbEC Take  by mouth daily.  OTHER Indications: Elemental Diet Shake    acetylcysteine 600 mg cap capsule Take  by mouth.  OTHER L-Theanine 100 mg 1 daily    amitriptyline (ELAVIL) 50 mg tablet Take 50 mg by mouth nightly.  levomefolate-algal oil (DEPLIN, ALGAL OIL,) 7.5-90.314 mg cap Take  by mouth.  acetaminophen (TYLENOL) 325 mg tablet Take  by mouth every four (4) hours as needed for Pain.  guaiFENesin ER (MUCINEX) 600 mg ER tablet Take 600 mg by mouth daily as needed for Congestion.  brief disposable (ADULT) misc by Does Not Apply route. Depends, size medium, 1 nightly     No current facility-administered medications for this visit.       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, without mention of intractable epilepsy 5/14/2011    Mental retardation     Osteoporosis     Other ill-defined conditions(649.29)     intestinal problems    Psychiatric disorder     anxiety    Seizure disorder [facility])      Past Surgical History:   Procedure Laterality Date    COLONOSCOPY N/A 1/23/2019    [facility]  1/23/2019         HX APPENDECTOMY  1/28/13    APPENDECTOMY    HX GI      colon resection    HX GI  1/28/13    LAPAROTOMY EXPLORATORY - OPEN LYSIS OF ADHESIONS     HX HERNIA REPAIR      HX OTHER SURGICAL      imperforated anus birth defect    UPPER GI ENDOSCOPY,BIOPSY  3/17/2017          Social History     Socioeconomic History    Marital status: SINGLE     Spouse name: Not on file    Number of children: Not on file    Years of education: Not on file    Highest education level: Not on file   Occupational History    Not on file   Social Needs    Financial resource strain: Not on file    Food insecurity     Worry: Not on file     Inability: Not on file    Transportation needs     Medical: Not on file     Non-medical: Not on file   Tobacco Use    Smoking status: Never Smoker    Smokeless tobacco: Never Used   Substance and Sexual Activity    Alcohol use: No    Drug use: No    Sexual activity: Never   Lifestyle    Physical activity     Days per week: Not on file     Minutes per session: Not on file    Stress: Not on file   Relationships    Social connections     Talks on phone: Not on file     Gets together: Not on file     Attends Church service: Not on file     Active member of club or organization: Not on file     Attends meetings of clubs or organizations: Not on file     Relationship status: Not on file    Intimate partner violence     Fear of current or ex partner: Not on file     Emotionally abused: Not on file     Physically abused: Not on file     Forced sexual activity: Not on file   Other Topics Concern    Not on file   Social History Narrative    Not on file     Family History   Problem Relation Age of Onset    No Known Problems Mother     Heart Disease Father     No Known Problems Sister     No Known Problems Sister        PHYSICAL EXAMINATION:    Visit Vitals  Temp 97.7 °F (36.5 °C)   Resp 17   Ht 5' 10\" (1.778 m)   Wt 45.8 kg (101 lb)   BMI 14.49 kg/m²     General: Well defined, nourished, and groomed individual in no acute distress. Neck: Supple, nontender, no bruits, no pain with resistance to active range of motion. Heart: Regular rate and rhythm, no murmurs, rub, or gallop. Normal S1S2. Lungs: Clear to auscultation bilaterally with equal chest expansion, no cough, no wheeze  Musculoskeletal: Extremities revealed no edema and had full range of motion of joints.    Psych: Good mood and bright affect      NEUROLOGICAL EXAMINATION:   Mental Status: Alert and oriented to person and place   Cranial Nerves:   II, III, IV, VI: Visual acuity grossly intact. Visual fields are normal.   Pupils are equal, round, and reactive to light and accommodation. Extra-ocular movements are full and fluid. Fundoscopic exam was benign, no ptosis or nystagmus. V-XII: Hearing is grossly intact. Facial features are symmetric, with normal sensation and strength. The palate rises symmetrically and the tongue protrudes midline. Sternocleidomastoids 5/5. Motor Examination: decreased tone and bulk with generalized weakness. Coordination: Finger to nose was normal. No resting tremor. There is a noticeable tremor bilaterally, right greater than left. Gait and Station: Much steadier with rising and walking. He pins his hands to his sides with walking and has to be reminded to swing his arms. No pronator drift. No muscle wasting or fasiculations noted. Reflexes: DTRs 2+ throughout. ASSESSMENT AND PLAN    ICD-10-CM ICD-9-CM    1. Tremor  R25.1 781.0    2. Anxiety  F41.9 300.00       The tremor and imbalance issues really do seem about the same. His exam is largely unchanged. Anxiety seems to be pretty well managed though there seems to be some mild behavioral issues which seem more related to with whom he is working rather than any continual anxiety. Continue with gabapentin as previously prescribed no changes to plan of care    Follow up in six months     [facility].  Allison Fairbanks

## 2020-09-04 DIAGNOSIS — F41.9 ANXIETY: ICD-10-CM

## 2020-09-04 DIAGNOSIS — R25.1 TREMOR: ICD-10-CM

## 2020-09-08 RX ORDER — GABAPENTIN 100 MG/1
CAPSULE ORAL
Qty: 93 CAP | Refills: 5 | Status: SHIPPED | OUTPATIENT
Start: 2020-09-08 | End: 2021-02-25 | Stop reason: SDUPTHER

## 2020-12-28 ENCOUNTER — VIRTUAL VISIT (OUTPATIENT)
Dept: INTERNAL MEDICINE CLINIC | Age: 40
End: 2020-12-28
Payer: MEDICARE

## 2020-12-28 DIAGNOSIS — F41.9 ANXIETY: Primary | ICD-10-CM

## 2020-12-28 DIAGNOSIS — Z86.16 HISTORY OF 2019 NOVEL CORONAVIRUS DISEASE (COVID-19): ICD-10-CM

## 2020-12-28 DIAGNOSIS — R63.6 UNDERWEIGHT: ICD-10-CM

## 2020-12-28 DIAGNOSIS — G47.00 INSOMNIA, UNSPECIFIED TYPE: ICD-10-CM

## 2020-12-28 DIAGNOSIS — Z98.890 HISTORY OF ABDOMINAL SURGERY: ICD-10-CM

## 2020-12-28 PROCEDURE — G8419 CALC BMI OUT NRM PARAM NOF/U: HCPCS | Performed by: INTERNAL MEDICINE

## 2020-12-28 PROCEDURE — 99214 OFFICE O/P EST MOD 30 MIN: CPT | Performed by: INTERNAL MEDICINE

## 2020-12-28 PROCEDURE — G9717 DOC PT DX DEP/BP F/U NT REQ: HCPCS | Performed by: INTERNAL MEDICINE

## 2020-12-28 PROCEDURE — G8427 DOCREV CUR MEDS BY ELIG CLIN: HCPCS | Performed by: INTERNAL MEDICINE

## 2020-12-28 NOTE — PROGRESS NOTES
Kayla Tellez is a 41 y.o. male who was seen by synchronous (real-time) audio-video technology on 12/28/2020 for Anxiety and Follow Up Chronic Condition        Assessment & Plan:   Diagnoses and all orders for this visit:    1. Anxiety    2. Underweight    3. History of abdominal surgery    4. History of 2019 novel coronavirus disease (COVID-19)    5. Insomnia, unspecified type        I spent at least 25 minutes on this visit with this established patient. Subjective:     Pt. is seen virtually with his caregiver and mother from his group home for f/u. Has a few chronic medical issues as documented. Chronic GI issues with multiple surgeries as a child. Chronically underweight. Appetite has been good and he is gaining weight. Has chronic depression, anxiety and insomnia. Followed by psychiatrist.  He is on amitriptyline and gabapentin. Also on different supplements including L-Theanine which helps his symptoms. Mother is asking to increase the dosage from 100 to 200 mg daily. He had Covid a few months ago. Has recovered nicely. Taking precautions for Covid 19. Denies any related signs or symptoms including fever, cough, SOB or CP. PMH/PSH/Allergies/Social History/medication list and most recent studies reviewed with patient. Tobacco use: No  Alcohol use: No  Reports compliance with medications and diet. Trying to be active physically to control weight. Reports no other new c/o. Plan: We will increase in L-Theanine gradually and as tolerated for anxiety  Continue other medications  F/u with other MD's as scheduled  Remain active physically and watch diet  COVID-19 precautions discussed with pt  Follow-up 4 months or as needed  Prior to Admission medications    Medication Sig Start Date End Date Taking? Authorizing Provider   gabapentin (NEURONTIN) 100 mg capsule TAKE (1) CAPSULE BY MOUTH THREE TIMES DAILY.  9/8/20  Yes Stacey INFANTE, NP   Refresh Tears 0.5 % drop ophthalmic solution INSTILL 1 DROP INTO EACH EYE TWICE DAILY 5/21/20  Yes Saw Farrell NP   melatonin 5 mg tablet TAKE ONE TABLET BY MOUTH AT BEDTIME 5/1/20  Yes Saw Farrell NP   One Daily Multivitamin tablet TAKE 1 TABLET BY MOUTH DAILY 3/30/20  Yes Donna Farrell NP   fluticasone propionate (FLONASE) 50 mcg/actuation nasal spray BLOW NOSE: 2 SPRAYS IN EACH NOSTRIL DAILY FOR ALLERGY. Patient taking differently: as needed. BLOW NOSE: 2 SPRAYS IN EACH NOSTRIL DAILY FOR ALLERGY. 2/10/20  Yes Moshe Cagle DO   CALCIUM 600 + D,3, 600 mg(1,500mg) -200 unit tab TAKE 1 TABLET BY MOUTH TWICE A DAY 1/30/20  Yes Moshe Cagle DO   OTHER Pancreatin 10x - 200, I TID with meals 1/17/20  Yes Moshe Cagle DO   B.infantis-B.ani-B.long-B.bifi (PROBIOTIC 4X) 10-15 mg TbEC Take  by mouth daily. Yes Provider, Historical   OTHER Indications: Elemental Diet Shake   Yes Provider, Historical   acetylcysteine 600 mg cap capsule Take  by mouth. Yes Provider, Historical   OTHER L-Theanine 100 mg 1 daily 4/4/19  Yes Moshe Cagle DO   amitriptyline (ELAVIL) 50 mg tablet Take 50 mg by mouth nightly. 6/15/18  Yes Provider, Historical   levomefolate-algal oil (DEPLIN, ALGAL OIL,) 7.5-90.314 mg cap Take  by mouth. Yes Provider, Historical   acetaminophen (TYLENOL) 325 mg tablet Take  by mouth every four (4) hours as needed for Pain. Yes Provider, Historical   guaiFENesin ER (MUCINEX) 600 mg ER tablet Take 600 mg by mouth daily as needed for Congestion. Yes Provider, Historical   brief disposable (ADULT) misc by Does Not Apply route.  Depends, size medium, 1 nightly 6/10/15  Yes Mckay Richards NP   colestipoL (COLESTID) 1 gram tablet TAKE 1 TABLET BY MOUTH EVERY EVENING TAKE 1 TABLET BY MOUTH EVERY MORNING AS NEEDED 3/30/20   Keanu Gonzalez DO     Patient Active Problem List    Diagnosis Date Noted    History of 2019 novel coronavirus disease (COVID-19) 07/29/2020    Intestinal malabsorption 12/11/2018    Functional diarrhea 07/04/2018  Chronic diarrhea 06/22/2018    Hypokalemia 06/22/2018    Hypotension due to drugs 05/22/2018    History of abdominal surgery 03/27/2018    Recurrent depression ([facility] 03/27/2018    Weight loss 11/27/2017    Underweight 05/08/2017    Anxiety 07/29/2016    Stool incontinence 07/29/2016    Static encephalopathy 03/26/2013    S/p small bowel obstruction 03/12/2013    Bowel obstruction ([facility] 05/14/2012    Encounter for long-term (current) use of other medications 05/14/2011    Osteoporosis 01/10/2011    Depression 01/10/2011    Vitamin D deficiency 01/10/2011     Current Outpatient Medications   Medication Sig Dispense Refill    gabapentin (NEURONTIN) 100 mg capsule TAKE (1) CAPSULE BY MOUTH THREE TIMES DAILY. 93 Cap 5    Refresh Tears 0.5 % drop ophthalmic solution INSTILL 1 DROP INTO EACH EYE TWICE DAILY 15 mL 0    melatonin 5 mg tablet TAKE ONE TABLET BY MOUTH AT BEDTIME 30 Tab 11    One Daily Multivitamin tablet TAKE 1 TABLET BY MOUTH DAILY 31 Tab PRN    fluticasone propionate (FLONASE) 50 mcg/actuation nasal spray BLOW NOSE: 2 SPRAYS IN EACH NOSTRIL DAILY FOR ALLERGY. (Patient taking differently: as needed. BLOW NOSE: 2 SPRAYS IN EACH NOSTRIL DAILY FOR ALLERGY.) 16 g 5    CALCIUM 600 + D,3, 600 mg(1,500mg) -200 unit tab TAKE 1 TABLET BY MOUTH TWICE A DAY 62 Tab PRN    OTHER Pancreatin 10x - 200, I TID with meals 100 Cap 11    B.infantis-B.ani-B.long-B.bifi (PROBIOTIC 4X) 10-15 mg TbEC Take  by mouth daily.  OTHER Indications: Elemental Diet Shake      acetylcysteine 600 mg cap capsule Take  by mouth.  OTHER L-Theanine 100 mg 1 daily 30 Tab 11    amitriptyline (ELAVIL) 50 mg tablet Take 50 mg by mouth nightly.  levomefolate-algal oil (DEPLIN, ALGAL OIL,) 7.5-90.314 mg cap Take  by mouth.  acetaminophen (TYLENOL) 325 mg tablet Take  by mouth every four (4) hours as needed for Pain.       guaiFENesin ER (MUCINEX) 600 mg ER tablet Take 600 mg by mouth daily as needed for Congestion.  brief disposable (ADULT) misc by Does Not Apply route.  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, without mention of intractable epilepsy 5/14/2011    Mental retardation     Osteoporosis     Other ill-defined conditions(799.89)     intestinal problems    Psychiatric disorder     anxiety    Seizure disorder [facility])      Past Surgical History:   Procedure Laterality Date    COLONOSCOPY N/A 1/23/2019    COLONOSCOPY performed by Misa Oliver MD at \[facility]\"" ENDOSCOPY    COLONOSCOPY,DIAGNOSTIC  1/23/2019         HX APPENDECTOMY  1/28/13    APPENDECTOMY    HX GI      colon resection    HX GI  1/28/13    LAPAROTOMY EXPLORATORY - OPEN LYSIS OF ADHESIONS     HX HERNIA REPAIR      HX OTHER SURGICAL      imperforated anus birth defect    UPPER GI ENDOSCOPY,BIOPSY  3/17/2017          Family History   Problem Relation Age of Onset    No Known Problems Mother     Heart Disease Father     No Known Problems Sister     No Known Problems Sister      Social History     Tobacco Use    Smoking status: Never Smoker    Smokeless tobacco: Never Used   Substance Use Topics    Alcohol use: No       ROS    Objective:     Patient-Reported Vitals 12/28/2020   Patient-Reported Weight 106.7 lb   Patient-Reported Height -   Patient-Reported Temperature 98.70        [INSTRUCTIONS:  \"[x]\" Indicates a positive item  \"[]\" Indicates a negative item  -- DELETE ALL ITEMS NOT EXAMINED]    Constitutional: [x] Appears well-developed and well-nourished [x] No apparent distress      [] Abnormal - Mental status: [x] Alert and awake  [x] Oriented to person/place/time [x] Able to follow commands    [] Abnormal -     Eyes:   EOM    [x]  Normal    [] Abnormal -   Sclera  [x]  Normal    [] Abnormal -          Discharge [x]  None visible   [] Abnormal -     HENT: [x] Normocephalic, atraumatic  [] Abnormal -   [x] Mouth/Throat: Mucous membranes are moist    External Ears [x] Normal  [] Abnormal -    Neck: [x] No visualized mass [] Abnormal -     Pulmonary/Chest: [x] Respiratory effort normal   [x] No visualized signs of difficulty breathing or respiratory distress        [] Abnormal -      Musculoskeletal:   [x] Normal gait with no signs of ataxia         [x] Normal range of motion of neck        [] Abnormal -     Neurological:        [x] No Facial Asymmetry (Cranial nerve 7 motor function) (limited exam due to video visit)          [x] No gaze palsy        [] Abnormal -          Skin:        [x] No significant exanthematous lesions or discoloration noted on facial skin         [] Abnormal -            Psychiatric:       [x] Normal Affect [] Abnormal -        [x] No Hallucinations    Other pertinent observable physical exam findings:-        We discussed the expected course, resolution and complications of the diagnosis(es) in detail. Medication risks, benefits, costs, interactions, and alternatives were discussed as indicated. I advised him to contact the office if his condition worsens, changes or fails to improve as anticipated. He expressed understanding with the diagnosis(es) and plan. Christine Palmer, who was evaluated through a patient-initiated, synchronous (real-time) audio-video encounter, and/or his healthcare decision maker, is aware that it is a billable service, with coverage as determined by his insurance carrier. He provided verbal consent to proceed: Yes, and patient identification was verified.  It was conducted pursuant to the emergency declaration under the [address] Emergencies Act, [facility] authority and the Coronavirus Preparedness and Response Supplemental Appropriations Act. A caregiver was present when appropriate. Ability to conduct physical exam was limited. I was at home. The patient was at home.       Camila Dance, DO

## 2020-12-28 NOTE — PROGRESS NOTES
Sherif Sanchez is a 41 y.o. male    No chief complaint on file. Health Maintenance Due   Topic Date Due    DTaP/Tdap/Td series (1 - Tdap) 11/04/1999    Flu Vaccine (1) 09/01/2020    Medicare Yearly Exam  01/17/2021       There were no vitals taken for this visit. Pain level:0    Coordination of Care Questionnaire:  :   1) Have you been to an emergency room, urgent care, or hospitalized since your last visit? If yes, where when, and reason for visit? no       2. Have seen or consulted any other health care provider since your last visit? If yes, where when, and reason for visit?   NO

## 2020-12-29 RX ORDER — CALCIUM CARBONATE/VITAMIN D3 600MG-5MCG
TABLET ORAL
Qty: 62 TAB | Refills: 12 | Status: SHIPPED | OUTPATIENT
Start: 2020-12-29 | End: 2021-12-28

## 2021-01-26 DIAGNOSIS — K90.9 INTESTINAL MALABSORPTION, UNSPECIFIED TYPE: Primary | ICD-10-CM

## 2021-01-26 DIAGNOSIS — R63.6 UNDERWEIGHT: ICD-10-CM

## 2021-01-26 NOTE — TELEPHONE ENCOUNTER
----- Message from Spenser Miller sent at 1/25/2021  5:17 PM EST -----  Regarding: RE: Prescription Question  Contact: [phone]  I believe the Lead staff, Melvin Roa, will need a script from you. Thanks!

## 2021-02-17 DIAGNOSIS — R63.6 UNDERWEIGHT: ICD-10-CM

## 2021-02-17 DIAGNOSIS — K90.9 INTESTINAL MALABSORPTION, UNSPECIFIED TYPE: ICD-10-CM

## 2021-02-17 NOTE — TELEPHONE ENCOUNTER
Requested Prescriptions     Pending Prescriptions Disp Refills    OTHER 30 Tab 11     Sig: L-Theanine 150 mg 1 daily   12/28/2020  No upcoming  bremo ltc

## 2021-02-25 ENCOUNTER — OFFICE VISIT (OUTPATIENT)
Dept: NEUROLOGY | Age: 41
End: 2021-02-25
Payer: MEDICARE

## 2021-02-25 VITALS
TEMPERATURE: 97 F | RESPIRATION RATE: 17 BRPM | SYSTOLIC BLOOD PRESSURE: 112 MMHG | HEART RATE: 101 BPM | OXYGEN SATURATION: 98 % | HEIGHT: 70 IN | WEIGHT: 112 LBS | DIASTOLIC BLOOD PRESSURE: 74 MMHG | BODY MASS INDEX: 16.03 KG/M2

## 2021-02-25 DIAGNOSIS — R26.9 GAIT DISTURBANCE: ICD-10-CM

## 2021-02-25 DIAGNOSIS — F41.9 ANXIETY: ICD-10-CM

## 2021-02-25 DIAGNOSIS — R25.1 TREMOR: Primary | ICD-10-CM

## 2021-02-25 PROCEDURE — 99214 OFFICE O/P EST MOD 30 MIN: CPT | Performed by: NURSE PRACTITIONER

## 2021-02-25 PROCEDURE — G9717 DOC PT DX DEP/BP F/U NT REQ: HCPCS | Performed by: NURSE PRACTITIONER

## 2021-02-25 PROCEDURE — G8419 CALC BMI OUT NRM PARAM NOF/U: HCPCS | Performed by: NURSE PRACTITIONER

## 2021-02-25 PROCEDURE — G8427 DOCREV CUR MEDS BY ELIG CLIN: HCPCS | Performed by: NURSE PRACTITIONER

## 2021-02-25 RX ORDER — GABAPENTIN 100 MG/1
CAPSULE ORAL
Qty: 93 CAP | Refills: 5 | Status: SHIPPED | OUTPATIENT
Start: 2021-02-25 | End: 2021-09-07 | Stop reason: SDUPTHER

## 2021-02-25 NOTE — PROGRESS NOTES
Date:  21     Name:  Radha Kerr  :  1980  MRN:  [id number]     PCP:  Angel Mota DO    Chief Complaint   Patient presents with    Medication Refill    Seizure    Tremors     HISTORY OF PRESENT ILLNESS: Follow up for tremors, anxiety, and failure to thrive. He is accompanied by his caregiver his residential facility. He continues to take gabapentin 100 mg 3 times daily for treatment of the tremor. Since his last office visit, he has been gaining some weight and eating well. Since this caregiver has been working with him, she has noted that he is able to do ADLs independently where before he needed reminding. Tremor is about the same. Balance has been good. No falls. He has not had any seizures despite being off of antiepileptic medication. Recap from last office visit:  The tremor and imbalance issues really do seem about the same. His exam is largely unchanged. Anxiety seems to be pretty well managed though there seems to be some mild behavioral issues which seem more related to with whom he is working rather than any continual anxiety. Continue with gabapentin as previously prescribed no changes to plan of care    Follow up in six months      Current Outpatient Medications   Medication Sig    OTHER L-Theanine 150 mg 1 daily    calcium-vitamin D 600 mg(1,500mg) -200 unit tab TAKE 1 TABLET BY MOUTH TWICE A DAY    gabapentin (NEURONTIN) 100 mg capsule TAKE (1) CAPSULE BY MOUTH THREE TIMES DAILY.  Refresh Tears 0.5 % drop ophthalmic solution INSTILL 1 DROP INTO EACH EYE TWICE DAILY    melatonin 5 mg tablet TAKE ONE TABLET BY MOUTH AT BEDTIME    One Daily Multivitamin tablet TAKE 1 TABLET BY MOUTH DAILY    fluticasone propionate (FLONASE) 50 mcg/actuation nasal spray BLOW NOSE: 2 SPRAYS IN EACH NOSTRIL DAILY FOR ALLERGY. (Patient taking differently: as needed.  BLOW NOSE: 2 SPRAYS IN EACH NOSTRIL DAILY FOR ALLERGY.)    OTHER Pancreatin 10x - 200, I TID with meals    B.infantis-B.ani-B.long-B.bifi (PROBIOTIC 4X) 10-15 mg TbEC Take  by mouth daily.  OTHER Indications: Elemental Diet Shake    acetylcysteine 600 mg cap capsule Take  by mouth.  amitriptyline (ELAVIL) 50 mg tablet Take 50 mg by mouth nightly.  levomefolate-algal oil (DEPLIN, ALGAL OIL,) 7.5-90.314 mg cap Take  by mouth.  acetaminophen (TYLENOL) 325 mg tablet Take  by mouth every four (4) hours as needed for Pain.  guaiFENesin ER (MUCINEX) 600 mg ER tablet Take 600 mg by mouth daily as needed for Congestion.  brief disposable (ADULT) misc by Does Not Apply route. Depends, size medium, 1 nightly     No current facility-administered medications for this visit.       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, without mention of intractable epilepsy 5/14/2011    Mental retardation     Osteoporosis     Other ill-defined conditions(799.89)     intestinal problems    Psychiatric disorder     anxiety    Seizure disorder [facility])      Past Surgical History:   Procedure Laterality Date    COLONOSCOPY N/A 1/23/2019    [facility]  1/23/2019         HX APPENDECTOMY  1/28/13    APPENDECTOMY    HX GI      colon resection    HX GI  1/28/13    LAPAROTOMY EXPLORATORY - OPEN LYSIS OF ADHESIONS     HX HERNIA REPAIR      HX OTHER SURGICAL      imperforated anus birth defect    UPPER GI ENDOSCOPY,BIOPSY  3/17/2017          Social History     Socioeconomic History    Marital status: SINGLE     Spouse name: Not on file    Number of children: Not on file    Years of education: Not on file    Highest education level: Not on file   Occupational History    Not on file   Social Needs    Financial resource strain: Not on file    Food insecurity     Worry: Not on file     Inability: Not on file    Transportation needs     Medical: Not on file     Non-medical: Not on file   Tobacco Use    Smoking status: Never Smoker    Smokeless tobacco: Never Used   Substance and Sexual Activity    Alcohol use: No    Drug use: No    Sexual activity: Never   Lifestyle    Physical activity     Days per week: Not on file     Minutes per session: Not on file    Stress: Not on file   Relationships    Social connections     Talks on phone: Not on file     Gets together: Not on file     Attends Congregation service: Not on file     Active member of club or organization: Not on file     Attends meetings of clubs or organizations: Not on file     Relationship status: Not on file    Intimate partner violence     Fear of current or ex partner: Not on file     Emotionally abused: Not on file     Physically abused: Not on file     Forced sexual activity: Not on file   Other Topics Concern    Not on file   Social History Narrative    Not on file     Family History   Problem Relation Age of Onset    No Known Problems Mother     Heart Disease Father     No Known Problems Sister     No Known Problems Sister        PHYSICAL EXAMINATION:    Visit Vitals  /74   Pulse (!) 101   Temp 97 °F (36.1 °C)   Resp 17   Ht 5' 10\" (1.778 m)   Wt 50.8 kg (112 lb)   SpO2 98%   BMI 16.07 kg/m²     General: Well defined, nourished, and groomed individual in no acute distress. Neck: Supple, nontender, no bruits, no pain with resistance to active range of motion. Heart: Regular rate and rhythm, no murmurs, rub, or gallop. Normal S1S2. Lungs: Clear to auscultation bilaterally with equal chest expansion, no cough, no wheeze  Musculoskeletal: Extremities revealed no edema and had full range of motion of joints.    Psych: Good mood and bright affect      NEUROLOGICAL EXAMINATION:   Mental Status: Alert and oriented to person and place   Cranial Nerves:   II, III, IV, VI: Visual acuity grossly intact. Visual fields are normal.   Pupils are equal, round, and reactive to light and accommodation. Extra-ocular movements are full and fluid. Fundoscopic exam was benign, no ptosis or nystagmus. V-XII: Hearing is grossly intact. Facial features are symmetric, with normal sensation and strength. The palate rises symmetrically and the tongue protrudes midline. Sternocleidomastoids 5/5. Motor Examination: decreased tone and bulk with generalized weakness. Coordination: Finger to nose was normal. No resting tremor. There is a noticeable tremor bilaterally, right greater than left. Gait and Station: Much steadier with rising and walking. He pins his hands to his sides with walking and has to be reminded to swing his arms. No pronator drift. No muscle wasting or fasiculations noted. Reflexes: DTRs 2+ throughout. ASSESSMENT AND PLAN    ICD-10-CM ICD-9-CM    1. Tremor  R25.1 781.0 gabapentin (NEURONTIN) 100 mg capsule   2. Anxiety  F41.9 300.00 gabapentin (NEURONTIN) 100 mg capsule   3. Gait disturbance  R26.9 781.2      More functional now than he has been in the past in terms of ADL. Eating well now. Balance, tremor, and anxiety are stable on present therapy of gabapentin. Continue without change. Follow up in six months    [address] [facility].  Bobby Harkins

## 2021-03-16 ENCOUNTER — TELEPHONE (OUTPATIENT)
Dept: INTERNAL MEDICINE CLINIC | Age: 41
End: 2021-03-16

## 2021-03-16 NOTE — TELEPHONE ENCOUNTER
----- Message from Isaiasbeccanaomie Ramos sent at 3/16/2021  2:45 PM EDT -----  Regarding: Dr. Ester Blanchard; Telephone  General Message/Vendor Calls    Caller's first and last name: Pt mother - [employer] Screen      Reason for call: Pharmacy needs a prior authorization form for pt's   \"L-Methylfolate\"   \"N-Acetyl-Cysteine\"   [facility] pharmacy; listed on chart; [phone] required yes/no and why: Yes; to discuss      Best contact number(s): [phone]      Details to clarify the request: Pharmacy needs a prior authorization form for pt's   \"L-Methylfolate\"   \"N-Acetyl-Cysteine\"   [facility] pharmacy; listed on chart; [address] [facility] (insurance provider)  Fax number: [phone]  Telephone number: [address]

## 2021-03-19 ENCOUNTER — VIRTUAL VISIT (OUTPATIENT)
Dept: INTERNAL MEDICINE CLINIC | Age: 41
End: 2021-03-19
Payer: MEDICARE

## 2021-03-19 DIAGNOSIS — F41.9 ANXIETY: ICD-10-CM

## 2021-03-19 DIAGNOSIS — Z11.59 NEED FOR HEPATITIS C SCREENING TEST: ICD-10-CM

## 2021-03-19 DIAGNOSIS — K90.9 INTESTINAL MALABSORPTION, UNSPECIFIED TYPE: ICD-10-CM

## 2021-03-19 DIAGNOSIS — Z98.890 HISTORY OF ABDOMINAL SURGERY: ICD-10-CM

## 2021-03-19 DIAGNOSIS — L98.9 SKIN PROBLEM: Primary | ICD-10-CM

## 2021-03-19 DIAGNOSIS — E55.9 VITAMIN D DEFICIENCY: ICD-10-CM

## 2021-03-19 DIAGNOSIS — F32.A DEPRESSION, UNSPECIFIED DEPRESSION TYPE: ICD-10-CM

## 2021-03-19 DIAGNOSIS — Z13.220 LIPID SCREENING: ICD-10-CM

## 2021-03-19 PROCEDURE — G8419 CALC BMI OUT NRM PARAM NOF/U: HCPCS | Performed by: NURSE PRACTITIONER

## 2021-03-19 PROCEDURE — G9717 DOC PT DX DEP/BP F/U NT REQ: HCPCS | Performed by: NURSE PRACTITIONER

## 2021-03-19 PROCEDURE — G8427 DOCREV CUR MEDS BY ELIG CLIN: HCPCS | Performed by: NURSE PRACTITIONER

## 2021-03-19 PROCEDURE — 99213 OFFICE O/P EST LOW 20 MIN: CPT | Performed by: NURSE PRACTITIONER

## 2021-03-19 NOTE — PROGRESS NOTES
Kayla Aguirre is a 41 y.o. male who was seen by synchronous (real-time) audio-video technology on 3/19/2021 for Rash (Left shoulder)        Assessment & Plan:   Diagnoses and all orders for this visit:    1. Skin problem    2. Vitamin D deficiency    3. Depression, unspecified depression type    4. Anxiety    5. History of abdominal surgery    6. Intestinal malabsorption, unspecified type    7. Lipid screening    8. Need for hepatitis C screening test    Continue to cleanse affected area of left shoulder with soap and water, apply triple antibiotic ointment, and cover with bandage daily until healed. Notify of any sign/ symptom of infection. Notify of any further experience of rash. Will order  Orders Placed This Encounter    CBC WITH AUTOMATED DIFF    METABOLIC PANEL, COMPREHENSIVE    TSH 3RD GENERATION    LIPID PANEL    VITAMIN D, 25 HYDROXY    HEPATITIS C AB     Follow-up with PCP, Dr. oBom Cavanaugh, in one month after completion of labs, or sooner as needed. Patient encouraged to call or return to office if symptoms do not improve or worsen. Reviewed plan of care with patient who acknowledges understanding and agrees. Subjective: This is a patient of Dr. Boom Cavanaugh who presents today related to skin problem. The patient's caregiver noted an area of redness and 2 small open areas to his left shoulder 2 days ago. No known injury to the area. No recent change in laundry/ hygiene products. No rash elsewhere on body. No itching/ burning/ pain per patient. Caregiver has been keeping area clean, applied triple antibiotic ointment and covered with bandage over the last two days. Area appears to be improving. Patient is otherwise feeling well at time of appointment. Patient has completed series of 2 Covid vaccinations per caregiver. Patient due for follow-up, Medicare Wellness, screening labs. Requests scheduling in office visit.   Prior to Admission medications    Medication Sig Start Date End Date Taking? Authorizing Provider   gabapentin (NEURONTIN) 100 mg capsule TAKE (1) CAPSULE BY MOUTH THREE TIMES DAILY. 2/25/21  Yes Doug Martel NP   OTHER L-Theanine 150 mg 1 daily 2/18/21  Yes Moshe Cagle DO   calcium-vitamin D 600 mg(1,500mg) -200 unit tab TAKE 1 TABLET BY MOUTH TWICE A DAY 12/29/20  Yes Moshe Cagle DO   Refresh Tears 0.5 % drop ophthalmic solution INSTILL 1 DROP INTO EACH EYE TWICE DAILY 5/21/20  Yes Donna Farrell NP   melatonin 5 mg tablet TAKE ONE TABLET BY MOUTH AT BEDTIME 5/1/20  Yes April Farrell NP   One Daily Multivitamin tablet TAKE 1 TABLET BY MOUTH DAILY 3/30/20  Yes Donna Farrell NP   fluticasone propionate (FLONASE) 50 mcg/actuation nasal spray BLOW NOSE: 2 SPRAYS IN EACH NOSTRIL DAILY FOR ALLERGY. Patient taking differently: as needed. BLOW NOSE: 2 SPRAYS IN EACH NOSTRIL DAILY FOR ALLERGY. 2/10/20  Yes Moshe Cagle DO   OTHER Pancreatin 10x - 200, I TID with meals 1/17/20  Yes Moshe Cagle DO   B.infantis-B.ani-B.long-B.bifi (PROBIOTIC 4X) 10-15 mg TbEC Take  by mouth daily. Yes Provider, Historical   OTHER Indications: Elemental Diet Shake   Yes Provider, Historical   acetylcysteine 600 mg cap capsule Take  by mouth. Yes Provider, Historical   amitriptyline (ELAVIL) 50 mg tablet Take 50 mg by mouth nightly. 6/15/18  Yes Provider, Historical   levomefolate-algal oil (DEPLIN, ALGAL OIL,) 7.5-90.314 mg cap Take  by mouth. Yes Provider, Historical   acetaminophen (TYLENOL) 325 mg tablet Take  by mouth every four (4) hours as needed for Pain. Yes Provider, Historical   guaiFENesin ER (MUCINEX) 600 mg ER tablet Take 600 mg by mouth daily as needed for Congestion. Yes Provider, Historical   brief disposable (ADULT) misc by Does Not Apply route.  Depends, size medium, 1 nightly 6/10/15  Yes Ayana Faustin NP     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, without mention of intractable epilepsy 5/14/2011    Mental retardation     Osteoporosis     Other ill-defined conditions(799.89)     intestinal problems    Psychiatric disorder     anxiety    Seizure disorder [facility])      Past Surgical History:   Procedure Laterality Date    COLONOSCOPY N/A 1/23/2019    COL[facility]  1/23/2019         HX APPENDECTOMY  1/28/13    APPENDECTOMY    HX GI      colon resection    HX GI  1/28/13    LAPAROTOMY EXPLORATORY - OPEN LYSIS OF ADHESIONS     HX HERNIA REPAIR      HX OTHER SURGICAL      imperforated anus birth defect    UPPER GI ENDOSCOPY,BIOPSY  3/17/2017            Review of Systems   Constitutional: Negative. Respiratory: Negative. Cardiovascular: Negative. Skin: Positive for rash. Negative for itching. Neurological: Negative.         Objective:     Patient-Reported Vitals 3/19/2021   Patient-Reported Weight 111   Patient-Reported Height -   Patient-Reported Temperature 98.5        [INSTRUCTIONS:  \"[x]\" Indicates a positive item  \"[]\" Indicates a negative item  -- DELETE ALL ITEMS NOT EXAMINED]    Constitutional: [x] Appears well-developed and well-nourished [x] No apparent distress      [] Abnormal -     Mental status: [x] Alert and awake   [x] Able to follow commands    [] Abnormal -     Eyes:   EOM    [x]  Normal    [] Abnormal -   Sclera  [x]  Normal    [] Abnormal -          Discharge [x]  None visible   [] Abnormal -     HENT: [x] Normocephalic, atraumatic  [] Abnormal -       Neck: [x] No visualized mass [] Abnormal -     Pulmonary/Chest: [x] Respiratory effort normal   [x] No visualized signs of difficulty breathing or respiratory distress        [] Abnormal -      Musculoskeletal:          [x] Normal range of motion of neck        [] Abnormal -     Neurological:        [x] No Facial Asymmetry (Cranial nerve 7 motor function) (limited exam due to video visit)          [x] No gaze palsy        [] Abnormal -          Skin:        [x] No significant exanthematous lesions or discoloration noted on facial skin         [] Abnormal -          2 small open areas to left shoulder with mild surrounding erythema, excoriation-- erythema and excoriation appears improved from picture sent via [employer] 03/17/21  Psychiatric:       [x] Normal Affect [] Abnormal -       Other pertinent observable physical exam findings:-        We discussed the expected course, resolution and complications of the diagnosis(es) in detail. Medication risks, benefits, costs, interactions, and alternatives were discussed as indicated. I advised him to contact the office if his condition worsens, changes or fails to improve as anticipated. He expressed understanding with the diagnosis(es) and plan. Tomi Bernard, was evaluated through a synchronous (real-time) audio-video encounter. The patient (or guardian if applicable) is aware that this is a billable service. Verbal consent to proceed has been obtained within the past 12 months. The visit was conducted pursuant to the emergency declaration under the [facility] [address] authority and the [employer] General Act. Patient identification was verified, and a caregiver was present when appropriate. The patient was located in a state where the provider was credentialed to provide care.       Elaina Montiel NP

## 2021-03-19 NOTE — PROGRESS NOTES
Health Maintenance Due   Topic Date Due    Hepatitis C Screening  Never done    DTaP/Tdap/Td series (1 - Tdap) Never done    Flu Vaccine (1) 09/01/2020    Medicare Yearly Exam  01/17/2021       Chief Complaint   Patient presents with    Rash     Left shoulder       1. Have you been to the ER, urgent care clinic since your last visit? Hospitalized since your last visit? No    2. Have you seen or consulted any other health care providers outside of the [address] [place] [address] since your last visit? Include any pap smears or colon screening. No    3) Do you have an Advance Directive on file? no    4) Are you interested in receiving information on Advance Directives? NO      Patient is accompanied by adult caretaker and nursing attendant I have received verbal consent from Bigg Jade to discuss any/all medical information while they are present in the room.

## 2021-03-23 ENCOUNTER — DOCUMENTATION ONLY (OUTPATIENT)
Dept: INTERNAL MEDICINE CLINIC | Age: 41
End: 2021-03-23

## 2021-03-23 NOTE — PROGRESS NOTES
Chief Complaint   Patient presents with    Prior Auth     N-Acetyl-L-Cysteine 600MG capsules     Key: [id number]  Sent to plan

## 2021-03-23 NOTE — PROGRESS NOTES
Chief Complaint   Patient presents with    Prior Auth     L-Methylfolate 7.5MG tablets    Key: [id number] - [place] Case ID: [id number]

## 2021-03-24 ENCOUNTER — DOCUMENTATION ONLY (OUTPATIENT)
Dept: INTERNAL MEDICINE CLINIC | Age: 41
End: 2021-03-24

## 2021-03-24 NOTE — PROGRESS NOTES
Chief Complaint   Patient presents with    Prior Auth     received denial for  mg cap    Prior Auth     received denial for L-Methyflolate 7.5 mg

## 2021-04-27 ENCOUNTER — OFFICE VISIT (OUTPATIENT)
Dept: INTERNAL MEDICINE CLINIC | Age: 41
End: 2021-04-27
Payer: MEDICARE

## 2021-04-27 VITALS
DIASTOLIC BLOOD PRESSURE: 68 MMHG | BODY MASS INDEX: 15.6 KG/M2 | OXYGEN SATURATION: 98 % | WEIGHT: 109 LBS | HEIGHT: 70 IN | SYSTOLIC BLOOD PRESSURE: 105 MMHG | RESPIRATION RATE: 16 BRPM | TEMPERATURE: 97.5 F | HEART RATE: 88 BPM

## 2021-04-27 DIAGNOSIS — R63.6 UNDERWEIGHT: ICD-10-CM

## 2021-04-27 DIAGNOSIS — M81.0 OSTEOPOROSIS, UNSPECIFIED OSTEOPOROSIS TYPE, UNSPECIFIED PATHOLOGICAL FRACTURE PRESENCE: ICD-10-CM

## 2021-04-27 DIAGNOSIS — Z87.19 S/P SMALL BOWEL OBSTRUCTION: ICD-10-CM

## 2021-04-27 DIAGNOSIS — F33.9 RECURRENT DEPRESSION (HCC): ICD-10-CM

## 2021-04-27 DIAGNOSIS — Z00.00 MEDICARE ANNUAL WELLNESS VISIT, SUBSEQUENT: ICD-10-CM

## 2021-04-27 DIAGNOSIS — K90.9 INTESTINAL MALABSORPTION, UNSPECIFIED TYPE: Primary | ICD-10-CM

## 2021-04-27 DIAGNOSIS — Z98.890 HISTORY OF ABDOMINAL SURGERY: ICD-10-CM

## 2021-04-27 DIAGNOSIS — K59.1 FUNCTIONAL DIARRHEA: ICD-10-CM

## 2021-04-27 PROCEDURE — 99214 OFFICE O/P EST MOD 30 MIN: CPT | Performed by: INTERNAL MEDICINE

## 2021-04-27 PROCEDURE — G0439 PPPS, SUBSEQ VISIT: HCPCS | Performed by: INTERNAL MEDICINE

## 2021-04-27 PROCEDURE — G8419 CALC BMI OUT NRM PARAM NOF/U: HCPCS | Performed by: INTERNAL MEDICINE

## 2021-04-27 PROCEDURE — G8427 DOCREV CUR MEDS BY ELIG CLIN: HCPCS | Performed by: INTERNAL MEDICINE

## 2021-04-27 PROCEDURE — G9717 DOC PT DX DEP/BP F/U NT REQ: HCPCS | Performed by: INTERNAL MEDICINE

## 2021-04-27 RX ORDER — LEVOMEFOLATE CALCIUM 7.5 MG TABLET
TABLET
COMMUNITY
Start: 2021-04-15

## 2021-04-27 NOTE — PROGRESS NOTES
ADVISED PATIENT OF THE FOLLOWING HEALTH MAINTAINCE DUE  Health Maintenance Due   Topic Date Due    Hepatitis C Screening  Never done    DTaP/Tdap/Td series (1 - Tdap) Never done    Medicare Yearly Exam  01/17/2021      Chief Complaint   Patient presents with    Annual Wellness Visit    Medication Evaluation    Hypotension       1. Have you been to the ER, urgent care clinic since your last visit? Hospitalized since your last visit? No    2. Have you seen or consulted any other health care providers outside of the [address] [place] [address] since your last visit? Include any DEXA scan, mammography  or colon screening. No    3. Do you have an Advance Directive on file? no    4. Do you have a DNR on file? no    Patient is accompanied by self and adult caretaker I have received verbal consent from Mary Cordon to discuss any/all medical information while they are present in the room. Advance Care Planning 7/4/2018   Patient's Healthcare Decision Maker is: Named in scanned ACP document   Primary Decision Maker Name Maureen Tom   Primary Decision Maker Phone Number [phone]   Primary Decision Maker Relationship to Patient Parent   Secondary Decision Maker Name Iraida Brown   Secondary Decision Maker Phone Number [phone]   Secondary Decision Maker Relationship to Patient Sibling   Confirm Advance Directive Yes, on file         [address] [place] [address], [address] [place] [address]  Phone: [phone] Fax: [phone] [address], [address] [facility] [address]  [address] [place] [address]  Phone: [phone] Fax: [phone]        Patient reminded during visit to bring all medication bottles, OTC medications to all appointments.

## 2021-04-27 NOTE — PROGRESS NOTES
Schedule of Personalized Health Plan  (Provide Copy to Patient)  The best way to stay healthy is to live a healthy lifestyle. A healthy lifestyle includes regular exercise, eating a well-balanced diet, keeping a healthy weight and not smoking. Regular physical exams and screening tests are another important way to take care of yourself. Preventive exams provided by health care providers can find health problems early when treatment works best and can keep you from getting certain diseases or illnesses. Preventive services include exams, lab tests, screenings, shots, monitoring and information to help you take care of your own health. All people over 65 should have a pneumonia shot. Pneumonia shots are usually only needed once in a lifetime unless your doctor decides differently. All people over 65 should have a yearly flu shot. People over 65 are at medium to high risk for Hepatitis B. Three shots are needed for complete protection. In addition to your physical exam, some screening tests are recommended:    Bone mass measurement (dexa scan) is recommended every two years  Diabetes Mellitus screening is recommended every year. Glaucoma is an eye disease caused by high pressure in the eye. An eye exam is recommended every year. Cardiovascular screening tests that check your cholesterol and other blood fat (lipid) levels are recommended every five years. Colorectal Cancer screening tests help to find pre-cancerous polyps (growths in the colon) so they can be removed before they turn into cancer. Tests ordered for screening depend on your personal and family history risk factors.     Screening for Breast Cancer is recommended yearly with a mammogram.    Screening for Cervical Cancer is recommended every two years (annually for certain risk factors, such as previous history of STD or abnormal PAP in past 7 years), with a Pelvic Exam with PAP    Here is a list of your current Health Maintenance items with a due date:  Health Maintenance   Topic Date Due    Hepatitis C Screening  Never done    DTaP/Tdap/Td series (1 - Tdap) Never done    Medicare Yearly Exam  04/28/2022    Lipid Screen  06/14/2023    Flu Vaccine  Completed    COVID-19 Vaccine  Completed    Pneumococcal 0-64 years  Aged Out       This is the Subsequent Medicare Annual Wellness Exam, performed 12 months or more after the Initial AWV or the last Subsequent AWV    I have reviewed the patient's medical history in detail and updated the computerized patient record. Assessment/Plan   Education and counseling provided:  Are appropriate based on today's review and evaluation    1. Intestinal malabsorption, unspecified type  2. Recurrent depression ([facility] [address]  3. Functional diarrhea  4. History of abdominal surgery  5. Underweight  6. S/p small bowel obstruction  7. Osteoporosis, unspecified osteoporosis type, unspecified pathological fracture presence  -     DEXA BONE DENSITY STUDY AXIAL; Future  8.  Medicare annual wellness visit, subsequent       Depression Risk Factor Screening     3 most recent PHQ Screens 4/27/2021   Little interest or pleasure in doing things Not at all   Feeling down, depressed, irritable, or hopeless Not at all   Total Score PHQ 2 0   Trouble falling or staying asleep, or sleeping too much Not at all   Feeling tired or having little energy Not at all   Poor appetite, weight loss, or overeating Not at all   Feeling bad about yourself - or that you are a failure or have let yourself or your family down Not at all   Trouble concentrating on things such as school, work, reading, or watching TV Not at all   Moving or speaking so slowly that other people could have noticed; or the opposite being so fidgety that others notice Not at all   Thoughts of being better off dead, or hurting yourself in some way Not at all   PHQ 9 Score 0       Alcohol Risk Screen    Do you average more than 2 drinks per night or 14 drinks a week: No    On any one occasion in the past three months have you have had more than 4 drinks containing alcohol:  No        Functional Ability and Level of Safety    Hearing: Hearing is good. Activities of Daily Living: The home contains: no safety equipment. Patient needs help with:  transportation, shopping, managing medications and managing money      Ambulation: with mild difficulty     Fall Risk:  Fall Risk Assessment, last 12 mths 4/27/2021   Able to walk? Yes   Fall in past 12 months? 0   Do you feel unsteady? 0   Are you worried about falling 0   Number of falls in past 12 months -   Fall with injury?  -      Abuse Screen:  Patient is not abused       Cognitive Screening    Has your family/caregiver stated any concerns about your memory: no     Cognitive Screening: A+O x 3    Health Maintenance Due     Health Maintenance Due   Topic Date Due    Hepatitis C Screening  Never done    DTaP/Tdap/Td series (1 - Tdap) Never done       Patient Care Team   Patient Care Team:  Aminah Jolly DO as PCP - General  Aminah Jolly DO as PCP - [facility] Empaneled Provider  Nidia Flores MD (Colon and Rectal Surgery)  Aileen Santos NP (Neurology)  Abby Mayberry PsyD (Psychology)    History     Patient Active Problem List   Diagnosis Code    Osteoporosis M81.0    Depression F32.9    Vitamin D deficiency E55.9    Encounter for long-term (current) use of other medications Z79.899    Bowel obstruction ([address] K56.609    S/p small bowel obstruction Z87.19    Static encephalopathy G93.49    Anxiety F41.9    Stool incontinence R15.9    Underweight R63.6    Weight loss R63.4    History of abdominal surgery Z98.890    Recurrent depression ([address] F33.9    Hypotension due to drugs I95.2    Chronic diarrhea K52.9    Hypokalemia E87.6    Functional diarrhea K59.1    Medicare annual wellness visit, subsequent Z00.00    Intestinal malabsorption K90.9    History of 2019 novel coronavirus disease (COVID-19) Z86.16     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, without mention of intractable epilepsy 5/14/2011    Mental retardation     Osteoporosis     Other ill-defined conditions(799.89)     intestinal problems    Psychiatric disorder     anxiety    Seizure disorder [facility])       Past Surgical History:   Procedure Laterality Date    COLONOSCOPY N/A 1/23/2019    COLONOSCOPY performed by [facility]anda Hunter MD at [facility]  1/23/2019         HX APPENDECTOMY  1/28/13    APPENDECTOMY    HX GI      colon resection    HX GI  1/28/13    LAPAROTOMY EXPLORATORY - OPEN LYSIS OF ADHESIONS     HX HERNIA REPAIR      HX OTHER SURGICAL      imperforated anus birth defect    UPPER GI ENDOSCOPY,BIOPSY  3/17/2017          Current Outpatient Medications   Medication Sig Dispense Refill    L-Methylfolate 7.5 mg tab       One Daily Multivitamin tablet TAKE 1 TABLET BY MOUTH DAILY 31 Tab 5    gabapentin (NEURONTIN) 100 mg capsule TAKE (1) CAPSULE BY MOUTH THREE TIMES DAILY. 93 Cap 5    OTHER L-Theanine 150 mg 1 daily 30 Tab 11    calcium-vitamin D 600 mg(1,500mg) -200 unit tab TAKE 1 TABLET BY MOUTH TWICE A DAY 62 Tab 12    Refresh Tears 0.5 % drop ophthalmic solution INSTILL 1 DROP INTO EACH EYE TWICE DAILY 15 mL 0    melatonin 5 mg tablet TAKE ONE TABLET BY MOUTH AT BEDTIME 30 Tab 11    fluticasone propionate (FLONASE) 50 mcg/actuation nasal spray BLOW NOSE: 2 SPRAYS IN EACH NOSTRIL DAILY FOR ALLERGY. (Patient taking differently: as needed. BLOW NOSE: 2 SPRAYS IN EACH NOSTRIL DAILY FOR ALLERGY.) 16 g 5    OTHER Pancreatin 10x - 200, I TID with meals 100 Cap 11    B.infantis-B.ani-B.long-B.bifi (PROBIOTIC 4X) 10-15 mg TbEC Take  by mouth daily.       OTHER Indications: Elemental Diet Shake      acetylcysteine 600 mg cap capsule Take  by mouth.  amitriptyline (ELAVIL) 50 mg tablet Take 50 mg by mouth nightly.  levomefolate-algal oil (DEPLIN, ALGAL OIL,) 7.5-90.314 mg cap Take  by mouth.  acetaminophen (TYLENOL) 325 mg tablet Take  by mouth every four (4) hours as needed for Pain.  guaiFENesin ER (MUCINEX) 600 mg ER tablet Take 600 mg by mouth daily as needed for Congestion.  brief disposable (ADULT) misc by Does Not Apply route.  Depends, size medium, 1 nightly 1 Package 11     No Known Allergies    Family History   Problem Relation Age of Onset    No Known Problems Mother     Heart Disease Father     No Known Problems Sister     No Known Problems Sister      Social History     Tobacco Use    Smoking status: Never Smoker    Smokeless tobacco: Never Used   Substance Use Topics    Alcohol use: No         Danie Cuevas, DO

## 2021-04-28 LAB
25(OH)D3+25(OH)D2 SERPL-MCNC: 38.5 NG/ML (ref 30–100)
ALBUMIN SERPL-MCNC: 4.5 G/DL (ref 4–5)
ALBUMIN/GLOB SERPL: 1.9 {RATIO} (ref 1.2–2.2)
ALP SERPL-CCNC: 59 IU/L (ref 39–117)
ALT SERPL-CCNC: 10 IU/L (ref 0–44)
AST SERPL-CCNC: 20 IU/L (ref 0–40)
BASOPHILS # BLD AUTO: 0 X10E3/UL (ref 0–0.2)
BASOPHILS NFR BLD AUTO: 1 %
BILIRUB SERPL-MCNC: 0.3 MG/DL (ref 0–1.2)
BUN SERPL-MCNC: 17 MG/DL (ref 6–24)
BUN/CREAT SERPL: 18 (ref 9–20)
CALCIUM SERPL-MCNC: 9.4 MG/DL (ref 8.7–10.2)
CHLORIDE SERPL-SCNC: 100 MMOL/L (ref 96–106)
CHOLEST SERPL-MCNC: 142 MG/DL (ref 100–199)
CO2 SERPL-SCNC: 31 MMOL/L (ref 20–29)
CREAT SERPL-MCNC: 0.94 MG/DL (ref 0.76–1.27)
EOSINOPHIL # BLD AUTO: 0.1 X10E3/UL (ref 0–0.4)
EOSINOPHIL NFR BLD AUTO: 3 %
ERYTHROCYTE [DISTWIDTH] IN BLOOD BY AUTOMATED COUNT: 13 % (ref 11.6–15.4)
GLOBULIN SER CALC-MCNC: 2.4 G/DL (ref 1.5–4.5)
GLUCOSE SERPL-MCNC: 96 MG/DL (ref 65–99)
HCT VFR BLD AUTO: 45.1 % (ref 37.5–51)
HCV AB S/CO SERPL IA: <0.1 S/CO RATIO (ref 0–0.9)
HDLC SERPL-MCNC: 52 MG/DL
HGB BLD-MCNC: 15.3 G/DL (ref 13–17.7)
IMM GRANULOCYTES # BLD AUTO: 0 X10E3/UL (ref 0–0.1)
IMM GRANULOCYTES NFR BLD AUTO: 0 %
IMP & REVIEW OF LAB RESULTS: NORMAL
LDLC SERPL CALC-MCNC: 79 MG/DL (ref 0–99)
LYMPHOCYTES # BLD AUTO: 1.5 X10E3/UL (ref 0.7–3.1)
LYMPHOCYTES NFR BLD AUTO: 41 %
MCH RBC QN AUTO: 32.1 PG (ref 26.6–33)
MCHC RBC AUTO-ENTMCNC: 33.9 G/DL (ref 31.5–35.7)
MCV RBC AUTO: 95 FL (ref 79–97)
MONOCYTES # BLD AUTO: 0.4 X10E3/UL (ref 0.1–0.9)
MONOCYTES NFR BLD AUTO: 12 %
NEUTROPHILS # BLD AUTO: 1.6 X10E3/UL (ref 1.4–7)
NEUTROPHILS NFR BLD AUTO: 43 %
PLATELET # BLD AUTO: 163 X10E3/UL (ref 150–450)
POTASSIUM SERPL-SCNC: 4.3 MMOL/L (ref 3.5–5.2)
PROT SERPL-MCNC: 6.9 G/DL (ref 6–8.5)
RBC # BLD AUTO: 4.76 X10E6/UL (ref 4.14–5.8)
SODIUM SERPL-SCNC: 143 MMOL/L (ref 134–144)
TRIGL SERPL-MCNC: 50 MG/DL (ref 0–149)
TSH SERPL DL<=0.005 MIU/L-ACNC: 1.79 UIU/ML (ref 0.45–4.5)
VLDLC SERPL CALC-MCNC: 11 MG/DL (ref 5–40)
WBC # BLD AUTO: 3.7 X10E3/UL (ref 3.4–10.8)

## 2021-05-01 NOTE — PROGRESS NOTES
HISTORY OF PRESENT ILLNESS  Desire Connelly is a 41 y.o. male. Pt. is here with his caregiver from his group home for f/u. His mother is joining us via phone. Has a few chronic medical issues as documented. Patient has chronic GI issues with multiple surgeries as a child. He is chronically underweight. His weight has been stable recently. Appetite has been good. Has chronic depression, anxiety and insomnia. Followed by psychiatrist.  Celina Margy on amitriptyline and gabapentin. His chronic BM issues have been pretty stable. He also sees a homeopath practitioner who has him on different supplements including L-Theanine, probiotic, acetylcysteine. 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 Mother is asking me if I can adjust medications. He also has had some red areas on his skin. No falls or bruises. No other related symptoms. He had Covid a few months but recovered nicely. They are taking precautions for Covid 19. Denies any related signs or symptoms including fever, cough, SOB or CP. Needs repeat DEXA scan. PMH/PSH/Allergies/Social History/medication list and most recent studies reviewed with patient. Has repeat labs today. Not have the records yet. No tobacco or alcohol use. No recent falls. HPI    Review of Systems   Constitutional: Negative. HENT: Negative. Eyes: Negative. Respiratory: Negative for shortness of breath. Cardiovascular: Negative for chest pain and leg swelling. Gastrointestinal: Positive for diarrhea (Fecal incontinence). Negative for abdominal pain, blood in stool, constipation, heartburn, melena, nausea and vomiting. Stool incontinence   Genitourinary: Positive for urgency. Negative for dysuria. Musculoskeletal: Negative for back pain, falls and joint pain. Skin: Negative. Areas of redness   Neurological: Positive for tremors. Negative for dizziness, sensory change, seizures and headaches. Endo/Heme/Allergies: Negative. Psychiatric/Behavioral: Positive for depression. The patient is nervous/anxious and has insomnia. All other systems reviewed and are negative. Physical Exam  Vitals signs and nursing note reviewed. Constitutional:       General: He is not in acute distress. Appearance: He is well-developed. Comments: Pleasant, chronically underweight  cognitivelty a bit slow   HENT:      Head: Normocephalic and atraumatic. Mouth/Throat:      Mouth: Mucous membranes are moist.      Pharynx: Oropharynx is clear. No oropharyngeal exudate. Eyes:      General: No scleral icterus. Conjunctiva/sclera: Conjunctivae normal.   Neck:      Musculoskeletal: Normal range of motion and neck supple. Thyroid: No thyromegaly. Vascular: No JVD. Cardiovascular:      Rate and Rhythm: Normal rate and regular rhythm. Heart sounds: Normal heart sounds. No murmur. Pulmonary:      Effort: Pulmonary effort is normal. No respiratory distress. Breath sounds: Normal breath sounds. No wheezing or rales. Abdominal:      General: Bowel sounds are normal. There is no distension. Palpations: Abdomen is soft. There is no mass. Tenderness: There is no abdominal tenderness. There is no guarding or rebound. Comments: Chronic surgical scars   Musculoskeletal:         General: No tenderness. Right lower leg: No edema. Left lower leg: No edema. Skin:     General: Skin is warm and dry. Findings: No rash. Comments: Blanchable red area on left shoulder, nontender, not warm to touch, mostly on bony prominences   Neurological:      Mental Status: He is alert and oriented to person, place, and time. Coordination: Coordination abnormal.   Psychiatric:         Behavior: Behavior normal.      Comments: Repeats words  Chronic cognitive deficit , about same         ASSESSMENT and PLAN  Diagnoses and all orders for this visit:    1. Intestinal malabsorption, unspecified type    2. Recurrent depression ([facility] [address]    3. Functional diarrhea    4. History of abdominal surgery    5. Underweight    6. S/p small bowel obstruction    7. Osteoporosis, unspecified osteoporosis type, unspecified pathological fracture presence  -     DEXA BONE DENSITY STUDY AXIAL; Future    8. Medicare annual wellness visit, subsequent      Follow-up and Dispositions    · Return in about 6 months (around 10/27/2021).      lab results and schedule of future lab studies reviewed with patient  reviewed diet, exercise and weight control  reviewed medications and side effects in detail  F/u with other MD's/physicians as scheduled  Fall precautions discussed  Reassurance that patient is stable overall and current regimen  Explained to mother and caregiver that I cannot prescribe medications recommended by homeopathic provider since I am not familiar with them

## 2021-05-11 ENCOUNTER — HOSPITAL ENCOUNTER (OUTPATIENT)
Dept: MAMMOGRAPHY | Age: 41
Discharge: HOME | End: 2021-05-11
Attending: INTERNAL MEDICINE
Payer: MEDICARE

## 2021-05-11 DIAGNOSIS — M81.0 OSTEOPOROSIS, UNSPECIFIED OSTEOPOROSIS TYPE, UNSPECIFIED PATHOLOGICAL FRACTURE PRESENCE: ICD-10-CM

## 2021-05-11 PROCEDURE — 77080 DXA BONE DENSITY AXIAL: CPT

## 2021-05-13 NOTE — PROGRESS NOTES
Has Osteoporosis  Start citracal D 1 bid ( if not on Calcium and vit D)  Weight bearing exercises/walking as tolerated  Avoid falls  RTC to discuss other treatment options

## 2021-05-24 ENCOUNTER — TELEPHONE (OUTPATIENT)
Dept: INTERNAL MEDICINE CLINIC | Age: 41
End: 2021-05-24

## 2021-05-24 NOTE — TELEPHONE ENCOUNTER
Spoke with a Shar Gomez @  Group home for pt and she wants to know if it's ok to increase pt    L-Theanine 100-increased to 200mg per day. A request for medication change was sent via fax. Advised her that Dr. Roshan Cano has to review it once he gets a chance from being in clinic and we will fax it once done. There was verbalizing understanding.   [phone] Angry from Group

## 2021-05-25 DIAGNOSIS — F41.9 ANXIETY: Primary | ICD-10-CM

## 2021-05-27 PROBLEM — Z00.00 MEDICARE ANNUAL WELLNESS VISIT, SUBSEQUENT: Status: RESOLVED | Noted: 2018-07-04 | Resolved: 2021-05-27

## 2021-05-27 RX ORDER — CHOLECALCIFEROL (VITAMIN D3) 125 MCG
200 CAPSULE ORAL 2 TIMES DAILY
Qty: 180 CAPSULE | Refills: 1 | Status: SHIPPED | OUTPATIENT
Start: 2021-05-27 | End: 2022-06-16 | Stop reason: SDUPTHER

## 2021-06-01 ENCOUNTER — TELEPHONE (OUTPATIENT)
Dept: INTERNAL MEDICINE CLINIC | Age: 41
End: 2021-06-01

## 2021-06-01 NOTE — TELEPHONE ENCOUNTER
Pharmacy called needing clarifications on the script for theanine. Please call back at [phone] ext [phone]

## 2021-06-04 NOTE — TELEPHONE ENCOUNTER
Call placed to pts mother, she is asking for L theanine to be 10 mg BID . I called the pharmacy and spoke with Alejandro Maldonado     And gave verbal orders.

## 2021-06-15 ENCOUNTER — OFFICE VISIT (OUTPATIENT)
Dept: INTERNAL MEDICINE CLINIC | Age: 41
End: 2021-06-15
Payer: MEDICARE

## 2021-06-15 VITALS
RESPIRATION RATE: 16 BRPM | DIASTOLIC BLOOD PRESSURE: 68 MMHG | OXYGEN SATURATION: 95 % | SYSTOLIC BLOOD PRESSURE: 116 MMHG | HEIGHT: 70 IN | WEIGHT: 107 LBS | TEMPERATURE: 98.6 F | BODY MASS INDEX: 15.32 KG/M2 | HEART RATE: 81 BPM

## 2021-06-15 DIAGNOSIS — Z87.19 S/P SMALL BOWEL OBSTRUCTION: ICD-10-CM

## 2021-06-15 DIAGNOSIS — F41.9 ANXIETY: ICD-10-CM

## 2021-06-15 DIAGNOSIS — K90.9 INTESTINAL MALABSORPTION, UNSPECIFIED TYPE: ICD-10-CM

## 2021-06-15 DIAGNOSIS — Z98.890 HISTORY OF ABDOMINAL SURGERY: ICD-10-CM

## 2021-06-15 DIAGNOSIS — M81.0 OSTEOPOROSIS WITHOUT CURRENT PATHOLOGICAL FRACTURE, UNSPECIFIED OSTEOPOROSIS TYPE: Primary | ICD-10-CM

## 2021-06-15 DIAGNOSIS — R63.6 UNDERWEIGHT: ICD-10-CM

## 2021-06-15 PROCEDURE — G8419 CALC BMI OUT NRM PARAM NOF/U: HCPCS | Performed by: INTERNAL MEDICINE

## 2021-06-15 PROCEDURE — G9717 DOC PT DX DEP/BP F/U NT REQ: HCPCS | Performed by: INTERNAL MEDICINE

## 2021-06-15 PROCEDURE — G8427 DOCREV CUR MEDS BY ELIG CLIN: HCPCS | Performed by: INTERNAL MEDICINE

## 2021-06-15 PROCEDURE — 99214 OFFICE O/P EST MOD 30 MIN: CPT | Performed by: INTERNAL MEDICINE

## 2021-06-15 NOTE — PROGRESS NOTES
Health Maintenance Due   Topic Date Due    DTaP/Tdap/Td series (1 - Tdap) Never done       Chief Complaint   Patient presents with    Osteoporosis     Bone density f/u    Vitamin D Deficiency       1. Have you been to the ER, urgent care clinic since your last visit? Hospitalized since your last visit? No    2. Have you seen or consulted any other health care providers outside of the [address] [place] [address] since your last visit? Include any pap smears or colon screening. No    3) Do you have an Advance Directive on file? no    4) Are you interested in receiving information on Advance Directives? NO      Patient is accompanied by mother I have received verbal consent from Maxine Krause to discuss any/all medical information while they are present in the room.

## 2021-06-16 ENCOUNTER — TELEPHONE (OUTPATIENT)
Dept: INTERNAL MEDICINE CLINIC | Age: 41
End: 2021-06-16

## 2021-06-16 ENCOUNTER — PATIENT MESSAGE (OUTPATIENT)
Dept: INTERNAL MEDICINE CLINIC | Age: 41
End: 2021-06-16

## 2021-06-16 NOTE — TELEPHONE ENCOUNTER
Advised mom that order for Prolia shot and Progress notes for son facility has been faxed   Mom states she may  progress note for son facility if needed. There was verbalizing understanding.

## 2021-06-16 NOTE — TELEPHONE ENCOUNTER
Infusion order faxed to [facility] infusion center.   [phone]

## 2021-06-22 ENCOUNTER — HOSPITAL ENCOUNTER (OUTPATIENT)
Dept: INFUSION THERAPY | Age: 41
Discharge: HOME | End: 2021-06-22

## 2021-06-29 NOTE — PROGRESS NOTES
HISTORY OF PRESENT ILLNESS  Ivelisse Martínez is a 41 y.o. male. Pt. comes in with his mother from his group home for f/u. Has a few chronic medical issues as documented. His chronic GI issues have been stable. His chronic diarrhea is doing much better on meds. Is followed by multiple providers including GI, colorectal surgeon, and a .  He depends on staff for many ADLs. He goes to day program.  Has chronic cognitive deficit. Has gained some weight. Chronically underweight. Recent DeXA showed osteoporosis. Not on any meds for it. On calcium and vit D. No other c/o. PMH/PSH/Allergies/Social History/medication list and most recent studies reviewed with patient. Remains on supplements. Tobacco use: No  Alcohol use: No    Reports compliance with medications and diet. Trying to be active physically as tolerated. Reports no other new c/o. HPI    Review of Systems   Constitutional: Negative. HENT: Negative. Eyes: Negative. Respiratory: Negative for shortness of breath. Cardiovascular: Negative for chest pain and leg swelling. Gastrointestinal: Positive for diarrhea (Fecal incontinence). Negative for abdominal pain, blood in stool, constipation, heartburn, melena, nausea and vomiting. Genitourinary: Positive for urgency. Negative for dysuria. Musculoskeletal: Negative for back pain, falls and joint pain. Skin: Negative. Areas of redness   Neurological: Positive for tremors. Negative for dizziness, sensory change, seizures and headaches. Endo/Heme/Allergies: Negative. Psychiatric/Behavioral: Positive for depression. The patient is nervous/anxious and has insomnia. All other systems reviewed and are negative. Physical Exam  Vitals and nursing note reviewed. Constitutional:       General: He is not in acute distress. Appearance: He is well-developed.       Comments: Pleasant, chronically underweight  cognitivelty a bit slow   HENT:      Head: Normocephalic and atraumatic. Mouth/Throat:      Mouth: Mucous membranes are moist.      Pharynx: Oropharynx is clear. Eyes:      General: No scleral icterus. Conjunctiva/sclera: Conjunctivae normal.   Neck:      Thyroid: No thyromegaly. Vascular: No JVD. Cardiovascular:      Rate and Rhythm: Normal rate and regular rhythm. Heart sounds: Normal heart sounds. No murmur heard. Pulmonary:      Effort: Pulmonary effort is normal. No respiratory distress. Breath sounds: Normal breath sounds. No wheezing or rales. Abdominal:      General: Bowel sounds are normal. There is no distension. Palpations: Abdomen is soft. There is no mass. Tenderness: There is no abdominal tenderness. There is no guarding or rebound. Comments: Chronic surgical scars   Musculoskeletal:         General: No tenderness. Cervical back: Normal range of motion and neck supple. Right lower leg: No edema. Left lower leg: No edema. Skin:     General: Skin is warm and dry. Findings: No rash. Comments: Blanchable red area on left shoulder, nontender, not warm to touch, mostly on bony prominences   Neurological:      Mental Status: He is alert and oriented to person, place, and time. Coordination: Coordination abnormal.   Psychiatric:         Behavior: Behavior normal.      Comments: Repeats words  Chronic cognitive deficit , about same         ASSESSMENT and PLAN  Diagnoses and all orders for this visit:    1. Osteoporosis without current pathological fracture, unspecified osteoporosis type  Start Prolia  2. Intestinal malabsorption, unspecified type    3. History of abdominal surgery    4. Underweight    5. S/p small bowel obstruction    6. Anxiety      Follow-up and Dispositions    · Return in about 4 months (around 10/15/2021).      lab results and schedule of future lab studies reviewed with patient  reviewed diet, exercise and weight control  reviewed medications and side effects in detail  F/u with other MD's as scheduled  Fall precautions discussed  COVID-19 precautions discussed with pt

## 2021-07-22 DIAGNOSIS — J30.9 ALLERGIC RHINITIS: ICD-10-CM

## 2021-07-22 NOTE — TELEPHONE ENCOUNTER
Requested Prescriptions     Pending Prescriptions Disp Refills    fluticasone propionate (FLONASE) 50 mcg/actuation nasal spray 16 g 5   06/15/2021  10/22/2021  [facility] long term [facility], [address] [facility]  Medication Refill     Caller (if not patient): Paige Strickland       Relationship of caller (if not patient): Lead Staff at Pt' [address] [address] contact number(s): [phone]       Name of medication and dosage if known: flonase \"as needed\"       Is patient out of this medication (yes/no): yes       Pharmacy name: [address] [place] [address], [address] [place] [address] Randi Borjas listed in chart? (yes/no): yes       Pharmacy phone number: [phone]       Details to clarify the request: n/a       Lesli Cha

## 2021-07-23 RX ORDER — FLUTICASONE PROPIONATE 50 MCG
2 SPRAY, SUSPENSION (ML) NASAL DAILY
Qty: 1 BOTTLE | Refills: 3 | Status: SHIPPED | OUTPATIENT
Start: 2021-07-23 | End: 2021-09-27 | Stop reason: SDUPTHER

## 2021-08-02 ENCOUNTER — TELEPHONE (OUTPATIENT)
Dept: INTERNAL MEDICINE CLINIC | Age: 41
End: 2021-08-02

## 2021-08-04 NOTE — TELEPHONE ENCOUNTER
Returned call to pts mother a advised that order is in for Prolia and pt is scheduled for 8/10, per infusion center.

## 2021-08-10 ENCOUNTER — APPOINTMENT (OUTPATIENT)
Dept: INFUSION THERAPY | Age: 41
End: 2021-08-10
Payer: MEDICARE

## 2021-09-07 DIAGNOSIS — F41.9 ANXIETY: ICD-10-CM

## 2021-09-07 DIAGNOSIS — R25.1 TREMOR: ICD-10-CM

## 2021-09-07 RX ORDER — GABAPENTIN 100 MG/1
CAPSULE ORAL
Qty: 93 CAPSULE | Refills: 5 | Status: SHIPPED | OUTPATIENT
Start: 2021-09-07 | End: 2022-02-28 | Stop reason: SDUPTHER

## 2021-09-07 NOTE — TELEPHONE ENCOUNTER
----- Message from Jaclyn Yoder sent at 9/7/2021 12:00 PM EDT -----  Regarding: RICHY Ashton Antis / Refill  Medication Refill    Caller (if not patient): [facility]      Relationship of caller (if not patient): n/a      Best contact number(s): [phone]      Name of medication and dosage if known: gabapentin 300mg      Is patient out of this medication (yes/no): n/a      Pharmacy name: [address] [facility] listed in chart? (yes/no): yes  Pharmacy phone number: [phone]      Details to clarify the request: needs refill      Jaclyn Yoder

## 2021-09-13 ENCOUNTER — HOSPITAL ENCOUNTER (OUTPATIENT)
Dept: INFUSION THERAPY | Age: 41
Discharge: HOME | End: 2021-09-13
Payer: MEDICARE

## 2021-09-13 VITALS
TEMPERATURE: 98.2 F | DIASTOLIC BLOOD PRESSURE: 81 MMHG | SYSTOLIC BLOOD PRESSURE: 111 MMHG | RESPIRATION RATE: 18 BRPM | HEART RATE: 98 BPM | OXYGEN SATURATION: 100 %

## 2021-09-13 LAB
ALBUMIN SERPL-MCNC: 3.6 G/DL (ref 3.5–5)
ANION GAP SERPL CALC-SCNC: 1 MMOL/L (ref 5–15)
BUN SERPL-MCNC: 14 MG/DL (ref 6–20)
BUN/CREAT SERPL: 16 (ref 12–20)
CALCIUM SERPL-MCNC: 9.3 MG/DL (ref 8.5–10.1)
CHLORIDE SERPL-SCNC: 109 MMOL/L (ref 97–108)
CO2 SERPL-SCNC: 32 MMOL/L (ref 21–32)
CREAT SERPL-MCNC: 0.88 MG/DL (ref 0.7–1.3)
GLUCOSE SERPL-MCNC: 78 MG/DL (ref 65–100)
MAGNESIUM SERPL-MCNC: 2.2 MG/DL (ref 1.6–2.4)
PHOSPHATE SERPL-MCNC: 2.5 MG/DL (ref 2.6–4.7)
PHOSPHATE SERPL-MCNC: 2.6 MG/DL (ref 2.6–4.7)
POTASSIUM SERPL-SCNC: 3.9 MMOL/L (ref 3.5–5.1)
SODIUM SERPL-SCNC: 142 MMOL/L (ref 136–145)

## 2021-09-13 PROCEDURE — 36415 COLL VENOUS BLD VENIPUNCTURE: CPT

## 2021-09-13 PROCEDURE — 84100 ASSAY OF PHOSPHORUS: CPT

## 2021-09-13 PROCEDURE — 80069 RENAL FUNCTION PANEL: CPT

## 2021-09-13 PROCEDURE — 83735 ASSAY OF MAGNESIUM: CPT

## 2021-09-13 PROCEDURE — 96372 THER/PROPH/DIAG INJ SC/IM: CPT

## 2021-09-13 PROCEDURE — 74011250636 HC RX REV CODE- 250/636: Performed by: INTERNAL MEDICINE

## 2021-09-13 RX ADMIN — DENOSUMAB 60 MG: 60 INJECTION SUBCUTANEOUS at 12:13

## 2021-09-13 NOTE — PROGRESS NOTES
[address] [facility] VISIT NOTE    1050 Patient arrives for Labs & Prolia Injection without acute problems. Please see connect care for complete assessment and education provided. Vital signs stable throughout and prior to discharge, Pt. Tolerated treatment well and discharged without incident. Parent is aware of next [facility] appointment on 3/14/2022. Appointment card given to parent. Medications Verified by Dinh Harry RN via [employer]:  1. Prolia 60mg SQ left arm    VITAL SIGNS Patient Vitals for the past 12 hrs:   Temp Pulse Resp BP SpO2   09/13/21 1053 98.2 °F (36.8 °C) 98 18 111/81 100 %       LAB WORK Lab results pending, please see Connect Care for results.   Recent Results (from the past 12 hour(s))   RENAL FUNCTION PANEL    Collection Time: 09/13/21 11:00 AM   Result Value Ref Range    Sodium 142 136 - 145 mmol/L    Potassium 3.9 3.5 - 5.1 mmol/L    Chloride 109 (H) 97 - 108 mmol/L    CO2 32 21 - 32 mmol/L    Anion gap 1 (L) 5 - 15 mmol/L    Glucose 78 65 - 100 mg/dL    BUN 14 6 - 20 MG/DL    Creatinine 0.88 0.70 - 1.30 MG/DL    BUN/Creatinine ratio 16 12 - 20      GFR est AA >60 >60 ml/min/1.73m2    GFR est non-AA >60 >60 ml/min/1.73m2    Calcium 9.3 8.5 - 10.1 MG/DL    Phosphorus 2.5 (L) 2.6 - 4.7 MG/DL    Albumin 3.6 3.5 - 5.0 g/dL   MAGNESIUM    Collection Time: 09/13/21 11:00 AM   Result Value Ref Range    Magnesium 2.2 1.6 - 2.4 mg/dL   PHOSPHORUS    Collection Time: 09/13/21 11:00 AM   Result Value Ref Range    Phosphorus 2.6 2.6 - 4.7 MG/DL

## 2021-09-27 DIAGNOSIS — J30.9 ALLERGIC RHINITIS: ICD-10-CM

## 2021-09-27 RX ORDER — FLUTICASONE PROPIONATE 50 MCG
2 SPRAY, SUSPENSION (ML) NASAL
Qty: 1 EACH | Refills: 3 | Status: SHIPPED | OUTPATIENT
Start: 2021-09-27

## 2021-10-04 ENCOUNTER — OFFICE VISIT (OUTPATIENT)
Dept: NEUROLOGY | Age: 41
End: 2021-10-04
Payer: MEDICARE

## 2021-10-04 VITALS
TEMPERATURE: 97.8 F | HEIGHT: 70 IN | WEIGHT: 113 LBS | BODY MASS INDEX: 16.18 KG/M2 | SYSTOLIC BLOOD PRESSURE: 116 MMHG | OXYGEN SATURATION: 96 % | RESPIRATION RATE: 16 BRPM | DIASTOLIC BLOOD PRESSURE: 68 MMHG | HEART RATE: 87 BPM

## 2021-10-04 DIAGNOSIS — R25.1 TREMOR: ICD-10-CM

## 2021-10-04 DIAGNOSIS — R53.1 WEAKNESS GENERALIZED: ICD-10-CM

## 2021-10-04 DIAGNOSIS — G93.49 STATIC ENCEPHALOPATHY: ICD-10-CM

## 2021-10-04 DIAGNOSIS — F41.9 ANXIETY: ICD-10-CM

## 2021-10-04 DIAGNOSIS — Z74.09 IMPAIRED FUNCTIONAL MOBILITY, BALANCE, GAIT, AND ENDURANCE: ICD-10-CM

## 2021-10-04 DIAGNOSIS — R26.9 GAIT ABNORMALITY: Primary | ICD-10-CM

## 2021-10-04 PROCEDURE — G9717 DOC PT DX DEP/BP F/U NT REQ: HCPCS | Performed by: NURSE PRACTITIONER

## 2021-10-04 PROCEDURE — 99214 OFFICE O/P EST MOD 30 MIN: CPT | Performed by: NURSE PRACTITIONER

## 2021-10-04 PROCEDURE — G8419 CALC BMI OUT NRM PARAM NOF/U: HCPCS | Performed by: NURSE PRACTITIONER

## 2021-10-04 PROCEDURE — G8427 DOCREV CUR MEDS BY ELIG CLIN: HCPCS | Performed by: NURSE PRACTITIONER

## 2021-10-04 NOTE — PROGRESS NOTES
Date:  10/04/21     Name:  Iván Lee  :  1980  MRN:  [id number]     PCP:  Vikas Salinas DO    Chief Complaint   Patient presents with    Follow-up     Tremors, anxiety, FTT      HISTORY OF PRESENT ILLNESS: Follow up for tremors, anxiety, and failure to thrive. He is accompanied by his caregiver his residential facility. He continues to take gabapentin 100 mg 3 times daily for treatment of the tremor. He has been maintaining his weight and eating well. Tremor is about the same. Balance has been good. No falls. He has not had any seizures despite being off of antiepileptic medication. Today, the caregiver mentions on behalf of his mother that she would like him evaluated for possible tethered cord syndrome. She has been doing some research and feels that some of the symptoms Any Dumont has may be related to this. She would also like for him to be able to take an over the counter supplement, Focus Factor, for improvement of his cognitive function    Recap from last office visit:  More functional now than he has been in the past in terms of ADL. Eating well now. Balance, tremor, and anxiety are stable on present therapy of gabapentin. Continue without change. Follow up in six months     Current Outpatient Medications   Medication Sig    OTHER     OTHER Focus factor take two tablets twice a day with food    fluticasone propionate (FLONASE) 50 mcg/actuation nasal spray 2 Sprays by Both Nostrils route daily as needed for Rhinitis. BLOW NOSE: 2 SPRAYS IN EACH NOSTRIL DAILY FOR ALLERGY.  gabapentin (NEURONTIN) 100 mg capsule TAKE (1) CAPSULE BY MOUTH THREE TIMES DAILY.  theanine 200 mg cap Take 200 mg by mouth two (2) times a day.     melatonin 5 mg tablet TAKE ONE TABLET BY MOUTH AT BEDTIME    Refresh Tears 0.5 % drop ophthalmic solution PLACE 1 DROP INTO EACH EYE TWICE DAILY    L-Methylfolate 7.5 mg tab     One Daily Multivitamin tablet TAKE 1 TABLET BY MOUTH DAILY    OTHER L-Theanine 150 mg 1 daily    calcium-vitamin D 600 mg(1,500mg) -200 unit tab TAKE 1 TABLET BY MOUTH TWICE A DAY    OTHER Pancreatin 10x - 200, I TID with meals    B.infantis-B.ani-B.long-B.bifi (PROBIOTIC 4X) 10-15 mg TbEC Take  by mouth daily.  OTHER Indications: Elemental Diet Shake    acetylcysteine 600 mg cap capsule Take  by mouth.  amitriptyline (ELAVIL) 50 mg tablet Take 50 mg by mouth nightly.  guaiFENesin ER (MUCINEX) 600 mg ER tablet Take 600 mg by mouth daily as needed for Congestion.  brief disposable (ADULT) misc by Does Not Apply route. Depends, size medium, 1 nightly    levomefolate-algal oil (DEPLIN, ALGAL OIL,) 7.5-90.314 mg cap Take  by mouth. (Patient not taking: Reported on 10/4/2021)    acetaminophen (TYLENOL) 325 mg tablet Take  by mouth every four (4) hours as needed for Pain. (Patient not taking: Reported on 10/4/2021)     No current facility-administered medications for this visit.      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, without mention of intractable epilepsy 5/14/2011    Mental retardation     Osteoporosis     Other ill-defined conditions(799.89)     intestinal problems    Psychiatric disorder     anxiety    Seizure disorder [facility])      Past Surgical History:   Procedure Laterality Date    COLONOSCOPY N/A 1/23/2019    COLONOS[facility] by [facility]jo [facility] at [facility]  1/23/2019         HX APPENDECTOMY  1/28/13    APPENDECTOMY    HX GI      colon resection    HX GI  1/28/13    LAPAROTOMY EXPLORATORY - OPEN LYSIS OF ADHESIONS     HX HERNIA REPAIR      HX OTHER SURGICAL      imperforated anus birth defect    UPPER GI ENDOSCOPY,BIOPSY  3/17/2017          Social History     Socioeconomic History    Marital status: SINGLE     Spouse name: Not on file    Number of children: Not on file    Years of education: Not on file    Highest education level: Not on file   Occupational History    Not on file   Tobacco Use    Smoking status: Never Smoker    Smokeless tobacco: Never Used   Substance and Sexual Activity    Alcohol use: No    Drug use: No    Sexual activity: Never   Other Topics Concern     Service Not Asked    Blood Transfusions Not Asked    Caffeine Concern Not Asked    Occupational Exposure Not Asked    Hobby Hazards Not Asked    Sleep Concern Not Asked    Stress Concern Not Asked    Weight Concern Not Asked    Special Diet Not Asked    Back Care Not Asked    Exercise Not Asked    Bike Helmet Not Asked   [address] Not Asked    Self-Exams Not Asked   Social History Narrative    Not on file     Social Determinants of Health     Financial Resource Strain:     Difficulty of Paying Living Expenses:    Food Insecurity:     Worried About Running Out of Food in the Last Year:     Ran Out of Food in the Last Year:    Transportation Needs:     Lack of Transportation (Medical):      Lack of Transportation (Non-Medical):    Physical Activity:     Days of Exercise per Week:     Minutes of Exercise per Session:    Stress:     Feeling of Stress :    Social Connections:     Frequency of Communication with Friends and Family:     Frequency of Social Gatherings with Friends and Family:     Attends Caodaism Services:     Active Member of Clubs or Organizations:     Attends Club or Organization Meetings:     Marital Status:    Intimate Partner Violence:     Fear of Current or Ex-Partner:     Emotionally Abused:     Physically Abused:     Sexually Abused:      Family History   Problem Relation Age of Onset    No Known Problems Mother     Heart Disease Father     No Known Problems Sister     No Known Problems Sister        PHYSICAL EXAMINATION:    Visit Vitals  /68 (BP 1 Location: Left upper arm, BP Patient Position: Sitting)   Pulse 87   Temp 97.8 °F (36.6 °C) (Temporal)   Resp 16   Ht 5' 10\" (1.778 m)   Wt 51.3 kg (113 lb)   SpO2 96%   BMI 16.21 kg/m²     General: Well defined, nourished, and groomed individual in no acute distress. Neck: Supple, nontender, no bruits, no pain with resistance to active range of motion. Heart: Regular rate and rhythm, no murmurs, rub, or gallop. Normal S1S2. Lungs: Clear to auscultation bilaterally with equal chest expansion, no cough, no wheeze  Musculoskeletal: Extremities revealed no edema and had full range of motion of joints. Psych: Good mood and bright affect      NEUROLOGICAL EXAMINATION:   Mental Status: Alert and oriented to person and place   Cranial Nerves:   II, III, IV, VI: Visual acuity grossly intact. Visual fields are normal.   Pupils are equal, round, and reactive to light and accommodation. Extra-ocular movements are full and fluid. Fundoscopic exam was benign, no ptosis or nystagmus. V-XII: Hearing is grossly intact. Facial features are symmetric, with normal sensation and strength. The palate rises symmetrically and the tongue protrudes midline. Sternocleidomastoids 5/5. Motor Examination: decreased tone and bulk with generalized weakness. Coordination: Finger to nose was normal. No resting tremor. There is a noticeable tremor bilaterally, right greater than left. Gait and Station: Much steadier with rising and walking. He pins his hands to his sides with walking and has to be reminded to swing his arms. No pronator drift. No muscle wasting or fasiculations noted. Reflexes: DTRs 2+ throughout. ASSESSMENT AND PLAN    ICD-10-CM ICD-9-CM    1. Gait abnormality  R26.9 781.2    2. Anxiety  F41.9 300.00    3. Tremor  R25.1 781.0    4. Weakness generalized  R53.1 780.79    5. Static encephalopathy  G93.49 742.9 OTHER   6.  Impaired functional mobility, balance, gait, and endurance  Z74.09 V49.89 MRI LUMB SPINE WO CONT     At this time, NATHANIEL really appears to be stable in terms of gait, balance, and tremor. I see no issues in him taking Focus Factor though I am not sure that this will provide the effect his mother is looking for. 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 While there may be some correlation, this seems most common in cases with spina bifida and is usually identified in childhood and NATHANIEL is now 41. Will order an MRI of the lumbar spine for evaluation. Follow up in six months or sooner pending MRI results. Cielo Bey Standard

## 2021-10-22 ENCOUNTER — OFFICE VISIT (OUTPATIENT)
Dept: INTERNAL MEDICINE CLINIC | Age: 41
End: 2021-10-22
Payer: MEDICARE

## 2021-10-22 VITALS
DIASTOLIC BLOOD PRESSURE: 64 MMHG | HEIGHT: 70 IN | TEMPERATURE: 98.8 F | OXYGEN SATURATION: 97 % | RESPIRATION RATE: 17 BRPM | BODY MASS INDEX: 16.03 KG/M2 | HEART RATE: 87 BPM | WEIGHT: 112 LBS | SYSTOLIC BLOOD PRESSURE: 114 MMHG

## 2021-10-22 DIAGNOSIS — Z87.19 S/P SMALL BOWEL OBSTRUCTION: ICD-10-CM

## 2021-10-22 DIAGNOSIS — I87.2 VENOUS INSUFFICIENCY OF BOTH LOWER EXTREMITIES: ICD-10-CM

## 2021-10-22 DIAGNOSIS — L81.9 HYPERPIGMENTATION OF SKIN: ICD-10-CM

## 2021-10-22 DIAGNOSIS — R63.6 UNDERWEIGHT: ICD-10-CM

## 2021-10-22 DIAGNOSIS — F33.9 RECURRENT DEPRESSION (HCC): ICD-10-CM

## 2021-10-22 DIAGNOSIS — M81.0 OSTEOPOROSIS WITHOUT CURRENT PATHOLOGICAL FRACTURE, UNSPECIFIED OSTEOPOROSIS TYPE: Primary | ICD-10-CM

## 2021-10-22 DIAGNOSIS — E55.9 VITAMIN D DEFICIENCY: ICD-10-CM

## 2021-10-22 DIAGNOSIS — F41.9 ANXIETY: ICD-10-CM

## 2021-10-22 DIAGNOSIS — R15.9 INCONTINENCE OF FECES, UNSPECIFIED FECAL INCONTINENCE TYPE: ICD-10-CM

## 2021-10-22 DIAGNOSIS — Z23 ENCOUNTER FOR IMMUNIZATION: ICD-10-CM

## 2021-10-22 PROCEDURE — G9717 DOC PT DX DEP/BP F/U NT REQ: HCPCS | Performed by: INTERNAL MEDICINE

## 2021-10-22 PROCEDURE — G0008 ADMIN INFLUENZA VIRUS VAC: HCPCS | Performed by: INTERNAL MEDICINE

## 2021-10-22 PROCEDURE — 99214 OFFICE O/P EST MOD 30 MIN: CPT | Performed by: INTERNAL MEDICINE

## 2021-10-22 PROCEDURE — G8427 DOCREV CUR MEDS BY ELIG CLIN: HCPCS | Performed by: INTERNAL MEDICINE

## 2021-10-22 PROCEDURE — G8419 CALC BMI OUT NRM PARAM NOF/U: HCPCS | Performed by: INTERNAL MEDICINE

## 2021-10-22 PROCEDURE — 90686 IIV4 VACC NO PRSV 0.5 ML IM: CPT | Performed by: INTERNAL MEDICINE

## 2021-10-22 NOTE — PROGRESS NOTES
Health Maintenance Due   Topic Date Due    DTaP/Tdap/Td series (1 - Tdap) Never done    Flu Vaccine (1) 09/01/2021       Chief Complaint   Patient presents with    Rash    Follow-up       1. Have you been to the ER, urgent care clinic since your last visit? Hospitalized since your last visit? No    2. Have you seen or consulted any other health care providers outside of the [address] [place] [address] since your last visit? Include any pap smears or colon screening. No    3) Do you have an Advance Directive on file? no    4) Are you interested in receiving information on Advance Directives? NO      Patient is accompanied by self I have received verbal consent from Tomi Bernard to discuss any/all medical information while they are present in the room. Tomi Bernard is a 41 y.o. male  who presents for routine immunization(s). Patient denies any symptoms , reactions or allergies that would exclude them from being immunized today. Risks and adverse reactions were discussed. The patient/caregiver was provided the VIS and allotted time to read and ask questions prior to administration of vaccine. Patient voiced full understanding and signed Adult Immunization Consent form. All questions were addressed. Patient was observed for 10 min post injection. There were no reactions observed.

## 2021-11-02 ENCOUNTER — HOSPITAL ENCOUNTER (OUTPATIENT)
Dept: MRI IMAGING | Age: 41
Discharge: HOME | End: 2021-11-02
Attending: NURSE PRACTITIONER
Payer: MEDICARE

## 2021-11-02 DIAGNOSIS — Z74.09 IMPAIRED FUNCTIONAL MOBILITY, BALANCE, GAIT, AND ENDURANCE: ICD-10-CM

## 2021-11-02 PROCEDURE — 72148 MRI LUMBAR SPINE W/O DYE: CPT

## 2021-11-16 ENCOUNTER — TELEPHONE (OUTPATIENT)
Dept: INTERNAL MEDICINE CLINIC | Age: 41
End: 2021-11-16

## 2021-11-16 NOTE — TELEPHONE ENCOUNTER
----- Message from Dwaine Livingston sent at 11/16/2021 10:59 AM EST -----  Subject: Message to Provider    QUESTIONS  Information for Provider? Pt wants referral request to be faxed Dr. Reginald Arellano, Vascular Specialist on Fax Number [phone].   ---------------------------------------------------------------------------  --------------  [address]  What is the best way for the office to contact you? OK to leave message on   voicemail  Preferred Call Back Phone Number? [phone]  ---------------------------------------------------------------------------  --------------  SCRIPT ANSWERS  Relationship to Patient? Other  Representative Name? Eden Grigsby   Is the Representative on the appropriate HIPAA document in Epic?  Yes

## 2021-12-28 RX ORDER — CALCIUM CARBONATE/VITAMIN D3 600MG-5MCG
TABLET ORAL
Qty: 62 TABLET | Refills: 11 | Status: SHIPPED | OUTPATIENT
Start: 2021-12-28

## 2022-02-01 ENCOUNTER — VIRTUAL VISIT (OUTPATIENT)
Dept: INTERNAL MEDICINE CLINIC | Age: 42
End: 2022-02-01
Payer: MEDICAID

## 2022-02-01 DIAGNOSIS — L08.9 SKIN INFECTION: Primary | ICD-10-CM

## 2022-02-01 PROCEDURE — 99213 OFFICE O/P EST LOW 20 MIN: CPT | Performed by: NURSE PRACTITIONER

## 2022-02-01 RX ORDER — MUPIROCIN 20 MG/G
OINTMENT TOPICAL 2 TIMES DAILY
Qty: 15 G | Refills: 0 | Status: SHIPPED | OUTPATIENT
Start: 2022-02-01 | End: 2022-02-08

## 2022-02-01 NOTE — PROGRESS NOTES
Dutch Owens is a 35 y.o. male who was seen by synchronous (real-time) audio-video technology on 2/1/2022 for Skin Problem, Depression, and Vitamin D Deficiency      Assessment & Plan:   Diagnoses and all orders for this visit:    1. Skin infection  Cleanse affected area with soap and water bid and apply: Will order  -     mupirocin (BACTROBAN) 2 % ointment; Apply  to affected area two (2) times a day for 7 days. Notify of worsening or failure to improve. Regular hand washing recommended. Patient encouraged to call or return to office if symptoms do not improve or worsen. Reviewed medications and side effects in detail. Reviewed plan of care with patient who acknowledges understanding and agrees. Subjective: This is a patient of Dr. Vickie Sykes who presents today with concerns of skin problem. The patient has been noted to have a sore to his upper lip since yesterday with small purulent drainage. No fever or chills. Triple antibiotic ointment was applied. Prior to Admission medications    Medication Sig Start Date End Date Taking? Authorizing Provider   calcium-vitamin D 600 mg-5 mcg (200 unit) tab TAKE 1 TABLET BY MOUTH TWICE A DAY 12/28/21  Yes Darci Liang, DO   One Daily Multivitamin tablet TAKE 1 TABLET BY MOUTH DAILY 10/29/21  Yes Dena Bansal NP   melatonin 5 mg tablet TAKE 1 TABLET BY MOUTH AT BEDTIME 10/29/21  Yes Dena Bansal NP   OTHER    Yes Provider, Historical   OTHER Focus factor take two tablets twice a day with food 10/4/21  Yes Anai INFANTE NP   fluticasone propionate (FLONASE) 50 mcg/actuation nasal spray 2 Sprays by Both Nostrils route daily as needed for Rhinitis. BLOW NOSE: 2 SPRAYS IN EACH NOSTRIL DAILY FOR ALLERGY. 9/27/21  Yes Dena Bansal NP   gabapentin (NEURONTIN) 100 mg capsule TAKE (1) CAPSULE BY MOUTH THREE TIMES DAILY. 9/7/21  Yes Anai INFANTE NP   theanine 200 mg cap Take 200 mg by mouth two (2) times a day.  5/27/21  Yes Darci Liang, DO Refresh Tears 0.5 % drop ophthalmic solution PLACE 1 DROP INTO EACH EYE TWICE DAILY 4/30/21  Yes Monica Barthel, NP   L-Methylfolate 7.5 mg tab  4/15/21  Yes Provider, Historical   OTHER L-Theanine 150 mg 1 daily 2/18/21  Yes Moshe Cagle, DO   OTHER Pancreatin 10x - 200, I TID with meals 1/17/20  Yes Moshe Cagle, DO   B.infantis-B.ani-B.long-B.bifi (PROBIOTIC 4X) 10-15 mg TbEC Take  by mouth daily. Yes Provider, Historical   OTHER Indications: Elemental Diet Shake   Yes Provider, Historical   acetylcysteine 600 mg cap capsule Take  by mouth. Yes Provider, Historical   amitriptyline (ELAVIL) 50 mg tablet Take 50 mg by mouth nightly. 6/15/18  Yes Provider, Historical   levomefolate-algal oil (DEPLIN, ALGAL OIL,) 7.5-90.314 mg cap Take  by mouth. Yes Provider, Historical   acetaminophen (TYLENOL) 325 mg tablet Take  by mouth every four (4) hours as needed for Pain. Yes Provider, Historical   guaiFENesin ER (MUCINEX) 600 mg ER tablet Take 600 mg by mouth daily as needed for Congestion. Yes Provider, Historical   brief disposable (ADULT) misc by Does Not Apply route.  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, without mention of intractable epilepsy 5/14/2011    Mental retardation     Osteoporosis     Other ill-defined conditions(799.89)     intestinal problems    Psychiatric disorder     anxiety    Seizure disorder [facility])      Past Surgical History:   Procedure Laterality Date    COLONOSCOPY N/A 1/23/2019    COLONOS[facility]uyen [facility]  1/23/2019         HX APPENDECTOMY  1/28/13    APPENDECTOMY  HX GI      colon resection    HX GI  1/28/13    LAPAROTOMY EXPLORATORY - OPEN LYSIS OF ADHESIONS     HX HERNIA REPAIR      HX OTHER SURGICAL      imperforated anus birth defect    UPPER GI ENDOSCOPY,BIOPSY  3/17/2017            Review of Systems   Constitutional: Negative for chills and fever. HENT: Negative. Respiratory: Negative. Cardiovascular: Negative. Musculoskeletal: Negative. Skin:        Sore to upper lip   Neurological: Negative. Endo/Heme/Allergies: Negative.         Objective:     Patient-Reported Vitals 3/19/2021   Patient-Reported Weight 111   Patient-Reported Height -   Patient-Reported Temperature 98.5        [INSTRUCTIONS:  \"[x]\" Indicates a positive item  \"[]\" Indicates a negative item  -- DELETE ALL ITEMS NOT EXAMINED]    Constitutional: [x] Appears well-developed and well-nourished [x] No apparent distress      [] Abnormal -     Mental status: [x] Alert and awake  [x] Oriented to person/place/time [x] Able to follow commands    [] Abnormal -     Eyes:   EOM    [x]  Normal    [] Abnormal -   Sclera  [x]  Normal    [] Abnormal -          Discharge [x]  None visible   [] Abnormal -     HENT: [x] Normocephalic, atraumatic  [x] Abnormal - open area above upper lip, near nose, with small purulent drainage noted  [x] Mouth/Throat: Mucous membranes are moist    Neck: [x] No visualized mass [] Abnormal -     Pulmonary/Chest: [x] Respiratory effort normal   [x] No visualized signs of difficulty breathing or respiratory distress        [] Abnormal -      Musculoskeletal:           [x] Normal range of motion of neck        [] Abnormal -     Neurological:        [x] No Facial Asymmetry (Cranial nerve 7 motor function) (limited exam due to video visit)          [x] No gaze palsy        [] Abnormal -          Skin:        [x] No significant exanthematous lesions or discoloration noted on facial skin         [] Abnormal -            Psychiatric:       [x] Normal Affect [] Abnormal - [x] No Hallucinations    Other pertinent observable physical exam findings:-        We discussed the expected course, resolution and complications of the diagnosis(es) in detail. Medication risks, benefits, costs, interactions, and alternatives were discussed as indicated. I advised him to contact the office if his condition worsens, changes or fails to improve as anticipated. He expressed understanding with the diagnosis(es) and plan. Bird Mc, was evaluated through a synchronous (real-time) audio-video encounter. The patient (or guardian if applicable) is aware that this is a billable service, which includes applicable co-pays. Verbal consent to proceed has been obtained. The visit was conducted pursuant to the emergency declaration under the [address], [address] [place] [address] waiver authority and the [employer] General Act. Patient identification was verified, and a caregiver was present when appropriate. The patient was located at home in a state where the provider was licensed to provide care.       Esme Conn NP

## 2022-02-01 NOTE — PROGRESS NOTES
ADVISED PATIENT OF THE FOLLOWING HEALTH MAINTAINCE DUE  Health Maintenance Due   Topic Date Due    DTaP/Tdap/Td series (1 - Tdap) Never done    COVID-19 Vaccine (3 - Booster for Georgeana Crumble series) 07/21/2021      Chief Complaint   Patient presents with    Skin Problem    Depression    Vitamin D Deficiency       1. Have you been to the ER, urgent care clinic since your last visit? Hospitalized since your last visit? No    2. Have you seen or consulted any other health care providers outside of the [address] [place] [address] since your last visit? Include any DEXA scan, mammography  or colon screening. No    3. Do you have an Advance Directive on file? no    4. Do you have a DNR on file? no    Patient is accompanied by self I have received verbal consent from Cabrera Uribe to discuss any/all medical information while they are present in the room.       Advance Care Planning 9/13/2021   Patient's Healthcare Decision Maker is: Legal Next of Kin   Primary Decision Maker Name -   Primary Decision Maker Phone Number -   Primary Decision Maker Relationship to Patient -   Secondary Decision Maker Name -   Secondary Decision Maker Phone Number -   Secondary Decision Maker Relationship to Patient -   Confirm Advance Directive None         [address], [address] [place] [address]  Phone: [phone] Fax: [address] [place] [address], [address] [facility], [facility] [address] [facility] [address]  Phone: [phone] Fax: [phone] PT CALLING FOR THE RESULTS OF THE MRI SHE HAD DONE YESTERDAY.

## 2022-02-23 ENCOUNTER — OFFICE VISIT (OUTPATIENT)
Dept: INTERNAL MEDICINE CLINIC | Age: 42
End: 2022-02-23
Payer: MEDICAID

## 2022-02-23 VITALS
RESPIRATION RATE: 16 BRPM | HEART RATE: 88 BPM | TEMPERATURE: 98.4 F | OXYGEN SATURATION: 98 % | BODY MASS INDEX: 16.32 KG/M2 | WEIGHT: 114 LBS | DIASTOLIC BLOOD PRESSURE: 70 MMHG | HEIGHT: 70 IN | SYSTOLIC BLOOD PRESSURE: 136 MMHG

## 2022-02-23 DIAGNOSIS — E55.9 VITAMIN D DEFICIENCY: ICD-10-CM

## 2022-02-23 DIAGNOSIS — F41.9 ANXIETY: ICD-10-CM

## 2022-02-23 DIAGNOSIS — Z98.890 HISTORY OF ABDOMINAL SURGERY: ICD-10-CM

## 2022-02-23 DIAGNOSIS — K59.1 FUNCTIONAL DIARRHEA: Primary | ICD-10-CM

## 2022-02-23 DIAGNOSIS — K90.9 INTESTINAL MALABSORPTION, UNSPECIFIED TYPE: ICD-10-CM

## 2022-02-23 DIAGNOSIS — F33.9 RECURRENT DEPRESSION (HCC): ICD-10-CM

## 2022-02-23 DIAGNOSIS — Z87.19 S/P SMALL BOWEL OBSTRUCTION: ICD-10-CM

## 2022-02-23 PROCEDURE — 99214 OFFICE O/P EST MOD 30 MIN: CPT | Performed by: INTERNAL MEDICINE

## 2022-02-23 NOTE — PROGRESS NOTES
Sonia Mcintyre is a 35 y.o. male  Chief Complaint   Patient presents with    Follow-up     4 months     1. Have you been to the ER no, urgent care clinic since your last visit?no  Hospitalized since your last visit? 2. Have you seen or consulted any other health care providers outside of the [address] [place] [address] since your last visit? yes  Include any pap smears or colon screening.  no

## 2022-02-28 DIAGNOSIS — F41.9 ANXIETY: ICD-10-CM

## 2022-02-28 DIAGNOSIS — R25.1 TREMOR: ICD-10-CM

## 2022-02-28 RX ORDER — GABAPENTIN 100 MG/1
CAPSULE ORAL
Qty: 93 CAPSULE | Refills: 5 | Status: SHIPPED | OUTPATIENT
Start: 2022-02-28 | End: 2022-08-31 | Stop reason: SDUPTHER

## 2022-03-01 NOTE — PROGRESS NOTES
HISTORY OF PRESENT ILLNESS  Sonia Mcintyre is a 35 y.o. male. Pt. comes in with his caregiver and mother from his group home for f/u. Has a few chronic medical issues as documented. His chronic GI issues have been stable. Has had more episodes of diarrhea. Denies any abdominal pain or other related symptoms. Is followed by multiple providers including GI, colorectal surgeon, and a .    He depends on staff for many ADLs. He goes to day program.  Has chronic cognitive deficit. Has gained more weight. Appetite is good. Has been chronically underweight. History of abdominal surgery as a child. Has osteoporosis. On calcium and vit D. Slow gait but no falls. PMH/PSH/Allergies/Social History/medication list and most recent studies reviewed with patient. Remains on supplements. Tobacco/alcohol use: No    HPI    Review of Systems   Constitutional: Negative. HENT: Negative. Eyes: Negative. Respiratory: Negative for shortness of breath. Cardiovascular: Negative for chest pain and leg swelling. Gastrointestinal: Positive for diarrhea (Fecal incontinence). Negative for abdominal pain, blood in stool, constipation, heartburn, melena, nausea and vomiting. Genitourinary: Positive for urgency. Negative for dysuria. Musculoskeletal: Negative for back pain, falls and joint pain. Skin: Negative. Areas hyperpigmentation on feet   Neurological: Positive for tremors. Negative for dizziness, sensory change, seizures and headaches. Endo/Heme/Allergies: Negative. Psychiatric/Behavioral: Positive for depression. The patient is nervous/anxious and has insomnia. All other systems reviewed and are negative. Physical Exam  Vitals and nursing note reviewed. Constitutional:       General: He is not in acute distress. Appearance: He is well-developed. Comments: Pleasant, chronically underweight  cognitivelty a bit slow   HENT:      Head: Normocephalic and atraumatic. Mouth/Throat:      Mouth: Mucous membranes are moist.      Pharynx: Oropharynx is clear. Eyes:      General: No scleral icterus. Conjunctiva/sclera: Conjunctivae normal.   Neck:      Thyroid: No thyromegaly. Vascular: No carotid bruit or JVD. Cardiovascular:      Rate and Rhythm: Normal rate and regular rhythm. Heart sounds: Normal heart sounds. No murmur heard. Pulmonary:      Effort: Pulmonary effort is normal. No respiratory distress. Breath sounds: Normal breath sounds. No wheezing or rales. Abdominal:      General: Bowel sounds are normal. There is no distension. Palpations: Abdomen is soft. There is no mass. Tenderness: There is no abdominal tenderness. There is no right CVA tenderness, left CVA tenderness, guarding or rebound. Comments: Chronic surgical scars   Musculoskeletal:         General: No tenderness. Cervical back: Normal range of motion and neck supple. Right lower leg: No edema. Left lower leg: No edema. Skin:     General: Skin is warm and dry. Findings: No rash. Comments: Hyperpigmented areas on both feet with prominent veins, nontender, good pedal pulses   Neurological:      Mental Status: He is alert and oriented to person, place, and time. Coordination: Coordination abnormal.   Psychiatric:         Behavior: Behavior normal.      Comments: Repeats words  Chronic cognitive deficit , about same         ASSESSMENT and PLAN  Diagnoses and all orders for this visit:    1. Functional diarrhea  Stable chronic condition. Continue current treatment/medications. Keep up with fluid and fiber intake  2. Intestinal malabsorption, unspecified type  Follow-up with GI as scheduled  3. Recurrent depression (HCC)  Stable chronic condition. Continue current treatment/medications. Follow-up with psychiatrist as scheduled   4. Vitamin D deficiency  Continue vitamin supplements  5. S/p small bowel obstruction    6.  History of abdominal surgery    7. Anxiety  Stable on medications    Follow-up and Dispositions    · Return in about 6 months (around 8/23/2022). All chronic medical problems are stable  Continue with current medical management and plan  lab results and schedule of future lab studies reviewed with patient  reviewed diet, exercise and weight control  reviewed medications and side effects in detail  F/u with other MD's/ providers as scheduled  COVID-19 precautions discussed with pt  An After Visit Summary was printed and given to the patient.

## 2022-03-14 ENCOUNTER — HOSPITAL ENCOUNTER (OUTPATIENT)
Dept: INFUSION THERAPY | Age: 42
Discharge: HOME | End: 2022-03-14
Payer: MEDICARE

## 2022-03-14 VITALS
RESPIRATION RATE: 18 BRPM | HEART RATE: 93 BPM | OXYGEN SATURATION: 98 % | SYSTOLIC BLOOD PRESSURE: 106 MMHG | DIASTOLIC BLOOD PRESSURE: 65 MMHG | TEMPERATURE: 97.6 F

## 2022-03-14 LAB
ALBUMIN SERPL-MCNC: 3.8 G/DL (ref 3.5–5)
ANION GAP SERPL CALC-SCNC: 3 MMOL/L (ref 5–15)
BUN SERPL-MCNC: 18 MG/DL (ref 6–20)
BUN/CREAT SERPL: 20 (ref 12–20)
CALCIUM SERPL-MCNC: 9.8 MG/DL (ref 8.5–10.1)
CHLORIDE SERPL-SCNC: 103 MMOL/L (ref 97–108)
CO2 SERPL-SCNC: 33 MMOL/L (ref 21–32)
CREAT SERPL-MCNC: 0.89 MG/DL (ref 0.7–1.3)
GLUCOSE SERPL-MCNC: 79 MG/DL (ref 65–100)
PHOSPHATE SERPL-MCNC: 3.6 MG/DL (ref 2.6–4.7)
POTASSIUM SERPL-SCNC: 4.3 MMOL/L (ref 3.5–5.1)
SODIUM SERPL-SCNC: 139 MMOL/L (ref 136–145)

## 2022-03-14 PROCEDURE — 96372 THER/PROPH/DIAG INJ SC/IM: CPT

## 2022-03-14 PROCEDURE — 36415 COLL VENOUS BLD VENIPUNCTURE: CPT

## 2022-03-14 PROCEDURE — 80069 RENAL FUNCTION PANEL: CPT

## 2022-03-14 PROCEDURE — 74011250636 HC RX REV CODE- 250/636: Performed by: INTERNAL MEDICINE

## 2022-03-14 RX ADMIN — DENOSUMAB 60 MG: 60 INJECTION SUBCUTANEOUS at 11:56

## 2022-03-14 NOTE — PROGRESS NOTES
[address] \[facility] [place] [facility]\"" @ [facility] VISIT NOTE    2976 Patient arrives for Labs & Prolia q6mths without acute problems. Please see connect care for complete assessment and education provided. Vital signs stable throughout and prior to discharge, Pt. Tolerated treatment well and discharged without incident. Patient/caregiver is aware of next [facility] appointment on 9/15/2022. Appointment card given to caregiver. Medications Verified by Yevgeniy Boss RN via [email or web address]:  1. Prolia 60mg SQ left arm    VITAL SIGNS Patient Vitals for the past 12 hrs:   Temp Pulse Resp BP SpO2   03/14/22 1047 97.6 °F (36.4 °C) 93 18 106/65 98 %       LAB WORK Lab results pending, please see Connect Care for results.   Recent Results (from the past 12 hour(s))   RENAL FUNCTION PANEL    Collection Time: 03/14/22 10:54 AM   Result Value Ref Range    Sodium 139 136 - 145 mmol/L    Potassium 4.3 3.5 - 5.1 mmol/L    Chloride 103 97 - 108 mmol/L    CO2 33 (H) 21 - 32 mmol/L    Anion gap 3 (L) 5 - 15 mmol/L    Glucose 79 65 - 100 mg/dL    BUN 18 6 - 20 MG/DL    Creatinine 0.89 0.70 - 1.30 MG/DL    BUN/Creatinine ratio 20 12 - 20      GFR est AA >60 >60 ml/min/1.73m2    GFR est non-AA >60 >60 ml/min/1.73m2    Calcium 9.8 8.5 - 10.1 MG/DL    Phosphorus 3.6 2.6 - 4.7 MG/DL    Albumin 3.8 3.5 - 5.0 g/dL

## 2022-03-17 ENCOUNTER — TELEPHONE (OUTPATIENT)
Dept: INTERNAL MEDICINE CLINIC | Age: 42
End: 2022-03-17

## 2022-03-17 DIAGNOSIS — K59.1 FUNCTIONAL DIARRHEA: Primary | ICD-10-CM

## 2022-03-17 DIAGNOSIS — R15.9 INCONTINENCE OF FECES, UNSPECIFIED FECAL INCONTINENCE TYPE: ICD-10-CM

## 2022-03-17 NOTE — TELEPHONE ENCOUNTER
----- Message from Francisco Javiermaricruz Churchill sent at 3/17/2022 12:42 PM EDT -----  Subject: Message to Provider    QUESTIONS  Information for Provider? pt needs a prior auth sent to his insurance for   physical therapy of [employer]. please call pt to discuss. Fax number   for prior auth #[phone]  ---------------------------------------------------------------------------  --------------  CALL BACK INFO  What is the best way for the office to contact you? OK to leave message on   voicemail  Preferred Call Back Phone Number? [phone]  ---------------------------------------------------------------------------  --------------  SCRIPT ANSWERS  Relationship to Patient?  Third Party  Representative Name? caregiver

## 2022-03-18 PROBLEM — L81.9 HYPERPIGMENTATION OF SKIN: Status: ACTIVE | Noted: 2021-10-22

## 2022-03-18 PROBLEM — I87.2 VENOUS INSUFFICIENCY OF BOTH LOWER EXTREMITIES: Status: ACTIVE | Noted: 2021-10-22

## 2022-03-19 PROBLEM — K59.1 FUNCTIONAL DIARRHEA: Status: ACTIVE | Noted: 2018-07-04

## 2022-03-19 PROBLEM — K52.9 CHRONIC DIARRHEA: Status: ACTIVE | Noted: 2018-06-22

## 2022-03-19 PROBLEM — R63.4 WEIGHT LOSS: Status: ACTIVE | Noted: 2017-11-27

## 2022-03-19 PROBLEM — K90.9 INTESTINAL MALABSORPTION: Status: ACTIVE | Noted: 2018-12-11

## 2022-03-19 PROBLEM — Z86.16 HISTORY OF 2019 NOVEL CORONAVIRUS DISEASE (COVID-19): Status: ACTIVE | Noted: 2020-07-29

## 2022-03-19 PROBLEM — E87.6 HYPOKALEMIA: Status: ACTIVE | Noted: 2018-06-22

## 2022-03-19 PROBLEM — Z98.890 HISTORY OF ABDOMINAL SURGERY: Status: ACTIVE | Noted: 2018-03-27

## 2022-03-19 PROBLEM — I95.2 HYPOTENSION DUE TO DRUGS: Status: ACTIVE | Noted: 2018-05-22

## 2022-03-19 PROBLEM — R63.6 UNDERWEIGHT: Status: ACTIVE | Noted: 2017-05-08

## 2022-03-19 PROBLEM — F33.9 RECURRENT DEPRESSION (HCC): Status: ACTIVE | Noted: 2018-03-27

## 2022-05-03 NOTE — TELEPHONE ENCOUNTER
L-Theanine 100-increased to 200mg per day. Pt mother is sending info on this med via fax for Dr. Shaka Richard to review. She also wants to discuss adding a protein shake to pt diet.  PT group [place] needs permission to administer the L-Theanine if approved    [phone]- Shaun Waggoner from [employer] pt received from MERLENE Sanon in purple. pt upon assessment A&Ox4, breathing spontaneously on room air, all safety measures in place, vitals taken, pt denying pain/discomfort, rpt troponin drawn, dispo pending lab results.

## 2022-06-01 DIAGNOSIS — G47.09 OTHER INSOMNIA: ICD-10-CM

## 2022-06-01 RX ORDER — CHOLECALCIFEROL (VITAMIN D3) 125 MCG
CAPSULE ORAL
Qty: 31 TABLET | Refills: 12 | Status: SHIPPED | OUTPATIENT
Start: 2022-06-01

## 2022-06-01 RX ORDER — MULTIVITAMIN
TABLET ORAL
Qty: 31 TABLET | Refills: 0 | Status: SHIPPED | OUTPATIENT
Start: 2022-06-01 | End: 2022-06-28

## 2022-06-16 DIAGNOSIS — F41.9 ANXIETY: ICD-10-CM

## 2022-06-16 RX ORDER — CHOLECALCIFEROL (VITAMIN D3) 125 MCG
200 CAPSULE ORAL 2 TIMES DAILY
Qty: 180 EACH | Refills: 1 | Status: SHIPPED | OUTPATIENT
Start: 2022-06-16

## 2022-06-16 NOTE — TELEPHONE ENCOUNTER
[General Appearance - Well Developed] : well developed lov 2/23/22  Nov: 8/24/22 [General Appearance - Well Nourished] : well nourished [Normal Appearance] : normal appearance [Well Groomed] : well groomed [General Appearance - In No Acute Distress] : no acute distress [Abdomen Soft] : soft [Abdomen Tenderness] : non-tender [Costovertebral Angle Tenderness] : no ~M costovertebral angle tenderness [Urethral Meatus] : meatus normal [Urinary Bladder Findings] : the bladder was normal on palpation [Scrotum] : the scrotum was normal [Testes Mass (___cm)] : there were no testicular masses [No Prostate Nodules] : no prostate nodules [Edema] : no peripheral edema [] : no respiratory distress [Respiration, Rhythm And Depth] : normal respiratory rhythm and effort [Exaggerated Use Of Accessory Muscles For Inspiration] : no accessory muscle use [Oriented To Time, Place, And Person] : oriented to person, place, and time [Affect] : the affect was normal [Mood] : the mood was normal [Not Anxious] : not anxious [Normal Station and Gait] : the gait and station were normal for the patient's age [No Focal Deficits] : no focal deficits [No Palpable Adenopathy] : no palpable adenopathy

## 2022-06-17 ENCOUNTER — TELEPHONE (OUTPATIENT)
Dept: INTERNAL MEDICINE CLINIC | Age: 42
End: 2022-06-17

## 2022-06-17 NOTE — TELEPHONE ENCOUNTER
Laurence Whaley - please call patient's mother - she's been trying to get an insurance referral Marlen Domingo) for Dr. Simona Marquez for a pelvic floor therapist for her son Barron Durán.

## 2022-06-21 ENCOUNTER — TELEPHONE (OUTPATIENT)
Dept: INTERNAL MEDICINE CLINIC | Age: 42
End: 2022-06-21

## 2022-06-21 NOTE — TELEPHONE ENCOUNTER
theanine 200 mg cap [[id number]]     Order Details  Dose: 200 mg Route: Oral Frequency: 2 TIMES DAILY   Dispense Quantity: 180 Each Refills: 1          Sig: Take 200 mg by mouth two (2) times a day. Is this the correct dosage? Please advise.

## 2022-06-28 RX ORDER — MULTIVITAMIN
TABLET ORAL
Qty: 30 TABLET | Refills: 11 | Status: SHIPPED | OUTPATIENT
Start: 2022-06-28

## 2022-06-29 ENCOUNTER — HOSPITAL ENCOUNTER (EMERGENCY)
Age: 42
Discharge: HOME | End: 2022-06-29
Attending: EMERGENCY MEDICINE
Payer: MEDICARE

## 2022-06-29 ENCOUNTER — APPOINTMENT (OUTPATIENT)
Dept: GENERAL RADIOLOGY | Age: 42
End: 2022-06-29
Attending: EMERGENCY MEDICINE
Payer: MEDICARE

## 2022-06-29 VITALS
DIASTOLIC BLOOD PRESSURE: 61 MMHG | RESPIRATION RATE: 20 BRPM | SYSTOLIC BLOOD PRESSURE: 101 MMHG | HEART RATE: 114 BPM | TEMPERATURE: 100 F | OXYGEN SATURATION: 93 %

## 2022-06-29 DIAGNOSIS — J12.9 VIRAL PNEUMONIA: ICD-10-CM

## 2022-06-29 DIAGNOSIS — U07.1 COVID-19 VIRUS INFECTION: Primary | ICD-10-CM

## 2022-06-29 LAB
ALBUMIN SERPL-MCNC: 3.1 G/DL (ref 3.5–5)
ALBUMIN/GLOB SERPL: 0.7 {RATIO} (ref 1.1–2.2)
ALP SERPL-CCNC: 42 U/L (ref 45–117)
ALT SERPL-CCNC: 24 U/L (ref 12–78)
ANION GAP SERPL CALC-SCNC: 7 MMOL/L (ref 5–15)
AST SERPL-CCNC: 31 U/L (ref 15–37)
BASOPHILS # BLD: 0 K/UL (ref 0–0.1)
BASOPHILS NFR BLD: 0 % (ref 0–1)
BILIRUB SERPL-MCNC: 0.5 MG/DL (ref 0.2–1)
BUN SERPL-MCNC: 15 MG/DL (ref 6–20)
BUN/CREAT SERPL: 14 (ref 12–20)
CALCIUM SERPL-MCNC: 9.4 MG/DL (ref 8.5–10.1)
CHLORIDE SERPL-SCNC: 103 MMOL/L (ref 97–108)
CO2 SERPL-SCNC: 25 MMOL/L (ref 21–32)
CREAT SERPL-MCNC: 1.09 MG/DL (ref 0.7–1.3)
DIFFERENTIAL METHOD BLD: ABNORMAL
EOSINOPHIL # BLD: 0 K/UL (ref 0–0.4)
EOSINOPHIL NFR BLD: 0 % (ref 0–7)
ERYTHROCYTE [DISTWIDTH] IN BLOOD BY AUTOMATED COUNT: 12.6 % (ref 11.5–14.5)
GLOBULIN SER CALC-MCNC: 4.2 G/DL (ref 2–4)
GLUCOSE SERPL-MCNC: 136 MG/DL (ref 65–100)
HCT VFR BLD AUTO: 44.4 % (ref 36.6–50.3)
HGB BLD-MCNC: 15 G/DL (ref 12.1–17)
IMM GRANULOCYTES # BLD AUTO: 0 K/UL (ref 0–0.04)
IMM GRANULOCYTES NFR BLD AUTO: 0 % (ref 0–0.5)
LACTATE BLD-SCNC: 1.03 MMOL/L (ref 0.4–2)
LYMPHOCYTES # BLD: 0.8 K/UL (ref 0.8–3.5)
LYMPHOCYTES NFR BLD: 8 % (ref 12–49)
MCH RBC QN AUTO: 31.4 PG (ref 26–34)
MCHC RBC AUTO-ENTMCNC: 33.8 G/DL (ref 30–36.5)
MCV RBC AUTO: 93.1 FL (ref 80–99)
MONOCYTES # BLD: 0.5 K/UL (ref 0–1)
MONOCYTES NFR BLD: 5 % (ref 5–13)
NEUTS SEG # BLD: 8.7 K/UL (ref 1.8–8)
NEUTS SEG NFR BLD: 87 % (ref 32–75)
NRBC # BLD: 0 K/UL (ref 0–0.01)
NRBC BLD-RTO: 0 PER 100 WBC
PLATELET # BLD AUTO: 120 K/UL (ref 150–400)
PMV BLD AUTO: 8.8 FL (ref 8.9–12.9)
POTASSIUM SERPL-SCNC: 3.9 MMOL/L (ref 3.5–5.1)
PROCALCITONIN SERPL-MCNC: 1.5 NG/ML
PROT SERPL-MCNC: 7.3 G/DL (ref 6.4–8.2)
RBC # BLD AUTO: 4.77 M/UL (ref 4.1–5.7)
SODIUM SERPL-SCNC: 135 MMOL/L (ref 136–145)
TROPONIN-HIGH SENSITIVITY: <4 NG/L (ref 0–76)
WBC # BLD AUTO: 10 K/UL (ref 4.1–11.1)

## 2022-06-29 PROCEDURE — 74011250636 HC RX REV CODE- 250/636: Performed by: EMERGENCY MEDICINE

## 2022-06-29 PROCEDURE — 83605 ASSAY OF LACTIC ACID: CPT

## 2022-06-29 PROCEDURE — 74011250637 HC RX REV CODE- 250/637: Performed by: EMERGENCY MEDICINE

## 2022-06-29 PROCEDURE — 80053 COMPREHEN METABOLIC PANEL: CPT

## 2022-06-29 PROCEDURE — 93005 ELECTROCARDIOGRAM TRACING: CPT

## 2022-06-29 PROCEDURE — 84145 PROCALCITONIN (PCT): CPT

## 2022-06-29 PROCEDURE — 85025 COMPLETE CBC W/AUTO DIFF WBC: CPT

## 2022-06-29 PROCEDURE — 99285 EMERGENCY DEPT VISIT HI MDM: CPT

## 2022-06-29 PROCEDURE — 36415 COLL VENOUS BLD VENIPUNCTURE: CPT

## 2022-06-29 PROCEDURE — 71045 X-RAY EXAM CHEST 1 VIEW: CPT

## 2022-06-29 PROCEDURE — 96374 THER/PROPH/DIAG INJ IV PUSH: CPT

## 2022-06-29 PROCEDURE — 87040 BLOOD CULTURE FOR BACTERIA: CPT

## 2022-06-29 PROCEDURE — 84484 ASSAY OF TROPONIN QUANT: CPT

## 2022-06-29 RX ORDER — ACETAMINOPHEN 500 MG
1000 TABLET ORAL
Status: COMPLETED | OUTPATIENT
Start: 2022-06-29 | End: 2022-06-29

## 2022-06-29 RX ORDER — DEXAMETHASONE SODIUM PHOSPHATE 4 MG/ML
10 INJECTION, SOLUTION INTRA-ARTICULAR; INTRALESIONAL; INTRAMUSCULAR; INTRAVENOUS; SOFT TISSUE
Status: COMPLETED | OUTPATIENT
Start: 2022-06-29 | End: 2022-06-29

## 2022-06-29 RX ORDER — IBUPROFEN 600 MG/1
600 TABLET ORAL
Qty: 20 TABLET | Refills: 0 | Status: SHIPPED | OUTPATIENT
Start: 2022-06-29

## 2022-06-29 RX ADMIN — ACETAMINOPHEN 1000 MG: 500 TABLET ORAL at 20:07

## 2022-06-29 RX ADMIN — SODIUM CHLORIDE 1000 ML: 9 INJECTION, SOLUTION INTRAVENOUS at 20:06

## 2022-06-29 RX ADMIN — DEXAMETHASONE SODIUM PHOSPHATE 10 MG: 4 INJECTION, SOLUTION INTRAMUSCULAR; INTRAVENOUS at 21:06

## 2022-06-29 NOTE — ED TRIAGE NOTES
Pt c/o SOB, fever, and high HR. Pt diagnosed with covid on Monday. Respirations even/unlabored, pt has tremor at baseline. Pt is accompanied by caregiver and mom.

## 2022-06-30 LAB
ATRIAL RATE: 136 BPM
CALCULATED P AXIS, ECG09: 65 DEGREES
CALCULATED R AXIS, ECG10: 89 DEGREES
CALCULATED T AXIS, ECG11: 63 DEGREES
DIAGNOSIS, 93000: NORMAL
P-R INTERVAL, ECG05: 128 MS
Q-T INTERVAL, ECG07: 374 MS
QRS DURATION, ECG06: 74 MS
QTC CALCULATION (BEZET), ECG08: 562 MS
VENTRICULAR RATE, ECG03: 136 BPM

## 2022-06-30 NOTE — ED PROVIDER NOTES
58-year-old male presents from a group home accompanied by a staff member with concerns for COVID infection. Patient was exposed to Barrett earlier in the week. He was asymptomatic but did test positive on Monday. Yesterday he started to develop symptoms that included fever, cough, shortness of breath. Patient had diarrhea but no vomiting. Patient checking Tylenol and pulse ox is every 2 hours and states that his temperature was continuing to go up despite receiving Tylenol. He has not been on any antibiotics. No history of chronic lung disease. Past medical history is significant for autism, developmental delay, seizure disorder, colon resection, lysis of adhesions, appendectomy.            Past Medical History:   Diagnosis Date    Depression     Developmental delay     Encounter for long-term (current) use of other medications 5/14/2011    Fecal incontinence     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, without mention of intractable epilepsy 5/14/2011    Mental retardation     Osteoporosis     Other ill-defined conditions(799.89)     intestinal problems    Psychiatric disorder     anxiety    Seizure disorder [facility])        Past Surgical History:   Procedure Laterality Date    COLONOSCOPY N/A 1/23/2019    COLONOSCOPY performed by Yue Barakat MD at [facility] ENDOSCOPY    COLONOSCOPY,DIAGNOSTIC  1/23/2019         HX APPENDECTOMY  1/28/13    APPENDECTOMY    HX GI      colon resection    HX GI  1/28/13    LAPAROTOMY EXPLORATORY - OPEN LYSIS OF ADHESIONS     HX HERNIA REPAIR      HX OTHER SURGICAL      imperforated anus birth defect    UPPER GI ENDOSCOPY,BIOPSY  3/17/2017              Family History:   Problem Relation Age of Onset    No Known Problems Mother     Heart Disease Father     No Known Problems Sister     No Known Problems Sister        Social History     Socioeconomic History    Marital status: SINGLE Spouse name: Not on file    Number of children: Not on file    Years of education: Not on file    Highest education level: Not on file   Occupational History    Not on file   Tobacco Use    Smoking status: Never Smoker    Smokeless tobacco: Never Used   Substance and Sexual Activity    Alcohol use: No    Drug use: No    Sexual activity: Never   Other Topics Concern     Service Not Asked    Blood Transfusions Not Asked    Caffeine Concern Not Asked    Occupational Exposure Not Asked    Hobby Hazards Not Asked    Sleep Concern Not Asked    Stress Concern Not Asked    Weight Concern Not Asked    Special Diet Not Asked    Back Care Not Asked    Exercise Not Asked    Bike Helmet Not Asked    Seat Belt Not Asked    Self-Exams Not Asked   Social History Narrative    Not on file     Social Determinants of Health     Financial Resource Strain:     Difficulty of Paying Living Expenses: Not on file   Food Insecurity:     Worried About Running Out of Food in the Last Year: Not on file    Darshan of Food in the Last Year: Not on file   Transportation Needs:     Lack of Transportation (Medical): Not on file    Lack of Transportation (Non-Medical):  Not on file   Physical Activity:     Days of Exercise per Week: Not on file    Minutes of Exercise per Session: Not on file   Stress:     Feeling of Stress : Not on file   Social Connections:     Frequency of Communication with Friends and Family: Not on file    Frequency of Social Gatherings with Friends and Family: Not on file    Attends Mandaen Services: Not on file    Active Member of Clubs or Organizations: Not on file    Attends Club or Organization Meetings: Not on file    Marital Status: Not on file   Intimate Partner Violence:     Fear of Current or Ex-Partner: Not on file    Emotionally Abused: Not on file    Physically Abused: Not on file    Sexually Abused: Not on file   Housing Stability:     Unable to Pay for Housing in the Last Year: Not on file    Number of Places Lived in the Last Year: Not on file    Unstable Housing in the Last Year: Not on file         ALLERGIES: Patient has no known allergies. Review of Systems   Constitutional: Positive for fever. Negative for diaphoresis. HENT: Negative for facial swelling. Eyes: Negative for visual disturbance. Respiratory: Positive for cough. Cardiovascular: Negative for chest pain. Gastrointestinal: Negative for abdominal pain. Genitourinary: Negative for dysuria. Musculoskeletal: Negative for joint swelling. Skin: Negative for rash. Neurological: Negative for headaches. Hematological: Negative for adenopathy. Psychiatric/Behavioral: Negative for suicidal ideas. Vitals:    06/29/22 1905 06/29/22 1955 06/29/22 2025   BP: 110/66 105/66    Pulse: (!) 143  (!) 114   Resp: 24  23   Temp: (!) 100.8 °F (38.2 °C)     SpO2: 93%  96%            Physical Exam  Vitals and nursing note reviewed. Constitutional:       General: He is not in acute distress. Appearance: He is well-developed. HENT:      Head: Normocephalic and atraumatic. Eyes:      Pupils: Pupils are equal, round, and reactive to light. Cardiovascular:      Rate and Rhythm: Tachycardia present. Pulmonary:      Effort: Pulmonary effort is normal. No respiratory distress. Abdominal:      General: There is no distension. Musculoskeletal:         General: Normal range of motion. Cervical back: Normal range of motion and neck supple. Skin:     General: Skin is warm and dry. Neurological:      Mental Status: He is alert and oriented to person, place, and time. MDM  Number of Diagnoses or Management Options  COVID-19 virus infection  Viral pneumonia  Diagnosis management comments: Assessment: Patient resting comfortably. No respiratory distress. Chest x-ray is consistent with COVID-pneumonia. O2 sats have not been below 92% on room air. Lab work reassuring.   I do not see any indication for admission at this time. Patient can return to the group home with a do every 2 hour temperature and pulse ox checks. I instructed him to return to the emergency department if his O2 sat is persistently below 90%.        Amount and/or Complexity of Data Reviewed  Clinical lab tests: reviewed  Tests in the radiology section of CPT®: reviewed  Tests in the medicine section of CPT®: reviewed      ED Course as of 06/29/22 2137 Wed Jun 29, 2022 2055 IMMATURE GRANULOCYTES: 0 [JM]      ED Course User Index  [JM] Dennise Baron MD       Procedures

## 2022-07-04 LAB
BACTERIA SPEC CULT: NORMAL
SERVICE CMNT-IMP: NORMAL

## 2022-07-07 NOTE — PROGRESS NOTES
This is the Subsequent Medicare Annual Wellness Exam, performed 12 months or more after the Initial AWV or the last Subsequent AWV    I have reviewed the patient's medical history in detail and updated the computerized patient record.      History     Patient Active Problem List   Diagnosis Code    Osteoporosis M81.0    Depression F32.9    Vitamin D deficiency E55.9    Encounter for long-term (current) use of other medications Z79.899    Bowel obstruction ([facility] [address] K56.609    S/p small bowel obstruction Z87.19    Static encephalopathy G93.49    Anxiety F41.9    Stool incontinence R15.9    Underweight R63.6    Weight loss R63.4    History of abdominal surgery Z98.890    Recurrent depression ([facility] [address] F33.9    Hypotension due to drugs I95.2    Chronic diarrhea K52.9    Hypokalemia E87.6    Functional diarrhea K59.1    Medicare annual wellness visit, subsequent Z00.00    Intestinal malabsorption K90.9     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, without mention of intractable epilepsy 5/14/2011    Mental retardation     Osteoporosis     Other ill-defined conditions(799.89)     intestinal problems    Psychiatric disorder     anxiety    Seizure disorder [facility])       Past Surgical History:   Procedure Laterality Date    COLONOSCOPY N/A 1/23/2019    COLONOSCOPY performed by Shanta Diop MD at [facility] ENDOSCOPY    COLONOSCOPY,DIAGNOSTIC  1/23/2019         HX APPENDECTOMY  1/28/13    APPENDECTOMY    HX GI      colon resection    HX GI  1/28/13    LAPAROTOMY EXPLORATORY - OPEN LYSIS OF ADHESIONS     HX HERNIA REPAIR      HX OTHER SURGICAL      imperforated anus birth defect    UPPER GI ENDOSCOPY,BIOPSY  3/17/2017          Current Outpatient Medications   Medication Sig Dispense Refill    OTHER Pancreatin 10x - 200, I TID with meals 100 Cap 11    colestipol (COLESTID) 1 gram tablet Take 1 Tab by mouth every evening. And 1 po qam prn 60 Tab 2    B.infantis-B.ani-B.long-B.bifi (PROBIOTIC 4X) 10-15 mg TbEC Take  by mouth daily.  OTHER Indications: Elemental Diet Shake      gabapentin (NEURONTIN) 100 mg capsule TAKE (1) CAPSULE BY MOUTH THREE TIMES DAILY. 93 Cap 4    erythromycin (ILOTYCIN) ophthalmic ointment Apply to left inner lower eyelid TID x 7 days 3.5 g 0    melatonin 5 mg tablet Take 1 Tab by mouth nightly. 90 Tab 1    TAB-A-MARGIE tablet TAKE 1 TABLET BY MOUTH DAILY 30 Tab 5    CALCIUM 600 + D,3, 600 mg(1,500mg) -200 unit tab   99    acetylcysteine 600 mg cap capsule Take  by mouth.  Wheat Dextrin (BENEFIBER CLEAR) 3 gram/3.5 gram pwpk 1 pack daily 90 Packet 1    REFRESH TEARS 0.5 % drop ophthalmic solution INSTILL 1 DROP INTO EACH EYE TWICE A DAY 30 mL 5    OTHER L-Theanine 100 mg 1 daily 30 Tab 11    fluticasone (FLONASE) 50 mcg/actuation nasal spray BLOW NOSE: 2 SPRAYS IN EACH NOSTRIL DAILY FOR ALLERGY. 16 g 5    amitriptyline (ELAVIL) 50 mg tablet Take 50 mg by mouth nightly.  levomefolate-algal oil (DEPLIN, ALGAL OIL,) 7.5-90.314 mg cap Take  by mouth.  acetaminophen (TYLENOL) 325 mg tablet Take  by mouth every four (4) hours as needed for Pain.  guaiFENesin ER (MUCINEX) 600 mg ER tablet Take 600 mg by mouth daily as needed for Congestion.  brief disposable (ADULT) misc by Does Not Apply route.  Depends, size medium, 1 nightly 1 Package 11     No Known Allergies    Family History   Problem Relation Age of Onset    No Known Problems Mother     Heart Disease Father     No Known Problems Sister     No Known Problems Sister      Social History     Tobacco Use    Smoking status: Never Smoker    Smokeless tobacco: Never Used   Substance Use Topics    Alcohol use: No       Depression Risk Factor Screening:     3 most recent PHQ Screens 3/27/2018   Little interest or pleasure in doing things Not at all   Feeling down, depressed, irritable, or hopeless Not at all   Total Score PHQ 2 0       Alcohol Risk Factor Screening (MALE < 65): Do you average more than 2 drinks per night or 14 drinks a week: No    On any one occasion in the past three months have you have had more than 4 drinks containing alcohol:  No      Functional Ability and Level of Safety:   Hearing: Hearing is good. Activities of Daily Living: The home contains: handrails and grab bars  Patient needs help with:  phone, transportation, shopping, preparing meals, laundry, housework, managing medications, managing money, dressing, bathing, hygiene and bathroom needs    Ambulation: with mild difficulty    Fall Risk:  Fall Risk Assessment, last 12 mths 12/11/2018   Able to walk? Yes   Fall in past 12 months? Yes   Fall with injury? Yes   Number of falls in past 12 months 2   Fall Risk Score 3       Abuse Screen:  Patient is not abused    Cognitive Screening   Has your family/caregiver stated any concerns about your memory: Yes  Cognitive Screening: A+O x 2    Patient Care Team   Patient Care Team:  Cecile Salgado DO as PCP - General  Cecile Salgado DO as PCP - [facility] Empaneled Provider  Erin Vazquez MD (Colon and Rectal Surgery)  Tammy Tamayo NP (Neurology)  Claire Tadeo PsyD (Psychology)    Assessment/Plan   Education and counseling provided:  Are appropriate based on today's review and evaluation    Diagnoses and all orders for this visit:    1. Underweight    2. History of abdominal surgery    3. S/p small bowel obstruction    4. Osteoporosis, unspecified osteoporosis type, unspecified pathological fracture presence    5. Anxiety    6. Functional diarrhea    7. Vitamin D deficiency    8.  Medicare annual wellness visit, subsequent        Health Maintenance Due   Topic Date Due    DTaP/Tdap/Td series (1 - Tdap) 11/04/1989 DISCHARGE

## 2022-08-03 ENCOUNTER — HOSPITAL ENCOUNTER (OUTPATIENT)
Dept: PHYSICAL THERAPY | Age: 42
Discharge: HOME | End: 2022-08-03
Payer: MEDICARE

## 2022-08-03 PROCEDURE — 97162 PT EVAL MOD COMPLEX 30 MIN: CPT | Performed by: PHYSICAL MEDICINE & REHABILITATION

## 2022-08-03 NOTE — PROGRESS NOTES
Physical Therapy at [facility],   a part of  [facility]  [address] [facility] Priyanka [facility]  Phone: [phone]  Fax: [phone]    Plan of Care/Statement of Necessity for Physical Therapy Services  2-15    Patient name: Rashad De La Vega  : 1980  Provider#: [id number]  Referral source: Wilian Jerez MD      Medical/Treatment Diagnosis: Full incontinence of feces [R15.9]     Prior Hospitalization: see medical history     Comorbidities:  Cognitive impairment, seizure disorder  Prior Level of Function: FI of 6 years duration  Medications: Verified on Patient Summary List  Start of Care: 8/3/2022      Onset Date: 6 years   The Plan of Care and following information is based on the information from the initial evaluation. Assessment/ key information: Pt with cognitive impairment with episodic fecal incontinence associated with loose stools. He is not able to provide consent for internal exam/treatment. I am reluctant to do any internal exam for this reason, this was explained to his mother who is in agreement. He does present with global hip and trunk weakness which is likely contributory, he will benefit from strengthening exs designed to uptrain and strengthen the pelvic floor. He was able to follow exercise instructions in the clinic well today. We discussed the importance of maintaining a bulky but easy to pass stool quality (BSS4) for fecal continence. He will benefit from continued PT to address his functional limitations and achieve his goals as stated on the plan of care.          Evaluation Complexity History MEDIUM  Complexity : 1-2 comorbidities / personal factors will impact the outcome/ POC ; Examination MEDIUM Complexity : 3 Standardized tests and measures addressing body structure, function, activity limitation and / or participation in recreation  ;Presentation MEDIUM Complexity : Evolving with changing characteristics ;Clinical Decision Making MEDIUM Complexity : FOTO score of 26-74  Overall Complexity Rating: MEDIUM    Problem List: decrease strength, impaired gait/ balance, decrease ADL/ functional abilitiies, decrease activity tolerance, and other fecal incontinence. Treatment Plan may include any combination of the following: Therapeutic exercise, Therapeutic activities, Neuromuscular re-education, Physical agent/modality, Gait/balance training, Manual therapy, Patient education, Self Care training, Functional mobility training, and Home safety training  Patient / Family readiness to learn indicated by: interest  Persons(s) to be included in education: family support person (FSP);list mother and group home staff  Barriers to Learning/Limitations: yes;  emotional and cognitive  Patient Goal (s): stop having bathroom accidents  Patient Self Reported Health Status: good  Rehabilitation Potential: good      Short Term Goals: To be accomplished in 6 weeks:  Patient will be independent with a progressive home exercise program with assistance from care giver. Patient will demonstrate & utilized proper toilet position using Squatty Potty or similar with assistance from care giver. Patient will demonstrate improved PFM strength to 3+/5 bilaterally in order to decrease FI symptoms   Patient will demonstrate improved hip ext strength to 4+/5 bilaterally   Patient will report improved stool quality to BSS 4     Long Term Goals: To be accomplished in 12 weeks:  Patient will demonstrate 5/5 hip ext and abduction strength bilaterally in order to eliminate FI symptoms. Patient will demonstrate 10 second PFM holds at 4/5 in order to elminate UI symptoms. Patient will have no fecal incontinence episodes x 4 weeks. Frequency / Duration: Patient to be seen 1 times per week for 6-12 weeks.     Patient/ Caregiver education and instruction: self care    [x]  Plan of care has been reviewed with ACOSTA Ferreira, PT, MSPT 8/3/2022     ________________________________________________________________________    I certify that the above Therapy Services are being furnished while the patient is under my care. I agree with the treatment plan and certify that this therapy is necessary.     Physician's Signature:____________________  Date:____________Time: _________         Samantha Corbett MD

## 2022-08-03 NOTE — PROGRESS NOTES
PT INITIAL EVALUATION NOTE 2-15    Patient Name: Slater Kehr  Date:8/3/2022  : 1980  [x]  Patient  Verified  Payor: Sarah Joy / Plan: [id number] [employer] HMO / Product Type: Managed Care Medicare /    In time: 9704 Out time: 7210  Total Treatment Time (min): 60  Visit #: 1     Treatment Area: Full incontinence of feces [R15.9]    SUBJECTIVE  Pain Level (0-10 scale): 0   Any medication changes, allergies to medications, adverse drug reactions, diagnosis change, or new procedure performed?: [] No    [x] Yes (see summary sheet for update)  Subjective:     Patient is a pleasant 38year old male group home resident with cognitive impairment. He is accompanied by his mother and a staff member from the group home. He is here with complaints of fecal incontinence episodes that started about 6 years ago. Pt will have a fecal accident once every 2 weeks. When he has an accident, stool is BSS 6-7. Pt is more likely to have loose stools when he deviates from his diet. On most days Mr Celine Garcias is continent, able to make it to the bathroom in response to fecal urge. Dr Amanda Vicente started him on Metamucil 8 months ago (2 spoonfulls daily) which has helped some. Pt's mother reports she is concerned about his weight, she would like a referral to a dietician to address low BMI. Pt's mother also presents with questions regarding his tremors and movement patterns as they relate to a medication change for seizures 6 years ago.          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     PLOF: Chronic, 6 years  Mechanism of Injury: none  Previous Treatment/Compliance:  no prior PT  PMHx/Surgical Hx: (incomplete history provided by mother)  Work Hx: on disability   Living Situation: group home with support  Pt Goals: stop bathroom accidents  Barriers: cognitive/emotional  Motivation: good  Substance use: none   Cognition: A & O x 4       OBJECTIVE/EXAMINATION    Posture: Flexed at hips and trunk, posterior pelvic tilt, forward rounded shoulders, forward head  Other Observations: poor ms bulk through hips, glutes  Gait and Functional Mobility: slow shuffle gait, frequent toe walking    Palpation: NT  Joint Mobility: NT        Lumbar AROM:        R  L  Flexion    Fingers to knee joint line           Extension   Unable to achieve neutral                    Aberrant movements:  Painful arc: [] Yes  [x] No  Instability catch: [] Yes  [x] No  Difficulty returning from flexion: [] Yes  [x] No  Reversal of lumbopelvic rhythm: [x] Yes  [] No      LOWER QUARTER   MUSCLE STRENGTH  KEY       R  L  0 - No Contraction  L1, L2 Psoas  4  4      1 - Trace   L3 Quads  4  4      2 - Poor   L4 Tib Ant  4  4     3 - Fair    L5 EHL  5  5      4 - Good   S1 FHL  5  5    5 - Normal   S2 Hams  4  4      Adductors   5  5      Abductors  4  4            MMT:  Core strength: 3   Hip abduction: 4   Hip extension: 4    Pt able to bridge 2 out of 4 attempts due to weakness. Able to follow directions well with repetition. Per Medicaid pt is \"EVAL ONLY\" on first visit. Pain Level (0-10 scale) post treatment: 0       ASSESSMENT:   Pt with cognitive impairment with episodic fecal incontinence associated with loose stools. He is not able to provide consent for internal exam/treatment. I am reluctant to do any internal exam for this reason, this was explained to his mother who is in agreement. He does present with global hip and trunk weakness which is likely contributory, he will benefit from strengthening exs designed to uptrain and strengthen the pelvic floor. He was able to follow exercise instructions in the clinic well today. We discussed the importance of maintaining a bulky but easy to pass stool quality (BSS4) for fecal continence. Pt tried Cholestepol pills with resultant constipation.   Pt may benefit from a trial of Cholestopol powder which can be titrated more easily than pills. He will benefit from continued PT to address his functional limitations and achieve his goals as stated on the plan of care.           [x]  See Plan of Starla Monteiro, PT, MSPT  8/3/2022

## 2022-08-09 ENCOUNTER — APPOINTMENT (OUTPATIENT)
Dept: PHYSICAL THERAPY | Age: 42
End: 2022-08-09
Payer: MEDICARE

## 2022-08-10 ENCOUNTER — APPOINTMENT (OUTPATIENT)
Dept: PHYSICAL THERAPY | Age: 42
End: 2022-08-10
Payer: MEDICARE

## 2022-08-17 ENCOUNTER — APPOINTMENT (OUTPATIENT)
Dept: PHYSICAL THERAPY | Age: 42
End: 2022-08-17
Payer: MEDICARE

## 2022-08-22 ENCOUNTER — HOSPITAL ENCOUNTER (OUTPATIENT)
Dept: PHYSICAL THERAPY | Age: 42
Discharge: HOME | End: 2022-08-22
Payer: MEDICARE

## 2022-08-22 PROCEDURE — 97112 NEUROMUSCULAR REEDUCATION: CPT

## 2022-08-22 PROCEDURE — 97110 THERAPEUTIC EXERCISES: CPT

## 2022-08-22 NOTE — PROGRESS NOTES
PT DAILY TREATMENT NOTE - [facility] 2-15    Patient Name: Christina Aquino  Date:2022  : 1980  [x]  Patient  Verified  Payor: [employer] MEDICAID / Plan: [address] [place] [address] / Product Type: Managed Care Medicaid /    In time: 10:45a  Out time: 11:30a  Total Treatment Time (min): 45  Total Timed Codes (min): 45  1:1 Treatment Time ( [address] only): 45   Visit #:  2    Treatment Area: Full incontinence of feces [R15.9]    SUBJECTIVE  Pain Level (0-10 scale): 0  Any medication changes, allergies to medications, adverse drug reactions, diagnosis change, or new procedure performed?: [x] No    [] Yes (see summary sheet for update)  Subjective functional status/changes:   [] No changes reported  Patient mother reports he has been doing well since last visit, no episodes of incontinence.      OBJECTIVE    30 min Therapeutic Exercise:  [x] See flow sheet :   Rationale: increase ROM, increase strength, improve coordination, improve balance, and increase proprioception to improve the patients ability to sit, stand, lift, carry, reach, ambulate and complete ADL's    15 min Neuromuscular Re-education:  [x]  See flow sheet :   Rationale: increase strength, improve coordination, and improve balance  to improve the patients ability to sit, stand, lift, carry, reach, ambulate and complete ADL's     With   [] TE   [] TA   [] Neuro   [] SC   [] other: Patient Education: [x] Review HEP    [x] Progressed/Changed HEP based on:   [] positioning   [] body mechanics   [] transfers   [] heat/ice application    [x] other: HEP updated and 2 copies given to mother  Acces code: [id number], janene HEP     Other Objective/Functional Measures:   Patient easily distracted and looking around throughout visit, required mod verbal cues and reminders for initiating and completing exercises with proper form and mechanics, no LOB , mod fatigue noted with bridges and LAQ     Paper filled out stating patient was present in clinic, explained to mother how to access [facility] for details of visit to provide for group home as needed. Pain Level (0-10 scale) post treatment: 0    ASSESSMENT/Changes in Function:   Patient did well with all advanced interventions with mod fatigue noted after several exercises including bridges. Patient continues to demonstrates toe walking, forward flexed at hips, and little to no trunk rotation and arm swing. Patient will continue to benefit from skilled PT services to modify and progress therapeutic interventions, address functional mobility deficits, address ROM deficits, address strength deficits, analyze and address soft tissue restrictions, analyze and cue movement patterns, analyze and modify body mechanics/ergonomics, assess and modify postural abnormalities, address imbalance/dizziness, and instruct in home and community integration to attain remaining goals. []  See Plan of Care  []  See progress note/recertification  []  See Discharge Summary         Progress towards goals / Updated goals:  Patient will do well with continued focus on BLE strengthening and improving trunk stability to reach goals. Short Term Goals: To be accomplished in 6 weeks:  Patient will be independent with a progressive home exercise program with assistance from care giver. Patient will demonstrate & utilized proper toilet position using Squatty Potty or similar with assistance from care giver. Patient will demonstrate improved PFM strength to 3+/5 bilaterally in order to decrease FI symptoms   Patient will demonstrate improved hip ext strength to 4+/5 bilaterally   Patient will report improved stool quality to BSS 4      Long Term Goals: To be accomplished in 12 weeks:  Patient will demonstrate 5/5 hip ext and abduction strength bilaterally in order to eliminate FI symptoms. Patient will demonstrate 10 second PFM holds at 4/5 in order to elminate UI symptoms. Patient will have no fecal incontinence episodes x 4 weeks. PLAN  [x]  Upgrade activities as tolerated     [x]  Continue plan of care  [x]  Update interventions per flow sheet       []  Discharge due to:_  []  Other:_      Elisabeth Benavides PTA 8/22/2022

## 2022-08-24 ENCOUNTER — OFFICE VISIT (OUTPATIENT)
Dept: INTERNAL MEDICINE CLINIC | Age: 42
End: 2022-08-24
Payer: MEDICARE

## 2022-08-24 VITALS
HEIGHT: 70 IN | WEIGHT: 106.4 LBS | OXYGEN SATURATION: 97 % | SYSTOLIC BLOOD PRESSURE: 114 MMHG | BODY MASS INDEX: 15.23 KG/M2 | HEART RATE: 100 BPM | RESPIRATION RATE: 12 BRPM | DIASTOLIC BLOOD PRESSURE: 80 MMHG

## 2022-08-24 DIAGNOSIS — K59.1 FUNCTIONAL DIARRHEA: Primary | ICD-10-CM

## 2022-08-24 DIAGNOSIS — Z98.890 HISTORY OF ABDOMINAL SURGERY: ICD-10-CM

## 2022-08-24 DIAGNOSIS — F33.9 RECURRENT DEPRESSION (HCC): ICD-10-CM

## 2022-08-24 DIAGNOSIS — Z87.19 S/P SMALL BOWEL OBSTRUCTION: ICD-10-CM

## 2022-08-24 DIAGNOSIS — Z00.00 MEDICARE ANNUAL WELLNESS VISIT, SUBSEQUENT: ICD-10-CM

## 2022-08-24 PROCEDURE — G8419 CALC BMI OUT NRM PARAM NOF/U: HCPCS | Performed by: INTERNAL MEDICINE

## 2022-08-24 PROCEDURE — 99214 OFFICE O/P EST MOD 30 MIN: CPT | Performed by: INTERNAL MEDICINE

## 2022-08-24 PROCEDURE — G9717 DOC PT DX DEP/BP F/U NT REQ: HCPCS | Performed by: INTERNAL MEDICINE

## 2022-08-24 PROCEDURE — G0439 PPPS, SUBSEQ VISIT: HCPCS | Performed by: INTERNAL MEDICINE

## 2022-08-24 PROCEDURE — G8427 DOCREV CUR MEDS BY ELIG CLIN: HCPCS | Performed by: INTERNAL MEDICINE

## 2022-08-24 RX ORDER — DENOSUMAB 60 MG/ML
INJECTION SUBCUTANEOUS
COMMUNITY
Start: 2021-09-13

## 2022-08-24 RX ORDER — METHYLCELLULOSE (WITH SUGAR)
POWDER IN PACKET (EA) ORAL
COMMUNITY
Start: 2022-08-10 | End: 2022-10-20 | Stop reason: SDUPTHER

## 2022-08-24 RX ORDER — RIFAXIMIN 550 MG/1
TABLET ORAL
COMMUNITY
Start: 2022-06-08

## 2022-08-24 NOTE — PROGRESS NOTES
Schedule of Personalized Health Plan  (Provide Copy to Patient)  The best way to stay healthy is to live a healthy lifestyle. A healthy lifestyle includes regular exercise, eating a well-balanced diet, keeping a healthy weight and not smoking. Regular physical exams and screening tests are another important way to take care of yourself. Preventive exams provided by health care providers can find health problems early when treatment works best and can keep you from getting certain diseases or illnesses. Preventive services include exams, lab tests, screenings, shots, monitoring and information to help you take care of your own health. All people over 65 should have a pneumonia shot. Pneumonia shots are usually only needed once in a lifetime unless your doctor decides differently. All people over 65 should have a yearly flu shot. People over 65 are at medium to high risk for Hepatitis B. Three shots are needed for complete protection. In addition to your physical exam, some screening tests are recommended:    Bone mass measurement (dexa scan) is recommended every two years if you have certain risk factors, such as personal history of vertebral fracture or chronic steroid medication use    Diabetes Mellitus screening is recommended every year. Glaucoma is an eye disease caused by high pressure in the eye. An eye exam is recommended every year. Cardiovascular screening tests that check your cholesterol and other blood fat (lipid) levels are recommended every five years. Colorectal Cancer screening tests help to find pre-cancerous polyps (growths in the colon) so they can be removed before they turn into cancer. Tests ordered for screening depend on your personal and family history risk factors.     Screening for Prostate Cancer is recommended yearly with a digital rectal exam and/or a PSA test    Here is a list of your current Health Maintenance items with a due date:  Health Maintenance Topic Date Due    DTaP/Tdap/Td series (1 - Tdap) Never done    Flu Vaccine (1) 09/01/2022    Depression Monitoring  02/01/2023    Medicare Yearly Exam  08/25/2023    Lipid Screen  04/27/2026    Hepatitis C Screening  Completed    COVID-19 Vaccine  Completed    Pneumococcal 0-64 years  Aged Out       This is the Subsequent Medicare Annual Wellness Exam, performed 12 months or more after the Initial AWV or the last Subsequent AWV    I have reviewed the patient's medical history in detail and updated the computerized patient record. Assessment/Plan   Education and counseling provided:  Are appropriate based on today's review and evaluation    1. Functional diarrhea  2. Recurrent depression ([address]  3. S/p small bowel obstruction  4. History of abdominal surgery  5. Medicare annual wellness visit, subsequent       Depression Risk Factor Screening     3 most recent PHQ Screens 8/24/2022   Little interest or pleasure in doing things Not at all   Feeling down, depressed, irritable, or hopeless Several days   Total Score PHQ 2 1   Trouble falling or staying asleep, or sleeping too much -   Feeling tired or having little energy -   Poor appetite, weight loss, or overeating -   Feeling bad about yourself - or that you are a failure or have let yourself or your family down -   Trouble concentrating on things such as school, work, reading, or watching TV -   Moving or speaking so slowly that other people could have noticed; or the opposite being so fidgety that others notice -   Thoughts of being better off dead, or hurting yourself in some way -   PHQ 9 Score -       Alcohol & Drug Abuse Risk Screen    Do you average more than 2 drinks per night or 14 drinks a week: No    On any one occasion in the past three months have you have had more than 4 drinks containing alcohol:  No          Functional Ability and Level of Safety    Hearing: Hearing is good. Activities of Daily Living:   The home contains: handrails and [place]  Patient needs help with:  phone, transportation, shopping, preparing meals, laundry, housework, managing medications, and managing money      Ambulation: with mild difficulty     Fall Risk:  Fall Risk Assessment, last 12 mths 2/1/2022   Able to walk? Yes   Fall in past 12 months? 0   Do you feel unsteady? 0   Are you worried about falling 0   Number of falls in past 12 months -   Fall with injury? -      Abuse Screen:  Patient is not abused       Cognitive Screening    Has your family/caregiver stated any concerns about your memory: no     Cognitive Screening: stable, A+O     Health Maintenance Due     Health Maintenance Due   Topic Date Due    DTaP/Tdap/Td series (1 - Tdap) Never done       Patient Care Team   Patient Care Team:  Forrest Singh DO as PCP - General  Forrest Singh DO as PCP [facility]  Homero Ariza MD (Colon and Rectal Surgery)  Natalia Burnham NP (Neurology)  Wes Balderas PsyD (Psychology)    History     Patient Active Problem List   Diagnosis Code    Osteoporosis M81.0    Depression F32. A    Vitamin D deficiency E55.9    Encounter for long-term (current) use of other medications Z79.899    Bowel obstruction ([facility] [address] K56.609    S/p small bowel obstruction Z87.19    Static encephalopathy G93.49    Anxiety F41.9    Stool incontinence R15.9    Underweight R63.6    Weight loss R63.4    History of abdominal surgery Z98.890    Recurrent depression ([address] F33.9    Hypotension due to drugs I95.2    Chronic diarrhea K52.9    Hypokalemia E87.6    Functional diarrhea K59.1    Intestinal malabsorption K90.9    History of 2019 novel coronavirus disease (COVID-19) Z86.16    Hyperpigmentation of skin L81.9    Venous insufficiency of both lower extremities I87.2     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, without mention of intractable epilepsy 5/14/2011    Mental retardation     Osteoporosis     Other ill-defined conditions(339.89)     intestinal problems    Psychiatric disorder     anxiety    Seizure disorder [facility])       Past Surgical History:   Procedure Laterality Date    COLONOSCOPY N/A 1/23/2019    COLONOSCOPY performed by Audie Rojo MD at [facility] ENDOSCOPY    COLONOSCOPY,DIAGNOSTIC  1/23/2019         HX APPENDECTOMY  1/28/13    APPENDECTOMY    HX GI      colon resection    HX GI  1/28/13    LAPAROTOMY EXPLORATORY - OPEN LYSIS OF ADHESIONS     HX HERNIA REPAIR      HX OTHER SURGICAL      imperforated anus birth defect    UPPER GI ENDOSCOPY,BIOPSY  3/17/2017          Current Outpatient Medications   Medication Sig Dispense Refill    denosumab (Prolia) 60 mg/mL injection       Citrucel, sucrose, powd       Xifaxan 550 mg tablet       ibuprofen (MOTRIN) 600 mg tablet Take 1 Tablet by mouth every six (6) hours as needed (fever if not repsonding to Tylenol). 20 Tablet 0    One Daily Multivitamin tablet TAKE 1 TABLET BY MOUTH DAILY 30 Tablet 11    theanine 200 mg cap Take 200 mg by mouth two (2) times a day. 180 Each 1    melatonin 5 mg tablet TAKE 1 TABLET BY MOUTH AT BEDTIME 31 Tablet 12    Refresh Tears 0.5 % drop ophthalmic solution PLACE 1 DROP INTO EACH EYE TWICE DAILY 15 mL 5    gabapentin (NEURONTIN) 100 mg capsule TAKE (1) CAPSULE BY MOUTH THREE TIMES DAILY. 93 Capsule 5    calcium-vitamin D 600 mg-5 mcg (200 unit) tab TAKE 1 TABLET BY MOUTH TWICE A DAY 62 Tablet 11    OTHER       OTHER Focus factor take two tablets twice a day with food 120 Tablet 1    fluticasone propionate (FLONASE) 50 mcg/actuation nasal spray 2 Sprays by Both Nostrils route daily as needed for Rhinitis.  BLOW NOSE: 2 SPRAYS IN EACH NOSTRIL DAILY FOR ALLERGY. 1 Each 3    L-Methylfolate 7.5 mg tab       OTHER L-Theanine 150 mg 1 daily 30 Tab 11 OTHER Pancreatin 10x - 200, I TID with meals 100 Cap 11    B.animalis,bifid,infantis,long 10-15 mg TbEC Take  by mouth daily. OTHER Indications: Elemental Diet Shake      acetylcysteine 600 mg cap capsule Take  by mouth. amitriptyline (ELAVIL) 50 mg tablet Take 50 mg by mouth nightly. levomefolate-algal oil 7.5-90.314 mg cap Take  by mouth. acetaminophen (TYLENOL) 325 mg tablet Take  by mouth every four (4) hours as needed for Pain.      guaiFENesin ER (MUCINEX) 600 mg ER tablet Take 600 mg by mouth daily as needed for Congestion. brief disposable (ADULT) misc by Does Not Apply route.  Depends, size medium, 1 nightly 1 Package 11     No Known Allergies    Family History   Problem Relation Age of Onset    No Known Problems Mother     Heart Disease Father     No Known Problems Sister     No Known Problems Sister      Social History     Tobacco Use    Smoking status: Never    Smokeless tobacco: Never   Substance Use Topics    Alcohol use: No         Krzysztof Freeze, DO

## 2022-08-24 NOTE — PROGRESS NOTES
HISTORY OF PRESENT ILLNESS  Katie Mcclure is a 35 y.o. male. Pt. comes in with his mother from his group home for f/u. Has a few chronic medical issues as documented. Vital signs and weight are stable. Chronically underweight. BMI 15.3. His chronic GI issues have been stable. Diarrhea is improved with more formed stools. Denies any abdominal pain or other related symptoms. Is followed by multiple providers including GI, colorectal surgeon, and a . Appetite is good. Has been chronically underweight. History of abdominal surgery as a child. Remains on supplements. Has not had a seizure in a long time. Sees neurologist once a year. Not taking antiseizure medications anymore. He depends on staff for many ADLs. He goes to day program.  Has chronic cognitive deficit. Has chronic depression and anxiety. Amitriptyline is helping. Sees a psychiatrist.  Has osteoporosis. On calcium and vit D. Slow gait but no falls. PMH/PSH/Allergies/Social History/medication list and most recent studies reviewed with patient. Last albumin was 3.1. Other labs were stable. Tobacco/alcohol use: No  Needs an annual H&P form filled for group home. No other acute complaints. HPI    Review of Systems   Constitutional: Negative. HENT: Negative. Eyes: Negative. Respiratory:  Negative for shortness of breath. Cardiovascular:  Negative for chest pain and leg swelling. Gastrointestinal:  Positive for diarrhea (Fecal incontinence). Negative for abdominal pain, blood in stool, constipation, heartburn, melena, nausea and vomiting. Genitourinary:  Positive for urgency. Negative for dysuria. Musculoskeletal:  Negative for back pain, falls and joint pain. Skin: Negative. Areas hyperpigmentation on feet   Neurological:  Positive for tremors. Negative for dizziness, sensory change, seizures and headaches. Endo/Heme/Allergies: Negative. Psychiatric/Behavioral:  Positive for depression. The patient is nervous/anxious and has insomnia. All other systems reviewed and are negative. Physical Exam  Vitals and nursing note reviewed. Constitutional:       General: He is not in acute distress. Appearance: He is well-developed. Comments: Pleasant, chronically underweight  cognitivelty a bit slow   HENT:      Head: Normocephalic and atraumatic. Nose: Nose normal.      Mouth/Throat:      Mouth: Mucous membranes are moist.      Pharynx: Oropharynx is clear. Eyes:      General: No scleral icterus. Conjunctiva/sclera: Conjunctivae normal.   Neck:      Thyroid: No thyromegaly. Vascular: No carotid bruit or JVD. Cardiovascular:      Rate and Rhythm: Normal rate and regular rhythm. Heart sounds: Normal heart sounds. No murmur heard. Pulmonary:      Effort: Pulmonary effort is normal. No respiratory distress. Breath sounds: Normal breath sounds. No wheezing or rales. Abdominal:      General: Bowel sounds are normal. There is no distension. Palpations: Abdomen is soft. There is no mass. Tenderness: There is no abdominal tenderness. There is no right CVA tenderness, left CVA tenderness, guarding or rebound. Comments: Chronic surgical scars   Musculoskeletal:         General: No tenderness. Cervical back: Normal range of motion and neck supple. Right lower leg: No edema. Left lower leg: No edema. Skin:     General: Skin is warm and dry. Findings: No rash. Comments: Hyperpigmented areas on both feet with prominent veins, nontender, good pedal pulses   Neurological:      Mental Status: He is alert and oriented to person, place, and time. Mental status is at baseline. Coordination: Coordination abnormal.      Gait: Gait abnormal.   Psychiatric:         Behavior: Behavior normal.      Comments: Repeats words  Chronic cognitive deficit , about same       ASSESSMENT and PLAN  Diagnoses and all orders for this visit:    1. Functional diarrhea  Doing much better on current medications including fiber  Follow-up with GI Dr. Kendall Yoo as scheduled  2. Recurrent depression ([facility] [address]  Stable. Continue amitriptyline. Follow-up with psychiatrist as scheduled. 3. S/p small bowel obstruction  Stable  4. History of abdominal surgery    5. Medicare annual wellness visit, subsequent  Annual H&P form filled  6. Cognitive impairment  According to mother patient's memory slips times  Brain training exercises discussed  Note sent to patient's group home as well      Follow-up and Dispositions    Return in about 6 months (around 2/24/2023). All chronic medical problems are stable  Continue with current medical management and plan  lab results and schedule of future lab studies reviewed with patient  reviewed diet, exercise and weight control  reviewed medications and side effects in detail  F/u with other MD's/ providers as scheduled  COVID-19 precautions discussed with pt  An After Visit Summary was printed and given to the patient.

## 2022-08-24 NOTE — PROGRESS NOTES
Health Maintenance Due   Topic Date Due    DTaP/Tdap/Td series (1 - Tdap) Never done    Medicare Yearly Exam  04/28/2022       Chief Complaint   Patient presents with    Follow Up Chronic Condition    Hypotension    Anxiety       1. Have you been to the ER, urgent care clinic since your last visit? Hospitalized since your last visit? No    2. Have you seen or consulted any other health care providers outside of the [address] [place] [address] since your last visit? Include any pap smears or colon screening. No    3) Do you have an Advance Directive on file? no    4) Are you interested in receiving information on Advance Directives? NO      Patient is accompanied by Mom I have received verbal consent from Ary Mina to discuss any/all medical information while they are present in the room.

## 2022-08-29 ENCOUNTER — APPOINTMENT (OUTPATIENT)
Dept: CT IMAGING | Age: 42
End: 2022-08-29
Attending: STUDENT IN AN ORGANIZED HEALTH CARE EDUCATION/TRAINING PROGRAM
Payer: MEDICARE

## 2022-08-29 ENCOUNTER — HOSPITAL ENCOUNTER (EMERGENCY)
Age: 42
Discharge: HOME | End: 2022-08-29
Attending: STUDENT IN AN ORGANIZED HEALTH CARE EDUCATION/TRAINING PROGRAM
Payer: MEDICARE

## 2022-08-29 VITALS
RESPIRATION RATE: 18 BRPM | OXYGEN SATURATION: 99 % | DIASTOLIC BLOOD PRESSURE: 77 MMHG | TEMPERATURE: 97.3 F | HEART RATE: 83 BPM | SYSTOLIC BLOOD PRESSURE: 113 MMHG

## 2022-08-29 DIAGNOSIS — R10.84 ABDOMINAL PAIN, GENERALIZED: Primary | ICD-10-CM

## 2022-08-29 LAB
ALBUMIN SERPL-MCNC: 3.9 G/DL (ref 3.5–5)
ALBUMIN/GLOB SERPL: 1.1 {RATIO} (ref 1.1–2.2)
ALP SERPL-CCNC: 51 U/L (ref 45–117)
ALT SERPL-CCNC: 23 U/L (ref 12–78)
ANION GAP SERPL CALC-SCNC: 2 MMOL/L (ref 5–15)
AST SERPL-CCNC: 20 U/L (ref 15–37)
BASOPHILS # BLD: 0 K/UL (ref 0–0.1)
BASOPHILS NFR BLD: 1 % (ref 0–1)
BILIRUB SERPL-MCNC: 0.4 MG/DL (ref 0.2–1)
BUN SERPL-MCNC: 22 MG/DL (ref 6–20)
BUN/CREAT SERPL: 21 (ref 12–20)
CALCIUM SERPL-MCNC: 9.9 MG/DL (ref 8.5–10.1)
CHLORIDE SERPL-SCNC: 102 MMOL/L (ref 97–108)
CO2 SERPL-SCNC: 33 MMOL/L (ref 21–32)
COMMENT, HOLDF: NORMAL
CREAT SERPL-MCNC: 1.07 MG/DL (ref 0.7–1.3)
DIFFERENTIAL METHOD BLD: ABNORMAL
EOSINOPHIL # BLD: 0.1 K/UL (ref 0–0.4)
EOSINOPHIL NFR BLD: 2 % (ref 0–7)
ERYTHROCYTE [DISTWIDTH] IN BLOOD BY AUTOMATED COUNT: 13 % (ref 11.5–14.5)
GLOBULIN SER CALC-MCNC: 3.6 G/DL (ref 2–4)
GLUCOSE SERPL-MCNC: 95 MG/DL (ref 65–100)
HCT VFR BLD AUTO: 44.3 % (ref 36.6–50.3)
HGB BLD-MCNC: 14.9 G/DL (ref 12.1–17)
IMM GRANULOCYTES # BLD AUTO: 0 K/UL (ref 0–0.04)
IMM GRANULOCYTES NFR BLD AUTO: 0 % (ref 0–0.5)
LIPASE SERPL-CCNC: 208 U/L (ref 73–393)
LYMPHOCYTES # BLD: 2.6 K/UL (ref 0.8–3.5)
LYMPHOCYTES NFR BLD: 45 % (ref 12–49)
MCH RBC QN AUTO: 32.1 PG (ref 26–34)
MCHC RBC AUTO-ENTMCNC: 33.6 G/DL (ref 30–36.5)
MCV RBC AUTO: 95.5 FL (ref 80–99)
MONOCYTES # BLD: 0.4 K/UL (ref 0–1)
MONOCYTES NFR BLD: 8 % (ref 5–13)
NEUTS SEG # BLD: 2.5 K/UL (ref 1.8–8)
NEUTS SEG NFR BLD: 44 % (ref 32–75)
NRBC # BLD: 0 K/UL (ref 0–0.01)
NRBC BLD-RTO: 0 PER 100 WBC
PLATELET # BLD AUTO: 150 K/UL (ref 150–400)
PMV BLD AUTO: 8.7 FL (ref 8.9–12.9)
POTASSIUM SERPL-SCNC: 3.7 MMOL/L (ref 3.5–5.1)
PROT SERPL-MCNC: 7.5 G/DL (ref 6.4–8.2)
RBC # BLD AUTO: 4.64 M/UL (ref 4.1–5.7)
SAMPLES BEING HELD,HOLD: NORMAL
SODIUM SERPL-SCNC: 137 MMOL/L (ref 136–145)
WBC # BLD AUTO: 5.7 K/UL (ref 4.1–11.1)

## 2022-08-29 PROCEDURE — 74011000636 HC RX REV CODE- 636: Performed by: RADIOLOGY

## 2022-08-29 PROCEDURE — 74177 CT ABD & PELVIS W/CONTRAST: CPT

## 2022-08-29 PROCEDURE — 85025 COMPLETE CBC W/AUTO DIFF WBC: CPT

## 2022-08-29 PROCEDURE — 36415 COLL VENOUS BLD VENIPUNCTURE: CPT

## 2022-08-29 PROCEDURE — 74011250636 HC RX REV CODE- 250/636: Performed by: STUDENT IN AN ORGANIZED HEALTH CARE EDUCATION/TRAINING PROGRAM

## 2022-08-29 PROCEDURE — 99285 EMERGENCY DEPT VISIT HI MDM: CPT

## 2022-08-29 PROCEDURE — 83690 ASSAY OF LIPASE: CPT

## 2022-08-29 PROCEDURE — 80053 COMPREHEN METABOLIC PANEL: CPT

## 2022-08-29 RX ADMIN — IOPAMIDOL 100 ML: 755 INJECTION, SOLUTION INTRAVENOUS at 04:08

## 2022-08-29 RX ADMIN — SODIUM CHLORIDE 1000 ML: 9 INJECTION, SOLUTION INTRAVENOUS at 03:22

## 2022-08-29 NOTE — ED PROVIDER NOTES
49-year-old male presents to ED from group home with staff for evaluation of 1 day of diarrhea and mild abdominal pain. Patient states that he was nauseous earlier currently not having symptoms. Has had 1 day of nonbloody diarrhea. History of abdominal adhesions. Denies any vomiting. Patient's symptoms are only mild at the moment. Diarrhea   Associated symptoms include diarrhea and nausea. Pertinent negatives include no fever, no dysuria and no chest pain. Nausea   Associated symptoms include diarrhea. Pertinent negatives include no fever.       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, without mention of intractable epilepsy 5/14/2011    Mental retardation     Osteoporosis     Other ill-defined conditions(799.89)     intestinal problems    Psychiatric disorder     anxiety    Seizure disorder [facility])        Past Surgical History:   Procedure Laterality Date    COLONOSCOPY N/A 1/23/2019    COLONOSCOPY performed by Shahla Pope MD at [facility] ENDOSCOPY    COLONOSCOPY,DIAGNOSTIC  1/23/2019         HX APPENDECTOMY  1/28/13    APPENDECTOMY    HX GI      colon resection    HX GI  1/28/13    LAPAROTOMY EXPLORATORY - OPEN LYSIS OF ADHESIONS     HX HERNIA REPAIR      HX OTHER SURGICAL      imperforated anus birth defect    UPPER GI ENDOSCOPY,BIOPSY  3/17/2017              Family History:   Problem Relation Age of Onset    No Known Problems Mother     Heart Disease Father     No Known Problems Sister     No Known Problems Sister        Social History     Socioeconomic History    Marital status: SINGLE     Spouse name: Not on file    Number of children: Not on file    Years of education: Not on file    Highest education level: Not on file   Occupational History    Not on file   Tobacco Use    Smoking status: Never    Smokeless tobacco: Never   Vaping Use    Vaping Use: Never used   Substance and Sexual Activity    Alcohol use: No    Drug use: No    Sexual activity: Never   Other Topics Concern     Service Not Asked    Blood Transfusions Not Asked    Caffeine Concern Not Asked    Occupational Exposure Not Asked    Hobby Hazards Not Asked    Sleep Concern Not Asked    Stress Concern Not Asked    Weight Concern Not Asked    Special Diet Not Asked    Back Care Not Asked    Exercise Not Asked    Bike Helmet Not Asked    Seat Belt Not Asked    Self-Exams Not Asked   Social History Narrative    Not on file     Social Determinants of Health     Financial Resource Strain: Not on file   Food Insecurity: Not on file   Transportation Needs: Not on file   Physical Activity: Not on file   Stress: Not on file   Social Connections: Not on file   Intimate Partner Violence: Not on file   Housing Stability: Not on file         ALLERGIES: Patient has no known allergies. Review of Systems   Constitutional:  Negative for fever. Respiratory:  Negative for shortness of breath. Cardiovascular:  Negative for chest pain. Gastrointestinal:  Positive for diarrhea and nausea. Genitourinary:  Negative for dysuria. All other systems reviewed and are negative. Vitals:    08/29/22 0153   BP: 113/77   Pulse: 83   Resp: 18   Temp: 97.3 °F (36.3 °C)   SpO2: 99%            Physical Exam  Vitals and nursing note reviewed. Constitutional:       General: He is not in acute distress. Appearance: Normal appearance. He is not ill-appearing. HENT:      Head: Normocephalic and atraumatic. Right Ear: External ear normal.      Left Ear: External ear normal.      Nose: Nose normal.      Mouth/Throat:      Mouth: Mucous membranes are moist.      Pharynx: Oropharynx is clear. Eyes:      Extraocular Movements: Extraocular movements intact.       Conjunctiva/sclera: Conjunctivae normal. Cardiovascular:      Rate and Rhythm: Normal rate and regular rhythm. Pulses: Normal pulses. Heart sounds: Normal heart sounds. Pulmonary:      Effort: Pulmonary effort is normal. No respiratory distress. Breath sounds: Normal breath sounds. No wheezing. Abdominal:      General: Abdomen is flat. Bowel sounds are normal.      Palpations: Abdomen is soft. Tenderness: There is no abdominal tenderness. There is no guarding. Genitourinary:     Comments: Deferred  Musculoskeletal:         General: No swelling. Normal range of motion. Cervical back: Normal range of motion. Skin:     General: Skin is warm and dry. Capillary Refill: Capillary refill takes less than 2 seconds. Findings: No rash. Neurological:      General: No focal deficit present. Mental Status: He is alert and oriented to person, place, and time. Comments: Moving all extremities   Psychiatric:         Mood and Affect: Mood normal.         Behavior: Behavior normal.      Comments: Has decision making capacity        MDM  Differential diagnosis includes not limited to diverticulitis, appendicitis, constipation, infectious diarrhea, dehydration    Well-appearing 78-year-old male no acute distress with 1 day of diarrhea. On exam patient is abdomen is soft and nontender. Patient reported having some mild abdominal pain. Offered analgesic medications but the patient declined. ED Course as of 08/29/22 0445   Mon Aug 29, 2022   7339 CT abdomen pelvis with IV contrast remarkable for fluid-filled mildly dilated small bowels in the lower abdomen without definite evidence of obstruction. Moderate amount of colonic stool. No free air or fluid. CBC is reassuring, CMP reassuring as well. Lipase is normal.  Patient declined medications for pain. Stable for discharge home and follow-up with PCP. Return if worsening condition.   Patient is discharged in no acute distress nontoxic appearance [WG]      ED Course User Index  [WG] Luz Moreira DO       Procedures

## 2022-08-29 NOTE — ED TRIAGE NOTES
Patient arrives from a group home accompanied by staff with CC of diarrhea x 1 day, denies blood in stool but states he is also sweaty and nauseous    Hx of abd adhesions, intellectual disability

## 2022-08-29 NOTE — DISCHARGE INSTRUCTIONS
You are seen in the emergency apartment for abdominal pain and diarrhea. Your laboratory and imaging work-up is reassuring. I like you to follow-up with your primary care doctor next couple days for reevaluation today symptoms. Return if worsening condition.

## 2022-08-31 ENCOUNTER — HOSPITAL ENCOUNTER (OUTPATIENT)
Dept: PHYSICAL THERAPY | Age: 42
Discharge: HOME | End: 2022-08-31
Payer: MEDICARE

## 2022-08-31 DIAGNOSIS — R25.1 TREMOR: ICD-10-CM

## 2022-08-31 DIAGNOSIS — F41.9 ANXIETY: ICD-10-CM

## 2022-08-31 PROCEDURE — 97112 NEUROMUSCULAR REEDUCATION: CPT

## 2022-08-31 PROCEDURE — 97110 THERAPEUTIC EXERCISES: CPT

## 2022-08-31 NOTE — PROGRESS NOTES
PT DAILY TREATMENT NOTE - [facility] 2-15    Patient Name: Trent Kerns  Date:2022  : 1980  [x]  Patient  Verified  Payor: [employer] / Plan: [id number] [employer] HMO / Product Type: Managed Care Medicare /    In time:12:30  Out time:1:15  Total Treatment Time (min): 45  Total Timed Codes (min): 45  1:1 Treatment Time ([facility] only): 45   Visit #: 3      Treatment Area: Full incontinence of feces [R15.9]    SUBJECTIVE  Pain Level (0-10 scale): 0  Any medication changes, allergies to medications, adverse drug reactions, diagnosis change, or new procedure performed?: [x] No    [] Yes (see summary sheet for update)  Subjective functional status/changes:   [] No changes reported  Pt is present with caregiver. Pt and caregiver both report that everything has been going well.       OBJECTIVE    30 min Therapeutic Exercise:  [x] See flow sheet :   Rationale: increase ROM, increase strength, improve coordination, improve balance, and increase proprioception to improve the patients ability to sit, stand, lift, carry, reach, ambulate and complete ADL's    15 min Neuromuscular Re-education:  [x]  See flow sheet :   Rationale: increase strength, improve coordination, and improve balance  to improve the patients ability to sit, stand, lift, carry, reach, ambulate and complete ADL's            With   [x] TE   [] TA   [] neuro   [] other: Patient Education: [x] Review HEP    [] Progressed/Changed HEP based on:   [] positioning   [] body mechanics   [] transfers   [] heat/ice application    [] other:      Other Objective/Functional Measures:     LOWER QUARTER MUSCLE STRENGTH      R  L  L1, L2 Psoas   4+  4+  L3 Quads   4+  4+   L4 Tib Ant   4+  4+   L5 EHL   5  5   S1 FHL   5  5  S2 Hams   4  4  Adduction                                5                      5  Abductors                                4+                    4+    MMT:  Core strength: 5  Pt able to perform 5/5 attempts with min verbal cues     Lumbar AROM:  R  L  Flexion:    2\" past knee joint  Extension:    Neutral       Pain Level (0-10 scale) post treatment: 0    ASSESSMENT/Changes in Function:     Patient will continue to benefit from skilled PT services to modify and progress therapeutic interventions, address functional mobility deficits, address ROM deficits, address strength deficits, analyze and address soft tissue restrictions, analyze and cue movement patterns, analyze and modify body mechanics/ergonomics, and assess and modify postural abnormalities to attain remaining goals. []  See Plan of Care  []  See progress note/recertification  []  See Discharge Summary         Progress towards goals / Updated goals:  Pt demonstrated improved LE strength and core strength during today's session. Pt to continue skilled therapy in order to achieve all goals, improve LE strength, and improve functional mobility. Short Term Goals: To be accomplished in 6 weeks:  Patient will be independent with a progressive home exercise program with assistance from care giver. - MET  Patient will demonstrate & utilized proper toilet position using Squatty Potty or similar with assistance from care giver. - MET  Patient will demonstrate improved PFM strength to 3+/5 bilaterally in order to decrease FI symptoms - Progressing  Patient will demonstrate improved hip ext strength to 4+/5 bilaterally - MET   Patient will report improved stool quality to BSS 4 -Progressing     Long Term Goals: To be accomplished in 12 weeks:  Patient will demonstrate 5/5 hip ext and abduction strength - bilaterally in order to eliminate FI symptoms. - Progressing  Patient will demonstrate 10 second PFM holds at 4/5 in order to elminate UI symptoms. Patient will have no fecal incontinence episodes x 4 weeks.     PLAN  [x]  Upgrade activities as tolerated     [x]  Continue plan of care  [x]  Update interventions per flow sheet       []  Discharge due to:_  []  Other:_ Durga Byrd, SPTA 8/31/2022     2 - TE  1 - NM

## 2022-09-01 RX ORDER — GABAPENTIN 100 MG/1
CAPSULE ORAL
Qty: 93 CAPSULE | Refills: 5 | Status: SHIPPED | OUTPATIENT
Start: 2022-09-01

## 2022-09-09 DIAGNOSIS — M81.0 OSTEOPOROSIS WITHOUT CURRENT PATHOLOGICAL FRACTURE, UNSPECIFIED OSTEOPOROSIS TYPE: Primary | ICD-10-CM

## 2022-09-09 RX ORDER — DIPHENHYDRAMINE HYDROCHLORIDE 50 MG/ML
25 INJECTION, SOLUTION INTRAMUSCULAR; INTRAVENOUS AS NEEDED
Status: CANCELLED
Start: 2022-09-15

## 2022-09-09 RX ORDER — EPINEPHRINE 1 MG/ML
0.3 INJECTION, SOLUTION, CONCENTRATE INTRAVENOUS AS NEEDED
Status: CANCELLED | OUTPATIENT
Start: 2022-09-15

## 2022-09-09 RX ORDER — ONDANSETRON 2 MG/ML
8 INJECTION INTRAMUSCULAR; INTRAVENOUS AS NEEDED
Status: CANCELLED | OUTPATIENT
Start: 2022-09-15

## 2022-09-09 RX ORDER — DIPHENHYDRAMINE HYDROCHLORIDE 50 MG/ML
50 INJECTION, SOLUTION INTRAMUSCULAR; INTRAVENOUS AS NEEDED
Status: CANCELLED
Start: 2022-09-15

## 2022-09-09 RX ORDER — HYDROCORTISONE SODIUM SUCCINATE 100 MG/2ML
100 INJECTION, POWDER, FOR SOLUTION INTRAMUSCULAR; INTRAVENOUS AS NEEDED
Status: CANCELLED | OUTPATIENT
Start: 2022-09-15

## 2022-09-09 RX ORDER — ALBUTEROL SULFATE 0.83 MG/ML
2.5 SOLUTION RESPIRATORY (INHALATION) AS NEEDED
Status: CANCELLED
Start: 2022-09-15

## 2022-09-09 RX ORDER — ACETAMINOPHEN 325 MG/1
650 TABLET ORAL AS NEEDED
Status: CANCELLED
Start: 2022-09-15

## 2022-09-14 ENCOUNTER — OFFICE VISIT (OUTPATIENT)
Dept: INTERNAL MEDICINE CLINIC | Age: 42
End: 2022-09-14
Payer: MEDICARE

## 2022-09-14 ENCOUNTER — TELEPHONE (OUTPATIENT)
Dept: INTERNAL MEDICINE CLINIC | Age: 42
End: 2022-09-14

## 2022-09-14 VITALS
HEART RATE: 89 BPM | DIASTOLIC BLOOD PRESSURE: 74 MMHG | SYSTOLIC BLOOD PRESSURE: 120 MMHG | HEIGHT: 70 IN | WEIGHT: 102 LBS | TEMPERATURE: 98.1 F | BODY MASS INDEX: 14.6 KG/M2 | RESPIRATION RATE: 16 BRPM | OXYGEN SATURATION: 97 %

## 2022-09-14 DIAGNOSIS — M81.0 OSTEOPOROSIS WITHOUT CURRENT PATHOLOGICAL FRACTURE, UNSPECIFIED OSTEOPOROSIS TYPE: ICD-10-CM

## 2022-09-14 DIAGNOSIS — R63.6 UNDERWEIGHT: ICD-10-CM

## 2022-09-14 DIAGNOSIS — K59.1 FUNCTIONAL DIARRHEA: Primary | ICD-10-CM

## 2022-09-14 DIAGNOSIS — Z98.890 HISTORY OF ABDOMINAL SURGERY: ICD-10-CM

## 2022-09-14 DIAGNOSIS — Z87.19 S/P SMALL BOWEL OBSTRUCTION: ICD-10-CM

## 2022-09-14 PROCEDURE — G8427 DOCREV CUR MEDS BY ELIG CLIN: HCPCS | Performed by: INTERNAL MEDICINE

## 2022-09-14 PROCEDURE — G9717 DOC PT DX DEP/BP F/U NT REQ: HCPCS | Performed by: INTERNAL MEDICINE

## 2022-09-14 PROCEDURE — 99214 OFFICE O/P EST MOD 30 MIN: CPT | Performed by: INTERNAL MEDICINE

## 2022-09-14 PROCEDURE — G8419 CALC BMI OUT NRM PARAM NOF/U: HCPCS | Performed by: INTERNAL MEDICINE

## 2022-09-14 NOTE — PROGRESS NOTES
Health Maintenance Due   Topic Date Due    DTaP/Tdap/Td series (1 - Tdap) Never done    Flu Vaccine (1) 09/01/2022       Chief Complaint   Patient presents with    Osteoporosis    Vitamin D Deficiency    ED Follow-up       1. Have you been to the ER, urgent care clinic since your last visit? Hospitalized since your last visit? YES, 8/29/22, [facility], [facility]     2. Have you seen or consulted any other health care providers outside of the [address] [place] [address] since your last visit? Include any pap smears or colon screening. No    3) Do you have an Advance Directive on file? no    4) Are you interested in receiving information on Advance Directives? NO      Patient is accompanied by self I have received verbal consent from Joan Hernandez to discuss any/all medical information while they are present in the room.

## 2022-09-14 NOTE — TELEPHONE ENCOUNTER
Pt mother requesting lab results to be sent to the pediatric infusion center. phone # [phone] fax # [phone]  Pt has appointment tomorrow at 11am

## 2022-09-15 ENCOUNTER — HOSPITAL ENCOUNTER (OUTPATIENT)
Dept: INFUSION THERAPY | Age: 42
Discharge: HOME | End: 2022-09-15
Payer: MEDICARE

## 2022-09-15 VITALS
DIASTOLIC BLOOD PRESSURE: 61 MMHG | HEART RATE: 81 BPM | SYSTOLIC BLOOD PRESSURE: 108 MMHG | RESPIRATION RATE: 18 BRPM | TEMPERATURE: 98.9 F

## 2022-09-15 DIAGNOSIS — M81.0 OSTEOPOROSIS WITHOUT CURRENT PATHOLOGICAL FRACTURE, UNSPECIFIED OSTEOPOROSIS TYPE: Primary | ICD-10-CM

## 2022-09-15 LAB
ALBUMIN SERPL-MCNC: 4.6 G/DL (ref 4–5)
BUN SERPL-MCNC: 18 MG/DL (ref 6–24)
BUN/CREAT SERPL: 19 (ref 9–20)
CALCIUM SERPL-MCNC: 9.3 MG/DL (ref 8.7–10.2)
CHLORIDE SERPL-SCNC: 101 MMOL/L (ref 96–106)
CO2 SERPL-SCNC: 30 MMOL/L (ref 20–29)
CREAT SERPL-MCNC: 0.96 MG/DL (ref 0.76–1.27)
EGFR: 101 ML/MIN/1.73
GLUCOSE SERPL-MCNC: 85 MG/DL (ref 65–99)
MAGNESIUM SERPL-MCNC: 2 MG/DL (ref 1.6–2.3)
PHOSPHATE SERPL-MCNC: 3.1 MG/DL (ref 2.8–4.1)
POTASSIUM SERPL-SCNC: 4 MMOL/L (ref 3.5–5.2)
SODIUM SERPL-SCNC: 143 MMOL/L (ref 134–144)

## 2022-09-15 PROCEDURE — 96372 THER/PROPH/DIAG INJ SC/IM: CPT

## 2022-09-15 PROCEDURE — 74011250636 HC RX REV CODE- 250/636: Performed by: NURSE PRACTITIONER

## 2022-09-15 RX ORDER — DIPHENHYDRAMINE HYDROCHLORIDE 50 MG/ML
25 INJECTION, SOLUTION INTRAMUSCULAR; INTRAVENOUS AS NEEDED
Start: 2023-03-16

## 2022-09-15 RX ORDER — ONDANSETRON 2 MG/ML
8 INJECTION INTRAMUSCULAR; INTRAVENOUS AS NEEDED
Status: ACTIVE | OUTPATIENT
Start: 2022-09-15 | End: 2022-09-15

## 2022-09-15 RX ORDER — EPINEPHRINE 1 MG/ML
0.3 INJECTION, SOLUTION, CONCENTRATE INTRAVENOUS AS NEEDED
OUTPATIENT
Start: 2023-03-16

## 2022-09-15 RX ORDER — DIPHENHYDRAMINE HYDROCHLORIDE 50 MG/ML
50 INJECTION, SOLUTION INTRAMUSCULAR; INTRAVENOUS AS NEEDED
Status: ACTIVE | OUTPATIENT
Start: 2022-09-15 | End: 2022-09-15

## 2022-09-15 RX ORDER — ALBUTEROL SULFATE 0.83 MG/ML
2.5 SOLUTION RESPIRATORY (INHALATION) AS NEEDED
Status: ACTIVE | OUTPATIENT
Start: 2022-09-15 | End: 2022-09-15

## 2022-09-15 RX ORDER — DIPHENHYDRAMINE HYDROCHLORIDE 50 MG/ML
25 INJECTION, SOLUTION INTRAMUSCULAR; INTRAVENOUS AS NEEDED
Status: ACTIVE | OUTPATIENT
Start: 2022-09-15 | End: 2022-09-15

## 2022-09-15 RX ORDER — DIPHENHYDRAMINE HYDROCHLORIDE 50 MG/ML
50 INJECTION, SOLUTION INTRAMUSCULAR; INTRAVENOUS AS NEEDED
Start: 2023-03-16

## 2022-09-15 RX ORDER — ACETAMINOPHEN 325 MG/1
650 TABLET ORAL AS NEEDED
Start: 2023-03-16

## 2022-09-15 RX ORDER — ALBUTEROL SULFATE 0.83 MG/ML
2.5 SOLUTION RESPIRATORY (INHALATION) AS NEEDED
Start: 2023-03-16

## 2022-09-15 RX ORDER — HYDROCORTISONE SODIUM SUCCINATE 100 MG/2ML
100 INJECTION, POWDER, FOR SOLUTION INTRAMUSCULAR; INTRAVENOUS AS NEEDED
Status: ACTIVE | OUTPATIENT
Start: 2022-09-15 | End: 2022-09-15

## 2022-09-15 RX ORDER — ONDANSETRON 2 MG/ML
8 INJECTION INTRAMUSCULAR; INTRAVENOUS AS NEEDED
OUTPATIENT
Start: 2023-03-16

## 2022-09-15 RX ORDER — EPINEPHRINE 1 MG/ML
0.3 INJECTION, SOLUTION, CONCENTRATE INTRAVENOUS AS NEEDED
Status: ACTIVE | OUTPATIENT
Start: 2022-09-15 | End: 2022-09-15

## 2022-09-15 RX ORDER — HYDROCORTISONE SODIUM SUCCINATE 100 MG/2ML
100 INJECTION, POWDER, FOR SOLUTION INTRAMUSCULAR; INTRAVENOUS AS NEEDED
OUTPATIENT
Start: 2023-03-16

## 2022-09-15 RX ORDER — ACETAMINOPHEN 325 MG/1
650 TABLET ORAL AS NEEDED
Status: ACTIVE | OUTPATIENT
Start: 2022-09-15 | End: 2022-09-15

## 2022-09-15 RX ADMIN — DENOSUMAB 60 MG: 60 INJECTION SUBCUTANEOUS at 11:33

## 2022-09-15 NOTE — PROGRESS NOTES
[address] [facility] @ [facility] VISIT NOTE    [age] Patient arrives for Prolia q6mths without acute problems. Please see connect care for complete assessment and education provided. Vital signs stable throughout and prior to discharge, Pt. Tolerated treatment well and discharged without incident. Patient/caregiver is aware of next [facility] appointment on 3/15/2023. Appointment card given to caregiver.     Medications Verified by Pelon Kern RN:  Prolia 60mg SQ left arm    VITAL SIGNS Patient Vitals for the past 12 hrs:   Temp Pulse Resp BP   09/15/22 1131 98.9 °F (37.2 °C) 81 18 108/61

## 2022-09-28 ENCOUNTER — HOSPITAL ENCOUNTER (OUTPATIENT)
Dept: PHYSICAL THERAPY | Age: 42
Discharge: HOME | End: 2022-09-28
Payer: MEDICARE

## 2022-09-28 PROCEDURE — 97110 THERAPEUTIC EXERCISES: CPT

## 2022-09-28 PROCEDURE — 97112 NEUROMUSCULAR REEDUCATION: CPT

## 2022-09-28 NOTE — PROGRESS NOTES
PT DAILY TREATMENT NOTE - [facility] 2-15    Patient Name: Nahed Varner  Date:2022  : 1980  [x]  Patient  Verified  Payor: [facility] MEDICARE / Plan: [facility] / Product Type: Managed Care Medicare /    In time:4:15p  Out time: 5:10p  Total Treatment Time (min): 55  Total Timed Codes (min): 55  1:1 Treatment Time ( [address] only): 55   Visit #: 4      Treatment Area: Full incontinence of feces [R15.9]    SUBJECTIVE  Pain Level (0-10 scale): 0  Any medication changes, allergies to medications, adverse drug reactions, diagnosis change, or new procedure performed?: [x] No    [] Yes (see summary sheet for update)  Subjective functional status/changes:   [] No changes reported  Patient reports he has been doing good. Patient caregiver states his mother wanted her to come in to see what May Pool has been doing in therapy to make sure the other staff can help him at home.     OBJECTIVE    40 min Therapeutic Exercise:  [x] See flow sheet :   Rationale: increase ROM, increase strength, improve coordination, improve balance, and increase proprioception to improve the patients ability to sit, stand, lift, carry, reach, ambulate and complete ADL's    15 min Neuromuscular Re-education:  [x]  See flow sheet :   Rationale: increase strength, improve coordination, and improve balance  to improve the patients ability to sit, stand, lift, carry, reach, ambulate and complete ADL's            With   [x] TE   [] TA   [] neuro   [] other: Patient Education: [x] Review HEP    [] Progressed/Changed HEP based on:   [] positioning   [] body mechanics   [] transfers   [] heat/ice application    [] other:      Other Objective/Functional Measures:     LOWER QUARTER MUSCLE STRENGTH      R  L  L1, L2 Psoas   4+  5  L3 Quads   5  4+   L4 Tib Ant   5  4+   L5 EHL   5  5   S1 FHL   5  5  S2 Hams   4+  4+  Adduction                                5                      5  Abductors                                5                     4+    MMT: Core strength: 5       Lumbar AROM:  R  L  Flexion:    2\" past knee joint  Extension:    25%      Pain Level (0-10 scale) post treatment: 0    ASSESSMENT/Changes in Function:          []  See Plan of Care  []  See progress note/recertification  [x]  See Discharge Summary         Progress towards goals / Updated goals:  Patient has not attended therapy for 4 weeks and has made steady gains in strength. Patient has met 4/5 short term goals and is progressing towards long term goals. Patient is able to perform all home tasks with no limitations or pain. He continues to have very rare occasions of FI secondary to diet changes. He will do well with discharge to [facility] at this time. Short Term Goals: To be accomplished in 6 weeks:  Patient will be independent with a progressive home exercise program with assistance from care giver. - MET  Patient will demonstrate & utilized proper toilet position using Squatty Potty or similar with assistance from care giver. - MET  Patient will demonstrate improved PFM strength to 3+/5 bilaterally in order to decrease FI symptoms - Progressing  Patient will demonstrate improved hip ext strength to 4+/5 bilaterally - MET   Patient will report improved stool quality to BSS 4 -Met     Long Term Goals: To be accomplished in 12 weeks:  Patient will demonstrate 5/5 hip ext and abduction strength - bilaterally in order to eliminate FI symptoms. - Progressing  Patient will demonstrate 10 second PFM holds at 4/5 in order to elminate UI symptoms. Not Tested  Patient will have no fecal incontinence episodes x 4 weeks.  Progressing    PLAN  []  Upgrade activities as tolerated     []  Continue plan of care  []  Update interventions per flow sheet       [x]  Discharge due to:_  []  Other:_      Frederick Lazo, PTA 9/28/2022

## 2022-09-29 NOTE — PROGRESS NOTES
Subjective  Faiza Estrada is a 35 y.o. male. Pt. comes in with his mother and caregiver from his group home for f/u. Has a few chronic medical issues as documented including chronic abdominal issues with diarrhea. Has had GI surgeries as a child. Recent ER visit with increased abdominal pain and diarrhea. I reviewed records. Labs and CT of abdomen looked okay. No evidence of obstruction. Along with chronic stool was seen. Patient has been followed by GI as well as a naturopath. Remains on medications and natural supplements. Appetites been good. He has lost some weight. Reports feeling well today. Depends on others for some ADLs. All other chronic medical issues are stable on current treatment regimen. Has had Covid-19 vaccination. Reports taking proper precautions. Denies any related signs or symptoms. PMH/PSH/Allergies/Social History/medication list and most recent studies reviewed with patient. Tobacco use: No  Alcohol use: No  Reports compliance with medications and diet. Trying to be active physically as tolerated. Reports no other new c/o. HPI  Review of Systems   Constitutional: Negative. HENT: Negative. Eyes: Negative. Respiratory:  Negative for shortness of breath. Cardiovascular:  Negative for chest pain and leg swelling. Gastrointestinal:  Positive for diarrhea (Fecal incontinence). Negative for abdominal pain, blood in stool, constipation, heartburn, melena, nausea and vomiting. Genitourinary:  Positive for urgency. Negative for dysuria. Musculoskeletal:  Negative for back pain, falls and joint pain. Skin: Negative. Neurological:  Positive for tremors. Negative for dizziness, sensory change, seizures and headaches. Endo/Heme/Allergies: Negative. Psychiatric/Behavioral:  Positive for depression. The patient is nervous/anxious and has insomnia. All other systems reviewed and are negative. Objective  Physical Exam  Vitals and nursing note reviewed. Constitutional:       General: He is not in acute distress. Appearance: He is well-developed. Comments: Pleasant, chronically underweight  cognitivelty a bit slow   HENT:      Head: Normocephalic and atraumatic. Nose: Nose normal.      Mouth/Throat:      Mouth: Mucous membranes are moist.      Pharynx: Oropharynx is clear. Eyes:      General: No scleral icterus. Conjunctiva/sclera: Conjunctivae normal.   Neck:      Thyroid: No thyromegaly. Vascular: No carotid bruit or JVD. Cardiovascular:      Rate and Rhythm: Normal rate and regular rhythm. Heart sounds: Normal heart sounds. No murmur heard. Pulmonary:      Effort: Pulmonary effort is normal. No respiratory distress. Breath sounds: Normal breath sounds. No wheezing or rales. Abdominal:      General: Bowel sounds are normal. There is no distension. Palpations: Abdomen is soft. There is no mass. Tenderness: There is no abdominal tenderness. There is no right CVA tenderness, left CVA tenderness, guarding or rebound. Comments: Chronic surgical scars   Musculoskeletal:         General: No tenderness. Cervical back: Normal range of motion and neck supple. Right lower leg: No edema. Left lower leg: No edema. Skin:     General: Skin is warm and dry. Findings: No rash. Comments: Hyperpigmented areas on both feet with prominent veins, nontender, good pedal pulses   Neurological:      Mental Status: He is alert and oriented to person, place, and time. Mental status is at baseline. Coordination: Coordination abnormal.      Gait: Gait abnormal.   Psychiatric:         Behavior: Behavior normal.      Comments: Repeats words  Chronic cognitive deficit , about same        Assessment & Plan    ICD-10-CM ICD-9-CM    1. Functional diarrhea  K59.1 564.5 This is a chronic issue. According to caregiver is back to his baseline. 2. Underweight  R63.6 783.22 80 disease 3 meals and snacks. Increase calorie intake. 3. History of abdominal surgery  Z98.890 V45.89       4. S/p small bowel obstruction  Z87.19 V12.79       5. Osteoporosis without current pathological fracture, unspecified osteoporosis type  M81.0 733.00 CALCIUM      MAGNESIUM      RENAL FUNCTION PANEL      PHOSPHORUS      CALCIUM      MAGNESIUM      RENAL FUNCTION PANEL      PHOSPHORUS  Continue Prolia injections        Follow-up and Dispositions    Return if symptoms worsen or fail to improve. Reassurance that all chronic medical problems are stable  Continue with current medical management and plan  lab results and schedule of future lab studies reviewed with patient  reviewed diet, exercise and weight control  reviewed medications and side effects in detail  F/u with other MD's/ providers as scheduled  COVID-19 precautions discussed with pt  An After Visit Summary was printed and given to the patient.     Kolby Harper, DO

## 2022-10-03 NOTE — H&P
Maxwell Alcantara MD  [facility], [address]  [address]  [phone]  [email or web address]      See office H and P. No interval change. Date of Surgery Update:  Teodora Kvng was seen and examined. History and physical has been reviewed. The patient has been examined.  There have been no significant clinical changes since the completion of the originally dated History and Physical.    Signed By: Randell Quevedo MD     March 17, 2017 8:33 AM
No

## 2022-10-20 DIAGNOSIS — K90.9 INTESTINAL MALABSORPTION, UNSPECIFIED TYPE: Primary | ICD-10-CM

## 2022-10-20 DIAGNOSIS — K59.1 FUNCTIONAL DIARRHEA: ICD-10-CM

## 2022-10-20 RX ORDER — METHYLCELLULOSE (WITH SUGAR)
POWDER IN PACKET (EA) ORAL
Qty: 850 G | Refills: 2 | Status: SHIPPED | OUTPATIENT
Start: 2022-10-20

## 2022-11-30 RX ORDER — CALCIUM CARBONATE/VITAMIN D3 600MG-5MCG
TABLET ORAL
Qty: 60 TABLET | Refills: 12 | Status: SHIPPED | OUTPATIENT
Start: 2022-11-30

## 2022-12-05 NOTE — PROGRESS NOTES
Physical Therapy at [facility],   a part of  [facility]  [address] [facility], [address] [place] [address] Drive  Phone: [phone] Fax: [phone]      Discharge Summary 2-15    Patient name: Emeka Toro  : 1980  Provider#: [id number]  Referral source: Michell Castle MD      Medical/Treatment Diagnosis: Full incontinence of feces [R15.9]     Prior Hospitalization: see medical history     Comorbidities: See Plan of Care  Prior Level of Function: See Plan of Care  Medications: Verified on Patient Summary List    Start of Care: 2022     Onset Date:6 years   Visits from Start of Care: 4     Missed Visits: 1  Reporting Period : 2022 to 2022    Assessment/Summary of care: Pt referred to pelvic PT for evaluation and treatment of pelvic floor dysfunction, hip weakness, fecal incontinence. Treatment consisted of manual therapy, therapeutic exercise, therapeutic activity, patient and caregiver education, posture and body mechanics training, modalities, home exercise program development and progression. Mr Rizwan Willoughby made steady gains toward goals, reported full resolution of fecal incontinenence 2+ weeks per caregiver phone report. Mr Rizwan Willoughby and caregiver are independent with a home exercise program.  Goal status as follows:         Short Term Goals: To be accomplished in 6 weeks:  Patient will be independent with a progressive home exercise program with assistance from care giver. - MET  Patient will demonstrate & utilized proper toilet position using Squatty Potty or similar with assistance from care giver. - MET  Patient will demonstrate improved PFM strength to 3+/5 bilaterally in order to decrease FI symptoms - MET - Fecal incontinence has resolved x 2+ weeks. Patient will demonstrate improved hip ext strength to 4+/5 bilaterally - MET   Patient will report improved stool quality to BSS 4 -MET     Long Term Goals:  To be accomplished in 12 weeks:  Patient will demonstrate 5/5 hip ext and abduction strength - bilaterally in order to eliminate FI symptoms. - Progressing- Fecal incontinence resolved x 2+weeks. Patient will demonstrate 10 second PFM holds at 4/5 in order to elminate UI symptoms. Not Tested  Patient will have no fecal incontinence episodes x 4 weeks.  Progressing    RECOMMENDATIONS:  [x]Discontinue therapy: [x]Patient has reached or is progressing toward set goals     []Patient is non-compliant or has abdicated     []Due to lack of appreciable progress towards set goals     []Other    Vinicius Donis, PT, MSPT   09/28//2022

## 2022-12-15 ENCOUNTER — PATIENT MESSAGE (OUTPATIENT)
Dept: INTERNAL MEDICINE CLINIC | Age: 42
End: 2022-12-15

## 2022-12-15 DIAGNOSIS — R63.6 LOW WEIGHT: Primary | ICD-10-CM

## 2023-01-10 ENCOUNTER — HOSPITAL ENCOUNTER (OUTPATIENT)
Dept: GENERAL RADIOLOGY | Age: 43
Discharge: HOME | End: 2023-01-10
Payer: MEDICARE

## 2023-01-10 ENCOUNTER — OFFICE VISIT (OUTPATIENT)
Dept: INTERNAL MEDICINE CLINIC | Age: 43
End: 2023-01-10
Payer: MEDICARE

## 2023-01-10 VITALS
WEIGHT: 105 LBS | TEMPERATURE: 102.2 F | BODY MASS INDEX: 15.03 KG/M2 | DIASTOLIC BLOOD PRESSURE: 80 MMHG | HEART RATE: 115 BPM | OXYGEN SATURATION: 99 % | HEIGHT: 70 IN | SYSTOLIC BLOOD PRESSURE: 142 MMHG | RESPIRATION RATE: 16 BRPM

## 2023-01-10 DIAGNOSIS — R63.6 UNDERWEIGHT: ICD-10-CM

## 2023-01-10 DIAGNOSIS — K90.9 INTESTINAL MALABSORPTION, UNSPECIFIED TYPE: ICD-10-CM

## 2023-01-10 DIAGNOSIS — F41.9 ANXIETY: ICD-10-CM

## 2023-01-10 DIAGNOSIS — S99.921A RIGHT FOOT INJURY, INITIAL ENCOUNTER: Primary | ICD-10-CM

## 2023-01-10 DIAGNOSIS — S99.921A RIGHT FOOT INJURY, INITIAL ENCOUNTER: ICD-10-CM

## 2023-01-10 DIAGNOSIS — Z98.890 HISTORY OF ABDOMINAL SURGERY: ICD-10-CM

## 2023-01-10 DIAGNOSIS — F33.9 RECURRENT DEPRESSION (HCC): ICD-10-CM

## 2023-01-10 PROCEDURE — G9717 DOC PT DX DEP/BP F/U NT REQ: HCPCS | Performed by: INTERNAL MEDICINE

## 2023-01-10 PROCEDURE — G8418 CALC BMI BLW LOW PARAM F/U: HCPCS | Performed by: INTERNAL MEDICINE

## 2023-01-10 PROCEDURE — G8427 DOCREV CUR MEDS BY ELIG CLIN: HCPCS | Performed by: INTERNAL MEDICINE

## 2023-01-10 PROCEDURE — 73630 X-RAY EXAM OF FOOT: CPT

## 2023-01-10 PROCEDURE — 99214 OFFICE O/P EST MOD 30 MIN: CPT | Performed by: INTERNAL MEDICINE

## 2023-01-10 RX ORDER — CHOLECALCIFEROL (VITAMIN D3) 125 MCG
200 CAPSULE ORAL
Qty: 180 EACH | Refills: 1 | Status: SHIPPED | OUTPATIENT
Start: 2023-01-10

## 2023-01-10 RX ORDER — SAME BUTANEDISULFONATE/BETAINE 400-600 MG
250 POWDER IN PACKET (EA) ORAL 2 TIMES DAILY
COMMUNITY
End: 2023-01-10 | Stop reason: SDUPTHER

## 2023-01-10 RX ORDER — SAME BUTANEDISULFONATE/BETAINE 400-600 MG
POWDER IN PACKET (EA) ORAL
Qty: 30 CAPSULE | Refills: 11 | Status: SHIPPED | OUTPATIENT
Start: 2023-01-10

## 2023-01-10 NOTE — PROGRESS NOTES
Health Maintenance Due   Topic Date Due    DTaP/Tdap/Td series (1 - Tdap) Never done    COVID-19 Vaccine (4 - Booster for Moderna series) 01/12/2022    Flu Vaccine (1) 08/01/2022       Chief Complaint   Patient presents with    Hypertension    Osteoporosis    Follow Up Chronic Condition       1. Have you been to the ER, urgent care clinic since your last visit? Hospitalized since your last visit? No    2. Have you seen or consulted any other health care providers outside of the [address] [place] [address] since your last visit? Include any pap smears or colon screening. No    3) Do you have an Advance Directive on file? no    4) Are you interested in receiving information on Advance Directives? NO      Patient is accompanied by self I have received verbal consent from [employer]er to discuss any/all medical information while they are present in the room.

## 2023-01-10 NOTE — PROGRESS NOTES
Leeanna Tam is a 38 y.o. male. Pt. comes in with his mother and caregiver from his group home for f/u. Has a few chronic medical issues as documented. He fell while getting out of the shower this morning. Has injured lateral aspect of right foot. Reports some pain. Denies any other related issues. His chronic GI issues have been stable. Followed by nutritionist and remains on a number of supplements which are helping. BMs have been more regular and formed since on Xifaxan. Has been followed by GI as well. All chronic medical issues are stable on current treatment regimen. Denies any issues with  Covid-19 vaccination. PMH/PSH/Allergies/Social History/medication list and most recent studies reviewed with patient. Tobacco use: No  Alcohol use: no   Reports compliance with medications and diet. Trying to be active physically to control weight. Reports no other new c/o. 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, without mention of intractable epilepsy 5/14/2011    Mental retardation     Osteoporosis     Other ill-defined conditions(799.89)     intestinal problems    Psychiatric disorder     anxiety    Seizure disorder ([facility] [address]        No Known Allergies    Current Outpatient Medications on File Prior to Visit   Medication Sig Dispense Refill    calcium-vitamin D 600 mg-5 mcg (200 unit) tab TAKE 1 TABLET BY MOUTH TWICE A DAY 60 Tablet 12    CitruceL, sucrose, powd Patient to have 1 scoop every morning, When patient has diarrhea please hold (Do not give) 850 g 2    gabapentin (NEURONTIN) 100 mg capsule TAKE (1) CAPSULE BY MOUTH THREE TIMES DAILY.  93 Capsule 5    denosumab (Prolia) 60 mg/mL injection       Xifaxan 550 mg tablet       One Daily Multivitamin tablet TAKE 1 TABLET BY MOUTH DAILY 30 Tablet 11    melatonin 5 mg tablet TAKE 1 TABLET BY MOUTH AT BEDTIME 31 Tablet 12    Refresh Tears 0.5 % drop ophthalmic solution PLACE 1 DROP INTO EACH EYE TWICE DAILY 15 mL 5    OTHER       OTHER Focus factor take two tablets twice a day with food 120 Tablet 1    fluticasone propionate (FLONASE) 50 mcg/actuation nasal spray 2 Sprays by Both Nostrils route daily as needed for Rhinitis. BLOW NOSE: 2 SPRAYS IN EACH NOSTRIL DAILY FOR ALLERGY. 1 Each 3    L-Methylfolate 7.5 mg tab       OTHER L-Theanine 150 mg 1 daily 30 Tab 11    B.animalis,bifid,infantis,long 10-15 mg TbEC Take  by mouth daily. OTHER Indications: Elemental Diet Shake      acetylcysteine 600 mg cap capsule Take  by mouth. amitriptyline (ELAVIL) 50 mg tablet Take 50 mg by mouth nightly. levomefolate-algal oil 7.5-90.314 mg cap Take  by mouth. acetaminophen (TYLENOL) 325 mg tablet Take  by mouth every four (4) hours as needed for Pain. brief disposable (ADULT) misc by Does Not Apply route. Depends, size medium, 1 nightly 1 Package 11    [DISCONTINUED] Saccharomyces boulardii (FLORASTOR) 250 mg capsule Take 250 mg by mouth two (2) times a day. [DISCONTINUED] ibuprofen (MOTRIN) 600 mg tablet Take 1 Tablet by mouth every six (6) hours as needed (fever if not repsonding to Tylenol). 20 Tablet 0    [DISCONTINUED] theanine 200 mg cap Take 200 mg by mouth two (2) times a day. 180 Each 1    [DISCONTINUED] OTHER Pancreatin 10x - 200, I TID with meals (Patient taking differently: Pancreatin 10x - 200, I BID with meals) 100 Cap 11    [DISCONTINUED] guaiFENesin ER (MUCINEX) 600 mg ER tablet Take 600 mg by mouth daily as needed for Congestion. No current facility-administered medications on file prior to visit.        Visit Vitals  Blood Pressure (Abnormal) 142/80 (BP 1 Location: Left upper arm, BP Patient Position: Sitting, BP Cuff Size: Adult)   Pulse (Abnormal) 115   Temperature (Abnormal) 102.2 °F (39 °C) (Oral)   Respiration 16   Height 5' 10\" (1.778 m)   Weight 105 lb (47.6 kg)   Oxygen Saturation 99%   Body Mass Index 15.07 kg/m²     HPI  Review of Systems   Constitutional: Negative. HENT: Negative. Eyes: Negative. Respiratory:  Negative for shortness of breath. Cardiovascular:  Negative for chest pain and leg swelling. Gastrointestinal:  Positive for diarrhea (Fecal incontinence). Negative for abdominal pain, blood in stool, constipation, heartburn, melena, nausea and vomiting. Genitourinary:  Positive for urgency. Negative for dysuria. Musculoskeletal:  Positive for back pain and falls. Negative for joint pain. Skin: Negative. Neurological:  Positive for tremors. Negative for dizziness, sensory change, seizures and headaches. Endo/Heme/Allergies: Negative. Psychiatric/Behavioral:  Positive for depression. The patient is nervous/anxious and has insomnia. All other systems reviewed and are negative. Objective  Physical Exam  Vitals and nursing note reviewed. Constitutional:       General: He is not in acute distress. Appearance: He is well-developed. Comments: Pleasant, chronically underweight  cognitivelty a bit slow   HENT:      Head: Normocephalic and atraumatic. Nose: Nose normal.      Mouth/Throat:      Mouth: Mucous membranes are moist.      Pharynx: Oropharynx is clear. Eyes:      General: No scleral icterus. Conjunctiva/sclera: Conjunctivae normal.   Neck:      Thyroid: No thyromegaly. Vascular: No carotid bruit or JVD. Cardiovascular:      Rate and Rhythm: Normal rate and regular rhythm. Heart sounds: Normal heart sounds. No murmur heard. Pulmonary:      Effort: Pulmonary effort is normal. No respiratory distress. Breath sounds: Normal breath sounds. No wheezing or rales. Abdominal:      General: Bowel sounds are normal. There is no distension. Palpations: Abdomen is soft. There is no mass.       Tenderness: There is no abdominal tenderness. There is no right CVA tenderness, left CVA tenderness, guarding or rebound. Comments: Chronic surgical scars   Musculoskeletal:         General: Tenderness (Right lateral foot with small abrasion/erythema) and signs of injury present. No swelling or deformity. Normal range of motion. Cervical back: Normal range of motion and neck supple. Right lower leg: No edema. Left lower leg: No edema. Skin:     General: Skin is warm and dry. Findings: No rash. Comments: Hyperpigmented areas on both feet with prominent veins, nontender, good pedal pulses   Neurological:      Mental Status: He is alert and oriented to person, place, and time. Mental status is at baseline. Coordination: Coordination abnormal.      Gait: Gait abnormal.   Psychiatric:         Behavior: Behavior normal.      Comments: Repeats words  Chronic cognitive deficit , about same        Assessment & Plan    ICD-10-CM ICD-9-CM    1. Right foot injury, initial encounter  S99.921A 959.7 XR FOOT RT MIN 3 V  Continue Tylenol for pain  Apply ice to the area as tolerated  Elevate foot and stay off of it today      2. Recurrent depression (HCC)  F33.9 296.30 Stable chronic condition. Continue current treatment/medications. 3. Anxiety  F41.9 300.00 theanine 200 mg cap   4. Chronically underweight  5. Chronic malabsorption with previous abdominal surgery          continue supplements/medications     Orders Placed This Encounter    XR FOOT RT MIN 3 V     Standing Status:   Future     Number of Occurrences:   1     Standing Expiration Date:   2/10/2024    OTHER     Sig: Pancreatin 10x - 200, I BID with meals     Dispense:  60 Capsule     Refill:  11    Saccharomyces boulardii (FLORASTOR) 250 mg capsule     Si at 5 PM     Dispense:  30 Capsule     Refill:  11    theanine 200 mg cap     Sig: Take 200 mg by mouth two (2) times daily as needed for Diarrhea or Nausea. Dispense:  180 Each     Refill:  1     Follow-up and Dispositions    Return in about 6 months (around 7/10/2023), or if symptoms worsen or fail to improve. All chronic medical problems are stable  Continue with current medical management and plan  lab results and schedule of future lab studies reviewed with patient  reviewed diet, exercise and weight control  reviewed medications and side effects in detail  F/u with other MD's/ providers as scheduled  COVID-19 precautions discussed with pt  An After Visit Summary was printed and given to the patient.     Peterson Sung, DO

## 2023-02-07 DIAGNOSIS — F41.9 ANXIETY: ICD-10-CM

## 2023-02-07 DIAGNOSIS — R25.1 TREMOR: ICD-10-CM

## 2023-02-07 NOTE — TELEPHONE ENCOUNTER
Patient needs a refill on his medication GABAPENTIN the counselor informed us the prescription expires on 2/14/23. Please contact with any questions.

## 2023-02-08 RX ORDER — GABAPENTIN 100 MG/1
CAPSULE ORAL
Qty: 93 CAPSULE | Refills: 5 | Status: SHIPPED | OUTPATIENT
Start: 2023-02-08

## 2023-02-09 ENCOUNTER — TELEPHONE (OUTPATIENT)
Dept: NEUROLOGY | Age: 43
End: 2023-02-09

## 2023-02-09 NOTE — TELEPHONE ENCOUNTER
Patients mom would like a call from the nurse or doctor regarding his medication GABAPENTIN. She would like to know can the dosage be lowered?       Please contact

## 2023-03-01 ENCOUNTER — VIRTUAL VISIT (OUTPATIENT)
Dept: NEUROLOGY | Age: 43
End: 2023-03-01
Payer: MEDICARE

## 2023-03-01 DIAGNOSIS — R25.1 TREMOR: ICD-10-CM

## 2023-03-01 DIAGNOSIS — R56.9 SEIZURE-LIKE ACTIVITY (HCC): Primary | ICD-10-CM

## 2023-03-01 DIAGNOSIS — G47.8 OTHER SLEEP DISORDERS: ICD-10-CM

## 2023-03-01 DIAGNOSIS — F41.9 ANXIETY: ICD-10-CM

## 2023-03-01 PROCEDURE — G9717 DOC PT DX DEP/BP F/U NT REQ: HCPCS | Performed by: NURSE PRACTITIONER

## 2023-03-01 PROCEDURE — G8428 CUR MEDS NOT DOCUMENT: HCPCS | Performed by: NURSE PRACTITIONER

## 2023-03-01 PROCEDURE — 99214 OFFICE O/P EST MOD 30 MIN: CPT | Performed by: NURSE PRACTITIONER

## 2023-03-01 RX ORDER — GABAPENTIN 100 MG/1
100 CAPSULE ORAL 2 TIMES DAILY
Qty: 62 CAPSULE | Refills: 5 | Status: SHIPPED | OUTPATIENT
Start: 2023-03-01

## 2023-03-01 NOTE — PROGRESS NOTES
[address] [facility],[address]  [address]   [place] [address] [facility]   [phone] Office   [phone] Fax           Date:  23     Name:  Svetlana Hyman  :  1980  MRN:  [id number]     PCP:  DO Chastity Rain Ren is a 38 y.o. male who was seen by synchronous (real-time) audio-video technology on 3/1/2023 for No chief complaint on file. Subjective:   His mother indicates that she is interested in potentially decreasing the gabapentin or stopping it. The medication was originally prescribed for the tremor. He seems to be in a fog. He seems to have a lot of prompting. He will stand and look confused about what he may need to be doing. The staff indicates that he does not seem to respond. He looks around but not at the staff. The mouth will move like he is chattering. They state that it is like they are talking to someone who is deaf. This seems to occur about three times a week. This seems to last maybe a minute or so and can last for as much as five minutes. He does not seem to be able to function well for the rest of the day. He did have a fall in January getting out of the shower. Usually, he has someone with him who helps him get in and out but he did not wait until someone was with him. He seems to remember this event and what happened surrounding it. His ability to walk has improved since his weight has improved. He is able to walk on his own for the most part. The tremor seems a little better as well. Current Outpatient Medications   Medication Sig    gabapentin (NEURONTIN) 100 mg capsule TAKE (1) CAPSULE BY MOUTH THREE TIMES DAILY. OTHER Pancreatin 10x - 200, I BID with meals    Saccharomyces boulardii (FLORASTOR) 250 mg capsule 1 at 5 PM    theanine 200 mg cap Take 200 mg by mouth two (2) times daily as needed for Diarrhea or Nausea.     calcium-vitamin D 600 mg-5 mcg (200 unit) tab TAKE 1 TABLET BY MOUTH TWICE A DAY    CitruceL, sucrose, powd Patient to have 1 scoop every morning, When patient has diarrhea please hold (Do not give)    denosumab (Prolia) 60 mg/mL injection     Xifaxan 550 mg tablet     One Daily Multivitamin tablet TAKE 1 TABLET BY MOUTH DAILY    melatonin 5 mg tablet TAKE 1 TABLET BY MOUTH AT BEDTIME    Refresh Tears 0.5 % drop ophthalmic solution PLACE 1 DROP INTO EACH EYE TWICE DAILY    OTHER     OTHER Focus factor take two tablets twice a day with food    fluticasone propionate (FLONASE) 50 mcg/actuation nasal spray 2 Sprays by Both Nostrils route daily as needed for Rhinitis. BLOW NOSE: 2 SPRAYS IN EACH NOSTRIL DAILY FOR ALLERGY. L-Methylfolate 7.5 mg tab     OTHER L-Theanine 150 mg 1 daily    B.animalis,bifid,infantis,long 10-15 mg TbEC Take  by mouth daily. OTHER Indications: Elemental Diet Shake    acetylcysteine 600 mg cap capsule Take  by mouth. amitriptyline (ELAVIL) 50 mg tablet Take 50 mg by mouth nightly. levomefolate-algal oil 7.5-90.314 mg cap Take  by mouth. acetaminophen (TYLENOL) 325 mg tablet Take  by mouth every four (4) hours as needed for Pain. brief disposable (ADULT) misc by Does Not Apply route. Depends, size medium, 1 nightly     No current facility-administered medications for this visit.      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, without mention of intractable epilepsy 5/14/2011    Mental retardation     Osteoporosis     Other ill-defined conditions(799.89)     intestinal problems    Psychiatric disorder     anxiety    Seizure disorder [facility])      Past Surgical History:   Procedure Laterality Date    COLONOSCOPY N/A 1/23/2019    COLONOSCOPY performed by Susy Hermosillo Mundo Roberts MD at hospitals ENDOSCOPY    COLONOSCOPY,DIAGNOSTIC  1/23/2019         HX APPENDECTOMY  1/28/13    APPENDECTOMY    HX GI      colon resection    HX GI  1/28/13    LAPAROTOMY EXPLORATORY - OPEN LYSIS OF ADHESIONS     HX HERNIA REPAIR      HX OTHER SURGICAL      imperforated anus birth defect    UPPER GI ENDOSCOPY,BIOPSY  3/17/2017           reports that he has never smoked. He has never used smokeless tobacco. He reports that he does not drink alcohol and does not use drugs. family history includes Heart Disease in his father; No Known Problems in his mother, sister, and sister. ROS    Objective:     Patient-Reported Vitals 3/19/2021   Patient-Reported Weight 111   Patient-Reported Height -   Patient-Reported Temperature 98.5        General:  Well defined, nourished, and groomed individual in no acute distress. Psych:  Good mood and bright affect    NEUROLOGICAL EXAMINATION:     Mental Status:   Alert and oriented to person and place. He still repeats what he hears like he used to. Cranial Nerves:  I: smell Not tested   II: visual fields Not assessed   II: pupils Equal, round, reactive to light   II: optic disc Not assessed   III,VII: ptosis none   III,IV,VI: extraocular muscles  Full ROM   V: mastication normal   V: facial light touch sensation  Not assessed   VII: facial muscle function   symmetric   VIII: hearing symmetric   IX: soft palate elevation  normal   XI: trapezius strength  Not assessed   XI: sternocleidomastoid strength Not assessed   XI: neck flexion strength  Not assessed   XII: tongue  midline     Motor Examination: Normal tone and bulk. Strength was not assessed      Sensory exam:  Not assessed     Coordination:  No resting. The intention tremor is present more on the right than the left but it is stress. Gait and Station:  Steady while walking. No muscle wasting or fasiculations noted. Reflexes:  Not assessed    Assessment & Plan:       ICD-10-CM ICD-9-CM    1. Seizure-like activity (HCC)  R56.9 780.39 EEG      EEG SLEEP DEPRIVED      2. Tremor  R25.1 781.0 gabapentin (NEURONTIN) 100 mg capsule      3. Anxiety  F41.9 300.00 gabapentin (NEURONTIN) 100 mg capsule      4. Other sleep disorders   G47.8 327.8 EEG SLEEP DEPRIVED        Based on the description of his episodes in which he seems to be develop a blank stare and does not seem to respond followed by being off the rest of the day, my concern would be for recurrent seizure rather than a behavioral issues which is what the caregiver at his residential facility and his mother thought. I would like to set him up for routine and sleep deprived EEG for further evaluation. With regard to the gabapentin, this was largely for the tremor and anxiety neither of which seem to be as much of an issue since his weight is a bit more normal. I am fine with decreasing this to 100mg twice a day. If the EEGs demonstrate seizure activity, I would want to use a different medication for seizure management anyway. Follow up after testing. We discussed the expected course, resolution and complications of the diagnosis(es) in detail. Medication risks, benefits, costs, interactions, and alternatives were discussed as indicated. I advised him to contact the office if his condition worsens, changes or fails to improve as anticipated. He expressed understanding with the diagnosis(es) and plan. An Ivey, was evaluated through a synchronous (real-time) audio-video encounter. The patient (or guardian if applicable) is aware that this is a billable service, which includes applicable co-pays. This Virtual Visit was conducted with patient's (and/or legal guardian's) consent. The visit was conducted pursuant to the emergency declaration under the [facility], [address] [place] [address] [facility] authority and the [employer] General Act.   Patient identification was verified, and a caregiver was present when appropriate.   The patient was located at: Home: [address] [place] [address] [place]  [place] [phone]  The provider was located at: Home: [address] [place] [address]

## 2023-03-07 DIAGNOSIS — G40.909 SEIZURE DISORDER (HCC): Primary | ICD-10-CM

## 2023-03-15 ENCOUNTER — HOSPITAL ENCOUNTER (OUTPATIENT)
Dept: INFUSION THERAPY | Age: 43
Discharge: HOME | End: 2023-03-15
Attending: NURSE PRACTITIONER
Payer: MEDICARE

## 2023-03-15 VITALS
TEMPERATURE: 97.7 F | SYSTOLIC BLOOD PRESSURE: 130 MMHG | RESPIRATION RATE: 18 BRPM | HEART RATE: 96 BPM | DIASTOLIC BLOOD PRESSURE: 79 MMHG

## 2023-03-15 DIAGNOSIS — M81.0 OSTEOPOROSIS WITHOUT CURRENT PATHOLOGICAL FRACTURE, UNSPECIFIED OSTEOPOROSIS TYPE: Primary | ICD-10-CM

## 2023-03-15 LAB
ANION GAP BLD CALC-SCNC: 9 MMOL/L (ref 10–20)
CA-I BLD-MCNC: 1.17 MMOL/L (ref 1.12–1.32)
CHLORIDE BLD-SCNC: 101 MMOL/L (ref 98–107)
CO2 BLD-SCNC: 33.5 MMOL/L (ref 21–32)
CREAT BLD-MCNC: 0.86 MG/DL (ref 0.6–1.3)
GLUCOSE BLD-MCNC: 89 MG/DL (ref 65–100)
POTASSIUM BLD-SCNC: 4.3 MMOL/L (ref 3.5–5.1)
SERVICE CMNT-IMP: ABNORMAL
SODIUM BLD-SCNC: 142 MMOL/L (ref 136–145)

## 2023-03-15 PROCEDURE — 74011250636 HC RX REV CODE- 250/636: Performed by: NURSE PRACTITIONER

## 2023-03-15 PROCEDURE — 80047 BASIC METABLC PNL IONIZED CA: CPT

## 2023-03-15 PROCEDURE — 96372 THER/PROPH/DIAG INJ SC/IM: CPT

## 2023-03-15 RX ORDER — ONDANSETRON 2 MG/ML
8 INJECTION INTRAMUSCULAR; INTRAVENOUS AS NEEDED
OUTPATIENT
Start: 2023-09-13

## 2023-03-15 RX ORDER — EPINEPHRINE 1 MG/ML
0.3 INJECTION, SOLUTION, CONCENTRATE INTRAVENOUS AS NEEDED
Status: ACTIVE | OUTPATIENT
Start: 2023-03-15 | End: 2023-03-15

## 2023-03-15 RX ORDER — DIPHENHYDRAMINE HYDROCHLORIDE 50 MG/ML
50 INJECTION, SOLUTION INTRAMUSCULAR; INTRAVENOUS AS NEEDED
Start: 2023-09-13

## 2023-03-15 RX ORDER — DIPHENHYDRAMINE HYDROCHLORIDE 50 MG/ML
50 INJECTION, SOLUTION INTRAMUSCULAR; INTRAVENOUS AS NEEDED
Status: ACTIVE | OUTPATIENT
Start: 2023-03-15 | End: 2023-03-15

## 2023-03-15 RX ORDER — HYDROCORTISONE SODIUM SUCCINATE 100 MG/2ML
100 INJECTION, POWDER, FOR SOLUTION INTRAMUSCULAR; INTRAVENOUS AS NEEDED
Status: ACTIVE | OUTPATIENT
Start: 2023-03-15 | End: 2023-03-15

## 2023-03-15 RX ORDER — DIPHENHYDRAMINE HYDROCHLORIDE 50 MG/ML
25 INJECTION, SOLUTION INTRAMUSCULAR; INTRAVENOUS AS NEEDED
Start: 2023-09-13

## 2023-03-15 RX ORDER — ALBUTEROL SULFATE 0.83 MG/ML
2.5 SOLUTION RESPIRATORY (INHALATION) AS NEEDED
Status: ACTIVE | OUTPATIENT
Start: 2023-03-15 | End: 2023-03-15

## 2023-03-15 RX ORDER — ACETAMINOPHEN 325 MG/1
650 TABLET ORAL AS NEEDED
Start: 2023-09-13

## 2023-03-15 RX ORDER — HYDROCORTISONE SODIUM SUCCINATE 100 MG/2ML
100 INJECTION, POWDER, FOR SOLUTION INTRAMUSCULAR; INTRAVENOUS AS NEEDED
OUTPATIENT
Start: 2023-09-13

## 2023-03-15 RX ORDER — ALBUTEROL SULFATE 0.83 MG/ML
2.5 SOLUTION RESPIRATORY (INHALATION) AS NEEDED
Start: 2023-09-13

## 2023-03-15 RX ORDER — ACETAMINOPHEN 325 MG/1
650 TABLET ORAL AS NEEDED
Status: ACTIVE | OUTPATIENT
Start: 2023-03-15 | End: 2023-03-15

## 2023-03-15 RX ORDER — EPINEPHRINE 1 MG/ML
0.3 INJECTION, SOLUTION, CONCENTRATE INTRAVENOUS AS NEEDED
OUTPATIENT
Start: 2023-09-13

## 2023-03-15 RX ORDER — ONDANSETRON 2 MG/ML
8 INJECTION INTRAMUSCULAR; INTRAVENOUS AS NEEDED
Status: ACTIVE | OUTPATIENT
Start: 2023-03-15 | End: 2023-03-15

## 2023-03-15 RX ORDER — DIPHENHYDRAMINE HYDROCHLORIDE 50 MG/ML
25 INJECTION, SOLUTION INTRAMUSCULAR; INTRAVENOUS AS NEEDED
Status: ACTIVE | OUTPATIENT
Start: 2023-03-15 | End: 2023-03-15

## 2023-03-15 RX ADMIN — DENOSUMAB 60 MG: 60 INJECTION SUBCUTANEOUS at 11:24

## 2023-03-15 NOTE — PROGRESS NOTES
[address] [facility] @ [facility] VISIT NOTE    1050 Patient arrives for Prolia q6mths without acute problems. Please see connect care for complete assessment and education provided. Vital signs stable throughout and prior to discharge, Pt. Tolerated treatment well and discharged without incident. Patient & mother are aware of next [facility] appointment on 9/13/2023. Appointment card given to parent. Medications Verified by Inocente Esparza RN:  Prolia 60mg SQ right arm    VITAL SIGNS Patient Vitals for the past 12 hrs:   Temp Pulse Resp BP   03/15/23 1050 97.7 °F (36.5 °C) 96 18 130/79       LAB WORK Lab results pending, please see Connect Care for results. Recent Results (from the past 12 hour(s))   POC CHEM8    Collection Time: 03/15/23 10:58 AM   Result Value Ref Range    Calcium, ionized (POC) 1.17 1.12 - 1.32 mmol/L    Sodium (POC) 142 136 - 145 mmol/L    Potassium (POC) 4.3 3.5 - 5.1 mmol/L    Chloride (POC) 101 98 - 107 mmol/L    CO2 (POC) 33.5 (H) 21 - 32 mmol/L    Anion gap (POC) 9 (L) 10 - 20 mmol/L    Glucose (POC) 89 65 - 100 mg/dL    Creatinine (POC) 0.86 0.6 - 1.3 mg/dL    eGFR (POC) >60 >60 ml/min/1.73m2    Comment Comment Not Indicated.

## 2023-04-03 DIAGNOSIS — K59.1 FUNCTIONAL DIARRHEA: ICD-10-CM

## 2023-04-03 DIAGNOSIS — K90.9 INTESTINAL MALABSORPTION, UNSPECIFIED TYPE: ICD-10-CM

## 2023-04-04 ENCOUNTER — PATIENT MESSAGE (OUTPATIENT)
Dept: INTERNAL MEDICINE CLINIC | Age: 43
End: 2023-04-04

## 2023-04-04 RX ORDER — METHYLCELLULOSE (WITH SUGAR)
POWDER IN PACKET (EA) ORAL
Qty: 850 G | Refills: 12 | Status: SHIPPED
Start: 2023-04-04

## 2023-04-04 NOTE — TELEPHONE ENCOUNTER
Pt's mother requesting Lactase Enzyme Tablets. LOV: 1.10.23  OER:85.90.22    \"This message is being sent by Bettie Howard on behalf of Perla Mullins. A script order is needed for Macarena Mcmanus for             Lactase Enzyme 3000 ALU  Please send to [address]. After seeing Basilio Fernandez, Nutritionist, Lydia Caller is now drinking milk. When he gets ice cream, a prepared dessert milk, or other dairy products it is necessary for him to be given a lactaid pill. Thank you!  \"

## 2023-04-05 RX ORDER — LACTASE 4500 UNIT
4500 TABLET ORAL
Qty: 90 TABLET | Refills: 1 | Status: SHIPPED
Start: 2023-04-05

## 2023-05-11 NOTE — TELEPHONE ENCOUNTER
Lov 1/10/23  Nov: 5/24/23    [facility]  Refresh Tears 0.5%

## 2023-05-12 RX ORDER — CARBOXYMETHYLCELLULOSE SODIUM 5 MG/ML
SOLUTION/ DROPS OPHTHALMIC
Qty: 30 ML | Refills: 5 | Status: SHIPPED | OUTPATIENT
Start: 2023-05-12

## 2023-05-30 DIAGNOSIS — G47.00 INSOMNIA, UNSPECIFIED TYPE: Primary | ICD-10-CM

## 2023-05-30 RX ORDER — CHOLECALCIFEROL (VITAMIN D3) 125 MCG
CAPSULE ORAL
Qty: 30 TABLET | Refills: 12 | Status: SHIPPED | OUTPATIENT
Start: 2023-05-30

## 2023-06-14 ENCOUNTER — OFFICE VISIT (OUTPATIENT)
Age: 43
End: 2023-06-14
Payer: MEDICARE

## 2023-06-14 VITALS
TEMPERATURE: 98 F | BODY MASS INDEX: 18.21 KG/M2 | SYSTOLIC BLOOD PRESSURE: 142 MMHG | DIASTOLIC BLOOD PRESSURE: 74 MMHG | RESPIRATION RATE: 16 BRPM | HEIGHT: 70 IN | OXYGEN SATURATION: 99 % | WEIGHT: 127.2 LBS | HEART RATE: 112 BPM

## 2023-06-14 DIAGNOSIS — F33.9 RECURRENT DEPRESSION (HCC): ICD-10-CM

## 2023-06-14 DIAGNOSIS — E55.9 VITAMIN D DEFICIENCY: ICD-10-CM

## 2023-06-14 DIAGNOSIS — R63.6 UNDERWEIGHT: ICD-10-CM

## 2023-06-14 DIAGNOSIS — Z98.890 HISTORY OF ABDOMINAL SURGERY: ICD-10-CM

## 2023-06-14 DIAGNOSIS — M81.0 OSTEOPOROSIS WITHOUT CURRENT PATHOLOGICAL FRACTURE, UNSPECIFIED OSTEOPOROSIS TYPE: Primary | ICD-10-CM

## 2023-06-14 DIAGNOSIS — K90.9 INTESTINAL MALABSORPTION, UNSPECIFIED TYPE: ICD-10-CM

## 2023-06-14 DIAGNOSIS — Z87.19 S/P SMALL BOWEL OBSTRUCTION: ICD-10-CM

## 2023-06-14 PROCEDURE — 99214 OFFICE O/P EST MOD 30 MIN: CPT | Performed by: INTERNAL MEDICINE

## 2023-06-14 SDOH — ECONOMIC STABILITY: FOOD INSECURITY: WITHIN THE PAST 12 MONTHS, THE FOOD YOU BOUGHT JUST DIDN'T LAST AND YOU DIDN'T HAVE MONEY TO GET MORE.: NEVER TRUE

## 2023-06-14 SDOH — ECONOMIC STABILITY: HOUSING INSECURITY
IN THE LAST 12 MONTHS, WAS THERE A TIME WHEN YOU DID NOT HAVE A STEADY PLACE TO SLEEP OR SLEPT IN A SHELTER (INCLUDING NOW)?: NO

## 2023-06-14 SDOH — ECONOMIC STABILITY: FOOD INSECURITY: WITHIN THE PAST 12 MONTHS, YOU WORRIED THAT YOUR FOOD WOULD RUN OUT BEFORE YOU GOT MONEY TO BUY MORE.: NEVER TRUE

## 2023-06-14 SDOH — ECONOMIC STABILITY: INCOME INSECURITY: HOW HARD IS IT FOR YOU TO PAY FOR THE VERY BASICS LIKE FOOD, HOUSING, MEDICAL CARE, AND HEATING?: NOT VERY HARD

## 2023-06-14 ASSESSMENT — PATIENT HEALTH QUESTIONNAIRE - PHQ9
2. FEELING DOWN, DEPRESSED OR HOPELESS: 0
9. THOUGHTS THAT YOU WOULD BE BETTER OFF DEAD, OR OF HURTING YOURSELF: 0
SUM OF ALL RESPONSES TO PHQ QUESTIONS 1-9: 0
SUM OF ALL RESPONSES TO PHQ QUESTIONS 1-9: 0
3. TROUBLE FALLING OR STAYING ASLEEP: 0
4. FEELING TIRED OR HAVING LITTLE ENERGY: 0
1. LITTLE INTEREST OR PLEASURE IN DOING THINGS: 0
7. TROUBLE CONCENTRATING ON THINGS, SUCH AS READING THE NEWSPAPER OR WATCHING TELEVISION: 0
10. IF YOU CHECKED OFF ANY PROBLEMS, HOW DIFFICULT HAVE THESE PROBLEMS MADE IT FOR YOU TO DO YOUR WORK, TAKE CARE OF THINGS AT HOME, OR GET ALONG WITH OTHER PEOPLE: 0
6. FEELING BAD ABOUT YOURSELF - OR THAT YOU ARE A FAILURE OR HAVE LET YOURSELF OR YOUR FAMILY DOWN: 0
SUM OF ALL RESPONSES TO PHQ9 QUESTIONS 1 & 2: 0
SUM OF ALL RESPONSES TO PHQ QUESTIONS 1-9: 0
5. POOR APPETITE OR OVEREATING: 0
8. MOVING OR SPEAKING SO SLOWLY THAT OTHER PEOPLE COULD HAVE NOTICED. OR THE OPPOSITE, BEING SO FIGETY OR RESTLESS THAT YOU HAVE BEEN MOVING AROUND A LOT MORE THAN USUAL: 0
SUM OF ALL RESPONSES TO PHQ QUESTIONS 1-9: 0

## 2023-06-14 ASSESSMENT — ANXIETY QUESTIONNAIRES
3. WORRYING TOO MUCH ABOUT DIFFERENT THINGS: 0
2. NOT BEING ABLE TO STOP OR CONTROL WORRYING: 0
6. BECOMING EASILY ANNOYED OR IRRITABLE: 0
GAD7 TOTAL SCORE: 0
IF YOU CHECKED OFF ANY PROBLEMS ON THIS QUESTIONNAIRE, HOW DIFFICULT HAVE THESE PROBLEMS MADE IT FOR YOU TO DO YOUR WORK, TAKE CARE OF THINGS AT HOME, OR GET ALONG WITH OTHER PEOPLE: NOT DIFFICULT AT ALL
7. FEELING AFRAID AS IF SOMETHING AWFUL MIGHT HAPPEN: 0
5. BEING SO RESTLESS THAT IT IS HARD TO SIT STILL: 0
1. FEELING NERVOUS, ANXIOUS, OR ON EDGE: 0
4. TROUBLE RELAXING: 0

## 2023-06-28 RX ORDER — MULTIVITAMIN
TABLET ORAL
Qty: 30 TABLET | Refills: 12 | Status: SHIPPED | OUTPATIENT
Start: 2023-06-28

## 2023-06-29 ASSESSMENT — ENCOUNTER SYMPTOMS
SHORTNESS OF BREATH: 0
RESPIRATORY NEGATIVE: 1
ALLERGIC/IMMUNOLOGIC NEGATIVE: 1
GASTROINTESTINAL NEGATIVE: 1
ABDOMINAL PAIN: 0
EYES NEGATIVE: 1

## 2023-07-06 DIAGNOSIS — R09.89 CHEST CONGESTION: Primary | ICD-10-CM

## 2023-07-06 RX ORDER — GUAIFENESIN 600 MG/1
600 TABLET, EXTENDED RELEASE ORAL 2 TIMES DAILY
Qty: 30 TABLET | Refills: 0 | Status: SHIPPED | OUTPATIENT
Start: 2023-07-06 | End: 2023-07-21

## 2023-07-07 ENCOUNTER — OFFICE VISIT (OUTPATIENT)
Age: 43
End: 2023-07-07
Payer: MEDICARE

## 2023-07-07 VITALS
TEMPERATURE: 96.8 F | DIASTOLIC BLOOD PRESSURE: 70 MMHG | HEART RATE: 78 BPM | RESPIRATION RATE: 16 BRPM | HEIGHT: 70 IN | SYSTOLIC BLOOD PRESSURE: 122 MMHG | BODY MASS INDEX: 18.47 KG/M2 | WEIGHT: 129 LBS | OXYGEN SATURATION: 99 %

## 2023-07-07 DIAGNOSIS — R68.89 THROAT CONGESTION: ICD-10-CM

## 2023-07-07 DIAGNOSIS — Z87.19 S/P SMALL BOWEL OBSTRUCTION: ICD-10-CM

## 2023-07-07 DIAGNOSIS — E55.9 VITAMIN D DEFICIENCY: ICD-10-CM

## 2023-07-07 DIAGNOSIS — J30.89 NON-SEASONAL ALLERGIC RHINITIS, UNSPECIFIED TRIGGER: Primary | ICD-10-CM

## 2023-07-07 PROCEDURE — 99214 OFFICE O/P EST MOD 30 MIN: CPT | Performed by: INTERNAL MEDICINE

## 2023-07-07 RX ORDER — FLUTICASONE PROPIONATE 50 MCG
2 SPRAY, SUSPENSION (ML) NASAL DAILY
Qty: 16 G | Refills: 5 | Status: SHIPPED | OUTPATIENT
Start: 2023-07-07

## 2023-07-07 SDOH — ECONOMIC STABILITY: FOOD INSECURITY: WITHIN THE PAST 12 MONTHS, YOU WORRIED THAT YOUR FOOD WOULD RUN OUT BEFORE YOU GOT MONEY TO BUY MORE.: NEVER TRUE

## 2023-07-07 SDOH — ECONOMIC STABILITY: FOOD INSECURITY: WITHIN THE PAST 12 MONTHS, THE FOOD YOU BOUGHT JUST DIDN'T LAST AND YOU DIDN'T HAVE MONEY TO GET MORE.: NEVER TRUE

## 2023-07-07 SDOH — ECONOMIC STABILITY: INCOME INSECURITY: HOW HARD IS IT FOR YOU TO PAY FOR THE VERY BASICS LIKE FOOD, HOUSING, MEDICAL CARE, AND HEATING?: NOT VERY HARD

## 2023-07-07 ASSESSMENT — PATIENT HEALTH QUESTIONNAIRE - PHQ9
9. THOUGHTS THAT YOU WOULD BE BETTER OFF DEAD, OR OF HURTING YOURSELF: 0
4. FEELING TIRED OR HAVING LITTLE ENERGY: 0
2. FEELING DOWN, DEPRESSED OR HOPELESS: 0
10. IF YOU CHECKED OFF ANY PROBLEMS, HOW DIFFICULT HAVE THESE PROBLEMS MADE IT FOR YOU TO DO YOUR WORK, TAKE CARE OF THINGS AT HOME, OR GET ALONG WITH OTHER PEOPLE: 0
1. LITTLE INTEREST OR PLEASURE IN DOING THINGS: 0
3. TROUBLE FALLING OR STAYING ASLEEP: 0
SUM OF ALL RESPONSES TO PHQ QUESTIONS 1-9: 0
SUM OF ALL RESPONSES TO PHQ QUESTIONS 1-9: 0
5. POOR APPETITE OR OVEREATING: 0
9. THOUGHTS THAT YOU WOULD BE BETTER OFF DEAD, OR OF HURTING YOURSELF: 0
7. TROUBLE CONCENTRATING ON THINGS, SUCH AS READING THE NEWSPAPER OR WATCHING TELEVISION: 0
3. TROUBLE FALLING OR STAYING ASLEEP: 0
SUM OF ALL RESPONSES TO PHQ QUESTIONS 1-9: 0
SUM OF ALL RESPONSES TO PHQ QUESTIONS 1-9: 0
7. TROUBLE CONCENTRATING ON THINGS, SUCH AS READING THE NEWSPAPER OR WATCHING TELEVISION: 0
2. FEELING DOWN, DEPRESSED OR HOPELESS: 0
SUM OF ALL RESPONSES TO PHQ QUESTIONS 1-9: 0
SUM OF ALL RESPONSES TO PHQ9 QUESTIONS 1 & 2: 0
6. FEELING BAD ABOUT YOURSELF - OR THAT YOU ARE A FAILURE OR HAVE LET YOURSELF OR YOUR FAMILY DOWN: 0
SUM OF ALL RESPONSES TO PHQ QUESTIONS 1-9: 0
1. LITTLE INTEREST OR PLEASURE IN DOING THINGS: 0
10. IF YOU CHECKED OFF ANY PROBLEMS, HOW DIFFICULT HAVE THESE PROBLEMS MADE IT FOR YOU TO DO YOUR WORK, TAKE CARE OF THINGS AT HOME, OR GET ALONG WITH OTHER PEOPLE: 0
SUM OF ALL RESPONSES TO PHQ QUESTIONS 1-9: 0
6. FEELING BAD ABOUT YOURSELF - OR THAT YOU ARE A FAILURE OR HAVE LET YOURSELF OR YOUR FAMILY DOWN: 0
8. MOVING OR SPEAKING SO SLOWLY THAT OTHER PEOPLE COULD HAVE NOTICED. OR THE OPPOSITE, BEING SO FIGETY OR RESTLESS THAT YOU HAVE BEEN MOVING AROUND A LOT MORE THAN USUAL: 0
SUM OF ALL RESPONSES TO PHQ QUESTIONS 1-9: 0
8. MOVING OR SPEAKING SO SLOWLY THAT OTHER PEOPLE COULD HAVE NOTICED. OR THE OPPOSITE, BEING SO FIGETY OR RESTLESS THAT YOU HAVE BEEN MOVING AROUND A LOT MORE THAN USUAL: 0
4. FEELING TIRED OR HAVING LITTLE ENERGY: 0
SUM OF ALL RESPONSES TO PHQ9 QUESTIONS 1 & 2: 0
5. POOR APPETITE OR OVEREATING: 0

## 2023-07-07 ASSESSMENT — ENCOUNTER SYMPTOMS
EYES NEGATIVE: 1
ALLERGIC/IMMUNOLOGIC NEGATIVE: 1
RESPIRATORY NEGATIVE: 1
SHORTNESS OF BREATH: 0
COUGH: 0
GASTROINTESTINAL NEGATIVE: 1
ABDOMINAL PAIN: 0

## 2023-07-07 ASSESSMENT — ANXIETY QUESTIONNAIRES
1. FEELING NERVOUS, ANXIOUS, OR ON EDGE: 0
IF YOU CHECKED OFF ANY PROBLEMS ON THIS QUESTIONNAIRE, HOW DIFFICULT HAVE THESE PROBLEMS MADE IT FOR YOU TO DO YOUR WORK, TAKE CARE OF THINGS AT HOME, OR GET ALONG WITH OTHER PEOPLE: NOT DIFFICULT AT ALL
4. TROUBLE RELAXING: 0
7. FEELING AFRAID AS IF SOMETHING AWFUL MIGHT HAPPEN: 0
2. NOT BEING ABLE TO STOP OR CONTROL WORRYING: 0
GAD7 TOTAL SCORE: 0
5. BEING SO RESTLESS THAT IT IS HARD TO SIT STILL: 0
6. BECOMING EASILY ANNOYED OR IRRITABLE: 0
3. WORRYING TOO MUCH ABOUT DIFFERENT THINGS: 0

## 2023-07-07 NOTE — PROGRESS NOTES
Subjective    Brissa Sousa is a 38 y.o. male who presents today for the following:  Chief Complaint   Patient presents with    Congestion    Shortness of Breath         Pt. comes in with his mother for f/u. Has a few chronic medical issues as documented. He lives in a group home. Mother reports patient having some chest and throat congestion for the last few days. Apparently one of the roommates has had the same problem. Patient reports feeling okay. No dyspnea or chest pain. No fever. His chronic GI issues are stable. Has had previous intestinal surgery as a child. Follows with malabsorption. He is underweight but gaining weight gradually. His chronic depression/anxiety/insomnia. Followed by psychiatry. Remains on amitriptyline and melatonin. Also on number of supplements. All chronic medical issues are stable on current treatment regimen. Has had Covid-19 vaccination. Reports taking proper precautions. Denies any related signs or symptoms. PMH/PSH/Allergies/Social History/medication list and most recent studies reviewed with patient. Social History     Tobacco Use   Smoking Status Never   Smokeless Tobacco Never     Social History     Substance and Sexual Activity   Alcohol Use No         Reports compliance with medications and diet. Trying to be active physically to control weight. Reports no other new c/o.      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

## 2023-07-07 NOTE — PROGRESS NOTES
1. \"Have you been to the ER, urgent care clinic since your last visit? Hospitalized since your last visit? \" No    2. \"Have you seen or consulted any other health care providers outside of the [address] [place] [address] since your last visit? \" No    3. For patients aged 43-73: Has the patient had a colonoscopy / FIT/ Cologuard? Recommendation: Colonoscopy every 10y or annual FIT test from 50-75 or every 3 year stool DNA based test with consideration of ongoing screening from 76-85. If the patient is female:    4. For patients aged 43-66: Has the patient had a mammogram within the past 2 years? No    5. For patients aged 21-65: Has the patient had a pap smear?  No

## 2023-07-10 ENCOUNTER — TELEPHONE (OUTPATIENT)
Age: 43
End: 2023-07-10

## 2023-07-10 NOTE — TELEPHONE ENCOUNTER
Spoke with the patient's mother to schedule both a routine eeg and a sleep deprived eeg. She wants to know if he can drink his Chocolate protein shake in the morning?

## 2023-07-17 ENCOUNTER — PROCEDURE VISIT (OUTPATIENT)
Age: 43
End: 2023-07-17
Payer: MEDICARE

## 2023-07-17 DIAGNOSIS — R56.9 SEIZURE-LIKE ACTIVITY (HCC): Primary | ICD-10-CM

## 2023-07-17 PROCEDURE — 95816 EEG AWAKE AND DROWSY: CPT | Performed by: PSYCHIATRY & NEUROLOGY

## 2023-07-17 NOTE — PROGRESS NOTES
[facility] AT [place]   Electroencephalogram Report    Procedure ID: [id number] Procedure Date: 7/17/2023   Patient Name: Jose Rivas YOB: 1980   Procedure Type: Routine Medical Record No: [id number]       An EEG is requested in this 39-year-old man to evaluate for epileptiform abnormality. Medication schedule include Elavil    This tracing is obtained during the awake state. During wakefulness there are brief intermittent runs of posteriorly dominant and symmetric low to medium amplitude 10 cps activities which attenuate with eye opening. Lower voltage faster frequency activities are seen symmetrically over the anterior head regions. Hyperventilation is not performed. Intermittent photic stimulation little alters the tracing. Sleep is not attained. Interpretation  This EEG recorded during the awake state is normal.  No epileptiform abnormalities are seen.         Aye Reid MD

## 2023-07-18 ENCOUNTER — TELEPHONE (OUTPATIENT)
Age: 43
End: 2023-07-18

## 2023-07-18 NOTE — TELEPHONE ENCOUNTER
[facility] long term care pharmacy called because there is some confusion on the order for focus factor. The patient is in a group home. His mother said that Sen Fritz increased the dosage of the medication, the pharmacy needs a new order to reflect this change. pharmacy number is [phone]

## 2023-07-18 NOTE — TELEPHONE ENCOUNTER
Elise Grimm, DO  You 9 minutes ago (10:38 AM)     IM  I do not remember the details. Mom wanted to increase the dosage or frequency I believe. Call her to find out and then let the pharmacy know to do that.

## 2023-07-18 NOTE — TELEPHONE ENCOUNTER
Spoke with Bhavna Carmona, she would like him to take 2 tablets in the AM      [facility] was called and verbalized understanding on note below.

## 2023-08-04 ENCOUNTER — TELEPHONE (OUTPATIENT)
Age: 43
End: 2023-08-04

## 2023-08-04 NOTE — TELEPHONE ENCOUNTER
Pt mother contacted [facility]a .  Seema told her that the practice has to call [phone] to obtain continuation of care

## 2023-09-06 RX ORDER — ALBUTEROL SULFATE 90 UG/1
4 AEROSOL, METERED RESPIRATORY (INHALATION) PRN
OUTPATIENT
Start: 2023-09-13

## 2023-09-06 RX ORDER — ONDANSETRON 2 MG/ML
8 INJECTION INTRAMUSCULAR; INTRAVENOUS
OUTPATIENT
Start: 2023-09-13

## 2023-09-06 RX ORDER — ACETAMINOPHEN 325 MG/1
650 TABLET ORAL
OUTPATIENT
Start: 2023-09-13

## 2023-09-06 RX ORDER — DIPHENHYDRAMINE HYDROCHLORIDE 50 MG/ML
50 INJECTION INTRAMUSCULAR; INTRAVENOUS
OUTPATIENT
Start: 2023-09-13

## 2023-09-06 RX ORDER — EPINEPHRINE 1 MG/ML
0.3 INJECTION, SOLUTION, CONCENTRATE INTRAVENOUS PRN
OUTPATIENT
Start: 2023-09-13

## 2023-09-06 RX ORDER — SODIUM CHLORIDE 9 MG/ML
INJECTION, SOLUTION INTRAVENOUS CONTINUOUS
OUTPATIENT
Start: 2023-09-13

## 2023-09-12 ENCOUNTER — TELEPHONE (OUTPATIENT)
Age: 43
End: 2023-09-12

## 2023-09-12 NOTE — TELEPHONE ENCOUNTER
Pt needs a continuation of care letter for his prolia infusion on9/25/2023. please put letter in my chart.  Pt mother is calling insurance to get an authorization for continuation of care and wants to have this letter to support the need for his prolia infusion

## 2023-09-13 ENCOUNTER — HOSPITAL ENCOUNTER (OUTPATIENT)
Facility: HOSPITAL | Age: 43
Setting detail: INFUSION SERIES
End: 2023-09-13

## 2023-09-13 ENCOUNTER — APPOINTMENT (OUTPATIENT)
Dept: INFUSION THERAPY | Age: 43
End: 2023-09-13
Attending: NURSE PRACTITIONER

## 2023-09-13 DIAGNOSIS — M81.0 OSTEOPOROSIS WITHOUT CURRENT PATHOLOGICAL FRACTURE, UNSPECIFIED OSTEOPOROSIS TYPE: Primary | ICD-10-CM

## 2023-09-22 ENCOUNTER — NURSE TRIAGE (OUTPATIENT)
Dept: OTHER | Facility: CLINIC | Age: 43
End: 2023-09-22

## 2023-09-22 NOTE — TELEPHONE ENCOUNTER
[facility] [facility]    Received call from Bre Epperson at [facility] [employer] with [employer]. Subjective: Caller states \"Hip pain and swelling \"     Limited triage, mother (umu) calling for patient. Current Symptoms:   R side of body (hip) pain and swelling and worsening. \"He has been c/o for over a month now. Been going on for over a month and he is now c/o pain every day. \"    Denies any injuries. \"Gained weight recently too. \"    No limping, walking fine, moving legs normally. Denies redness. Onset: 1 month ago; worsening    Associated Symptoms: NA    Pain Severity:  Patient is special needs and has been c/o pain every day. Temperature: Denies     What has been tried: Sometimes tylenol or ibuprofen. Recommended disposition: See in Office Today or Tomorrow    Care advice provided, patient verbalizes understanding; denies any other questions or concerns; instructed to call back for any new or worsening symptoms. Patient/Caller agrees with recommended disposition; writer provided warm transfer to BRANDIN at [facility] for appointment scheduling    Attention Provider: Thank you for allowing me to participate in the care of your patient. The patient was connected to triage in response to information provided to the ECC/PSC. Please do not respond through this encounter as the response is not directed to a shared pool.     Reason for Disposition   Patient wants to be seen    Protocols used: Hip Pain-ADULT-[place]

## 2023-09-25 ENCOUNTER — HOSPITAL ENCOUNTER (OUTPATIENT)
Facility: HOSPITAL | Age: 43
Setting detail: INFUSION SERIES
End: 2023-09-25

## 2023-09-25 ENCOUNTER — OFFICE VISIT (OUTPATIENT)
Age: 43
End: 2023-09-25
Payer: MEDICARE

## 2023-09-25 VITALS
BODY MASS INDEX: 19.61 KG/M2 | SYSTOLIC BLOOD PRESSURE: 120 MMHG | HEIGHT: 70 IN | RESPIRATION RATE: 18 BRPM | DIASTOLIC BLOOD PRESSURE: 80 MMHG | HEART RATE: 85 BPM | OXYGEN SATURATION: 99 % | WEIGHT: 137 LBS | TEMPERATURE: 98 F

## 2023-09-25 DIAGNOSIS — R10.9 RIGHT LATERAL ABDOMINAL PAIN: Primary | ICD-10-CM

## 2023-09-25 PROCEDURE — 99213 OFFICE O/P EST LOW 20 MIN: CPT | Performed by: INTERNAL MEDICINE

## 2023-09-25 RX ORDER — CALCIUM CARBONATE 500 MG/1
1 TABLET, CHEWABLE ORAL DAILY
Qty: 30 TABLET | Refills: 0 | Status: SHIPPED | OUTPATIENT
Start: 2023-09-25 | End: 2023-10-25

## 2023-09-25 SDOH — ECONOMIC STABILITY: INCOME INSECURITY: HOW HARD IS IT FOR YOU TO PAY FOR THE VERY BASICS LIKE FOOD, HOUSING, MEDICAL CARE, AND HEATING?: NOT HARD AT ALL

## 2023-09-25 SDOH — ECONOMIC STABILITY: FOOD INSECURITY: WITHIN THE PAST 12 MONTHS, YOU WORRIED THAT YOUR FOOD WOULD RUN OUT BEFORE YOU GOT MONEY TO BUY MORE.: NEVER TRUE

## 2023-09-25 SDOH — ECONOMIC STABILITY: FOOD INSECURITY: WITHIN THE PAST 12 MONTHS, THE FOOD YOU BOUGHT JUST DIDN'T LAST AND YOU DIDN'T HAVE MONEY TO GET MORE.: NEVER TRUE

## 2023-09-25 ASSESSMENT — ANXIETY QUESTIONNAIRES
1. FEELING NERVOUS, ANXIOUS, OR ON EDGE: 1
2. NOT BEING ABLE TO STOP OR CONTROL WORRYING: 1
7. FEELING AFRAID AS IF SOMETHING AWFUL MIGHT HAPPEN: 0
GAD7 TOTAL SCORE: 4
4. TROUBLE RELAXING: 1
6. BECOMING EASILY ANNOYED OR IRRITABLE: 0
IF YOU CHECKED OFF ANY PROBLEMS ON THIS QUESTIONNAIRE, HOW DIFFICULT HAVE THESE PROBLEMS MADE IT FOR YOU TO DO YOUR WORK, TAKE CARE OF THINGS AT HOME, OR GET ALONG WITH OTHER PEOPLE: NOT DIFFICULT AT ALL
3. WORRYING TOO MUCH ABOUT DIFFERENT THINGS: 1
5. BEING SO RESTLESS THAT IT IS HARD TO SIT STILL: 0

## 2023-09-25 ASSESSMENT — PATIENT HEALTH QUESTIONNAIRE - PHQ9
SUM OF ALL RESPONSES TO PHQ QUESTIONS 1-9: 0
SUM OF ALL RESPONSES TO PHQ QUESTIONS 1-9: 0
3. TROUBLE FALLING OR STAYING ASLEEP: 0
SUM OF ALL RESPONSES TO PHQ9 QUESTIONS 1 & 2: 0
4. FEELING TIRED OR HAVING LITTLE ENERGY: 0
SUM OF ALL RESPONSES TO PHQ QUESTIONS 1-9: 0
6. FEELING BAD ABOUT YOURSELF - OR THAT YOU ARE A FAILURE OR HAVE LET YOURSELF OR YOUR FAMILY DOWN: 0
5. POOR APPETITE OR OVEREATING: 0
10. IF YOU CHECKED OFF ANY PROBLEMS, HOW DIFFICULT HAVE THESE PROBLEMS MADE IT FOR YOU TO DO YOUR WORK, TAKE CARE OF THINGS AT HOME, OR GET ALONG WITH OTHER PEOPLE: 0
1. LITTLE INTEREST OR PLEASURE IN DOING THINGS: 0
7. TROUBLE CONCENTRATING ON THINGS, SUCH AS READING THE NEWSPAPER OR WATCHING TELEVISION: 0
9. THOUGHTS THAT YOU WOULD BE BETTER OFF DEAD, OR OF HURTING YOURSELF: 0
8. MOVING OR SPEAKING SO SLOWLY THAT OTHER PEOPLE COULD HAVE NOTICED. OR THE OPPOSITE, BEING SO FIGETY OR RESTLESS THAT YOU HAVE BEEN MOVING AROUND A LOT MORE THAN USUAL: 0
2. FEELING DOWN, DEPRESSED OR HOPELESS: 0
SUM OF ALL RESPONSES TO PHQ QUESTIONS 1-9: 0

## 2023-09-25 ASSESSMENT — ENCOUNTER SYMPTOMS
RESPIRATORY NEGATIVE: 1
DIARRHEA: 0
NAUSEA: 0
ABDOMINAL DISTENTION: 0
ABDOMINAL PAIN: 1

## 2023-09-25 NOTE — PROGRESS NOTES
1. Have you been to the ER, urgent care clinic since your last visit? Hospitalized since your last visit? 2. Have you seen or consulted any other health care providers outside of the [address] [place] [address] since your last visit? Include any pap smears or colon screening.    no

## 2023-10-06 RX ORDER — EPINEPHRINE 1 MG/ML
0.3 INJECTION, SOLUTION, CONCENTRATE INTRAVENOUS PRN
OUTPATIENT
Start: 2023-10-13

## 2023-10-06 RX ORDER — ALBUTEROL SULFATE 90 UG/1
4 AEROSOL, METERED RESPIRATORY (INHALATION) PRN
OUTPATIENT
Start: 2023-10-13

## 2023-10-06 RX ORDER — ACETAMINOPHEN 325 MG/1
650 TABLET ORAL
OUTPATIENT
Start: 2023-10-13

## 2023-10-06 RX ORDER — ONDANSETRON 2 MG/ML
8 INJECTION INTRAMUSCULAR; INTRAVENOUS
OUTPATIENT
Start: 2023-10-13

## 2023-10-06 RX ORDER — DIPHENHYDRAMINE HYDROCHLORIDE 50 MG/ML
50 INJECTION INTRAMUSCULAR; INTRAVENOUS
OUTPATIENT
Start: 2023-10-13

## 2023-10-06 RX ORDER — SODIUM CHLORIDE 9 MG/ML
INJECTION, SOLUTION INTRAVENOUS CONTINUOUS
OUTPATIENT
Start: 2023-10-13

## 2023-10-13 ENCOUNTER — HOSPITAL ENCOUNTER (OUTPATIENT)
Facility: HOSPITAL | Age: 43
Setting detail: INFUSION SERIES
End: 2023-10-13
Payer: MEDICAID

## 2023-10-13 VITALS
TEMPERATURE: 97.7 F | RESPIRATION RATE: 18 BRPM | HEART RATE: 108 BPM | SYSTOLIC BLOOD PRESSURE: 104 MMHG | DIASTOLIC BLOOD PRESSURE: 66 MMHG

## 2023-10-13 DIAGNOSIS — M81.0 OSTEOPOROSIS WITHOUT CURRENT PATHOLOGICAL FRACTURE, UNSPECIFIED OSTEOPOROSIS TYPE: Primary | ICD-10-CM

## 2023-10-13 LAB
ALBUMIN SERPL-MCNC: 3.5 G/DL (ref 3.5–5)
ALBUMIN/GLOB SERPL: 0.9 (ref 1.1–2.2)
ALP SERPL-CCNC: 55 U/L (ref 45–117)
ALT SERPL-CCNC: 26 U/L (ref 12–78)
ANION GAP BLD CALC-SCNC: 9.1 MMOL/L (ref 10–20)
ANION GAP SERPL CALC-SCNC: 4 MMOL/L (ref 5–15)
AST SERPL-CCNC: 15 U/L (ref 15–37)
BILIRUB SERPL-MCNC: 0.3 MG/DL (ref 0.2–1)
BUN SERPL-MCNC: 22 MG/DL (ref 6–20)
BUN/CREAT SERPL: 26 (ref 12–20)
CA-I BLD-MCNC: 1.25 MMOL/L (ref 1.12–1.32)
CALCIUM SERPL-MCNC: 9.4 MG/DL (ref 8.5–10.1)
CHLORIDE BLD-SCNC: 102 MMOL/L (ref 98–107)
CHLORIDE SERPL-SCNC: 105 MMOL/L (ref 97–108)
CO2 BLD-SCNC: 31.9 MMOL/L (ref 21–32)
CO2 SERPL-SCNC: 30 MMOL/L (ref 21–32)
CREAT BLD-MCNC: 0.82 MG/DL (ref 0.6–1.3)
CREAT SERPL-MCNC: 0.85 MG/DL (ref 0.7–1.3)
GLOBULIN SER CALC-MCNC: 3.9 G/DL (ref 2–4)
GLUCOSE BLD-MCNC: 99 MG/DL (ref 65–100)
GLUCOSE SERPL-MCNC: 102 MG/DL (ref 65–100)
MAGNESIUM SERPL-MCNC: 2.1 MG/DL (ref 1.6–2.4)
PHOSPHATE SERPL-MCNC: 3.2 MG/DL (ref 2.6–4.7)
POTASSIUM BLD-SCNC: 3.9 MMOL/L (ref 3.5–5.1)
POTASSIUM SERPL-SCNC: 3.8 MMOL/L (ref 3.5–5.1)
PROT SERPL-MCNC: 7.4 G/DL (ref 6.4–8.2)
SERVICE CMNT-IMP: ABNORMAL
SODIUM BLD-SCNC: 143 MMOL/L (ref 136–145)
SODIUM SERPL-SCNC: 139 MMOL/L (ref 136–145)

## 2023-10-13 PROCEDURE — 83735 ASSAY OF MAGNESIUM: CPT

## 2023-10-13 PROCEDURE — 80047 BASIC METABLC PNL IONIZED CA: CPT

## 2023-10-13 PROCEDURE — 36415 COLL VENOUS BLD VENIPUNCTURE: CPT

## 2023-10-13 PROCEDURE — 80053 COMPREHEN METABOLIC PANEL: CPT

## 2023-10-13 PROCEDURE — 6360000002 HC RX W HCPCS: Performed by: INTERNAL MEDICINE

## 2023-10-13 PROCEDURE — 96372 THER/PROPH/DIAG INJ SC/IM: CPT

## 2023-10-13 PROCEDURE — 84100 ASSAY OF PHOSPHORUS: CPT

## 2023-10-13 RX ORDER — DIPHENHYDRAMINE HYDROCHLORIDE 50 MG/ML
50 INJECTION INTRAMUSCULAR; INTRAVENOUS
OUTPATIENT
Start: 2024-04-07

## 2023-10-13 RX ORDER — SODIUM CHLORIDE 9 MG/ML
INJECTION, SOLUTION INTRAVENOUS CONTINUOUS
OUTPATIENT
Start: 2024-04-07

## 2023-10-13 RX ORDER — ONDANSETRON 2 MG/ML
8 INJECTION INTRAMUSCULAR; INTRAVENOUS
OUTPATIENT
Start: 2024-04-07

## 2023-10-13 RX ORDER — ALBUTEROL SULFATE 90 UG/1
4 AEROSOL, METERED RESPIRATORY (INHALATION) PRN
OUTPATIENT
Start: 2024-04-07

## 2023-10-13 RX ORDER — EPINEPHRINE 1 MG/ML
0.3 INJECTION, SOLUTION, CONCENTRATE INTRAVENOUS PRN
OUTPATIENT
Start: 2024-04-07

## 2023-10-13 RX ORDER — ACETAMINOPHEN 325 MG/1
650 TABLET ORAL
OUTPATIENT
Start: 2024-04-07

## 2023-10-13 RX ADMIN — DENOSUMAB 60 MG: 60 INJECTION SUBCUTANEOUS at 14:12

## 2023-10-20 ENCOUNTER — HOSPITAL ENCOUNTER (EMERGENCY)
Facility: HOSPITAL | Age: 43
Discharge: HOME | End: 2023-10-20
Attending: EMERGENCY MEDICINE
Payer: MEDICAID

## 2023-10-20 ENCOUNTER — APPOINTMENT (OUTPATIENT)
Facility: HOSPITAL | Age: 43
End: 2023-10-20
Payer: MEDICAID

## 2023-10-20 VITALS
OXYGEN SATURATION: 97 % | RESPIRATION RATE: 18 BRPM | TEMPERATURE: 97.4 F | DIASTOLIC BLOOD PRESSURE: 89 MMHG | WEIGHT: 137.57 LBS | BODY MASS INDEX: 19.74 KG/M2 | HEART RATE: 104 BPM | SYSTOLIC BLOOD PRESSURE: 145 MMHG

## 2023-10-20 DIAGNOSIS — K59.00 CONSTIPATION, UNSPECIFIED CONSTIPATION TYPE: Primary | ICD-10-CM

## 2023-10-20 PROCEDURE — 74019 RADEX ABDOMEN 2 VIEWS: CPT

## 2023-10-20 PROCEDURE — 99283 EMERGENCY DEPT VISIT LOW MDM: CPT

## 2023-10-20 RX ORDER — POLYETHYLENE GLYCOL 3350 17 G/17G
17 POWDER, FOR SOLUTION ORAL DAILY PRN
Qty: 1530 G | Refills: 1 | Status: SHIPPED | OUTPATIENT
Start: 2023-10-20 | End: 2023-11-19

## 2023-10-20 ASSESSMENT — PAIN - FUNCTIONAL ASSESSMENT: PAIN_FUNCTIONAL_ASSESSMENT: NONE - DENIES PAIN

## 2023-10-20 NOTE — ED TRIAGE NOTES
Pt arrives ambulatory to ED w/  from group home with complaints of possible constipation. Pt has \"small bowel movements that are getting stuck\" per . Pt denies abd pain, bleeding from rectum, n/v/d or fevers. Pt not taking OTCs for constipation daily.

## 2023-10-20 NOTE — DISCHARGE INSTRUCTIONS
Recommend increase fiber in your diet through foods such as oatmeal, fiber 1 cereals, fruits and vegetables. May continue with fiber supplement such as sugar-free Metamucil or Benefiber. Recommend MiraLAX as needed for constipation.

## 2023-10-24 ENCOUNTER — TELEPHONE (OUTPATIENT)
Age: 43
End: 2023-10-24

## 2023-10-24 NOTE — TELEPHONE ENCOUNTER
Pt's mother called and also sent MyChart messages of the discoloration of vein on left & right lower leg; Uploaded the right leg but is having troubles loading up the left leg;  Pt is having no complaints of pain or whatsoever

## 2023-10-27 DIAGNOSIS — E55.9 VITAMIN D DEFICIENCY: Primary | ICD-10-CM

## 2023-10-27 RX ORDER — CALCIUM CARBONATE/VITAMIN D3 600MG-5MCG
1 TABLET ORAL 2 TIMES DAILY
Qty: 180 TABLET | Refills: 1 | Status: SHIPPED | OUTPATIENT
Start: 2023-10-27

## 2023-10-27 NOTE — TELEPHONE ENCOUNTER
Requested Prescriptions     Pending Prescriptions Disp Refills    CALCIUM + VITAMIN D3 600-5 MG-MCG TABS tablet [Pharmacy Med Name: CALCIUM 600-VIT D3 200 TABLET] 180 tablet 1     Sig: TAKE 1 TABLET BY MOUTH TWICE A DAY         [address] [place] [phone] Khushi Kilpatrick [phone]  [address] [place] [address]  Phone: [phone] Fax: [phone]       Last appt 9/25/2023      Future Appointments   Date Time Provider [address] [place] [address]   11/27/2023  2:45 PM Dharmesh Warren MD [id number]   11/28/2023 11:00 AM DO URVASHI Carter BS AMB

## 2023-11-02 ENCOUNTER — TELEPHONE (OUTPATIENT)
Age: 43
End: 2023-11-02

## 2023-11-27 ENCOUNTER — TELEPHONE (OUTPATIENT)
Age: 43
End: 2023-11-27

## 2023-11-27 ENCOUNTER — OFFICE VISIT (OUTPATIENT)
Age: 43
End: 2023-11-27
Payer: MEDICAID

## 2023-11-27 VITALS
RESPIRATION RATE: 18 BRPM | OXYGEN SATURATION: 94 % | DIASTOLIC BLOOD PRESSURE: 63 MMHG | HEART RATE: 100 BPM | SYSTOLIC BLOOD PRESSURE: 111 MMHG

## 2023-11-27 DIAGNOSIS — R41.89 COGNITIVE DECLINE: Primary | ICD-10-CM

## 2023-11-27 PROCEDURE — 99214 OFFICE O/P EST MOD 30 MIN: CPT | Performed by: PSYCHIATRY & NEUROLOGY

## 2023-11-27 NOTE — TELEPHONE ENCOUNTER
Patients mother asked for a copy of his EEG results. I was able to print his routine one but his sleep deprived isn't showing up in his chart. She'd like a call back once it's been printed.

## 2023-11-27 NOTE — PROGRESS NOTES
The caregiver states that the patient will have an increase of tremors when he is nervous .
practitioner Jose Juan in March of this year. He was on Neurontin for his tremor. His mother thought this was causing him to be a bit foggy. He was said to have episodes where he had a blank stare and did not seem to respond and concern was for potential seizure. He was sent for EEG. Neurontin was decreased to a dose of 100 mg twice daily    Today he is with his mother. He is now off the Neurontin. His tremors are only really noticeable or bothersome if he is anxious or upset. His mother is concerned because he has had some cognitive decline. He has times where he really has a difficult time with daily activities and then the last several days and then it will seem to get better. He is slower and moving as well. Question of a dementing process has been raised. EEG July 17, 2023 unremarkable    Emergency department visit October 20, 2023 where patient came in for constipation. He was slightly hypertensive. He had abdominal x-ray with no evidence of bowel obstruction or pneumoperitoneum and he had a moderate volume of stool in the right colon predominantly. He was given a prescription for MiraLAX    Laboratory analysis from October 13, 2023  Magnesium normal  Comprehensive metabolic panel unremarkable  Phosphorus was normal    I saw him back in 2011 for epilepsy and he was stable on Carbatrol. Further review of nurse practitioner Gregory's notes finds the Carbatrol was weaned off should back in the April through June 2017. Examination  /63 (Site: Right Upper Arm, Position: Sitting, Cuff Size: Medium Adult)   Pulse 100   Resp 18   SpO2 94%   Pleasant gentleman. Static encephalopathy. Follows all commands. He will tell me where he went for Thanksgiving and some of the people he saw. Cranial nerves are intact. No motor focality. He does have postural tremor of the outstretched hands with intention on finger-nose-finger. Reflexes symmetrical.  No ataxia.     Impression/Plan  37year-old

## 2023-11-28 ENCOUNTER — TELEPHONE (OUTPATIENT)
Age: 43
End: 2023-11-28

## 2023-11-28 ENCOUNTER — OFFICE VISIT (OUTPATIENT)
Age: 43
End: 2023-11-28
Payer: MEDICAID

## 2023-11-28 VITALS
SYSTOLIC BLOOD PRESSURE: 128 MMHG | WEIGHT: 139 LBS | TEMPERATURE: 98 F | HEART RATE: 76 BPM | BODY MASS INDEX: 19.9 KG/M2 | DIASTOLIC BLOOD PRESSURE: 74 MMHG | HEIGHT: 70 IN | OXYGEN SATURATION: 99 % | RESPIRATION RATE: 16 BRPM

## 2023-11-28 DIAGNOSIS — R63.6 UNDERWEIGHT: ICD-10-CM

## 2023-11-28 DIAGNOSIS — Z98.890 HISTORY OF ABDOMINAL SURGERY: ICD-10-CM

## 2023-11-28 DIAGNOSIS — Z87.19 S/P SMALL BOWEL OBSTRUCTION: ICD-10-CM

## 2023-11-28 DIAGNOSIS — M81.0 OSTEOPOROSIS WITHOUT CURRENT PATHOLOGICAL FRACTURE, UNSPECIFIED OSTEOPOROSIS TYPE: ICD-10-CM

## 2023-11-28 DIAGNOSIS — Z23 FLU VACCINE NEED: ICD-10-CM

## 2023-11-28 DIAGNOSIS — E55.9 VITAMIN D DEFICIENCY: ICD-10-CM

## 2023-11-28 DIAGNOSIS — F33.9 RECURRENT DEPRESSION (HCC): Primary | ICD-10-CM

## 2023-11-28 PROCEDURE — 99214 OFFICE O/P EST MOD 30 MIN: CPT | Performed by: INTERNAL MEDICINE

## 2023-11-28 PROCEDURE — PBSHW INFLUENZA, AFLURIA QUADV, (AGE 3 YRS+), IM, PF, 0.5ML: Performed by: INTERNAL MEDICINE

## 2023-11-28 PROCEDURE — 90686 IIV4 VACC NO PRSV 0.5 ML IM: CPT | Performed by: INTERNAL MEDICINE

## 2023-11-28 SDOH — ECONOMIC STABILITY: FOOD INSECURITY: WITHIN THE PAST 12 MONTHS, THE FOOD YOU BOUGHT JUST DIDN'T LAST AND YOU DIDN'T HAVE MONEY TO GET MORE.: NEVER TRUE

## 2023-11-28 SDOH — ECONOMIC STABILITY: FOOD INSECURITY: WITHIN THE PAST 12 MONTHS, YOU WORRIED THAT YOUR FOOD WOULD RUN OUT BEFORE YOU GOT MONEY TO BUY MORE.: NEVER TRUE

## 2023-11-28 SDOH — ECONOMIC STABILITY: INCOME INSECURITY: HOW HARD IS IT FOR YOU TO PAY FOR THE VERY BASICS LIKE FOOD, HOUSING, MEDICAL CARE, AND HEATING?: NOT VERY HARD

## 2023-11-28 ASSESSMENT — PATIENT HEALTH QUESTIONNAIRE - PHQ9
SUM OF ALL RESPONSES TO PHQ QUESTIONS 1-9: 0
SUM OF ALL RESPONSES TO PHQ QUESTIONS 1-9: 0
6. FEELING BAD ABOUT YOURSELF - OR THAT YOU ARE A FAILURE OR HAVE LET YOURSELF OR YOUR FAMILY DOWN: 0
1. LITTLE INTEREST OR PLEASURE IN DOING THINGS: 0
4. FEELING TIRED OR HAVING LITTLE ENERGY: 0
8. MOVING OR SPEAKING SO SLOWLY THAT OTHER PEOPLE COULD HAVE NOTICED. OR THE OPPOSITE, BEING SO FIGETY OR RESTLESS THAT YOU HAVE BEEN MOVING AROUND A LOT MORE THAN USUAL: 0
SUM OF ALL RESPONSES TO PHQ9 QUESTIONS 1 & 2: 0
SUM OF ALL RESPONSES TO PHQ QUESTIONS 1-9: 0
7. TROUBLE CONCENTRATING ON THINGS, SUCH AS READING THE NEWSPAPER OR WATCHING TELEVISION: 0
9. THOUGHTS THAT YOU WOULD BE BETTER OFF DEAD, OR OF HURTING YOURSELF: 0
10. IF YOU CHECKED OFF ANY PROBLEMS, HOW DIFFICULT HAVE THESE PROBLEMS MADE IT FOR YOU TO DO YOUR WORK, TAKE CARE OF THINGS AT HOME, OR GET ALONG WITH OTHER PEOPLE: 0
5. POOR APPETITE OR OVEREATING: 0
2. FEELING DOWN, DEPRESSED OR HOPELESS: 0
3. TROUBLE FALLING OR STAYING ASLEEP: 0
SUM OF ALL RESPONSES TO PHQ QUESTIONS 1-9: 0

## 2023-11-28 ASSESSMENT — ENCOUNTER SYMPTOMS
EYES NEGATIVE: 1
SHORTNESS OF BREATH: 0
ABDOMINAL PAIN: 0
ALLERGIC/IMMUNOLOGIC NEGATIVE: 1
RESPIRATORY NEGATIVE: 1
GASTROINTESTINAL NEGATIVE: 1

## 2023-11-28 ASSESSMENT — COLUMBIA-SUICIDE SEVERITY RATING SCALE - C-SSRS
5. HAVE YOU STARTED TO WORK OUT OR WORKED OUT THE DETAILS OF HOW TO KILL YOURSELF? DO YOU INTEND TO CARRY OUT THIS PLAN?: NO
4. HAVE YOU HAD THESE THOUGHTS AND HAD SOME INTENTION OF ACTING ON THEM?: NO
7. DID THIS OCCUR IN THE LAST THREE MONTHS: NO
3. HAVE YOU BEEN THINKING ABOUT HOW YOU MIGHT KILL YOURSELF?: NO

## 2023-11-28 ASSESSMENT — ANXIETY QUESTIONNAIRES
4. TROUBLE RELAXING: 0
2. NOT BEING ABLE TO STOP OR CONTROL WORRYING: 0
5. BEING SO RESTLESS THAT IT IS HARD TO SIT STILL: 0
1. FEELING NERVOUS, ANXIOUS, OR ON EDGE: 0
3. WORRYING TOO MUCH ABOUT DIFFERENT THINGS: 0
6. BECOMING EASILY ANNOYED OR IRRITABLE: 0
GAD7 TOTAL SCORE: 0
IF YOU CHECKED OFF ANY PROBLEMS ON THIS QUESTIONNAIRE, HOW DIFFICULT HAVE THESE PROBLEMS MADE IT FOR YOU TO DO YOUR WORK, TAKE CARE OF THINGS AT HOME, OR GET ALONG WITH OTHER PEOPLE: NOT DIFFICULT AT ALL
7. FEELING AFRAID AS IF SOMETHING AWFUL MIGHT HAPPEN: 0

## 2023-11-28 NOTE — PROGRESS NOTES
1. \"Have you been to the ER, urgent care clinic since your last visit? Hospitalized since your last visit? \"  YES, 10/20/23. [address], [place]    2. \"Have you seen or consulted any other health care providers outside of the [address] [place] [address] since your last visit? \" No    3. For patients aged 43-73: Has the patient had a colonoscopy / FIT/ Cologuard? Recommendation: Colonoscopy every 10y or annual FIT test from 50-75 or every 3 year stool DNA based test with consideration of ongoing screening from 76-85. If the patient is female:    4. For patients aged 43-66: Has the patient had a mammogram within the past 2 years? No    5. For patients aged 21-65: Has the patient had a pap smear?  No

## 2023-11-28 NOTE — TELEPHONE ENCOUNTER
Kristie Marte with [facility] would like a call back to schedule the patient with Dr. Rhodes. She states the patient is being referred by Dr. Warren.    Please call Kristie back at [phone] extension 110.

## 2023-11-28 NOTE — TELEPHONE ENCOUNTER
Contacted patient the patient and her  have minimal symptoms and do not require Paxlovid at this time Pt is takingTheanine at 10pm daily. His mother states this medication should be taken with food. She is requesting Dr. Cook to change the dispense time to 5pm

## 2023-11-28 NOTE — PROGRESS NOTES
Subjective    Jose Rivas is a 37 y.o. male who presents today for the following:  Chief Complaint   Patient presents with    Hypotension    Medication Refill    Sore Throat         Pt. comes in with his caregiver from his group home for f/u. His mother joined us over the phone as well. Has a few chronic medical issues as documented. Recent ER visit with GI issues. I reviewed the records in EMR. Labs were stable. X-ray showed excessive stool otherwise unremarkable. Reports his BMs being regular. No melena or hematochezia. Has had previous intestinal surgery as a child. Followed by GI. He is underweight but gaining weight gradually. His chronic depression/anxiety/insomnia. Followed by psychiatry and neurology. Remains on amitriptyline and melatonin. Also on number of supplements. All chronic medical issues are stable on current treatment regimen. Has had Covid-19 vaccination. Reports taking proper precautions. Denies any related signs or symptoms. PMH/PSH/Allergies/Social History/medication list and most recent studies reviewed with patient. Social History     Tobacco Use   Smoking Status Never   Smokeless Tobacco Never     Social History     Substance and Sexual Activity   Alcohol Use No         Reports compliance with medications and diet. Trying to be active physically to control weight. Reports no other new c/o.      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

## 2023-12-13 ENCOUNTER — TELEPHONE (OUTPATIENT)
Age: 43
End: 2023-12-13

## 2024-01-02 DIAGNOSIS — K59.1 FUNCTIONAL DIARRHEA: ICD-10-CM

## 2024-01-02 DIAGNOSIS — K90.9 INTESTINAL MALABSORPTION, UNSPECIFIED TYPE: Primary | ICD-10-CM

## 2024-01-02 RX ORDER — SACCHAROMYCES BOULARDII 250 MG
CAPSULE ORAL
Qty: 31 CAPSULE | Refills: 12 | Status: SHIPPED | OUTPATIENT
Start: 2024-01-02

## 2024-02-08 ENCOUNTER — OFFICE VISIT (OUTPATIENT)
Age: 44
End: 2024-02-08
Payer: MEDICAID

## 2024-02-08 DIAGNOSIS — F79 INTELLECTUAL DISABILITY: ICD-10-CM

## 2024-02-08 DIAGNOSIS — F41.9 ANXIETY: ICD-10-CM

## 2024-02-08 DIAGNOSIS — R41.89 COGNITIVE IMPAIRMENT: Primary | ICD-10-CM

## 2024-02-08 PROCEDURE — 90791 PSYCH DIAGNOSTIC EVALUATION: CPT | Performed by: STUDENT IN AN ORGANIZED HEALTH CARE EDUCATION/TRAINING PROGRAM

## 2024-02-08 NOTE — PROGRESS NOTES
INTAKE NOTE  Confidential       Name: French Jhaveri MRN: [id number]   Age: 43 y.o. YOB: 1980   Gender: male Date of Intake: 02/08/2024   Race/Ethnicity: White (non-)     Education: 12     Handedness: Right Referral Source: Clarissa Warren MD (Neurology)       EVALUATION METHODS: The following information was gathered from a clinical interview with Mr. Jhaveri, the  of his group home ([facility]), Kristie Marte, and a review of available medical records. This evaluation was conducted for clinical treatment planning and may not be valid for other purposes. Potential risks and benefits, limits of confidentiality, and evaluation procedures were discussed. Mr. Jhaveri did not understand their reason for today's visit but with clarification, they expressed understanding and assented to the procedures.     REASON FOR REFERRAL: Mr. Jhaveri was referred by Neurology to re-evaluate cognitive functioning and rule out impairment in the context of cognitive decline. He has been followed in Neurology for several years now and his most recent visit was in November of 2023 with Dr. Warren. Per his referral note, Mr. Jhaveri's mother expressed concern that there has been some cognitive decline, marked by fluctuations in his abilites to perform daily activities. There is also a noted history of tremors with onset at least over a year ago. Information gathered today will assist in differential diagnosis and treatment planning/recommendations.     Previous Neuropsychological Evaluations:   2008 (approximately), Dr. Adams: Revealed impairments across the vast majority of neurocognitive domains assessed. Some language abilities remain important areas of neurocognitive strength, but these likely mask underlying cognitive deficits at this time. There was evidence of a decline from previous evaluation, by history. Concern was for a reversible cause of dementia process of at least a mild severity.   10/30/2017,

## 2024-02-09 NOTE — PROGRESS NOTES
NEUROPSYCHOLOGICAL EVALUATION - REPORT  Confidential Report       Name: French Jhaveri MRN: [id number]   Age: 43 y.o. YOB: 1980   Gender: male Date of Intake: 2024   Race/Ethnicity: White (non-) Date of Testin2024   Education: 12     Handedness: Right Referral Source: Clarissa Warren MD (Neurology)       EVALUATION METHODS: The following information was gathered from a clinical interview with Mr. Jhaveri, the  of his group home ([facility]), Kristie Marte, and a review of available medical records. With Mr. Jhaveri's permission, a separate collateral interview was conducted with his mother, Mendy Jhaveri, on the day of his testing on 2024. Information was also gathered from the administered neuropsychological tests that were completed in-person. This evaluation was conducted for clinical treatment planning and may not be valid for other purposes. Potential risks and benefits, limits of confidentiality, and evaluation procedures were discussed. Mr. Jhaveri expressed an understanding of the purpose of the visit and consented to the procedures.     REASON FOR REFERRAL: Mr. Jhaveri was referred by Neurology to re-evaluate cognitive functioning and rule out impairment in the context of cognitive decline. He has been followed in Neurology for several years now and his most recent visit was in 2023 with Dr. Warren. Per his referral note, Mr. Jhaveri's mother expressed concern that there has been some cognitive decline, marked by fluctuations in his abilites to perform daily activities. There is also a noted history of tremors with onset at least over a year ago. This is his first neuropsychological evaluation.    Previous Neuropsychological Evaluations:    (approximately), Dr. Adams: Revealed impairments across the vast majority of neurocognitive domains assessed. Some language abilities remain important areas of neurocognitive strength, but these likely mask

## 2024-02-27 ENCOUNTER — PROCEDURE VISIT (OUTPATIENT)
Age: 44
End: 2024-02-27
Payer: MEDICARE

## 2024-02-27 DIAGNOSIS — F79 INTELLECTUAL DISABILITY: ICD-10-CM

## 2024-02-27 DIAGNOSIS — F41.9 ANXIETY: ICD-10-CM

## 2024-02-27 DIAGNOSIS — R41.89 COGNITIVE IMPAIRMENT: Primary | ICD-10-CM

## 2024-02-27 PROCEDURE — 96138 PSYCL/NRPSYC TECH 1ST: CPT | Performed by: STUDENT IN AN ORGANIZED HEALTH CARE EDUCATION/TRAINING PROGRAM

## 2024-02-27 PROCEDURE — 96139 PSYCL/NRPSYC TST TECH EA: CPT | Performed by: STUDENT IN AN ORGANIZED HEALTH CARE EDUCATION/TRAINING PROGRAM

## 2024-02-27 PROCEDURE — 96136 PSYCL/NRPSYC TST PHY/QHP 1ST: CPT | Performed by: STUDENT IN AN ORGANIZED HEALTH CARE EDUCATION/TRAINING PROGRAM

## 2024-03-04 RX ORDER — METHYLCELLULOSE 2 G/19G
POWDER, FOR SOLUTION ORAL
Qty: 479 G | Refills: 12 | Status: SHIPPED | OUTPATIENT
Start: 2024-03-04

## 2024-03-11 ENCOUNTER — TELEPHONE (OUTPATIENT)
Age: 44
End: 2024-03-11

## 2024-03-11 NOTE — TELEPHONE ENCOUNTER
Mendy is looking to see if she can get the f/u appt with  r/s. Would like to know what is next available if no appts are available any time soon.     Please contact.

## 2024-03-20 ENCOUNTER — OFFICE VISIT (OUTPATIENT)
Age: 44
End: 2024-03-20
Payer: MEDICARE

## 2024-03-20 VITALS
OXYGEN SATURATION: 98 % | HEIGHT: 70 IN | SYSTOLIC BLOOD PRESSURE: 126 MMHG | HEART RATE: 110 BPM | RESPIRATION RATE: 16 BRPM | DIASTOLIC BLOOD PRESSURE: 78 MMHG | TEMPERATURE: 98 F | BODY MASS INDEX: 19.9 KG/M2 | WEIGHT: 139 LBS

## 2024-03-20 DIAGNOSIS — L53.9 ERYTHEMA: ICD-10-CM

## 2024-03-20 DIAGNOSIS — M81.0 OSTEOPOROSIS WITHOUT CURRENT PATHOLOGICAL FRACTURE, UNSPECIFIED OSTEOPOROSIS TYPE: ICD-10-CM

## 2024-03-20 DIAGNOSIS — F33.9 RECURRENT DEPRESSION (HCC): ICD-10-CM

## 2024-03-20 DIAGNOSIS — R23.4 SCAB: Primary | ICD-10-CM

## 2024-03-20 DIAGNOSIS — Z87.19 S/P SMALL BOWEL OBSTRUCTION: ICD-10-CM

## 2024-03-20 DIAGNOSIS — Z00.00 MEDICARE ANNUAL WELLNESS VISIT, SUBSEQUENT: ICD-10-CM

## 2024-03-20 PROBLEM — R56.9 CONVULSIONS, UNSPECIFIED CONVULSION TYPE (HCC): Status: ACTIVE | Noted: 2024-03-20

## 2024-03-20 PROCEDURE — G0439 PPPS, SUBSEQ VISIT: HCPCS | Performed by: INTERNAL MEDICINE

## 2024-03-20 PROCEDURE — 99214 OFFICE O/P EST MOD 30 MIN: CPT | Performed by: INTERNAL MEDICINE

## 2024-03-20 PROCEDURE — G8482 FLU IMMUNIZE ORDER/ADMIN: HCPCS | Performed by: INTERNAL MEDICINE

## 2024-03-20 PROCEDURE — G8420 CALC BMI NORM PARAMETERS: HCPCS | Performed by: INTERNAL MEDICINE

## 2024-03-20 PROCEDURE — G8427 DOCREV CUR MEDS BY ELIG CLIN: HCPCS | Performed by: INTERNAL MEDICINE

## 2024-03-20 PROCEDURE — 1036F TOBACCO NON-USER: CPT | Performed by: INTERNAL MEDICINE

## 2024-03-20 ASSESSMENT — ANXIETY QUESTIONNAIRES
7. FEELING AFRAID AS IF SOMETHING AWFUL MIGHT HAPPEN: NOT AT ALL
5. BEING SO RESTLESS THAT IT IS HARD TO SIT STILL: NOT AT ALL
4. TROUBLE RELAXING: NOT AT ALL
IF YOU CHECKED OFF ANY PROBLEMS ON THIS QUESTIONNAIRE, HOW DIFFICULT HAVE THESE PROBLEMS MADE IT FOR YOU TO DO YOUR WORK, TAKE CARE OF THINGS AT HOME, OR GET ALONG WITH OTHER PEOPLE: NOT DIFFICULT AT ALL
3. WORRYING TOO MUCH ABOUT DIFFERENT THINGS: NOT AT ALL
2. NOT BEING ABLE TO STOP OR CONTROL WORRYING: NOT AT ALL
6. BECOMING EASILY ANNOYED OR IRRITABLE: NOT AT ALL
GAD7 TOTAL SCORE: 0
1. FEELING NERVOUS, ANXIOUS, OR ON EDGE: NOT AT ALL

## 2024-03-20 ASSESSMENT — PATIENT HEALTH QUESTIONNAIRE - PHQ9
1. LITTLE INTEREST OR PLEASURE IN DOING THINGS: NOT AT ALL
SUM OF ALL RESPONSES TO PHQ9 QUESTIONS 1 & 2: 0
SUM OF ALL RESPONSES TO PHQ QUESTIONS 1-9: 0
10. IF YOU CHECKED OFF ANY PROBLEMS, HOW DIFFICULT HAVE THESE PROBLEMS MADE IT FOR YOU TO DO YOUR WORK, TAKE CARE OF THINGS AT HOME, OR GET ALONG WITH OTHER PEOPLE: NOT DIFFICULT AT ALL
5. POOR APPETITE OR OVEREATING: NOT AT ALL
9. THOUGHTS THAT YOU WOULD BE BETTER OFF DEAD, OR OF HURTING YOURSELF: NOT AT ALL
DEPRESSION UNABLE TO ASSESS: PT REFUSES
SUM OF ALL RESPONSES TO PHQ QUESTIONS 1-9: 0
7. TROUBLE CONCENTRATING ON THINGS, SUCH AS READING THE NEWSPAPER OR WATCHING TELEVISION: NOT AT ALL
SUM OF ALL RESPONSES TO PHQ QUESTIONS 1-9: 0
6. FEELING BAD ABOUT YOURSELF - OR THAT YOU ARE A FAILURE OR HAVE LET YOURSELF OR YOUR FAMILY DOWN: NOT AT ALL
SUM OF ALL RESPONSES TO PHQ QUESTIONS 1-9: 0
8. MOVING OR SPEAKING SO SLOWLY THAT OTHER PEOPLE COULD HAVE NOTICED. OR THE OPPOSITE, BEING SO FIGETY OR RESTLESS THAT YOU HAVE BEEN MOVING AROUND A LOT MORE THAN USUAL: NOT AT ALL
4. FEELING TIRED OR HAVING LITTLE ENERGY: NOT AT ALL
3. TROUBLE FALLING OR STAYING ASLEEP: NOT AT ALL
2. FEELING DOWN, DEPRESSED OR HOPELESS: NOT AT ALL

## 2024-03-26 ENCOUNTER — OFFICE VISIT (OUTPATIENT)
Age: 44
End: 2024-03-26
Payer: MEDICARE

## 2024-03-26 DIAGNOSIS — R41.89 COGNITIVE IMPAIRMENT: Primary | ICD-10-CM

## 2024-03-26 DIAGNOSIS — F79 INTELLECTUAL DISABILITY: ICD-10-CM

## 2024-03-26 DIAGNOSIS — F41.9 ANXIETY: ICD-10-CM

## 2024-03-26 PROCEDURE — 96132 NRPSYC TST EVAL PHYS/QHP 1ST: CPT | Performed by: STUDENT IN AN ORGANIZED HEALTH CARE EDUCATION/TRAINING PROGRAM

## 2024-03-26 PROCEDURE — 96133 NRPSYC TST EVAL PHYS/QHP EA: CPT | Performed by: STUDENT IN AN ORGANIZED HEALTH CARE EDUCATION/TRAINING PROGRAM

## 2024-03-26 NOTE — PROGRESS NOTES
NEUROPSYCHOLOGICAL EVALUATION - FEEDBACK  Confidential        Mr. French Jhaveri was seen for a feedback appointment via in-person to discuss the results of his neuropsychological evaluation. His mother Mendy Jhaveri as well as his caregiver were also present.     I provided feedback that Mr. Jhaveri's neuropsychological testing results were clinically complex and difficult to interpret comprehensively given the fact that his scores continued to remain at the very low end of what was testable across virtually all domains. That said, we discussed the function behind his inability to perform specific daily tasks which was determined to be a sense of anxiety/fear that he may have to initiate the task. Further supporting this thought was the fact that he scored 28 points higher on a self-report measure of anxiety, indicating that there has been an increase over time and may be the reason why his mother was perceiving a decline in his ability to perform these daily functions. It was noted that lately, his caregiver and mother have noticed that Mr. Jhaveri has actually been less \"stuck\" in initiating these tasks, which further validated the thought that he has the capacity to still perform them, thus suggesting against a progressive/neurodegenerative process. Mr. Jhaveri, his mother, and his caregiver expressed agreement with the results and that the findings were consistent with what they notice in daily life.     Recommendations from the report were discussed in detail. We discussed the importance of improving anxiety to hopefully improve Mr. Jhaveri's clarity of thought and confidence in initiating tasks. Mendy will speak with his health providers about medication options and adjustments. He was amenable to continue participating in daily group home events; his caregiver noted that he has been very involved and is not concerned about his willingness to participate. We also discussed effective strategies that may benefit Mr. Jhaveri in

## 2024-03-27 ASSESSMENT — ENCOUNTER SYMPTOMS
EYES NEGATIVE: 1
GASTROINTESTINAL NEGATIVE: 1
ABDOMINAL PAIN: 0
RESPIRATORY NEGATIVE: 1
SHORTNESS OF BREATH: 0
ALLERGIC/IMMUNOLOGIC NEGATIVE: 1

## 2024-03-27 NOTE — PROGRESS NOTES
Chief Complaint   Patient presents with    Elbow Injury    Follow-up        \"Have you been to the ER, urgent care clinic since your last visit?  Hospitalized since your last visit?\"    NO    “Have you seen or consulted any other health care providers outside of [facility] since your last visit?”    NO            Click Here for Release of Records Request   
Medicare Annual Wellness Visit    French Jhaveri is here for Elbow Injury and Follow-up    Assessment & Plan   Scab  Erythema  Recurrent depression (HCC)  Osteoporosis without current pathological fracture, unspecified osteoporosis type  S/p small bowel obstruction  Medicare annual wellness visit, subsequent    Recommendations for Preventive Services Due: see orders and patient instructions/AVS.  Recommended screening schedule for the next 5-10 years is provided to the patient in written form: see Patient Instructions/AVS.     Return in about 6 months (around 9/20/2024).     Subjective       Patient's complete Health Risk Assessment and screening values have been reviewed and are found in Flowsheets. The following problems were reviewed today and where indicated follow up appointments were made and/or referrals ordered.    Positive Risk Factor Screenings with Interventions:        Depression:  Depression Unable to Assess: Pt refuses  PHQ-2 Score: 0  PHQ-9 Total Score: 0    Interpretation:  5-9 mild   10-14 moderate   15-19 moderately severe   20-27 severe                                  Objective   Vitals:    03/20/24 1432   BP: 126/78   Site: Left Upper Arm   Position: Sitting   Cuff Size: Medium Adult   Pulse: (!) 110   Resp: 16   Temp: 98 °F (36.7 °C)   SpO2: 98%   Weight: 63 kg (139 lb)   Height: 1.778 m (5' 10\")      Body mass index is 19.94 kg/m².             No Known Allergies  Prior to Visit Medications    Medication Sig Taking? Authorizing Provider   CITRUCEL oral powder MIX 2 TABLEPOONFULS INTO A GLASS OF WATER AND DRINK ONCE DAILY Yes Yancy Alexander, APRN - NP   saccharomyces boulardii (FLORASTOR) 250 MG capsule TAKE 1 CAPSULE BY MOUTH IN THE EVENING Yes Archie Cook DO   Theanine 200 MG CAPS TAKE (1) CAPSULE BY MOUTH TWICE DAILY Yes Archie Cook, DO   CALCIUM + VITAMIN D3 600-5 MG-MCG TABS tablet TAKE 1 TABLET BY MOUTH TWICE A DAY Yes Archie Cook DO   UNABLE TO FIND Take 2 tablets by mouth 
deficit with speech issues   Psychiatric:         Mood and Affect: Mood normal.         Behavior: Behavior normal.      Comments: Chronically anxious            Assessment & Plan   Diagnosis Orders   1. Scab  Reassurance that this is benign and there is no need for further treatment      2. Erythema  Advised patient to wear more comfortable cap  No evidence of infection      3. Recurrent depression (HCC)  Stable on current medication/regimen        4. Osteoporosis without current pathological fracture, unspecified osteoporosis type  Stable on current medication/regimen        5. S/p small bowel obstruction        6. Medicare annual wellness visit, subsequent            No orders of the defined types were placed in this encounter.       Return in about 6 months (around 9/20/2024).     All chronic medical problems are stable  Continue with current medical management and plan  lab results and schedule of future lab studies reviewed with patient  reviewed diet, exercise and weight control  reviewed medications and side effects in detail  F/u with other MD's/ providers as scheduled  COVID-19 precautions discussed with pt  An After Visit Summary was printed and given to the patient.      Archie Cook DO

## 2024-04-02 DIAGNOSIS — K90.9 INTESTINAL MALABSORPTION, UNSPECIFIED TYPE: ICD-10-CM

## 2024-04-02 DIAGNOSIS — K59.1 FUNCTIONAL DIARRHEA: ICD-10-CM

## 2024-04-02 RX ORDER — SACCHAROMYCES BOULARDII 250 MG
CAPSULE ORAL
Qty: 90 CAPSULE | Refills: 1 | Status: SHIPPED | OUTPATIENT
Start: 2024-04-02

## 2024-04-11 DIAGNOSIS — K59.1 FUNCTIONAL DIARRHEA: ICD-10-CM

## 2024-04-11 DIAGNOSIS — K52.9 CHRONIC DIARRHEA: ICD-10-CM

## 2024-04-11 DIAGNOSIS — K90.9 INTESTINAL MALABSORPTION, UNSPECIFIED TYPE: Primary | ICD-10-CM

## 2024-04-11 NOTE — TELEPHONE ENCOUNTER
Medication NDC Prescription # [facility]   TERRAFLORA (PROBIOTIC/PREBIOT) [id number] No   Written Date Last Fill Date Quantity Days Supply   4/7/2023 3/15/2024 31 Device Not Available   Sig Notes   TAKE 1 CAPSULE BY MOUTH DAILY WITH A MEAL AT 5PM Not Available      Provider Information

## 2024-04-16 ENCOUNTER — HOSPITAL ENCOUNTER (OUTPATIENT)
Facility: HOSPITAL | Age: 44
Setting detail: INFUSION SERIES
Discharge: HOME | End: 2024-04-16
Payer: MEDICARE

## 2024-04-16 VITALS
SYSTOLIC BLOOD PRESSURE: 145 MMHG | RESPIRATION RATE: 20 BRPM | TEMPERATURE: 98.5 F | DIASTOLIC BLOOD PRESSURE: 80 MMHG | HEART RATE: 98 BPM

## 2024-04-16 DIAGNOSIS — M81.0 AMINOACIDURIA WITH INTELLECTUAL DISABILITY, DWARFISM, MUSCULAR DYSTROPHY, OSTEOPOROSIS, AND ACIDOSIS (HCC): ICD-10-CM

## 2024-04-16 DIAGNOSIS — G71.00 AMINOACIDURIA WITH INTELLECTUAL DISABILITY, DWARFISM, MUSCULAR DYSTROPHY, OSTEOPOROSIS, AND ACIDOSIS (HCC): ICD-10-CM

## 2024-04-16 DIAGNOSIS — E72.9 AMINOACIDURIA WITH INTELLECTUAL DISABILITY, DWARFISM, MUSCULAR DYSTROPHY, OSTEOPOROSIS, AND ACIDOSIS (HCC): ICD-10-CM

## 2024-04-16 DIAGNOSIS — E87.20 AMINOACIDURIA WITH INTELLECTUAL DISABILITY, DWARFISM, MUSCULAR DYSTROPHY, OSTEOPOROSIS, AND ACIDOSIS (HCC): ICD-10-CM

## 2024-04-16 DIAGNOSIS — F79 AMINOACIDURIA WITH INTELLECTUAL DISABILITY, DWARFISM, MUSCULAR DYSTROPHY, OSTEOPOROSIS, AND ACIDOSIS (HCC): ICD-10-CM

## 2024-04-16 DIAGNOSIS — E34.328 AMINOACIDURIA WITH INTELLECTUAL DISABILITY, DWARFISM, MUSCULAR DYSTROPHY, OSTEOPOROSIS, AND ACIDOSIS (HCC): ICD-10-CM

## 2024-04-16 DIAGNOSIS — M81.0 OSTEOPOROSIS WITHOUT CURRENT PATHOLOGICAL FRACTURE, UNSPECIFIED OSTEOPOROSIS TYPE: Primary | ICD-10-CM

## 2024-04-16 LAB
ANION GAP BLD CALC-SCNC: 9.8 MMOL/L (ref 10–20)
CA-I BLD-MCNC: 1.25 MMOL/L (ref 1.12–1.32)
CHLORIDE BLD-SCNC: 107 MMOL/L (ref 98–107)
CO2 BLD-SCNC: 31.2 MMOL/L (ref 21–32)
CREAT BLD-MCNC: 0.74 MG/DL (ref 0.6–1.3)
GLUCOSE BLD-MCNC: 105 MG/DL (ref 65–100)
MAGNESIUM SERPL-MCNC: 2.1 MG/DL (ref 1.6–2.4)
PHOSPHATE SERPL-MCNC: 2.9 MG/DL (ref 2.6–4.7)
POTASSIUM BLD-SCNC: 4 MMOL/L (ref 3.5–5.1)
SERVICE CMNT-IMP: ABNORMAL
SODIUM BLD-SCNC: 148 MMOL/L (ref 136–145)

## 2024-04-16 PROCEDURE — 83735 ASSAY OF MAGNESIUM: CPT

## 2024-04-16 PROCEDURE — 6360000002 HC RX W HCPCS: Performed by: INTERNAL MEDICINE

## 2024-04-16 PROCEDURE — 96372 THER/PROPH/DIAG INJ SC/IM: CPT

## 2024-04-16 PROCEDURE — 84100 ASSAY OF PHOSPHORUS: CPT

## 2024-04-16 PROCEDURE — 36415 COLL VENOUS BLD VENIPUNCTURE: CPT

## 2024-04-16 PROCEDURE — 80047 BASIC METABLC PNL IONIZED CA: CPT

## 2024-04-16 RX ORDER — ACETAMINOPHEN 325 MG/1
650 TABLET ORAL
OUTPATIENT
Start: 2024-10-06

## 2024-04-16 RX ORDER — SODIUM CHLORIDE 9 MG/ML
INJECTION, SOLUTION INTRAVENOUS CONTINUOUS
OUTPATIENT
Start: 2024-10-06

## 2024-04-16 RX ORDER — ONDANSETRON 2 MG/ML
8 INJECTION INTRAMUSCULAR; INTRAVENOUS
OUTPATIENT
Start: 2024-10-06

## 2024-04-16 RX ORDER — ALBUTEROL SULFATE 90 UG/1
4 AEROSOL, METERED RESPIRATORY (INHALATION) PRN
OUTPATIENT
Start: 2024-10-06

## 2024-04-16 RX ORDER — DIPHENHYDRAMINE HYDROCHLORIDE 50 MG/ML
50 INJECTION INTRAMUSCULAR; INTRAVENOUS
OUTPATIENT
Start: 2024-10-06

## 2024-04-16 RX ORDER — EPINEPHRINE 1 MG/ML
0.3 INJECTION, SOLUTION INTRAMUSCULAR; SUBCUTANEOUS PRN
OUTPATIENT
Start: 2024-10-06

## 2024-04-16 RX ADMIN — DENOSUMAB 60 MG: 60 INJECTION SUBCUTANEOUS at 15:41

## 2024-04-16 NOTE — PROGRESS NOTES
[facility] Short Note                   Date: 2024    Name: French Jhaveri    MRN: [id number]       : 1980      1500 Pt admit to [facility] for Prolia ambulatory in stable condition. Assessment completed. No new concerns voiced.       Mr. Jhaveri's vitals were reviewed prior to and after treatment.   Patient Vitals for the past 12 hrs:   Temp Pulse Resp BP   24 1455 98.5 °F (36.9 °C) 98 20 (!) 145/80         Lab results were obtained and reviewed.   Please review pending lab results in Epic.  Recent Results (from the past 12 hour(s))   Phosphorus    Collection Time: 24  3:01 PM   Result Value Ref Range    Phosphorus 2.9 2.6 - 4.7 MG/DL   Magnesium    Collection Time: 24  3:01 PM   Result Value Ref Range    Magnesium 2.1 1.6 - 2.4 mg/dL   POC CHEM 8    Collection Time: 24  3:11 PM   Result Value Ref Range    POC Ionized Calcium 1.25 1.12 - 1.32 mmol/L    POC Sodium 148 (H) 136 - 145 mmol/L    POC Potassium 4.0 3.5 - 5.1 mmol/L    POC Chloride 107 98 - 107 mmol/L    POC TCO2 31.2 21 - 32 mmol/L    Anion Gap, POC 9.8 (L) 10 - 20 mmol/L    POC Glucose 105 (H) 65 - 100 mg/dL    POC Creatinine 0.74 0.6 - 1.3 mg/dL    eGFR, POC >90 >60 ml/min/1.73m2    UA Comment Comment Not Indicated.         Medications given:   Medications Administered         denosumab (PROLIA) SC injection 60 mg Admin Date  2024 Action  Given Dose  60 mg Route  SubCUTAneous Administered By  Audrey Moreno RN          Mr. Jhaveri tolerated the infusion, and had no complaints.    [facility].     Future Appointments   Date Time Provider Department Center   2024  9:00 AM Archie Cook DO [facility] BS AMB       AUDREY MORENO RN  2024  3:58 PM

## 2024-04-22 DIAGNOSIS — H04.123 DRY EYES: Primary | ICD-10-CM

## 2024-04-22 RX ORDER — CARBOXYMETHYLCELLULOSE SODIUM 5 MG/ML
SOLUTION/ DROPS OPHTHALMIC
Qty: 15 ML | Refills: 12 | Status: SHIPPED | OUTPATIENT
Start: 2024-04-22

## 2024-06-01 DIAGNOSIS — G47.00 INSOMNIA, UNSPECIFIED TYPE: ICD-10-CM

## 2024-06-03 RX ORDER — UREA 10 %
LOTION (ML) TOPICAL
Qty: 31 TABLET | Refills: 12 | Status: SHIPPED | OUTPATIENT
Start: 2024-06-03

## 2024-06-17 DIAGNOSIS — J30.89 NON-SEASONAL ALLERGIC RHINITIS, UNSPECIFIED TRIGGER: Primary | ICD-10-CM

## 2024-06-17 RX ORDER — FLUTICASONE PROPIONATE 50 MCG
SPRAY, SUSPENSION (ML) NASAL
Qty: 16 G | Refills: 12 | Status: SHIPPED | OUTPATIENT
Start: 2024-06-17

## 2024-06-24 RX ORDER — MULTIVITAMIN
TABLET ORAL
Qty: 30 TABLET | Refills: 12 | Status: SHIPPED | OUTPATIENT
Start: 2024-06-24

## 2024-07-29 DIAGNOSIS — E55.9 VITAMIN D DEFICIENCY: ICD-10-CM

## 2024-07-29 RX ORDER — CALCIUM CARBONATE/VITAMIN D3 600MG-5MCG
1 TABLET ORAL 2 TIMES DAILY
Qty: 62 TABLET | Refills: 12 | Status: SHIPPED | OUTPATIENT
Start: 2024-07-29

## 2024-08-19 DIAGNOSIS — K90.9 INTESTINAL MALABSORPTION, UNSPECIFIED TYPE: Primary | ICD-10-CM

## 2024-08-19 RX ORDER — LACTASE 3000 UNIT
TABLET ORAL
Qty: 30 TABLET | Refills: 12 | Status: SHIPPED | OUTPATIENT
Start: 2024-08-19

## 2024-09-11 ENCOUNTER — OFFICE VISIT (OUTPATIENT)
Age: 44
End: 2024-09-11
Payer: MEDICARE

## 2024-09-11 VITALS
WEIGHT: 140 LBS | HEIGHT: 70 IN | HEART RATE: 87 BPM | SYSTOLIC BLOOD PRESSURE: 106 MMHG | BODY MASS INDEX: 20.04 KG/M2 | OXYGEN SATURATION: 97 % | TEMPERATURE: 98 F | DIASTOLIC BLOOD PRESSURE: 70 MMHG

## 2024-09-11 DIAGNOSIS — G25.0 ESSENTIAL TREMOR: ICD-10-CM

## 2024-09-11 DIAGNOSIS — K52.9 CHRONIC DIARRHEA: ICD-10-CM

## 2024-09-11 DIAGNOSIS — F41.9 ANXIETY: ICD-10-CM

## 2024-09-11 DIAGNOSIS — E55.9 VITAMIN D DEFICIENCY: ICD-10-CM

## 2024-09-11 DIAGNOSIS — Z87.19 S/P SMALL BOWEL OBSTRUCTION: ICD-10-CM

## 2024-09-11 DIAGNOSIS — J30.89 NON-SEASONAL ALLERGIC RHINITIS, UNSPECIFIED TRIGGER: ICD-10-CM

## 2024-09-11 DIAGNOSIS — K90.9 INTESTINAL MALABSORPTION, UNSPECIFIED TYPE: Primary | ICD-10-CM

## 2024-09-11 DIAGNOSIS — K90.9 INTESTINAL MALABSORPTION, UNSPECIFIED TYPE: ICD-10-CM

## 2024-09-11 LAB
BASOPHILS # BLD AUTO: 0 X10E3/UL (ref 0–0.2)
BASOPHILS NFR BLD AUTO: 1 %
EOSINOPHIL # BLD AUTO: 0.2 X10E3/UL (ref 0–0.4)
EOSINOPHIL NFR BLD AUTO: 3 %
ERYTHROCYTE [DISTWIDTH] IN BLOOD BY AUTOMATED COUNT: 12.8 % (ref 11.6–15.4)
HCT VFR BLD AUTO: 46 % (ref 37.5–51)
HGB BLD-MCNC: 14.6 G/DL (ref 13–17.7)
IMM GRANULOCYTES # BLD AUTO: 0 X10E3/UL (ref 0–0.1)
IMM GRANULOCYTES NFR BLD AUTO: 0 %
LYMPHOCYTES # BLD AUTO: 2.6 X10E3/UL (ref 0.7–3.1)
LYMPHOCYTES NFR BLD AUTO: 43 %
MCH RBC QN AUTO: 30.7 PG (ref 26.6–33)
MCHC RBC AUTO-ENTMCNC: 31.7 G/DL (ref 31.5–35.7)
MCV RBC AUTO: 97 FL (ref 79–97)
MONOCYTES # BLD AUTO: 0.6 X10E3/UL (ref 0.1–0.9)
MONOCYTES NFR BLD AUTO: 10 %
NEUTROPHILS # BLD AUTO: 2.5 X10E3/UL (ref 1.4–7)
NEUTROPHILS NFR BLD AUTO: 43 %
PLATELET # BLD AUTO: 195 X10E3/UL (ref 150–450)
RBC # BLD AUTO: 4.76 X10E6/UL (ref 4.14–5.8)
WBC # BLD AUTO: 5.9 X10E3/UL (ref 3.4–10.8)

## 2024-09-11 PROCEDURE — 1036F TOBACCO NON-USER: CPT | Performed by: INTERNAL MEDICINE

## 2024-09-11 PROCEDURE — 99214 OFFICE O/P EST MOD 30 MIN: CPT | Performed by: INTERNAL MEDICINE

## 2024-09-11 PROCEDURE — G8420 CALC BMI NORM PARAMETERS: HCPCS | Performed by: INTERNAL MEDICINE

## 2024-09-11 PROCEDURE — G8427 DOCREV CUR MEDS BY ELIG CLIN: HCPCS | Performed by: INTERNAL MEDICINE

## 2024-09-11 RX ORDER — FLUTICASONE PROPIONATE 50 MCG
SPRAY, SUSPENSION (ML) NASAL
Qty: 16 G | Refills: 5 | Status: SHIPPED | OUTPATIENT
Start: 2024-09-11

## 2024-09-11 RX ORDER — LEVOMEFOLATE CALCIUM 7.5 MG
TABLET ORAL
COMMUNITY
Start: 2024-08-15

## 2024-09-11 RX ORDER — PROPRANOLOL HYDROCHLORIDE 80 MG/1
80 CAPSULE, EXTENDED RELEASE ORAL DAILY
COMMUNITY
Start: 2024-08-15

## 2024-09-11 RX ORDER — PRIMIDONE 50 MG/1
TABLET ORAL
COMMUNITY
Start: 2024-08-15

## 2024-09-12 DIAGNOSIS — K59.1 FUNCTIONAL DIARRHEA: ICD-10-CM

## 2024-09-12 DIAGNOSIS — K90.9 INTESTINAL MALABSORPTION, UNSPECIFIED TYPE: ICD-10-CM

## 2024-09-12 DIAGNOSIS — J30.89 NON-SEASONAL ALLERGIC RHINITIS, UNSPECIFIED TRIGGER: ICD-10-CM

## 2024-09-12 LAB
25(OH)D3+25(OH)D2 SERPL-MCNC: 41.4 NG/ML (ref 30–100)
ALBUMIN SERPL-MCNC: 4.2 G/DL (ref 4.1–5.1)
ALP SERPL-CCNC: 55 IU/L (ref 44–121)
ALT SERPL-CCNC: 16 IU/L (ref 0–44)
AST SERPL-CCNC: 17 IU/L (ref 0–40)
BILIRUB SERPL-MCNC: 0.3 MG/DL (ref 0–1.2)
BUN SERPL-MCNC: 24 MG/DL (ref 6–24)
BUN/CREAT SERPL: 26 (ref 9–20)
CALCIUM SERPL-MCNC: 9.7 MG/DL (ref 8.7–10.2)
CHLORIDE SERPL-SCNC: 98 MMOL/L (ref 96–106)
CO2 SERPL-SCNC: 32 MMOL/L (ref 20–29)
CREAT SERPL-MCNC: 0.93 MG/DL (ref 0.76–1.27)
EGFRCR SERPLBLD CKD-EPI 2021: 103 ML/MIN/1.73
GLOBULIN SER CALC-MCNC: 2.8 G/DL (ref 1.5–4.5)
GLUCOSE SERPL-MCNC: 62 MG/DL (ref 70–99)
POTASSIUM SERPL-SCNC: 4 MMOL/L (ref 3.5–5.2)
PROT SERPL-MCNC: 7 G/DL (ref 6–8.5)
SODIUM SERPL-SCNC: 142 MMOL/L (ref 134–144)

## 2024-09-12 RX ORDER — SACCHAROMYCES BOULARDII 250 MG
CAPSULE ORAL
Qty: 90 CAPSULE | Refills: 1 | OUTPATIENT
Start: 2024-09-12

## 2024-09-12 RX ORDER — FLUTICASONE PROPIONATE 50 MCG
SPRAY, SUSPENSION (ML) NASAL
Qty: 16 G | Refills: 5 | OUTPATIENT
Start: 2024-09-12

## 2024-09-13 DIAGNOSIS — K59.1 FUNCTIONAL DIARRHEA: ICD-10-CM

## 2024-09-13 DIAGNOSIS — K90.9 INTESTINAL MALABSORPTION, UNSPECIFIED TYPE: ICD-10-CM

## 2024-09-13 DIAGNOSIS — K52.9 CHRONIC DIARRHEA: ICD-10-CM

## 2024-09-18 ASSESSMENT — ENCOUNTER SYMPTOMS
RESPIRATORY NEGATIVE: 1
EYES NEGATIVE: 1
ABDOMINAL PAIN: 0
SHORTNESS OF BREATH: 0
ALLERGIC/IMMUNOLOGIC NEGATIVE: 1
GASTROINTESTINAL NEGATIVE: 1

## 2024-09-26 RX ORDER — POLYETHYLENE GLYCOL 3350 17 G/17G
POWDER, FOR SOLUTION ORAL
Qty: 510 G | Refills: 12 | Status: SHIPPED | OUTPATIENT
Start: 2024-09-26

## 2024-09-30 ENCOUNTER — CLINICAL DOCUMENTATION (OUTPATIENT)
Age: 44
End: 2024-09-30

## 2024-09-30 NOTE — PROGRESS NOTES
ALEXANDRU Burks Nurse Leader from [facility] calls today stating that patient took medication from another resident.  Group home called [facility] pharmacy and pharmacists stated to monitor patient to see if patient shows any effects from wrong meds. Nurse stated patient will be under surveillance.

## 2024-09-30 NOTE — PROGRESS NOTES
Spoke with aid at his assistant home.     Patient's blood pressure was 87/54 (66)    Now it is 126/77, blood pressure is going up.     Per Yancy Alexander NP patient can hold any meds until tomorrow, if he is symptomatic      Advised aid that no one here knows exactly what patient took on accident so it is difficult to give advice on side effects on meds taken.

## 2024-11-01 ENCOUNTER — HOSPITAL ENCOUNTER (OUTPATIENT)
Facility: HOSPITAL | Age: 44
Setting detail: INFUSION SERIES
Discharge: HOME | End: 2024-11-01
Payer: MEDICARE

## 2024-11-01 VITALS
HEART RATE: 78 BPM | RESPIRATION RATE: 20 BRPM | SYSTOLIC BLOOD PRESSURE: 102 MMHG | DIASTOLIC BLOOD PRESSURE: 71 MMHG | TEMPERATURE: 98.2 F

## 2024-11-01 DIAGNOSIS — M81.0 OSTEOPOROSIS WITHOUT CURRENT PATHOLOGICAL FRACTURE, UNSPECIFIED OSTEOPOROSIS TYPE: Primary | ICD-10-CM

## 2024-11-01 LAB
ANION GAP BLD CALC-SCNC: 8.8 MMOL/L (ref 10–20)
CA-I BLD-MCNC: 1.28 MMOL/L (ref 1.15–1.33)
CHLORIDE BLD-SCNC: 107 MMOL/L (ref 98–107)
CO2 BLD-SCNC: 33.2 MMOL/L (ref 21–32)
CREAT BLD-MCNC: 0.84 MG/DL (ref 0.6–1.3)
GLUCOSE BLD-MCNC: 98 MG/DL (ref 74–99)
MAGNESIUM SERPL-MCNC: 1.9 MG/DL (ref 1.6–2.4)
PHOSPHATE SERPL-MCNC: 2.6 MG/DL (ref 2.6–4.7)
POTASSIUM BLD-SCNC: 3.9 MMOL/L (ref 3.5–5.1)
SERVICE CMNT-IMP: ABNORMAL
SODIUM BLD-SCNC: 149 MMOL/L (ref 136–145)

## 2024-11-01 PROCEDURE — 96372 THER/PROPH/DIAG INJ SC/IM: CPT

## 2024-11-01 PROCEDURE — 80047 BASIC METABLC PNL IONIZED CA: CPT

## 2024-11-01 PROCEDURE — 83735 ASSAY OF MAGNESIUM: CPT

## 2024-11-01 PROCEDURE — 84100 ASSAY OF PHOSPHORUS: CPT

## 2024-11-01 PROCEDURE — 36415 COLL VENOUS BLD VENIPUNCTURE: CPT

## 2024-11-01 PROCEDURE — 6360000002 HC RX W HCPCS: Performed by: INTERNAL MEDICINE

## 2024-11-01 RX ADMIN — DENOSUMAB 60 MG: 60 INJECTION SUBCUTANEOUS at 09:39

## 2024-11-01 NOTE — PROGRESS NOTES
[facility] Short Note                       Date: 2024    Name: French Jhaveri    MRN: [id number]         : 1980     Pt admit to [facility] for Prolia ambulatory in stable condition. Assessment completed. No new concerns voiced.  Please review pending lab results in CC.      Mr. Jhaveri's vitals were reviewed prior to treatment.   Patient Vitals for the past 12 hrs:   Temp Pulse Resp BP   24 0915 98.2 °F (36.8 °C) 78 20 102/71         Medications given: SQ right arm  Medications Administered         denosumab (PROLIA) SC injection 60 mg Admin Date  2024 Action  Given Dose  60 mg Route  SubCUTAneous Documented By  Ca Juarez, RN                Pt tolerated treatment well. D/c home ambulatory in no distress.  Future Appointments   Date Time Provider Department Center   3/18/2025 11:00 AM Yancy Alexander APRN - NP [facility]       [facility] JUAREZ RN  2024  9:58 AM          
Breath Sounds equal & clear to percussion & auscultation, no accessory muscle use

## 2024-11-23 ENCOUNTER — OFFICE VISIT (OUTPATIENT)
Age: 44
End: 2024-11-23

## 2024-11-23 VITALS
TEMPERATURE: 98 F | DIASTOLIC BLOOD PRESSURE: 66 MMHG | WEIGHT: 137 LBS | RESPIRATION RATE: 16 BRPM | OXYGEN SATURATION: 99 % | BODY MASS INDEX: 19.66 KG/M2 | HEART RATE: 70 BPM | SYSTOLIC BLOOD PRESSURE: 111 MMHG

## 2024-11-23 DIAGNOSIS — H00.025 HORDEOLUM INTERNUM OF LEFT LOWER EYELID: Primary | ICD-10-CM

## 2024-11-23 RX ORDER — ERYTHROMYCIN 5 MG/G
OINTMENT OPHTHALMIC
Qty: 3.5 G | Refills: 0 | Status: SHIPPED | OUTPATIENT
Start: 2024-11-23 | End: 2024-12-03

## 2024-11-23 RX ORDER — ERYTHROMYCIN 5 MG/G
OINTMENT OPHTHALMIC
Qty: 3.5 G | Refills: 0 | Status: SHIPPED | OUTPATIENT
Start: 2024-11-23 | End: 2024-11-23 | Stop reason: SDUPTHER

## 2024-11-23 RX ORDER — CALCIUM CARBONATE/VITAMIN D3 600MG-62.5
CAPSULE ORAL
COMMUNITY

## 2024-11-23 ASSESSMENT — VISUAL ACUITY: OU: 1

## 2024-11-23 NOTE — PROGRESS NOTES
French Jhaveri (:  1980) is a 44 y.o. male,New patient, here for evaluation of the following chief complaint(s):  Eyelid Problem (Left lower eyelid red and swollen since yesterday)      Assessment & Plan :  Visit Diagnoses and Associated Orders       Hordeolum internum of left lower eyelid    -  Primary    erythromycin (ROMYCIN) 5 MG/GM ophthalmic ointment [4528]           ORDERS WITHOUT AN ASSOCIATED DIAGNOSIS    acetylcysteine (N-ACETYL CYSTEINE) 600 MG CAPS [341996]              Patient was seen today for an internal stye of the left eye  We will treat with erythromycin eye ointment, apply 1 cm ribbon 4 times daily for 7 days  Practice good hand hygiene, avoid rubbing or scratching at the eye  Warm compresses to the eye, 5 to 10 minutes at a time, several times throughout each day  Monitor symptoms carefully, if symptoms are worsening, if he is developing severe pain, blurry vision, or other more alarming symptoms, please follow-up with an ophthalmologist       Subjective :  HPI     44 y.o. male presents with symptoms of right lower eyelid swelling and redness which has progressed over the past 3 days.  Denies any significant pain or discomfort.  Denies any loss of vision.  Denies any thick or purulent drainage.  Denies fevers, chills, body aches or fatigue.  Denies any recent trauma or injury to the eye.  Family and caregiver note what appears to be a large stye to the inside of the lower eyelid.  No discharge or drainage reported.  Denies contact lens use.  He did have a blepharitis to the left lower eyelid approximately 5 years ago which was treated effectively with erythromycin ointment.         Vitals:    24 1418   BP: 111/66   Site: Right Upper Arm   Position: Sitting   Cuff Size: Medium Adult   Pulse: 70   Resp: 16   Temp: 98 °F (36.7 °C)   SpO2: 99%   Weight: 62.1 kg (137 lb)       No results found for this visit on 24.      Objective   Physical Exam  Vitals and nursing note reviewed.

## 2024-11-23 NOTE — PATIENT INSTRUCTIONS
Patient was seen today for an internal stye of the left eye  We will treat with erythromycin eye ointment, apply 1 cm ribbon 4 times daily for 7 days  Practice good hand hygiene, avoid rubbing or scratching at the eye  Warm compresses to the eye, 5 to 10 minutes at a time, several times throughout each day  Monitor symptoms carefully, if symptoms are worsening, if he is developing severe pain, blurry vision, or other more alarming symptoms, please follow-up with an ophthalmologist

## 2024-12-31 RX ORDER — NICOTINE 14MG/24HR
PATCH, TRANSDERMAL 24 HOURS TRANSDERMAL
Qty: 90 CAPSULE | Refills: 1 | Status: SHIPPED | OUTPATIENT
Start: 2024-12-31

## 2025-01-06 DIAGNOSIS — K90.9 INTESTINAL MALABSORPTION, UNSPECIFIED TYPE: ICD-10-CM

## 2025-01-07 DIAGNOSIS — K90.9 INTESTINAL MALABSORPTION, UNSPECIFIED TYPE: ICD-10-CM

## 2025-01-07 DIAGNOSIS — K52.9 CHRONIC DIARRHEA: ICD-10-CM

## 2025-01-07 DIAGNOSIS — K59.1 FUNCTIONAL DIARRHEA: ICD-10-CM

## 2025-01-07 RX ORDER — TYROSINE 500 MG
100 CAPSULE ORAL DAILY
Qty: 90 CAPSULE | Refills: 1 | OUTPATIENT
Start: 2025-01-07

## 2025-01-07 NOTE — TELEPHONE ENCOUNTER
Lov: 09/11/2024  Nov:03/18/2025    [facility] [place]    Theanine 200MG  Probiotic 250 MG  L-THEANINE 100mg

## 2025-01-27 ENCOUNTER — TELEPHONE (OUTPATIENT)
Age: 45
End: 2025-01-27

## 2025-01-27 DIAGNOSIS — R19.7 DIARRHEA, UNSPECIFIED TYPE: ICD-10-CM

## 2025-01-27 DIAGNOSIS — K52.9 CHRONIC DIARRHEA: Primary | ICD-10-CM

## 2025-01-27 RX ORDER — LOPERAMIDE HYDROCHLORIDE 2 MG/1
2 CAPSULE ORAL DAILY PRN
Qty: 30 CAPSULE | Refills: 0 | Status: SHIPPED | OUTPATIENT
Start: 2025-01-27 | End: 2025-02-26

## 2025-01-27 RX ORDER — LOPERAMIDE HYDROCHLORIDE 1 MG/5ML
1 SOLUTION ORAL 3 TIMES DAILY PRN
Qty: 30 ML | Refills: 0 | Status: CANCELLED | OUTPATIENT
Start: 2025-01-27 | End: 2025-02-03

## 2025-01-27 NOTE — TELEPHONE ENCOUNTER
Stomach flu is going around at pt Group Home  Caller wants to know if something can be called in for patients symptoms of diarrhea?  Please advise

## 2025-02-24 RX ORDER — LEVOMEFOLATE CALCIUM 7.5 MG
TABLET ORAL
Qty: 60 TABLET | Status: SHIPPED | OUTPATIENT
Start: 2025-02-24

## 2025-03-18 ENCOUNTER — OFFICE VISIT (OUTPATIENT)
Age: 45
End: 2025-03-18
Payer: MEDICARE

## 2025-03-18 VITALS
HEIGHT: 70 IN | DIASTOLIC BLOOD PRESSURE: 60 MMHG | RESPIRATION RATE: 16 BRPM | SYSTOLIC BLOOD PRESSURE: 100 MMHG | BODY MASS INDEX: 18.47 KG/M2 | TEMPERATURE: 97.8 F | WEIGHT: 129 LBS | HEART RATE: 59 BPM | OXYGEN SATURATION: 97 %

## 2025-03-18 DIAGNOSIS — K90.9 INTESTINAL MALABSORPTION, UNSPECIFIED TYPE: ICD-10-CM

## 2025-03-18 DIAGNOSIS — Z00.00 MEDICARE ANNUAL WELLNESS VISIT, SUBSEQUENT: Primary | ICD-10-CM

## 2025-03-18 DIAGNOSIS — F41.9 ANXIETY: ICD-10-CM

## 2025-03-18 DIAGNOSIS — G25.0 ESSENTIAL TREMOR: ICD-10-CM

## 2025-03-18 PROCEDURE — G0439 PPPS, SUBSEQ VISIT: HCPCS | Performed by: INTERNAL MEDICINE

## 2025-03-18 RX ORDER — SERTRALINE HCL 50 MG
50 TABLET ORAL DAILY
COMMUNITY
Start: 2025-02-13

## 2025-03-18 SDOH — ECONOMIC STABILITY: FOOD INSECURITY: WITHIN THE PAST 12 MONTHS, THE FOOD YOU BOUGHT JUST DIDN'T LAST AND YOU DIDN'T HAVE MONEY TO GET MORE.: NEVER TRUE

## 2025-03-18 SDOH — ECONOMIC STABILITY: TRANSPORTATION INSECURITY
IN THE PAST 12 MONTHS, HAS LACK OF TRANSPORTATION KEPT YOU FROM MEETINGS, WORK, OR FROM GETTING THINGS NEEDED FOR DAILY LIVING?: NO

## 2025-03-18 SDOH — ECONOMIC STABILITY: INCOME INSECURITY: IN THE LAST 12 MONTHS, WAS THERE A TIME WHEN YOU WERE NOT ABLE TO PAY THE MORTGAGE OR RENT ON TIME?: NO

## 2025-03-18 SDOH — ECONOMIC STABILITY: FOOD INSECURITY: WITHIN THE PAST 12 MONTHS, YOU WORRIED THAT YOUR FOOD WOULD RUN OUT BEFORE YOU GOT MONEY TO BUY MORE.: NEVER TRUE

## 2025-03-18 SDOH — ECONOMIC STABILITY: TRANSPORTATION INSECURITY
IN THE PAST 12 MONTHS, HAS THE LACK OF TRANSPORTATION KEPT YOU FROM MEDICAL APPOINTMENTS OR FROM GETTING MEDICATIONS?: NO

## 2025-03-18 ASSESSMENT — LIFESTYLE VARIABLES
HOW MANY STANDARD DRINKS CONTAINING ALCOHOL DO YOU HAVE ON A TYPICAL DAY: PATIENT DOES NOT DRINK
HOW OFTEN DO YOU HAVE A DRINK CONTAINING ALCOHOL: NEVER

## 2025-03-18 ASSESSMENT — PATIENT HEALTH QUESTIONNAIRE - PHQ9
1. LITTLE INTEREST OR PLEASURE IN DOING THINGS: NOT AT ALL
SUM OF ALL RESPONSES TO PHQ QUESTIONS 1-9: 0
5. POOR APPETITE OR OVEREATING: NOT AT ALL
2. FEELING DOWN, DEPRESSED OR HOPELESS: NOT AT ALL
10. IF YOU CHECKED OFF ANY PROBLEMS, HOW DIFFICULT HAVE THESE PROBLEMS MADE IT FOR YOU TO DO YOUR WORK, TAKE CARE OF THINGS AT HOME, OR GET ALONG WITH OTHER PEOPLE: NOT DIFFICULT AT ALL
8. MOVING OR SPEAKING SO SLOWLY THAT OTHER PEOPLE COULD HAVE NOTICED. OR THE OPPOSITE, BEING SO FIGETY OR RESTLESS THAT YOU HAVE BEEN MOVING AROUND A LOT MORE THAN USUAL: NOT AT ALL
SUM OF ALL RESPONSES TO PHQ QUESTIONS 1-9: 0
9. THOUGHTS THAT YOU WOULD BE BETTER OFF DEAD, OR OF HURTING YOURSELF: NOT AT ALL
SUM OF ALL RESPONSES TO PHQ QUESTIONS 1-9: 0
6. FEELING BAD ABOUT YOURSELF - OR THAT YOU ARE A FAILURE OR HAVE LET YOURSELF OR YOUR FAMILY DOWN: NOT AT ALL
4. FEELING TIRED OR HAVING LITTLE ENERGY: NOT AT ALL
SUM OF ALL RESPONSES TO PHQ QUESTIONS 1-9: 0
3. TROUBLE FALLING OR STAYING ASLEEP: NOT AT ALL
7. TROUBLE CONCENTRATING ON THINGS, SUCH AS READING THE NEWSPAPER OR WATCHING TELEVISION: NOT AT ALL

## 2025-03-18 NOTE — PROGRESS NOTES
Chief Complaint   Patient presents with    Medicare AWV     \"Have you been to the ER, urgent care clinic since your last visit?  Hospitalized since your last visit?\"    NO    “Have you seen or consulted any other health care providers outside our system since your last visit?”    NO             
Yes Archie Cook DO   melatonin 5 MG TABS tablet TAKE 1 TABLET BY MOUTH AT BEDTIME Yes Archie Cook DO   carboxymethylcellulose (REFRESH TEARS) 0.5 % SOLN ophthalmic solution PLACE 1 DROP INTO EACH EYE TWICE DAILY Yes Archie Cook DO   CITRUCEL oral powder MIX 2 TABLEPOONFULS INTO A GLASS OF WATER AND DRINK ONCE DAILY Yes Yancy Alexander APRN - NP   UNABLE TO FIND Take 2 tablets by mouth daily Take with food for improved memory, concentration and focus  Patient taking differently: Take 2 tablets by mouth daily FOCUS FACTOR: Take with food for improved memory, concentration and focus Yes Archie Cook DO   acetaminophen (TYLENOL) 325 MG tablet Take by mouth every 4 hours as needed Yes Automatic Reconciliation, Ar   denosumab (PROLIA) 60 MG/ML SOSY SC injection ceived the following from [facility]: Outside name: denosumab (Prolia) 60 mg/mL injection Yes Automatic Reconciliation, Ar   rifAXIMin (XIFAXAN) 550 MG tablet Take by mouth daily Yes Automatic Reconciliation, Ar       Social History     Substance and Sexual Activity   Sexual Activity Never        CareTeam (Including outside providers/suppliers regularly involved in providing care):   Patient Care Team:  Yancy Alexander APRN - NP as PCP - General (Nurse Practitioner)  Yancy Alexander APRN - NP as PCP - Empaneled Provider     Reviewed and updated this visit:  Tobacco  Allergies  Meds  Problems  Med Hx  Surg Hx  Fam Hx  Sexual   Hx

## 2025-03-31 DIAGNOSIS — K59.1 FUNCTIONAL DIARRHEA: ICD-10-CM

## 2025-03-31 DIAGNOSIS — K90.9 INTESTINAL MALABSORPTION, UNSPECIFIED TYPE: ICD-10-CM

## 2025-03-31 DIAGNOSIS — K52.9 CHRONIC DIARRHEA: ICD-10-CM

## 2025-03-31 NOTE — TELEPHONE ENCOUNTER
[facility] - [place], [place] - [address] [address] -  [phone] - F [phone]     Debbie (probiotic/prebiotic)  Quantity 31  Take 1 cap by mouth daily with meal at 5pm      03/18/2025 09/16/2025

## 2025-04-17 ENCOUNTER — TELEPHONE (OUTPATIENT)
Age: 45
End: 2025-04-17

## 2025-04-17 NOTE — TELEPHONE ENCOUNTER
Pt group home calling about a medication change   florastor probiotic was delivered to patient in March not the Terrafloraprobiotic  Was this change because the pharmacy was out of [facility]? Please confirm if patient should be taking Florastor

## 2025-04-18 NOTE — TELEPHONE ENCOUNTER
ATTEMPTED TO RETURN CALL. Lvm:  (We did not send in an alternative.  Based on the chart and his current medication list they will need to follow-up with the pharmacy)

## 2025-04-29 ENCOUNTER — TELEPHONE (OUTPATIENT)
Age: 45
End: 2025-04-29

## 2025-04-29 DIAGNOSIS — H04.123 DRY EYES: ICD-10-CM

## 2025-04-29 RX ORDER — CARBOXYMETHYLCELLULOSE SODIUM 5 MG/ML
SOLUTION/ DROPS OPHTHALMIC
Qty: 15 ML | Refills: 12 | Status: SHIPPED | OUTPATIENT
Start: 2025-04-29

## 2025-04-29 NOTE — TELEPHONE ENCOUNTER
Medication(s):  carboxymethylcellulose (REFRESH TEARS) 0.5 % SOLN ophthalmic solution     Last OV: 3/18/2025  Next OV: 09/16/2025    Pharmacy: [facility] [place], [place] - [address] [place] [address] [phone] -  [phone] [[id number]]

## 2025-05-07 ENCOUNTER — RESULTS FOLLOW-UP (OUTPATIENT)
Age: 45
End: 2025-05-07

## 2025-05-07 LAB
ALBUMIN SERPL-MCNC: 4.3 G/DL (ref 4.1–5.1)
ALP SERPL-CCNC: 54 IU/L (ref 44–121)
ALT SERPL-CCNC: 16 IU/L (ref 0–44)
AST SERPL-CCNC: 20 IU/L (ref 0–40)
BILIRUB SERPL-MCNC: 0.3 MG/DL (ref 0–1.2)
BUN SERPL-MCNC: 17 MG/DL (ref 6–24)
BUN/CREAT SERPL: 21 (ref 9–20)
CALCIUM SERPL-MCNC: 9.4 MG/DL (ref 8.7–10.2)
CHLORIDE SERPL-SCNC: 100 MMOL/L (ref 96–106)
CO2 SERPL-SCNC: 29 MMOL/L (ref 20–29)
CREAT SERPL-MCNC: 0.8 MG/DL (ref 0.76–1.27)
EGFRCR SERPLBLD CKD-EPI 2021: 111 ML/MIN/1.73
GLOBULIN SER CALC-MCNC: 2.4 G/DL (ref 1.5–4.5)
GLUCOSE SERPL-MCNC: 92 MG/DL (ref 70–99)
POTASSIUM SERPL-SCNC: 4.3 MMOL/L (ref 3.5–5.2)
PROT SERPL-MCNC: 6.7 G/DL (ref 6–8.5)
SODIUM SERPL-SCNC: 141 MMOL/L (ref 134–144)

## 2025-05-07 NOTE — TELEPHONE ENCOUNTER
Spoke with patient after verifying name and . Notified patient of lab results and recommendation from provider. Patient verbalized understanding and given a chance to ask questions.      ----- Message from LYNN Lei NP sent at 2025  7:54 AM EDT -----  Mr. Braswell, your labs have improved

## 2025-05-08 ENCOUNTER — HOSPITAL ENCOUNTER (OUTPATIENT)
Facility: HOSPITAL | Age: 45
Setting detail: INFUSION SERIES
Discharge: HOME | End: 2025-05-08
Payer: MEDICARE

## 2025-05-08 VITALS
SYSTOLIC BLOOD PRESSURE: 111 MMHG | DIASTOLIC BLOOD PRESSURE: 70 MMHG | HEART RATE: 87 BPM | RESPIRATION RATE: 18 BRPM | OXYGEN SATURATION: 96 % | TEMPERATURE: 97.9 F

## 2025-05-08 DIAGNOSIS — M81.0 OSTEOPOROSIS WITHOUT CURRENT PATHOLOGICAL FRACTURE, UNSPECIFIED OSTEOPOROSIS TYPE: Primary | ICD-10-CM

## 2025-05-08 PROCEDURE — 96372 THER/PROPH/DIAG INJ SC/IM: CPT

## 2025-05-08 PROCEDURE — 6360000002 HC RX W HCPCS: Performed by: INTERNAL MEDICINE

## 2025-05-08 RX ADMIN — DENOSUMAB 60 MG: 60 INJECTION SUBCUTANEOUS at 14:51

## 2025-05-08 ASSESSMENT — PAIN SCALES - GENERAL: PAINLEVEL_OUTOF10: 0

## 2025-05-08 NOTE — PROGRESS NOTES
[facility] Short Note                       Date: May 8, 2025    Name: French Jhaveri    MRN: [id number]         : 1980    Pt admit to [facility] for Prolia ambulatory in stable condition. Assessment completed. No new concerns voiced. Denies recent and upcoming invasive dental procedures. Pt and caregiver declines drawing magnesium and phosphorus levels today. Labs reviewed from 2025 and are within treatment parameters.      Mr. Jhaveri's vitals were reviewed prior to treatment.   Patient Vitals for the past 12 hrs:   Temp Pulse Resp BP SpO2   25 1434 97.9 °F (36.6 °C) 87 18 111/70 96 %       Medications given: SQ left arm  Medications Administered         denosumab (PROLIA) SC injection 60 mg Admin Date  2025 Action  Given Dose  60 mg Route  SubCUTAneous Documented By  Keyanna Harrington, RN             Pt tolerated treatment well. D/c home ambulatory in no distress accompanied by caregiver.  Future Appointments   Date Time Provider Department Center   2025 10:00 AM Yancy Alexander APRN - NP [facility] DEP       KEYANNA HARRINGTON, MAIA  May 8, 2025  3:27 PM           Yes

## 2025-05-30 ENCOUNTER — TELEPHONE (OUTPATIENT)
Age: 45
End: 2025-05-30

## 2025-05-30 DIAGNOSIS — G47.00 INSOMNIA, UNSPECIFIED TYPE: ICD-10-CM

## 2025-05-30 RX ORDER — MULTIVITAMIN
1 TABLET ORAL DAILY
Qty: 30 TABLET | Refills: 12 | Status: SHIPPED | OUTPATIENT
Start: 2025-05-30

## 2025-05-30 NOTE — TELEPHONE ENCOUNTER
Lov:03/18/2025  Nov:09/16/2025    [facility] - [place], [place] - [address] [facility]       melatonin 5 MG TABS tablet     Multiple Vitamin (ONE-DAILY MULTI-VITAMIN) TABS

## 2025-06-03 ENCOUNTER — TELEPHONE (OUTPATIENT)
Age: 45
End: 2025-06-03

## 2025-06-03 NOTE — TELEPHONE ENCOUNTER
Pt keeps running a fever. Pt was given tylenol and the fever improved a little.   Pts mother stated he also has a lot of mucus and congestion.   -no appointments available this week     Please return call with advice  Phone: [phone]

## 2025-06-04 ENCOUNTER — OFFICE VISIT (OUTPATIENT)
Age: 45
End: 2025-06-04

## 2025-06-04 ENCOUNTER — PATIENT MESSAGE (OUTPATIENT)
Age: 45
End: 2025-06-04

## 2025-06-04 VITALS
BODY MASS INDEX: 16.72 KG/M2 | RESPIRATION RATE: 16 BRPM | HEART RATE: 84 BPM | WEIGHT: 116.5 LBS | SYSTOLIC BLOOD PRESSURE: 96 MMHG | OXYGEN SATURATION: 95 % | DIASTOLIC BLOOD PRESSURE: 62 MMHG | TEMPERATURE: 99.1 F

## 2025-06-04 DIAGNOSIS — J06.9 VIRAL URI WITH COUGH: ICD-10-CM

## 2025-06-04 DIAGNOSIS — R09.81 NASAL CONGESTION: Primary | ICD-10-CM

## 2025-06-04 DIAGNOSIS — H66.003 NON-RECURRENT ACUTE SUPPURATIVE OTITIS MEDIA OF BOTH EARS WITHOUT SPONTANEOUS RUPTURE OF TYMPANIC MEMBRANES: Primary | ICD-10-CM

## 2025-06-04 RX ORDER — AMOXICILLIN 500 MG/1
1000 CAPSULE ORAL 3 TIMES DAILY
Qty: 42 CAPSULE | Refills: 0 | Status: SHIPPED | OUTPATIENT
Start: 2025-06-04 | End: 2025-06-11

## 2025-06-04 RX ORDER — NEOMYCIN SULFATE, POLYMYXIN B SULFATE, AND DEXAMETHASONE 3.5; 10000; 1 MG/G; [USP'U]/G; MG/G
OINTMENT OPHTHALMIC
COMMUNITY
Start: 2025-04-23

## 2025-06-04 RX ORDER — HYDROXYZINE HYDROCHLORIDE 10 MG/1
TABLET, FILM COATED ORAL
COMMUNITY
Start: 2025-05-02

## 2025-06-04 RX ORDER — SERTRALINE HYDROCHLORIDE 25 MG/1
TABLET, FILM COATED ORAL
COMMUNITY
Start: 2025-05-15

## 2025-06-04 NOTE — PROGRESS NOTES
Patient Name: French Jhaveri   YOB: 1980   Patient Status: Established patient,   Chief Complaint: Fever (Cold, fever, diarrhea (started today))      ____________________________________________________________________________________________    ASSESSMENT/PLAN:    Clinical Impression:   Assessment & Plan  Viral URI with cough            Non-recurrent acute suppurative otitis media of both ears without spontaneous rupture of tympanic membranes       Orders:    amoxicillin (AMOXIL) 500 MG capsule; Take 2 capsules by mouth 3 times daily for 7 days         MDM: The patient presents with symptoms and clinic findings consistent with Viral URI with cough and otitis media bilaterally . At the time of discharge the patient clinically looked well. The patient had normal work of breathing. Normal level of alertness.  Well hydrated. No meningeal signs. Lungs CTA. Normal room air O2sat. No signs of sepsis. Will treat with watch and wait amoxicillin with instructions provided to caregiver and on group home paperwork.  Mom declined Augmentin reporting it made patient sick but denied it as an allergy. ER precautions given.  Symptoms care instructions given and discussed.  All questions answered.  Encouraged follow up with PCP for persistent or recurrent symptoms. Patient verbalized understanding and agreement with precautions and care plan.        External Records Reviewed: None    Limitation to History: None    Outside Historian: Caregiver and Mom (by phone) give history due to patient baseline mental capacity    SUBJECTIVE/OBJECTIVE:  French Jhaveri is a 44 y.o. male presents with complaint of fever up to 101, cough, sinus pressure, 1 loose stool and congestion.  Symptoms began 5 day(s) ago and are worsening since onset.  Caregiver denies shortness of breath and GI Symptoms. Caregiver reports giving Acetaminophen for symptoms without relief. No other acute symptoms reported at this time. Home Covid test was negative.

## 2025-06-10 RX ORDER — GUAIFENESIN 600 MG/1
600 TABLET, EXTENDED RELEASE ORAL 2 TIMES DAILY
Qty: 30 TABLET | Refills: 0 | Status: SHIPPED | OUTPATIENT
Start: 2025-06-10 | End: 2025-06-25

## 2025-06-10 NOTE — TELEPHONE ENCOUNTER
Last appt 3/18/2025      Next Apt:     Future Appointments   Date Time Provider Department Center   9/16/2025 10:00 AM Yancy Alexander, LYNN - NP [facility]   11/12/2025  1:00 PM PEDS INF CHAIR 5 [facility] - [place], [place] - [address] [facility] - P [phone] - F [phone]  [address] [facility] [address]  Phone: [phone] Fax: [phone]

## 2025-06-16 ENCOUNTER — OFFICE VISIT (OUTPATIENT)
Age: 45
End: 2025-06-16
Payer: MEDICARE

## 2025-06-16 VITALS
HEIGHT: 70 IN | BODY MASS INDEX: 18.61 KG/M2 | OXYGEN SATURATION: 99 % | WEIGHT: 130 LBS | SYSTOLIC BLOOD PRESSURE: 100 MMHG | DIASTOLIC BLOOD PRESSURE: 70 MMHG | RESPIRATION RATE: 16 BRPM | HEART RATE: 76 BPM

## 2025-06-16 DIAGNOSIS — Z86.69 HISTORY OF EAR INFECTION: Primary | ICD-10-CM

## 2025-06-16 DIAGNOSIS — F41.9 ANXIETY: ICD-10-CM

## 2025-06-16 DIAGNOSIS — K90.9 INTESTINAL MALABSORPTION, UNSPECIFIED TYPE: ICD-10-CM

## 2025-06-16 PROCEDURE — 99213 OFFICE O/P EST LOW 20 MIN: CPT | Performed by: INTERNAL MEDICINE

## 2025-06-16 PROCEDURE — G8420 CALC BMI NORM PARAMETERS: HCPCS | Performed by: INTERNAL MEDICINE

## 2025-06-16 PROCEDURE — G8427 DOCREV CUR MEDS BY ELIG CLIN: HCPCS | Performed by: INTERNAL MEDICINE

## 2025-06-16 PROCEDURE — 1036F TOBACCO NON-USER: CPT | Performed by: INTERNAL MEDICINE

## 2025-06-16 ASSESSMENT — ENCOUNTER SYMPTOMS
ABDOMINAL DISTENTION: 0
COUGH: 1
ABDOMINAL PAIN: 0

## 2025-06-16 NOTE — PROGRESS NOTES
Subjective    French Jhaveri is a 44 y.o. male who presents today for the following:  Chief Complaint   Patient presents with    UC follow up        History of Present Illness  The patient presents for an urgent care follow-up. Here with his mom and caregiver     He sought medical attention at [facility] on 06/06/2025 due to an ear infection. He was prescribed a 7-day course of amoxicillin, which he has since completed. His symptoms have significantly improved following this treatment. He also experienced severe respiratory congestion, characterized by white nasal discharge, but did not progress to excessive mucus production. The ear infection was identified during this period, and despite the initiation of amoxicillin, his fever persisted. A few days ago, he was started on Mucinex due to persistent respiratory congestion. He continues to experience congestion and has been on Mucinex for 2 days.    He has been on anti-anxiety medication for approximately 6 weeks, which appears to be beneficial. His Zoloft dosage was increased from 50 mg to 75 mg by Lise, a healthcare provider he consults with, and this adjustment has been helpful. He has a scheduled appointment with her in a few weeks.    Plans are in place to consult a rheumatologist due to his continued stiff body movements. He also has significant gut inflammation and is currently on medication for this condition. He has a scheduled appointment with Dr. Colon in 07/2025.            PMH/PSH/Allergies/Social History/medication list and most recent studies reviewed with patient.     reports that he has never smoked. He has never used smokeless tobacco.    reports no history of alcohol use.   Results  Labs       Vitals:    06/16/25 1305   BP: 100/70   Pulse: 76   Resp: 16   SpO2: 99%     Body mass index is 18.65 kg/m².      6/16/2025     1:05 PM 6/4/2025     9:29 AM 3/18/2025    10:49 AM 11/23/2024     2:18 PM 9/11/2024     9:18 AM 3/20/2024     2:32 PM

## 2025-06-24 ENCOUNTER — PATIENT MESSAGE (OUTPATIENT)
Age: 45
End: 2025-06-24

## 2025-06-24 RX ORDER — LEVOMEFOLATE CALCIUM 7.5 MG
TABLET ORAL
Qty: 90 TABLET | Refills: 1 | Status: SHIPPED | OUTPATIENT
Start: 2025-06-24

## 2025-06-24 RX ORDER — CALCIUM CARBONATE/VITAMIN D3 600MG-62.5
CAPSULE ORAL
Qty: 90 CAPSULE | Refills: 1 | Status: SHIPPED | OUTPATIENT
Start: 2025-06-24

## 2025-06-24 NOTE — TELEPHONE ENCOUNTER
Last appt 6/16/2025      Next Apt:     Future Appointments   Date Time Provider Department Center   9/16/2025 10:00 AM Yancy Alexander APRN - NP [facility] DEP   11/12/2025  1:00 PM PEDS INF CHAIR 5 [facility]   3/19/2026 11:30 AM Yancy Alexander APRN - NP [facility] DEP         [facility] - [place], [place] - [address] [facility] - P [phone] - F [phone]  [address] [facility] [address]  Phone: [phone] Fax: [phone]

## 2025-06-25 ENCOUNTER — TELEPHONE (OUTPATIENT)
Age: 45
End: 2025-06-25

## 2025-06-25 DIAGNOSIS — J30.89 NON-SEASONAL ALLERGIC RHINITIS, UNSPECIFIED TRIGGER: ICD-10-CM

## 2025-06-25 RX ORDER — FLUTICASONE PROPIONATE 50 MCG
SPRAY, SUSPENSION (ML) NASAL
Qty: 16 G | Refills: 5 | Status: SHIPPED | OUTPATIENT
Start: 2025-06-25

## 2025-06-25 RX ORDER — NICOTINE 14MG/24HR
1 PATCH, TRANSDERMAL 24 HOURS TRANSDERMAL
Qty: 90 CAPSULE | Refills: 1 | Status: SHIPPED | OUTPATIENT
Start: 2025-06-25

## 2025-06-25 NOTE — TELEPHONE ENCOUNTER
Lov:06/16/2025  Nov:09/16/2025    [facility] - [place], [place] - [address] [facility] -       Saccharomyces boulardii (PROBIOTIC) 250 MG CAPS     fluticasone (FLONASE) 50 MCG/ACT nasal spray

## 2025-07-01 DIAGNOSIS — K90.9 INTESTINAL MALABSORPTION, UNSPECIFIED TYPE: ICD-10-CM

## 2025-07-01 DIAGNOSIS — K59.1 FUNCTIONAL DIARRHEA: ICD-10-CM

## 2025-07-01 DIAGNOSIS — K52.9 CHRONIC DIARRHEA: ICD-10-CM

## 2025-07-15 ENCOUNTER — TELEPHONE (OUTPATIENT)
Age: 45
End: 2025-07-15

## 2025-07-15 DIAGNOSIS — R35.0 URINARY FREQUENCY: ICD-10-CM

## 2025-07-15 DIAGNOSIS — R35.0 URINARY FREQUENCY: Primary | ICD-10-CM

## 2025-07-15 NOTE — TELEPHONE ENCOUNTER
Order placed per provider, call placed to pts mother and left VM order was placed and she take take pt to lab yefri.

## 2025-07-15 NOTE — TELEPHONE ENCOUNTER
Patients mother called and states she received a call from the group home that he is having overnight episodes of incontinent at night and today he an episode of bowel incontinence. They are thinking possible UTI and asking for possible order to have urine checked.

## 2025-07-18 ENCOUNTER — RESULTS FOLLOW-UP (OUTPATIENT)
Age: 45
End: 2025-07-18

## 2025-07-18 LAB
APPEARANCE UR: CLEAR
BACTERIA #/AREA URNS HPF: NORMAL /[HPF]
BILIRUB UR QL STRIP: NEGATIVE
CASTS URNS QL MICRO: NORMAL /LPF
COLOR UR: YELLOW
EPI CELLS #/AREA URNS HPF: NORMAL /HPF (ref 0–10)
GLUCOSE UR QL STRIP: NEGATIVE
HGB UR QL STRIP: NEGATIVE
KETONES UR QL STRIP: NEGATIVE
LEUKOCYTE ESTERASE UR QL STRIP: NEGATIVE
MICRO URNS: NORMAL
MICRO URNS: NORMAL
NITRITE UR QL STRIP: NEGATIVE
PH UR STRIP: 7.5 [PH] (ref 5–7.5)
PROT UR QL STRIP: NEGATIVE
RBC #/AREA URNS HPF: NORMAL /HPF (ref 0–2)
SP GR UR STRIP: 1.02 (ref 1–1.03)
URINALYSIS REFLEX: NORMAL
UROBILINOGEN UR STRIP-MCNC: 0.2 MG/DL (ref 0.2–1)
WBC #/AREA URNS HPF: NORMAL /HPF (ref 0–5)

## 2025-07-25 RX ORDER — NEOMYCIN SULFATE, POLYMYXIN B SULFATE, AND DEXAMETHASONE 3.5; 10000; 1 MG/G; [USP'U]/G; MG/G
OINTMENT OPHTHALMIC
OUTPATIENT
Start: 2025-07-25

## 2025-07-25 NOTE — TELEPHONE ENCOUNTER
06/16/2025 09/16/2025    [facility] - [place], [place] - [address] [facility] -         neomycin-polymyxin-dexameth [id number] OINT

## 2025-07-29 ENCOUNTER — TELEPHONE (OUTPATIENT)
Age: 45
End: 2025-07-29

## 2025-07-29 DIAGNOSIS — E55.9 VITAMIN D DEFICIENCY: ICD-10-CM

## 2025-07-29 DIAGNOSIS — K90.9 INTESTINAL MALABSORPTION, UNSPECIFIED TYPE: ICD-10-CM

## 2025-07-29 RX ORDER — ASPIRIN 81 MG
1 TABLET, DELAYED RELEASE (ENTERIC COATED) ORAL 2 TIMES DAILY
Qty: 62 TABLET | Refills: 12 | Status: SHIPPED | OUTPATIENT
Start: 2025-07-29

## 2025-07-29 RX ORDER — LACTASE 3000 UNIT
1 TABLET ORAL DAILY PRN
Qty: 30 TABLET | Refills: 12 | Status: SHIPPED | OUTPATIENT
Start: 2025-07-29

## 2025-07-29 NOTE — TELEPHONE ENCOUNTER
06/16/2025 09/16/2025     [facility] - [place], [place] - [address] \[facility]"" -         CALCIUM + VITAMIN D3 600-5 MG-MCG TABS tablet       LACTASE ENZYME 3000 units caplet

## 2025-08-01 ENCOUNTER — TELEPHONE (OUTPATIENT)
Age: 45
End: 2025-08-01

## 2025-08-01 NOTE — TELEPHONE ENCOUNTER
-Pts mother is requesting a call back to discuss increased protein levels.   -Urine test results.    Please advise.   Phone: [phone]

## 2025-08-06 ENCOUNTER — TELEPHONE (OUTPATIENT)
Age: 45
End: 2025-08-06

## 2025-08-06 RX ORDER — NICOTINE 14MG/24HR
1 PATCH, TRANSDERMAL 24 HOURS TRANSDERMAL
Qty: 90 CAPSULE | Refills: 1 | Status: SHIPPED | OUTPATIENT
Start: 2025-08-06

## 2025-08-11 DIAGNOSIS — K90.9 INTESTINAL MALABSORPTION, UNSPECIFIED TYPE: ICD-10-CM

## 2025-08-11 DIAGNOSIS — K59.1 FUNCTIONAL DIARRHEA: ICD-10-CM

## 2025-08-11 DIAGNOSIS — K52.9 CHRONIC DIARRHEA: ICD-10-CM

## 2025-08-20 ENCOUNTER — TELEPHONE (OUTPATIENT)
Age: 45
End: 2025-08-20

## 2025-08-21 RX ORDER — POLYETHYLENE GLYCOL 3350 17 G/17G
17 POWDER, FOR SOLUTION ORAL DAILY
Qty: 510 G | Refills: 12 | Status: SHIPPED | OUTPATIENT
Start: 2025-08-21

## 2025-08-25 ENCOUNTER — TELEPHONE (OUTPATIENT)
Age: 45
End: 2025-08-25

## 2025-08-25 RX ORDER — POLYETHYLENE GLYCOL 3350 17 G/17G
17 POWDER, FOR SOLUTION ORAL DAILY
Qty: 510 G | Refills: 12 | Status: SHIPPED | OUTPATIENT
Start: 2025-08-25

## 2025-08-29 ENCOUNTER — TELEPHONE (OUTPATIENT)
Age: 45
End: 2025-08-29

## (undated) DEVICE — CONTAINER SPEC 20 ML LID NEUT BUFF FORMALIN 10 % POLYPR STS

## (undated) DEVICE — SYR 10ML LUER LOK 1/5ML GRAD --

## (undated) DEVICE — SOLIDIFIER MEDC 1200ML -- CONVERT TO 356117

## (undated) DEVICE — FORCEPS BX L240CM JAW DIA2.8MM L CAP W/ NDL MIC MESH TOOTH

## (undated) DEVICE — Device

## (undated) DEVICE — NEEDLE HYPO 18GA L1.5IN PNK S STL HUB POLYPR SHLD REG BVL

## (undated) DEVICE — KENDALL RADIOLUCENT FOAM MONITORING ELECTRODE RECTANGULAR SHAPE: Brand: KENDALL

## (undated) DEVICE — SYR 3ML LL TIP 1/10ML GRAD --

## (undated) DEVICE — BASIN EMESIS 500CC ROSE 250/CS 60/PLT: Brand: MEDEGEN MEDICAL PRODUCTS, LLC

## (undated) DEVICE — CATH IV AUTOGRD BC PNK 20GA 25 -- INSYTE

## (undated) DEVICE — SET ADMIN 16ML TBNG L100IN 2 Y INJ SITE IV PIGGY BK DISP

## (undated) DEVICE — CATH IV AUTOGRD BC BLU 22GA 25 -- INSYTE

## (undated) DEVICE — 1200 GUARD II KIT W/5MM TUBE W/O VAC TUBE: Brand: GUARDIAN

## (undated) DEVICE — NEONATAL-ADULT SPO2 SENSOR: Brand: NELLCOR

## (undated) DEVICE — (D)SYR 10ML 1/5ML GRAD NSAF -- PKGING CHANGE USE ITEM 338027

## (undated) DEVICE — Z DISCONTINUED PER MEDLINE LINE GAS SAMPLING O2/CO2 LNG AD 13 FT NSL W/ TBNG FILTERLINE

## (undated) DEVICE — BAG SPEC BIOHZRD 10 X 10 IN --

## (undated) DEVICE — TOWEL 4 PLY TISS 19X30 SUE WHT

## (undated) DEVICE — MEDI-VAC YANK SUCT HNDL W/TPRD BULBOUS TIP: Brand: CARDINAL HEALTH

## (undated) DEVICE — FORCEPS BX L160CM DIA8MM GRSP DISECT CUP TIP NONLOCKING ROT

## (undated) DEVICE — Device: Brand: MEDEX

## (undated) DEVICE — BLOCK BITE ENDOSCP AD 21 MM W/ DIL BLU LF DISP

## (undated) DEVICE — BASIN EMSIS 16OZ GRAPHITE PLAS KID SHP MOLD GRAD FOR ORAL